# Patient Record
Sex: MALE | Race: WHITE | NOT HISPANIC OR LATINO | Employment: OTHER | URBAN - METROPOLITAN AREA
[De-identification: names, ages, dates, MRNs, and addresses within clinical notes are randomized per-mention and may not be internally consistent; named-entity substitution may affect disease eponyms.]

---

## 2019-03-04 ENCOUNTER — HOSPITAL ENCOUNTER (OUTPATIENT)
Facility: HOSPITAL | Age: 74
Setting detail: HOSPITAL OUTPATIENT SURGERY
End: 2019-03-04
Attending: UROLOGY | Admitting: UROLOGY

## 2019-03-05 RX ORDER — GENTAMICIN SULFATE 80 MG/100ML
80 INJECTION, SOLUTION INTRAVENOUS ONCE
Status: CANCELLED | OUTPATIENT
Start: 2019-03-06

## 2019-03-06 ENCOUNTER — ANESTHESIA EVENT (OUTPATIENT)
Dept: PERIOP | Facility: HOSPITAL | Age: 74
End: 2019-03-06

## 2019-03-06 ENCOUNTER — ANESTHESIA (OUTPATIENT)
Dept: PERIOP | Facility: HOSPITAL | Age: 74
End: 2019-03-06

## 2019-03-06 RX ORDER — LIDOCAINE HYDROCHLORIDE 10 MG/ML
0.5 INJECTION, SOLUTION EPIDURAL; INFILTRATION; INTRACAUDAL; PERINEURAL ONCE AS NEEDED
Status: CANCELLED | OUTPATIENT
Start: 2019-03-06

## 2019-03-06 RX ORDER — SODIUM CHLORIDE 0.9 % (FLUSH) 0.9 %
3-10 SYRINGE (ML) INJECTION AS NEEDED
Status: CANCELLED | OUTPATIENT
Start: 2019-03-06

## 2019-03-06 RX ORDER — FAMOTIDINE 20 MG/1
20 TABLET, FILM COATED ORAL ONCE
Status: CANCELLED | OUTPATIENT
Start: 2019-03-06 | End: 2019-03-06

## 2019-03-06 RX ORDER — SODIUM CHLORIDE, SODIUM LACTATE, POTASSIUM CHLORIDE, CALCIUM CHLORIDE 600; 310; 30; 20 MG/100ML; MG/100ML; MG/100ML; MG/100ML
9 INJECTION, SOLUTION INTRAVENOUS CONTINUOUS
Status: CANCELLED | OUTPATIENT
Start: 2019-03-06

## 2019-03-06 RX ORDER — SODIUM CHLORIDE 0.9 % (FLUSH) 0.9 %
3 SYRINGE (ML) INJECTION EVERY 12 HOURS SCHEDULED
Status: CANCELLED | OUTPATIENT
Start: 2019-03-06

## 2019-03-06 RX ORDER — FAMOTIDINE 10 MG/ML
20 INJECTION, SOLUTION INTRAVENOUS ONCE
Status: CANCELLED | OUTPATIENT
Start: 2019-03-06 | End: 2019-03-06

## 2020-05-12 ENCOUNTER — OFFICE VISIT (OUTPATIENT)
Dept: UROLOGY | Facility: CLINIC | Age: 75
End: 2020-05-12

## 2020-05-12 VITALS — TEMPERATURE: 98.5 F | HEIGHT: 68 IN | BODY MASS INDEX: 33.37 KG/M2 | WEIGHT: 220.2 LBS

## 2020-05-12 DIAGNOSIS — N31.9 NEUROGENIC BLADDER: ICD-10-CM

## 2020-05-12 DIAGNOSIS — C61 PROSTATE CANCER (HCC): Primary | ICD-10-CM

## 2020-05-12 DIAGNOSIS — N35.914 ANTERIOR URETHRAL STRICTURE: ICD-10-CM

## 2020-05-12 LAB
BILIRUB BLD-MCNC: NEGATIVE MG/DL
CLARITY, POC: ABNORMAL
COLOR UR: YELLOW
GLUCOSE UR STRIP-MCNC: NEGATIVE MG/DL
KETONES UR QL: NEGATIVE
LEUKOCYTE EST, POC: ABNORMAL
NITRITE UR-MCNC: POSITIVE MG/ML
PH UR: 6 [PH] (ref 5–8)
PROT UR STRIP-MCNC: NEGATIVE MG/DL
RBC # UR STRIP: ABNORMAL /UL
SP GR UR: 1.01 (ref 1–1.03)
UROBILINOGEN UR QL: NORMAL

## 2020-05-12 PROCEDURE — 81003 URINALYSIS AUTO W/O SCOPE: CPT | Performed by: UROLOGY

## 2020-05-12 PROCEDURE — 99204 OFFICE O/P NEW MOD 45 MIN: CPT | Performed by: UROLOGY

## 2020-05-12 RX ORDER — TROSPIUM CHLORIDE ER 60 MG/1
CAPSULE ORAL
COMMUNITY
Start: 2020-02-03 | End: 2020-07-07 | Stop reason: SDUPTHER

## 2020-05-12 NOTE — PROGRESS NOTES
Mr. Tam is 74 y.o. male    CHIEF COMPLAINT: PSA recurrence after EBRT.  HPI  A medical record summary is included below under the data section.    Prostate cancer   He was initially diagnosed with prostate cancer about  20 year(s) ago. This was identified in the context of elevated psa.  Severity of the disease is best described as biochemical recurrence after definitive therapy. Previous or current management includes external beam radiotherapy and androgen ablation therapy. Associated symptoms include urinary incontinence, urgency, frequency, poor urinary stream and incomplete bladder emptying. Currently his PSA is 22.25 ng/ml.     PSA  DATE  22.26  10/05/2016  <0.13  07/17/2017  7.91  10/17/2018  19.29  03/19/2020  22.25  04/07/2020    Other issues to be evaluated/managed today:  -Neurogenic bladder/anterior urethral stricture: Patient currently manages this with intermittent catheterization up to 7 times daily.  He still has moderate leakage.  He has been offered Botox therapy and continues to follow-up with Dr. Campa.  He is able to pass the catheters without difficulty.      The following portions of the patient's history were reviewed and updated as appropriate: allergies, current medications, past family history, past medical history, past social history, past surgical history and problem list.      Review of Systems   Constitutional: Negative for appetite change and fever.   HENT: Negative for hearing loss and sore throat.    Eyes: Negative for pain and redness.   Respiratory: Negative for cough and shortness of breath.    Cardiovascular: Negative for chest pain and leg swelling.   Gastrointestinal: Negative for anal bleeding, nausea and vomiting.   Endocrine: Negative for cold intolerance and heat intolerance.   Genitourinary: Negative for dysuria, flank pain, frequency, hematuria and urgency.   Musculoskeletal: Negative for joint swelling and myalgias.   Skin: Negative for color change and rash.    Allergic/Immunologic: Negative for immunocompromised state.   Neurological: Negative for dizziness and speech difficulty.   Hematological: Negative for adenopathy. Does not bruise/bleed easily.   Psychiatric/Behavioral: Negative for dysphoric mood and suicidal ideas.         Current Outpatient Medications:   •  dutasteride (AVODART) 0.5 MG capsule, Take 0.5 mg by mouth Daily., Disp: , Rfl:   •  JANUVIA 100 MG tablet, Take 100 mg by mouth Daily., Disp: , Rfl:   •  lisinopril-hydrochlorothiazide (PRINZIDE,ZESTORETIC) 20-12.5 MG per tablet, Take 20 tablets by mouth Daily., Disp: , Rfl:   •  rosuvastatin (CRESTOR) 20 MG tablet, Take 20 mg by mouth Daily., Disp: , Rfl:   •  trospium 60 MG capsule sustained-release 24 hr capsule, trospium ER 60 mg capsule,extended release 24 hr, Disp: , Rfl:   •  bicalutamide (CASODEX) 50 MG chemo tablet, Take 50 mg by mouth Daily., Disp: , Rfl:   •  doxycycline (VIBRAMYCIN) 100 MG capsule, Take 100 mg by mouth 2 (Two) Times a Day., Disp: , Rfl:   •  MYRBETRIQ 25 MG tablet sustained-release 24 hour, Take 25 mg by mouth Daily., Disp: , Rfl:   •  phenazopyridine (PYRIDIUM) 200 MG tablet, Take 200 mg by mouth As Needed., Disp: , Rfl:   •  sulfamethoxazole-trimethoprim (BACTRIM DS,SEPTRA DS) 800-160 MG per tablet, Take 800 tablets by mouth 2 (Two) Times a Day., Disp: , Rfl:   •  tamsulosin (FLOMAX) 0.4 MG capsule 24 hr capsule, Take 0.4 mg by mouth Daily., Disp: , Rfl:     Past Medical History:   Diagnosis Date   • Arrhythmia    • Cancer (CMS/HCC)    • Diabetes mellitus (CMS/HCC)    • Hyperlipidemia    • Hypertension        Past Surgical History:   Procedure Laterality Date   • CATARACT EXTRACTION     • KIDNEY STONE SURGERY     • TONSILLECTOMY         Social History     Socioeconomic History   • Marital status:      Spouse name: Not on file   • Number of children: Not on file   • Years of education: Not on file   • Highest education level: Not on file   Tobacco Use   • Smoking  "status: Never Smoker   • Smokeless tobacco: Never Used   Substance and Sexual Activity   • Alcohol use: No   • Drug use: No   • Sexual activity: Defer       Family History   Problem Relation Age of Onset   • Dementia Mother          Temp 98.5 °F (36.9 °C)   Ht 172.7 cm (68\")   Wt 99.9 kg (220 lb 3.2 oz)   BMI 33.48 kg/m²       Physical Exam  Constitutional: Well nourished, Well developed; No apparent distress; Vital reviewed as above  Psychiatric: Appropriate affect; Alert and oriented  Eyes: Unremarkable  Musculoskeletal: Normal gait and station  GI: Abdomen is soft, non-tender  Respiratory: No distress; Unlabored movement; No accessory musculature needed with symmetric movements  Skin: No pallor or diaphoresis  Lymphatic: No adenopathy neck or groin        International Prostate Symptom Score  The following is posted based on patient questionnaire answers:  0 - not at all    1-7 mild symptoms  1- Less than one time in five  8-19 moderate symptoms  2 -Less than half the time  20-35 severe symptoms  3 - About half the time  4 - More than half the time  5 - Almost always     For following sections:  Incomplete Emptying: - How often have you had the sensation  of not emptying your bladder completely after you finished urinating?  5  Frequency: -How often have you had to urinate again less than   two hours after you finished urinating?      5  Intermittency: -How often have you found you stopped and started again  Several times when you urinate?       2  Urgency: -How often do you find it difficult to postpone urination?             4  Weak stream: - How often have you had a weak urinary stream?             5  Straining: - How often have you had to push or strain to begin  Urination?          5  Sleeping: -How many times did you most typically get up to urinate   From the time you went to bed at night until the time you got up in the   3  Morning          Total `  29    Quality of Life  How would you feel if you had " "to live with your urinary condition the way   6  It is now, no better, no worse for the rest of your life?    Where: 0=delighted; 1= pleased, 2= mostly satisfied, 3= mixed, 4 = mostly  Dissatisfied, 5= Unhappy, 6 = terrible    Medical Records Summary:  Available to me today are  medical records from  [facility], [facility], & [facility] that can be summarized as follows:     -Approximately 2000: There are no records with the dates of when this gentleman underwent EBRT with Casodex monotherapy.  However it is consistently repeated that he had it in the history of several physicians' notes.  -10/2016: Patient PSA jameson to 22.26.  Bone scan and CT scan of the abdomen and pelvis were negative for prostate cancer.  Dr. Campa's note indicates that the patient was started on Lupron and Casodex at the time.  The patient denies getting Lupron.  The chart gives a good history by her that she reviewed Dr. Weller notes which confirmed that he had a total of 7 months of ADT therapy using Lupron.  -03/2017: The patient presented to [facility] with an obstructed, impacted right ureteral stone that was managed by Dr. Armando Colon.   -07/2017: Initial visit with Dr. Campa at [facility].  She saw him for neurogenic bladder. PSA >0.13 after Casodex and possibly leuprolide.   Patient did undergo urodynamic study which revealed a neurogenic bladder \"possibly secondary to radiation and diabetes mellitus 0.2 with overflow incontinence.  While he is done well with the bladder emptying using Klingerman catheterization it appears as though he has failed both Vesicare and Myrbetriq.  -09/2018: Patient decided against having Botox.  He was placed on trospium at 20 mg twice daily.  - 11/2018: Patient presented to emergency room with acute urinary retention.  He was managed by Dr. Frausto at the time.  A severe urethral stricture was noted.  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  He dilated this and the " patient has been on clean remittent catheterization since.  -03/2019: Seen by Dr. Wilson at [facility].  Cystoscopy is recommended.  I have no record of those results.    Assessment and Plan  Diagnoses and all orders for this visit:    Prostate cancer (CMS/[facility])  -     POC Urinalysis Dipstick, Multipro  -     Ambulatory Referral to Oncology  -     NM Bone Scan Whole Body; Future  -     CT Abdomen Pelvis With Contrast; Future    Neurogenic bladder    Anterior urethral stricture       -From prostate cancer standpoint I believe that he needs to be treated with leuprolide.  With regard to antiandrogen therapy versus second line hormonal therapy I would like to get oncology input.  Casodex withdrawal and monotherapy has not been reported on as far as I can tell.  He will be a reasonable candidate for Erleada (apalutamide) if his CT scan of abdomen pelvis and bone scan are negative which I have ordered.  -His neurogenic bladder is stable at this time on intermittent catheterization up to 7 times daily.  He will continue his tamsulosin and trospium.  -At present time he is having no difficulty passing a catheter.      (Please note that portions of this note were completed with a voice recognition program.)  Matthew Oneil MD  05/12/20  15:52

## 2020-05-12 NOTE — PATIENT INSTRUCTIONS

## 2020-05-22 ENCOUNTER — APPOINTMENT (OUTPATIENT)
Dept: CT IMAGING | Facility: HOSPITAL | Age: 75
End: 2020-05-22

## 2020-05-22 ENCOUNTER — APPOINTMENT (OUTPATIENT)
Dept: NUCLEAR MEDICINE | Facility: HOSPITAL | Age: 75
End: 2020-05-22

## 2020-06-11 ENCOUNTER — HOSPITAL ENCOUNTER (OUTPATIENT)
Dept: CT IMAGING | Facility: HOSPITAL | Age: 75
Discharge: HOME | End: 2020-06-11
Admitting: UROLOGY

## 2020-06-11 ENCOUNTER — HOSPITAL ENCOUNTER (OUTPATIENT)
Dept: NUCLEAR MEDICINE | Facility: HOSPITAL | Age: 75
Discharge: HOME | End: 2020-06-11

## 2020-06-11 DIAGNOSIS — C61 PROSTATE CANCER (HCC): ICD-10-CM

## 2020-06-11 LAB — CREAT BLDA-MCNC: 0.8 MG/DL (ref 0.6–1.3)

## 2020-06-11 PROCEDURE — 0 TECHNETIUM OXIDRONATE KIT: Performed by: UROLOGY

## 2020-06-11 PROCEDURE — 78306 BONE IMAGING WHOLE BODY: CPT

## 2020-06-11 PROCEDURE — 25010000002 IOPAMIDOL 61 % SOLUTION: Performed by: UROLOGY

## 2020-06-11 PROCEDURE — 82565 ASSAY OF CREATININE: CPT

## 2020-06-11 PROCEDURE — 74177 CT ABD & PELVIS W/CONTRAST: CPT

## 2020-06-11 PROCEDURE — A9561 TC99M OXIDRONATE: HCPCS | Performed by: UROLOGY

## 2020-06-11 RX ADMIN — IOPAMIDOL 50 ML: 612 INJECTION, SOLUTION INTRAVENOUS at 10:29

## 2020-06-11 RX ADMIN — TECHNETIUM TC 99M OXIDRONATE 1 DOSE: 3.15 INJECTION, POWDER, LYOPHILIZED, FOR SOLUTION INTRAVENOUS at 10:45

## 2020-06-11 RX ADMIN — IOPAMIDOL 100 ML: 612 INJECTION, SOLUTION INTRAVENOUS at 10:29

## 2020-06-22 NOTE — PROGRESS NOTES
[facility]  [address]  [place] [address] [phone]    Patient Name: Simeon Tam  Encounter Date: 06/29/2020  YOB: 1945  Patient Number: [id number]    Initial Note    REASON FOR CONSULTATION: Simeon Tam is a pleasant 75 y.o.  male referred by Dr. Matthew Oneil for management recommendations for prostate cancer.  He is seen with spouse, Essence.  History is obtained from patient. History is considered to be accurate.    HISTORY OF PRESENT ILLNESS: He presented with rising PSA of 13 and was diagnosed with prostate cancer about 25 years ago. He was treated with adjuvant radiation at [place] and androgen ablation with Casodex.    Previous PSA was 22.26 on 10/05/2016, <0.13 on 07/17/2017, 7.91 on 10/17/2018, 19.29 on 03/19/2020, and 22.25 on 04/07/2020.     He was seen by Dr. Oneil 05/12/2020. Latest PSA was 22.25 ng/ml on 04/07/2020.  Previous PSA 22.26 on 10/05/2016.  Bone scan and CT scan of the abdomen and pelvis were negative for prostate cancer on 10/2016.  Started on Lupron and Casodex. He had 7 months of ADT therapy with Lupron. PSA  Was down to <0.13 on 07/17/207.  Plan for apalutamide after staging scans.     CT abdomen and pelvis 06/11/2020. No evidence of metastatic disease in the abdomen or pelvis. Right renal pelvis and ureter urothelial thickening and hyperemia. Urinary bladder wall is thickened and there is adjacent inflammation. Recommend correlation with urinalysis and exam to exclude cystitis with right-sided pyelitis.  Cirrhosis.  Tiny 8 mm hypodense RIGHT inferior liver lesion on image 33 is too small to characterize.    Bone scan 06/11/2020. No evidence of bone metastases.    With the above background, he is seen.     LABS    No results for input(s): HGB, HCT, MCV, WBC, RDW, MPV, PLT, AUTOIGPER, NEUTROABS, LYMPHSABS, MONOSABS, EOSABS, BASOSABS, AUTOIGNUM, NRBC,  NEUTRELPCTM, MONOPCT, BASOPCT, ATYLMPCT, ANISOCYTOSIS, GIANTPLTS in the last 54164 hours.    Invalid input(s): LYMPHOCPCT, ABCMORPH    Lab Results - Last 18 Months   Lab Units 06/11/20  1018   CREATININE mg/dL 0.80       No results for input(s): MSPIKE, KAPPALAMB, IGLFLC, URICACID, FREEKAPPAL, CEA, LDH, REFLABREPO in the last 32892 hours.    No results for input(s): IRON, TIBC, LABIRON, FERRITIN, N6QYADX, TSH, FOLATE in the last 15166 hours.    Invalid input(s): VITB12      PAST MEDICAL HISTORY:  ALLERGIES:  No Known Allergies  CURRENT MEDICATIONS:  Outpatient Encounter Medications as of 6/29/2020   Medication Sig Dispense Refill   • dutasteride (AVODART) 0.5 MG capsule Take 0.5 mg by mouth Daily.     • JANUVIA 100 MG tablet Take 100 mg by mouth Daily.     • lisinopril-hydrochlorothiazide (PRINZIDE,ZESTORETIC) 20-12.5 MG per tablet Take 20 tablets by mouth Daily.     • rosuvastatin (CRESTOR) 20 MG tablet Take 20 mg by mouth Daily.     • trospium 60 MG capsule sustained-release 24 hr capsule trospium ER 60 mg capsule,extended release 24 hr     • bicalutamide (CASODEX) 50 MG chemo tablet Take 50 mg by mouth Daily.     • doxycycline (VIBRAMYCIN) 100 MG capsule Take 100 mg by mouth 2 (Two) Times a Day.     • phenazopyridine (PYRIDIUM) 200 MG tablet Take 200 mg by mouth As Needed.     • sulfamethoxazole-trimethoprim (BACTRIM DS,SEPTRA DS) 800-160 MG per tablet Take 800 tablets by mouth 2 (Two) Times a Day.     • tamsulosin (FLOMAX) 0.4 MG capsule 24 hr capsule Take 0.4 mg by mouth Daily.       No facility-administered encounter medications on file as of 6/29/2020.      ADULT ILLNESSES:  Patient Active Problem List   Diagnosis Code   • Hypertension I10   • Hyperlipidemia E78.5   • Diabetes mellitus (CMS/HCC) E11.9   • Cancer (CMS/HCC) C80.1   • Arrhythmia I49.9   • Non morbid obesity due to excess calories E66.09     SURGERIES:  Past Surgical History:   Procedure Laterality Date   • CATARACT EXTRACTION     • KIDNEY STONE  "SURGERY     • TONSILLECTOMY       HEALTH MAINTENANCE ITEMS:  Health Maintenance Due   Topic Date Due   • URINE MICROALBUMIN  1945   • TDAP/TD VACCINES (1 - Tdap) 06/10/1954   • ZOSTER VACCINE (1 of 2) 06/10/1993   • Pneumococcal Vaccine Once at 65 Years Old  06/10/2008   • HEPATITIS C SCREENING  05/11/2020   • MEDICARE ANNUAL WELLNESS  05/11/2020   • LIPID PANEL  05/11/2020   • HEMOGLOBIN A1C  05/11/2020   • DIABETIC EYE EXAM  05/11/2020       <no information>  Last Completed Colonoscopy     Patient has no health maintenance due at this time          There is no immunization history on file for this patient.  Last Completed Mammogram     Patient has no health maintenance due at this time            FAMILY HISTORY:  Family History   Problem Relation Age of Onset   • Dementia Mother      SOCIAL HISTORY:  Social History     Socioeconomic History   • Marital status:      Spouse name: Not on file   • Number of children: Not on file   • Years of education: Not on file   • Highest education level: Not on file   Tobacco Use   • Smoking status: Never Smoker   • Smokeless tobacco: Never Used   Substance and Sexual Activity   • Alcohol use: No   • Drug use: No   • Sexual activity: Defer       REVIEW OF SYSTEMS:  Review of Systems   Constitutional: Negative for chills, diaphoresis, fatigue, fever and unexpected weight loss.        \"I feel fine.\"   HENT: Negative for congestion, hearing loss and trouble swallowing.    Eyes: Negative for redness and visual disturbance.   Respiratory: Negative for cough, shortness of breath and wheezing.    Cardiovascular: Positive for leg swelling. Negative for chest pain.   Gastrointestinal: Negative for abdominal pain, blood in stool, constipation, diarrhea, nausea and vomiting.   Endocrine: Negative for cold intolerance and heat intolerance.   Genitourinary: Positive for difficulty urinating. Negative for flank pain and hematuria.        Self catheterize.    Musculoskeletal: " "Negative for joint swelling and neck stiffness.   Skin: Negative for pallor.   Allergic/Immunologic: Negative for food allergies.   Neurological: Negative for tremors, speech difficulty, weakness and confusion.   Hematological: Negative for adenopathy. Does not bruise/bleed easily.   Psychiatric/Behavioral: Negative for agitation, hallucinations and sleep disturbance. The patient is not nervous/anxious.        /72   Pulse 87   Temp 99.5 °F (37.5 °C)   Resp 18   Ht 172.7 cm (68\")   Wt 98.2 kg (216 lb 8 oz)   SpO2 94%   BMI 32.92 kg/m²  Body surface area is 2.11 meters squared.  Pain Score    06/29/20 1323   PainSc: 0-No pain       Physical Exam:  Physical Exam   Constitutional: He is oriented to person, place, and time. He appears well-developed and well-nourished. No distress.   HENT:   Head: Normocephalic and atraumatic.   Eyes: EOM are normal. No scleral icterus.   Neck: Neck supple.   Cardiovascular: Normal rate and regular rhythm.   Pulmonary/Chest: Effort normal and breath sounds normal. He has no wheezes. He has no rales.   Abdominal: Bowel sounds are normal. There is no tenderness.   Musculoskeletal: He exhibits edema.   2 + bilateral leg edema. No erythema or calf tenderness.    Lymphadenopathy:     He has no cervical adenopathy.   Neurological: He is alert and oriented to person, place, and time.   Skin: Skin is warm and dry. He is not diaphoretic. No pallor.   Psychiatric: He has a normal mood and affect. His behavior is normal. Judgment and thought content normal.   Vitals reviewed.      Simeon Tam reports a pain score of 0.        Patient's Body mass index is 32.92 kg/m². BMI is above normal parameters. Recommendations include: referral to primary care.        ASSESSMENT:  1.  Prostate cancer.  PSA recurrence.   AJCC stage (TX, NX, M0)  Treatment status:Casodex and Lupron with PSA recurrence.  Start apalutamide and leuprolde (by Dr. Oneil).  2.  Performance status of 0.   3.  " "Cirrhosis.  4.  Neurogenic bladder.   5.  Anterior urethral stricture.  6.  Thrombocytopenia at 102 on 03/19/2020 likley from cirrhosis.  7.  Obesity BMI 32.9.      PLAN:  1.    Re:  Reason for the referral.    2.    Re:  Role of apalutamide for hormone refractory prostate cancer, non metastatic.  Aware of potential adverse effects especially arrhythmia, rash, seizures, thyroid dysfunction, hypothyroidism, fall and fractures.    3.     Re:  CT abdomen and pelvis report. No metastasis.  4.     Re:  Bone scan report. No metastasis.   5.    eRx for apalutamide 60 mg, take four tablets total dose 240 mg po daily, number, 120.  Take either with or without food.  Swallow tablets whole.  TSH before apalutamide.  No grapefruit juice or grapefruit.   6.    eRx for Compazine 10 mg p.o. every 4 hours as needed for nausea/vomiting, 60, 2 refills.    7.    Continue currently identified medications.  8.    Continue ongoing management per primary care physician and other specialists.  9.    Plan of care discussed with patient.  Understanding expressed.  Patient agreeable to proceed.  10.  Blood for TSH, CMP, PSA and CBC with differential today.  11.  Refer to PCP for obesity.  12.  Questions were answered to their satisfaction. \"I got you.\"   13.  Advance Care Planning   ACP discussion was declined by the patient. Patient does not have an advance directive, information provided.  14.  Return to office in 4 weeks with pre-office TSH, CMP, PSA and CBC with differential.      Thank you for the referral.       I spent 64 total minutes, face-to-face, caring for Simeon today.  Greater than 50% of this time involved counseling and/or coordination of care as documented within this note regarding the patient's illness(es), pros and cons of various treatment options, instructions and/or risk reduction.      Matthew Oneil MD  (Oniel Doshi MD)  Lucio Garcia MD  "

## 2020-06-29 ENCOUNTER — LAB (OUTPATIENT)
Dept: LAB | Facility: HOSPITAL | Age: 75
End: 2020-06-29

## 2020-06-29 ENCOUNTER — CONSULT (OUTPATIENT)
Dept: ONCOLOGY | Facility: CLINIC | Age: 75
End: 2020-06-29

## 2020-06-29 VITALS
RESPIRATION RATE: 18 BRPM | SYSTOLIC BLOOD PRESSURE: 136 MMHG | OXYGEN SATURATION: 94 % | BODY MASS INDEX: 32.81 KG/M2 | HEART RATE: 87 BPM | DIASTOLIC BLOOD PRESSURE: 72 MMHG | WEIGHT: 216.5 LBS | TEMPERATURE: 99.5 F | HEIGHT: 68 IN

## 2020-06-29 DIAGNOSIS — C61 PROSTATE CANCER (HCC): Primary | ICD-10-CM

## 2020-06-29 DIAGNOSIS — R53.83 FATIGUE, UNSPECIFIED TYPE: ICD-10-CM

## 2020-06-29 LAB
ALBUMIN SERPL-MCNC: 4.3 G/DL (ref 3.5–5.2)
ALBUMIN/GLOB SERPL: 1.4 G/DL
ALP SERPL-CCNC: 48 U/L (ref 39–117)
ALT SERPL W P-5'-P-CCNC: 26 U/L (ref 1–41)
ANION GAP SERPL CALCULATED.3IONS-SCNC: 14 MMOL/L (ref 5–15)
AST SERPL-CCNC: 28 U/L (ref 1–40)
BASOPHILS # BLD AUTO: 0.04 10*3/MM3 (ref 0–0.2)
BASOPHILS NFR BLD AUTO: 0.5 % (ref 0–1.5)
BILIRUB SERPL-MCNC: 0.6 MG/DL (ref 0.2–1.2)
BUN SERPL-MCNC: 11 MG/DL (ref 8–23)
BUN/CREAT SERPL: 15.7 (ref 7–25)
CALCIUM SPEC-SCNC: 9.6 MG/DL (ref 8.6–10.5)
CHLORIDE SERPL-SCNC: 98 MMOL/L (ref 98–107)
CO2 SERPL-SCNC: 26 MMOL/L (ref 22–29)
CREAT SERPL-MCNC: 0.7 MG/DL (ref 0.76–1.27)
DEPRECATED RDW RBC AUTO: 45.1 FL (ref 37–54)
EOSINOPHIL # BLD AUTO: 0.21 10*3/MM3 (ref 0–0.4)
EOSINOPHIL NFR BLD AUTO: 2.7 % (ref 0.3–6.2)
ERYTHROCYTE [DISTWIDTH] IN BLOOD BY AUTOMATED COUNT: 14 % (ref 12.3–15.4)
GFR SERPL CREATININE-BSD FRML MDRD: 109 ML/MIN/1.73
GLOBULIN UR ELPH-MCNC: 3.1 GM/DL
GLUCOSE SERPL-MCNC: 145 MG/DL (ref 65–99)
HCT VFR BLD AUTO: 46.3 % (ref 37.5–51)
HGB BLD-MCNC: 15.8 G/DL (ref 13–17.7)
LYMPHOCYTES # BLD AUTO: 1.43 10*3/MM3 (ref 0.7–3.1)
LYMPHOCYTES NFR BLD AUTO: 18.4 % (ref 19.6–45.3)
MCH RBC QN AUTO: 30.4 PG (ref 26.6–33)
MCHC RBC AUTO-ENTMCNC: 34.1 G/DL (ref 31.5–35.7)
MCV RBC AUTO: 89 FL (ref 79–97)
MONOCYTES # BLD AUTO: 0.79 10*3/MM3 (ref 0.1–0.9)
MONOCYTES NFR BLD AUTO: 10.2 % (ref 5–12)
NEUTROPHILS NFR BLD AUTO: 5.26 10*3/MM3 (ref 1.7–7)
NEUTROPHILS NFR BLD AUTO: 67.8 % (ref 42.7–76)
PLATELET # BLD AUTO: 121 10*3/MM3 (ref 140–450)
PMV BLD AUTO: 10.1 FL (ref 6–12)
POTASSIUM SERPL-SCNC: 3.8 MMOL/L (ref 3.5–5.2)
PROT SERPL-MCNC: 7.4 G/DL (ref 6–8.5)
RBC # BLD AUTO: 5.2 10*6/MM3 (ref 4.14–5.8)
SODIUM SERPL-SCNC: 138 MMOL/L (ref 136–145)
TSH SERPL DL<=0.05 MIU/L-ACNC: 2.08 UIU/ML (ref 0.27–4.2)
WBC # BLD AUTO: 7.76 10*3/MM3 (ref 3.4–10.8)

## 2020-06-29 PROCEDURE — 84153 ASSAY OF PSA TOTAL: CPT | Performed by: INTERNAL MEDICINE

## 2020-06-29 PROCEDURE — 80053 COMPREHEN METABOLIC PANEL: CPT | Performed by: INTERNAL MEDICINE

## 2020-06-29 PROCEDURE — 85025 COMPLETE CBC W/AUTO DIFF WBC: CPT | Performed by: INTERNAL MEDICINE

## 2020-06-29 PROCEDURE — 84443 ASSAY THYROID STIM HORMONE: CPT | Performed by: INTERNAL MEDICINE

## 2020-06-29 PROCEDURE — 36415 COLL VENOUS BLD VENIPUNCTURE: CPT | Performed by: INTERNAL MEDICINE

## 2020-06-29 PROCEDURE — 99205 OFFICE O/P NEW HI 60 MIN: CPT | Performed by: INTERNAL MEDICINE

## 2020-06-30 ENCOUNTER — SPECIALTY PHARMACY (OUTPATIENT)
Dept: ONCOLOGY | Facility: HOSPITAL | Age: 75
End: 2020-06-30

## 2020-06-30 DIAGNOSIS — C61 PROSTATE CANCER (HCC): Primary | ICD-10-CM

## 2020-06-30 LAB — PSA SERPL-MCNC: 27.3 NG/ML (ref 0–4)

## 2020-06-30 RX ORDER — PROCHLORPERAZINE MALEATE 10 MG
10 TABLET ORAL EVERY 4 HOURS PRN
Qty: 30 TABLET | Refills: 5 | Status: SHIPPED | OUTPATIENT
Start: 2020-06-30 | End: 2021-04-20

## 2020-07-01 ENCOUNTER — TELEPHONE (OUTPATIENT)
Dept: ONCOLOGY | Facility: CLINIC | Age: 75
End: 2020-07-01

## 2020-07-01 NOTE — TELEPHONE ENCOUNTER
----- Message from Deshawn Black MD sent at 6/29/2020  2:20 PM CDT -----  Platelet 121, notify patient, stable for observation.

## 2020-07-06 NOTE — PROGRESS NOTES
[facility]    Oral Oncology Initial Consult      Consult Details  Consulting Provider:   Deshawn Black MD ([facility])   Medication and Regimen:  Apalutamide (Erleada) 60 mg tab - 240 mg daily (#120) x 6 cycles     Script Review and Evaluation:  Dosing & Interactions:   Reviewed, appropriate and no significant interaction noted at this time..   Oral Treatment Plan Signed?: Yes   Oral Treatment Plan Released?: Yes, released on 6/30/20.   Specialty Pharmacy Selected:  [facility]   Prior Authorization Status: Denied - Appeal Pending   Scheduled Counseling Date: 7/6/20   Predicted Prescription Fill Date: This Week     Intervention(s):                  Treatment plan entered for physician review and signature.  Medications reviewed for interactions.  Prior authorization process started with initial rejection, resubmission with transfer to [facility] pharmacy resulted in approval with $6 copay.      Summary:    Contacted patient regarding plan and schedule counseling for tomorrow at 1300.     Viviana Muro, PharmD  06/30/2020 13:59

## 2020-07-07 ENCOUNTER — APPOINTMENT (OUTPATIENT)
Dept: ONCOLOGY | Facility: HOSPITAL | Age: 75
End: 2020-07-07

## 2020-07-07 ENCOUNTER — TELEPHONE (OUTPATIENT)
Dept: UROLOGY | Facility: CLINIC | Age: 75
End: 2020-07-07

## 2020-07-07 DIAGNOSIS — N31.9 NEUROGENIC BLADDER: Primary | ICD-10-CM

## 2020-07-07 RX ORDER — TROSPIUM CHLORIDE ER 60 MG/1
60 CAPSULE ORAL EVERY MORNING
Qty: 30 CAPSULE | Refills: 3 | Status: SHIPPED | OUTPATIENT
Start: 2020-07-07 | End: 2021-01-04 | Stop reason: SDUPTHER

## 2020-07-07 NOTE — TELEPHONE ENCOUNTER
----- Message from Matthew Johns MD sent at 7/7/2020  6:57 AM CDT -----  Regarding: RE: Trospium Refill  Yes they should be refilled.  If he does not think it is helping his symptoms though he could stop it.  ----- Message -----  From: Jonny Delvalle MA  Sent: 7/6/2020   3:08 PM CDT  To: Matthew Johns MD  Subject: Trospium Refill                                  You last saw in him May 2020 for Prostate Ca. He is also followed in [place]. Someone else has been prescribing his Trospium. Do you want to refill this medication please?      ----- Message -----  From: Lissett Weller  Sent: 7/2/2020   1:39 PM CDT  To: Jonny Delvalle MA  Subject: nat pt                                        Mr juarez is needing a refill on his trospium please. He said if he is suppose to keep taking them. He wasn't sure if dr johns still wanted him to take them. His call back number is [phone] thanks.

## 2020-07-07 NOTE — TELEPHONE ENCOUNTER
I sent in 3 refills to [facility] in [place]. I tried to call pt but vm box is full and unable to leave message.

## 2020-07-08 ENCOUNTER — SPECIALTY PHARMACY (OUTPATIENT)
Dept: ONCOLOGY | Facility: HOSPITAL | Age: 75
End: 2020-07-08

## 2020-07-08 NOTE — PROGRESS NOTES
[facility]     Oral Oncology Teaching and Consent      Patient Name (): Simeon Tam  (1945)   Oral Chemotherapy Regimen: Apalutamide 240 mg (four 60 mg tablets) PO once daily    Provider: Deshawn Black MD   Projected Start Date: TBD      Education and information provided to Patient and Family (Name: Essence, Relationship: wife).  Patient's wife also recorded conversation to play for daughter.     Counseling Points  Administration Instructions:  • Dosing regimen.  • Medication can be taken with or without food.  • Proper storage and handling.    Side Effects & Management  • Side effects are often individualized and are usually able to be managed with medication or non-pharmacologic strategies  • Importance of managing side effects preemptively and alerting provider if side effects are unable to be managed at home.    • Highlighted common side effects and management strategies  Fatigue* Stay active   Increased BP Monitor   Rash Monitor   Diarrhea Loperamide (Immodium) - take 2 tablets after each loose stool   Hot flashes Light clothing, stay in a cool environment, place a cool clot on your head   Swelling Monitor and notify provider   Lab abnormalities*  Low blood counts   Increased cholesterol, triglycerides, blood sugar, potassium.  Thyroid dysfunction. Monitor for fever and signs of infection, keep lab appointments   Joint Pain OTC APAP, Ibuprofen unless instructed to avoid by another provider.   Weight Loss Monitor, maintain good eating habits.   Fractures Monitor   Dizziness/Falls Monitor, if experiencing dizziness be cautious to avoid falls     *most common  • Current medications acceptable from an interaction standpoint, please alert of any changes as they can sometimes affect the medication. Generally herbal products are not recommended.  • Seizure history and potential for increased risk of seizure.  • Barrier method contraception is recommended.    Contact  Provider If:  • Any of the above side effects are not able to be managed at home, increase dramatically, or become intolerable.  • Signs and symptoms of infection (fever, chills, etc.), unusual bleeding or bruising, or confusion.  • Any unusual symptoms since starting this medication.    Materials Provided  • Handout from [email or web address] (Erleada Patient Handout) provided with annotations concerning side effect management recommendations.    Questions/Comments:  Did not see active order for Lupron at this time. Instructed patient to not initiate Erleada until Lupron therapy had been confirmed and scheduled.    Re-stated medication will be filled through [facility] as preferred by patient's insurance company.     Consent:  Consent reviewed, signed, and will be uploaded.     Follow-up  Will follow-up with patient prior to next scheduled fill to monitor for side effects.     Simeon Tam  7/8/2020 10:53

## 2020-07-13 ENCOUNTER — TELEPHONE (OUTPATIENT)
Dept: UROLOGY | Facility: CLINIC | Age: 75
End: 2020-07-13

## 2020-07-13 NOTE — TELEPHONE ENCOUNTER
Pt's wife called and left message that they are needing his trospium and dutasteride refilled at the [facility] in [place] call back number is [phone] thanks.

## 2020-07-14 ENCOUNTER — TELEPHONE (OUTPATIENT)
Dept: ONCOLOGY | Facility: CLINIC | Age: 75
End: 2020-07-14

## 2020-07-14 NOTE — TELEPHONE ENCOUNTER
"Essence Tam(spouse) calling to discuss with some general questions about patient's cancer. Said for example \"Is it fast growing or slow growing?\" Her callback is [phone]. Then would like to know if we could send a 'hard copy' of the questions and answers.  "

## 2020-07-15 DIAGNOSIS — N31.9 NEUROGENIC BLADDER: ICD-10-CM

## 2020-07-15 RX ORDER — DUTASTERIDE 0.5 MG/1
0.5 CAPSULE, LIQUID FILLED ORAL DAILY
Qty: 30 CAPSULE | Refills: 3 | Status: SHIPPED | OUTPATIENT
Start: 2020-07-15 | End: 2021-01-04 | Stop reason: SDUPTHER

## 2020-07-17 NOTE — TELEPHONE ENCOUNTER
Called wife back and instructed that Dr Black states that it is slow growing.  Wife also states that they have not received any Erleada yet.  Instructed that I will check into this and will call her back.  Wife v/u.

## 2020-07-22 NOTE — PROGRESS NOTES
[facility] HEMATOLOGY AND [facility]COLOGY  [address]  [place] [address] [phone]    Patient Name: Simeon Tam  Encounter Date: 07/29/2020  YOB: 1945  Patient Number: [id number]      REASON FOR FOLLOW-UP: Simeon Tam is a pleasant 75 y.o.  male  who is seen in follow-up for  hormone refractory prostate cancer.  He is seen C1D8 of apalutamide.  He is seen with spouse, Essence.  History is obtained from patient. History is considered to be accurate.        Oncology/Hematology History    DIAGNOSTIC ABNORMALITIES:  He presented with rising PSA of 13 and was diagnosed with prostate cancer about 25 years ago.   Previous PSA was 22.26 on 10/05/2016, <0.13 on 07/17/2017, 7.91 on 10/17/2018, 19.29 on 03/19/2020, and 22.25 on 04/07/2020.   He was seen by Dr. Oneil 05/12/2020. Latest PSA was 22.25 ng/ml on 04/07/2020.  Previous PSA 22.26 on 10/05/2016.  Bone scan and CT scan of the abdomen and pelvis were negative for prostate cancer on 10/2016.  Started on  PSA  Was down to <0.13 on 07/17/207.  Plan for apalutamide after staging scans.   CT abdomen and pelvis 06/11/2020. No evidence of metastatic disease in the abdomen or pelvis. Right renal pelvis and ureter urothelial thickening and hyperemia. Urinary bladder wall is thickened and there is adjacent inflammation. Recommend correlation with urinalysis and exam to exclude cystitis with right-sided pyelitis.  Cirrhosis.  Tiny 8 mm hypodense RIGHT inferior liver lesion on image 33 is too small to characterize.   Bone scan 06/11/2020. No evidence of bone metastases.        PREVIOUS INTERVENTIONS:  He was treated with adjuvant radiation at [place] and androgen ablation with Casodex.  Lupron and Casodex 10/2016. He had 7 months of ADT therapy with Lupron.  Apalutamide 07/22/2020 through present.        Prostate cancer (CMS/HCC)    6/29/2020 -  Chemotherapy     OP PROSTATE Apalutamide       6/30/2020 Initial Diagnosis     Prostate cancer (CMS/[facility])      7/24/2020 -  Chemotherapy     OP LEUPROLIDE Q6M         PAST MEDICAL HISTORY:  ALLERGIES:  No Known Allergies  CURRENT MEDICATIONS:  Outpatient Encounter Medications as of 7/29/2020   Medication Sig Dispense Refill   • Apalutamide 60 MG tablet Take 4 tablets by mouth Daily. Take 4 tablets once daily with or without food. Do not crush or chew tablets. 120 tablet 5   • doxycycline (VIBRAMYCIN) 100 MG capsule Take 100 mg by mouth 2 (Two) Times a Day.     • dutasteride (AVODART) 0.5 MG capsule Take 1 capsule by mouth Daily. 30 capsule 3   • JANUVIA 100 MG tablet Take 100 mg by mouth Daily.     • lisinopril-hydrochlorothiazide (PRINZIDE,ZESTORETIC) 20-12.5 MG per tablet Take 20 tablets by mouth Daily.     • phenazopyridine (PYRIDIUM) 200 MG tablet Take 200 mg by mouth As Needed.     • prochlorperazine (COMPAZINE) 10 MG tablet Take 1 tablet by mouth Every 4 (Four) Hours As Needed for Nausea or Vomiting. 30 tablet 5   • rosuvastatin (CRESTOR) 20 MG tablet Take 20 mg by mouth Daily.     • sulfamethoxazole-trimethoprim (BACTRIM DS,SEPTRA DS) 800-160 MG per tablet Take 800 tablets by mouth 2 (Two) Times a Day.     • tamsulosin (FLOMAX) 0.4 MG capsule 24 hr capsule Take 0.4 mg by mouth Daily.     • trospium 60 MG capsule sustained-release 24 hr capsule Take 1 capsule by mouth Every Morning. 30 capsule 3     No facility-administered encounter medications on file as of 7/29/2020.      ADULT ILLNESSES:  Patient Active Problem List   Diagnosis Code   • Hypertension I10   • Hyperlipidemia E78.5   • Diabetes mellitus (CMS/[facility]) E11.9   • Cancer (CMS/[facility]) C80.1   • Arrhythmia I49.9   • Non morbid obesity due to excess calories E66.09   • Prostate cancer (CMS/[facility]) C61     SURGERIES:  Past Surgical History:   Procedure Laterality Date   • CATARACT EXTRACTION     • KIDNEY STONE SURGERY     • TONSILLECTOMY       HEALTH MAINTENANCE ITEMS:  Health Maintenance Due   Topic Date  "Due   • URINE MICROALBUMIN  1945   • TDAP/TD VACCINES (1 - Tdap) 06/10/1954   • ZOSTER VACCINE (1 of 2) 06/10/1993   • Pneumococcal Vaccine Once at 65 Years Old  06/10/2008   • HEPATITIS C SCREENING  05/11/2020   • MEDICARE ANNUAL WELLNESS  05/11/2020   • LIPID PANEL  05/11/2020   • HEMOGLOBIN A1C  05/11/2020   • DIABETIC EYE EXAM  05/11/2020       <no information>  Last Completed Colonoscopy     Patient has no health maintenance due at this time          There is no immunization history on file for this patient.  Last Completed Mammogram     Patient has no health maintenance due at this time            FAMILY HISTORY:  Family History   Problem Relation Age of Onset   • Dementia Mother      SOCIAL HISTORY:  Social History     Socioeconomic History   • Marital status:      Spouse name: Not on file   • Number of children: Not on file   • Years of education: Not on file   • Highest education level: Not on file   Tobacco Use   • Smoking status: Never Smoker   • Smokeless tobacco: Never Used   Substance and Sexual Activity   • Alcohol use: No   • Drug use: No   • Sexual activity: Defer       REVIEW OF SYSTEMS:    Review of Systems   Constitutional: Negative for chills, diaphoresis, fatigue and fever.        \"I feel good.\" Tolerating apulutamide.   HENT: Negative for congestion, hearing loss and nosebleeds.    Eyes: Negative for redness and visual disturbance.   Respiratory: Negative for cough, shortness of breath and wheezing.    Cardiovascular: Negative for chest pain and palpitations.   Gastrointestinal: Negative for abdominal distention, abdominal pain, blood in stool, constipation, diarrhea and nausea.   Endocrine: Negative for cold intolerance and heat intolerance.   Genitourinary: Negative for difficulty urinating and hematuria.   Musculoskeletal: Negative for gait problem and neck stiffness.   Skin: Negative for pallor.   Allergic/Immunologic: Negative for food allergies.   Neurological: Negative for " "dizziness, speech difficulty and weakness.   Hematological: Negative for adenopathy. Does not bruise/bleed easily.   Psychiatric/Behavioral: Negative for agitation, confusion and hallucinations.       VITAL SIGNS: /70   Pulse 93   Temp 98.6 °F (37 °C)   Resp 18   Ht 172.7 cm (68\")   Wt 98.2 kg (216 lb 8 oz)   SpO2 96%   BMI 32.92 kg/m²   Pain Score    07/29/20 1419   PainSc: 0-No pain       PHYSICAL EXAMINATION:     Physical Exam   Constitutional: He is oriented to person, place, and time. He appears well-developed and well-nourished. No distress.   HENT:   Head: Normocephalic and atraumatic.   Eyes: No scleral icterus.   Neck: Neck supple.   Cardiovascular: Normal rate and regular rhythm.   Pulmonary/Chest: Effort normal and breath sounds normal. He has no wheezes. He has no rales.   Abdominal: Soft. Bowel sounds are normal. There is no tenderness.   Musculoskeletal: He exhibits no edema.   Lymphadenopathy:     He has no cervical adenopathy.   Neurological: He is alert and oriented to person, place, and time.   Skin: Skin is warm and dry. He is not diaphoretic.   Psychiatric: He has a normal mood and affect. His behavior is normal. Judgment and thought content normal.   Vitals reviewed.      LABS    Lab Results - Last 18 Months   Lab Units 06/29/20  1408   HEMOGLOBIN g/dL 15.8   HEMATOCRIT % 46.3   MCV fL 89.0   WBC 10*3/mm3 7.76   RDW % 14.0   MPV fL 10.1   PLATELETS 10*3/mm3 121*   NEUTROS ABS 10*3/mm3 5.26   LYMPHS ABS 10*3/mm3 1.43   MONOS ABS 10*3/mm3 0.79   EOS ABS 10*3/mm3 0.21   BASOS ABS 10*3/mm3 0.04       Lab Results - Last 18 Months   Lab Units 06/29/20  1408 06/11/20  1018   GLUCOSE mg/dL 145*  --    SODIUM mmol/L 138  --    POTASSIUM mmol/L 3.8  --    CO2 mmol/L 26.0  --    CHLORIDE mmol/L 98  --    ANION GAP mmol/L 14.0  --    CREATININE mg/dL 0.70* 0.80   BUN mg/dL 11  --    BUN / CREAT RATIO  15.7  --    CALCIUM mg/dL 9.6  --    EGFR IF NONAFRICN AM mL/min/1.73 109  --    ALK PHOS U/L " 48  --    TOTAL PROTEIN g/dL 7.4  --    ALT (SGPT) U/L 26  --    AST (SGOT) U/L 28  --    BILIRUBIN mg/dL 0.6  --    ALBUMIN g/dL 4.30  --    GLOBULIN gm/dL 3.1  --        No results for input(s): MSPIKE, KAPPALAMB, IGLFLC, URICACID, FREEKAPPAL, CEA, LDH, REFLABREPO in the last 15379 hours.    Lab Results - Last 18 Months   Lab Units 06/29/20  1408   TSH uIU/mL 2.080       Simeon Tam reports a pain score of 0.        Patient's Body mass index is 32.92 kg/m². BMI is above normal parameters. Recommendations include: referral to primary care.        ASSESSMENT:  1.  Prostate cancer.  PSA recurrence.   AJCC stage (TX, NX, M0)  Treatment status:Casodex and Lupron with PSA recurrence.  On apalutamide and leuprolde (by Dr. Oneil).  2.  Performance status of 0.   3.  Cirrhosis.  4.  Neurogenic bladder.   5.  Anterior urethral stricture.  6.  Thrombocytopenia at 102 on 03/19/2020 likley from cirrhosis.  7.  Obesity BMI 32.92.        PLAN:  1.    Re:  Tolerance to apalutamide.    2.    Re:   Heme status.  None.  3.    Re:   Pre-office CMP and TSH.  None.   4.    Re:   Pre-office PSA.  None.   5.   eRx for C2D1 on 08/19/2020 apalutamide 60 mg, take four tablets total dose 240 mg po daily, number, 120.  Take either with or without food.  Swallow tablets whole.  TSH before apalutamide.  No grapefruit juice or grapefruit.   Monitor for adverse effects especially arrhythmia, rash, seizures, thyroid dysfunction, hypothyroidism, fall and fractures.  6.    eRx for Compazine 10 mg p.o. every 4 hours as needed for nausea/vomiting, 60, 2 refills if needed.    7.    Continue currently identified medications.  8.    Continue ongoing management per primary care physician and other specialists.  9.    Plan of care discussed with patient.  Understanding expressed.  Patient agreeable to proceed.  10.  Blood for TSH, CMP, PSA and CBC with differential in 2 weeks.  11.  Refer to PCP for obesity.  12.  Questions  "were answered to their satisfaction. \"We got you.\"   13.  Advance Care Planning  ACP discussion was declined by the patient. Patient does not have an advance directive, information provided.  14.  Return to office in 6 weeks with pre-office TSH, CMP, PSA and CBC with differential.  15.  Lupron per Dr. Oneil.          I spent 28 total minutes, face-to-face, caring for Simeon today.  Greater than 50% of this time involved counseling and/or coordination of care as documented within this note regarding the patient's illness(es), pros and cons of various treatment options, instructions and/or risk reduction.        Matthew Oneil MD  (Oniel Doshi MD)  Lucio Garcia MD  "

## 2020-07-29 ENCOUNTER — OFFICE VISIT (OUTPATIENT)
Dept: ONCOLOGY | Facility: CLINIC | Age: 75
End: 2020-07-29

## 2020-07-29 ENCOUNTER — INFUSION (OUTPATIENT)
Dept: ONCOLOGY | Facility: HOSPITAL | Age: 75
End: 2020-07-29

## 2020-07-29 VITALS
RESPIRATION RATE: 18 BRPM | SYSTOLIC BLOOD PRESSURE: 133 MMHG | OXYGEN SATURATION: 92 % | HEART RATE: 92 BPM | DIASTOLIC BLOOD PRESSURE: 62 MMHG | TEMPERATURE: 98.1 F

## 2020-07-29 VITALS
HEART RATE: 93 BPM | DIASTOLIC BLOOD PRESSURE: 70 MMHG | HEIGHT: 68 IN | TEMPERATURE: 98.6 F | OXYGEN SATURATION: 96 % | RESPIRATION RATE: 18 BRPM | WEIGHT: 216.5 LBS | SYSTOLIC BLOOD PRESSURE: 134 MMHG | BODY MASS INDEX: 32.81 KG/M2

## 2020-07-29 DIAGNOSIS — R53.83 FATIGUE, UNSPECIFIED TYPE: ICD-10-CM

## 2020-07-29 DIAGNOSIS — C61 PROSTATE CANCER (HCC): Primary | ICD-10-CM

## 2020-07-29 PROCEDURE — 25010000002 LEUPROLIDE ACETATE PER 7.5 MG: Performed by: UROLOGY

## 2020-07-29 PROCEDURE — 99214 OFFICE O/P EST MOD 30 MIN: CPT | Performed by: INTERNAL MEDICINE

## 2020-07-29 PROCEDURE — 96402 CHEMO HORMON ANTINEOPL SQ/IM: CPT

## 2020-07-29 RX ADMIN — LEUPROLIDE ACETATE 7.5 MG: KIT at 15:29

## 2020-07-29 NOTE — PATIENT INSTRUCTIONS
Leuprolide depot injection  What is this medicine?  LEUPROLIDE (loo PROE lide) is a man-made protein that acts like a natural hormone in the body. It decreases testosterone in men and decreases estrogen in women. In men, this medicine is used to treat advanced prostate cancer. In women, some forms of this medicine may be used to treat endometriosis, uterine fibroids, or other female hormone-related problems.  This medicine may be used for other purposes; ask your health care provider or pharmacist if you have questions.  COMMON BRAND NAME(S): Eligard, Fensolv, Lupron Depot, Lupron Depot-Ped, Viadur  What should I tell my health care provider before I take this medicine?  They need to know if you have any of these conditions:  · diabetes  · heart disease or previous heart attack  · high blood pressure  · high cholesterol  · mental illness  · osteoporosis  · pain or difficulty passing urine  · seizures  · spinal cord metastasis  · stroke  · suicidal thoughts, plans, or attempt; a previous suicide attempt by you or a family member  · tobacco smoker  · unusual vaginal bleeding (women)  · an unusual or allergic reaction to leuprolide, benzyl alcohol, other medicines, foods, dyes, or preservatives  · pregnant or trying to get pregnant  · breast-feeding  How should I use this medicine?  This medicine is for injection into a muscle or for injection under the skin. It is given by a health care professional in a hospital or clinic setting. The specific product will determine how it will be given to you. Make sure you understand which product you receive and how often you will receive it.  Talk to your pediatrician regarding the use of this medicine in children. Special care may be needed.  Overdosage: If you think you have taken too much of this medicine contact a poison control center or emergency room at once.  NOTE: This medicine is only for you. Do not share this medicine with others.  What if I miss a dose?  It is  important not to miss a dose. Call your doctor or health care professional if you are unable to keep an appointment.  Depot injections: Depot injections are given either once-monthly, every 12 weeks, every 16 weeks, or every 24 weeks depending on the product you are prescribed. The product you are prescribed will be based on if you are male or female, and your condition. Make sure you understand your product and dosing.  What may interact with this medicine?  Do not take this medicine with any of the following medications:  · chasteberry  This medicine may also interact with the following medications:  · herbal or dietary supplements, like black cohosh or DHEA  · female hormones, like estrogens or progestins and birth control pills, patches, rings, or injections  · male hormones, like testosterone  This list may not describe all possible interactions. Give your health care provider a list of all the medicines, herbs, non-prescription drugs, or dietary supplements you use. Also tell them if you smoke, drink alcohol, or use illegal drugs. Some items may interact with your medicine.  What should I watch for while using this medicine?  Visit your doctor or health care professional for regular checks on your progress. During the first weeks of treatment, your symptoms may get worse, but then will improve as you continue your treatment. You may get hot flashes, increased bone pain, increased difficulty passing urine, or an aggravation of nerve symptoms. Discuss these effects with your doctor or health care professional, some of them may improve with continued use of this medicine.  Female patients may experience a menstrual cycle or spotting during the first months of therapy with this medicine. If this continues, contact your doctor or health care professional.  This medicine may increase blood sugar. Ask your healthcare provider if changes in diet or medicines are needed if you have diabetes.  What side effects may I  notice from receiving this medicine?  Side effects that you should report to your doctor or health care professional as soon as possible:  · allergic reactions like skin rash, itching or hives, swelling of the face, lips, or tongue  · breathing problems  · chest pain  · depression or memory disorders  · pain in your legs or groin  · pain at site where injected or implanted  · seizures  · severe headache  · signs and symptoms of high blood sugar such as being more thirsty or hungry or having to urinate more than normal. You may also feel very tired or have blurry vision  · swelling of the feet and legs  · suicidal thoughts or other mood changes  · visual changes  · vomiting  Side effects that usually do not require medical attention (report to your doctor or health care professional if they continue or are bothersome):  · breast swelling or tenderness  · decrease in sex drive or performance  · diarrhea  · hot flashes  · loss of appetite  · muscle, joint, or bone pains  · nausea  · redness or irritation at site where injected or implanted  · skin problems or acne  This list may not describe all possible side effects. Call your doctor for medical advice about side effects. You may report side effects to FDA at 3-836-FDA-8248.  Where should I keep my medicine?  This drug is given in a hospital or clinic and will not be stored at home.  NOTE: This sheet is a summary. It may not cover all possible information. If you have questions about this medicine, talk to your doctor, pharmacist, or health care provider.  © 2020 [employer] (2019-10-17 09:27:03)

## 2020-08-12 ENCOUNTER — LAB (OUTPATIENT)
Dept: LAB | Facility: HOSPITAL | Age: 75
End: 2020-08-12

## 2020-08-12 DIAGNOSIS — R53.83 FATIGUE, UNSPECIFIED TYPE: ICD-10-CM

## 2020-08-12 DIAGNOSIS — C61 PROSTATE CANCER (HCC): ICD-10-CM

## 2020-08-12 LAB
ALBUMIN SERPL-MCNC: 4.4 G/DL (ref 3.5–5.2)
ALBUMIN/GLOB SERPL: 1.4 G/DL
ALP SERPL-CCNC: 55 U/L (ref 39–117)
ALT SERPL W P-5'-P-CCNC: 34 U/L (ref 1–41)
ANION GAP SERPL CALCULATED.3IONS-SCNC: 13 MMOL/L (ref 5–15)
AST SERPL-CCNC: 39 U/L (ref 1–40)
BASOPHILS # BLD AUTO: 0.04 10*3/MM3 (ref 0–0.2)
BASOPHILS NFR BLD AUTO: 0.6 % (ref 0–1.5)
BILIRUB SERPL-MCNC: 0.4 MG/DL (ref 0–1.2)
BUN SERPL-MCNC: 14 MG/DL (ref 8–23)
BUN/CREAT SERPL: 18.2 (ref 7–25)
CALCIUM SPEC-SCNC: 10 MG/DL (ref 8.6–10.5)
CHLORIDE SERPL-SCNC: 94 MMOL/L (ref 98–107)
CO2 SERPL-SCNC: 28 MMOL/L (ref 22–29)
CREAT SERPL-MCNC: 0.77 MG/DL (ref 0.76–1.27)
DEPRECATED RDW RBC AUTO: 42.2 FL (ref 37–54)
EOSINOPHIL # BLD AUTO: 0.13 10*3/MM3 (ref 0–0.4)
EOSINOPHIL NFR BLD AUTO: 2 % (ref 0.3–6.2)
ERYTHROCYTE [DISTWIDTH] IN BLOOD BY AUTOMATED COUNT: 13.2 % (ref 12.3–15.4)
GFR SERPL CREATININE-BSD FRML MDRD: 98 ML/MIN/1.73
GLOBULIN UR ELPH-MCNC: 3.1 GM/DL
GLUCOSE SERPL-MCNC: 221 MG/DL (ref 65–99)
HCT VFR BLD AUTO: 44.8 % (ref 37.5–51)
HGB BLD-MCNC: 15.5 G/DL (ref 13–17.7)
LYMPHOCYTES # BLD AUTO: 1.13 10*3/MM3 (ref 0.7–3.1)
LYMPHOCYTES NFR BLD AUTO: 17.2 % (ref 19.6–45.3)
MCH RBC QN AUTO: 30.2 PG (ref 26.6–33)
MCHC RBC AUTO-ENTMCNC: 34.6 G/DL (ref 31.5–35.7)
MCV RBC AUTO: 87.2 FL (ref 79–97)
MONOCYTES # BLD AUTO: 0.61 10*3/MM3 (ref 0.1–0.9)
MONOCYTES NFR BLD AUTO: 9.3 % (ref 5–12)
NEUTROPHILS NFR BLD AUTO: 4.64 10*3/MM3 (ref 1.7–7)
NEUTROPHILS NFR BLD AUTO: 70.6 % (ref 42.7–76)
PLATELET # BLD AUTO: 127 10*3/MM3 (ref 140–450)
PMV BLD AUTO: 10.4 FL (ref 6–12)
POTASSIUM SERPL-SCNC: 4 MMOL/L (ref 3.5–5.2)
PROT SERPL-MCNC: 7.5 G/DL (ref 6–8.5)
RBC # BLD AUTO: 5.14 10*6/MM3 (ref 4.14–5.8)
SODIUM SERPL-SCNC: 135 MMOL/L (ref 136–145)
TSH SERPL DL<=0.05 MIU/L-ACNC: 2.07 UIU/ML (ref 0.27–4.2)
WBC # BLD AUTO: 6.57 10*3/MM3 (ref 3.4–10.8)

## 2020-08-12 PROCEDURE — 84153 ASSAY OF PSA TOTAL: CPT

## 2020-08-12 PROCEDURE — 85025 COMPLETE CBC W/AUTO DIFF WBC: CPT

## 2020-08-12 PROCEDURE — 36415 COLL VENOUS BLD VENIPUNCTURE: CPT

## 2020-08-12 PROCEDURE — 84443 ASSAY THYROID STIM HORMONE: CPT

## 2020-08-12 PROCEDURE — 80053 COMPREHEN METABOLIC PANEL: CPT

## 2020-08-13 LAB — PSA SERPL-MCNC: 1.03 NG/ML (ref 0–4)

## 2020-08-17 ENCOUNTER — TELEPHONE (OUTPATIENT)
Dept: ONCOLOGY | Facility: CLINIC | Age: 75
End: 2020-08-17

## 2020-08-17 NOTE — TELEPHONE ENCOUNTER
Essence, patient's wife, calling.    Patient had labs last week.  They would like lab results.    Also, patient is going to dentist tomorrow.  Essence is not sure this is a good idea due to him being on chemotherapy.    [phone]

## 2020-08-17 NOTE — TELEPHONE ENCOUNTER
Labs reviewed with Essence.  Discussed Simeon getting dental work tomorrow with Dr. Black and he is ok for dental work to be done. Notified Essence that Simeon if ok to get dental work done.

## 2020-08-26 ENCOUNTER — INFUSION (OUTPATIENT)
Dept: ONCOLOGY | Facility: HOSPITAL | Age: 75
End: 2020-08-26

## 2020-08-26 VITALS
SYSTOLIC BLOOD PRESSURE: 113 MMHG | HEART RATE: 81 BPM | WEIGHT: 213 LBS | HEIGHT: 68 IN | OXYGEN SATURATION: 92 % | RESPIRATION RATE: 20 BRPM | BODY MASS INDEX: 32.28 KG/M2 | DIASTOLIC BLOOD PRESSURE: 62 MMHG | TEMPERATURE: 97.8 F

## 2020-08-26 DIAGNOSIS — C61 PROSTATE CANCER (HCC): Primary | ICD-10-CM

## 2020-08-26 PROCEDURE — 25010000002 LEUPROLIDE 45 MG KIT: Performed by: UROLOGY

## 2020-08-26 PROCEDURE — 96402 CHEMO HORMON ANTINEOPL SQ/IM: CPT

## 2020-08-26 RX ADMIN — LEUPROLIDE ACETATE 45 MG: KIT at 14:49

## 2020-09-01 ENCOUNTER — LAB (OUTPATIENT)
Dept: LAB | Facility: HOSPITAL | Age: 75
End: 2020-09-01

## 2020-09-01 DIAGNOSIS — R53.83 FATIGUE, UNSPECIFIED TYPE: ICD-10-CM

## 2020-09-01 DIAGNOSIS — C61 PROSTATE CANCER (HCC): ICD-10-CM

## 2020-09-01 LAB
ALBUMIN SERPL-MCNC: 4.3 G/DL (ref 3.5–5.2)
ALBUMIN/GLOB SERPL: 1.4 G/DL
ALP SERPL-CCNC: 61 U/L (ref 39–117)
ALT SERPL W P-5'-P-CCNC: 34 U/L (ref 1–41)
ANION GAP SERPL CALCULATED.3IONS-SCNC: 17 MMOL/L (ref 5–15)
AST SERPL-CCNC: 38 U/L (ref 1–40)
BASOPHILS # BLD AUTO: 0.03 10*3/MM3 (ref 0–0.2)
BASOPHILS NFR BLD AUTO: 0.5 % (ref 0–1.5)
BILIRUB SERPL-MCNC: 0.6 MG/DL (ref 0–1.2)
BUN SERPL-MCNC: 15 MG/DL (ref 8–23)
BUN/CREAT SERPL: 22.1 (ref 7–25)
CALCIUM SPEC-SCNC: 9.7 MG/DL (ref 8.6–10.5)
CHLORIDE SERPL-SCNC: 94 MMOL/L (ref 98–107)
CO2 SERPL-SCNC: 24 MMOL/L (ref 22–29)
CREAT SERPL-MCNC: 0.68 MG/DL (ref 0.76–1.27)
DEPRECATED RDW RBC AUTO: 42.8 FL (ref 37–54)
EOSINOPHIL # BLD AUTO: 0.15 10*3/MM3 (ref 0–0.4)
EOSINOPHIL NFR BLD AUTO: 2.5 % (ref 0.3–6.2)
ERYTHROCYTE [DISTWIDTH] IN BLOOD BY AUTOMATED COUNT: 13.2 % (ref 12.3–15.4)
GFR SERPL CREATININE-BSD FRML MDRD: 113 ML/MIN/1.73
GLOBULIN UR ELPH-MCNC: 3.1 GM/DL
GLUCOSE SERPL-MCNC: 280 MG/DL (ref 65–99)
HCT VFR BLD AUTO: 43.2 % (ref 37.5–51)
HGB BLD-MCNC: 14.7 G/DL (ref 13–17.7)
IMM GRANULOCYTES # BLD AUTO: 0.02 10*3/MM3 (ref 0–0.05)
IMM GRANULOCYTES NFR BLD AUTO: 0.3 % (ref 0–0.5)
LYMPHOCYTES # BLD AUTO: 1 10*3/MM3 (ref 0.7–3.1)
LYMPHOCYTES NFR BLD AUTO: 16.6 % (ref 19.6–45.3)
MCH RBC QN AUTO: 30.1 PG (ref 26.6–33)
MCHC RBC AUTO-ENTMCNC: 34 G/DL (ref 31.5–35.7)
MCV RBC AUTO: 88.5 FL (ref 79–97)
MONOCYTES # BLD AUTO: 0.41 10*3/MM3 (ref 0.1–0.9)
MONOCYTES NFR BLD AUTO: 6.8 % (ref 5–12)
NEUTROPHILS NFR BLD AUTO: 4.4 10*3/MM3 (ref 1.7–7)
NEUTROPHILS NFR BLD AUTO: 73.3 % (ref 42.7–76)
NRBC BLD AUTO-RTO: 0 /100 WBC (ref 0–0.2)
PLATELET # BLD AUTO: 143 10*3/MM3 (ref 140–450)
PMV BLD AUTO: 10.9 FL (ref 6–12)
POTASSIUM SERPL-SCNC: 3.9 MMOL/L (ref 3.5–5.2)
PROT SERPL-MCNC: 7.4 G/DL (ref 6–8.5)
RBC # BLD AUTO: 4.88 10*6/MM3 (ref 4.14–5.8)
SODIUM SERPL-SCNC: 135 MMOL/L (ref 136–145)
TSH SERPL DL<=0.05 MIU/L-ACNC: 2.14 UIU/ML (ref 0.27–4.2)
WBC # BLD AUTO: 6.01 10*3/MM3 (ref 3.4–10.8)

## 2020-09-01 PROCEDURE — 80053 COMPREHEN METABOLIC PANEL: CPT

## 2020-09-01 PROCEDURE — 36415 COLL VENOUS BLD VENIPUNCTURE: CPT

## 2020-09-01 PROCEDURE — 84443 ASSAY THYROID STIM HORMONE: CPT

## 2020-09-01 PROCEDURE — 85025 COMPLETE CBC W/AUTO DIFF WBC: CPT

## 2020-09-01 PROCEDURE — 84153 ASSAY OF PSA TOTAL: CPT

## 2020-09-01 NOTE — PROGRESS NOTES
[facility] HEMATOLOGY AND ONCOLOGY  [address]  [facility] [address] [phone]    Patient Name: Simeon Tam  Encounter Date: 09/08/2020  YOB: 1945  Patient Number: [id number]      REASON FOR FOLLOW-UP: Simeon Tam is a pleasant 75 y.o.  male who is seen in follow-up for hormone refractory prostate cancer.  He is seen C2D21 of apalutamide.  He is seen with spouse, Essence.  History is obtained from patient.  History is considered to be accurate.      Oncology/Hematology History    DIAGNOSTIC ABNORMALITIES:  He presented with rising PSA of 13 and was diagnosed with prostate cancer about 25 years ago.   Previous PSA was 22.26 on 10/05/2016, <0.13 on 07/17/2017, 7.91 on 10/17/2018, 19.29 on 03/19/2020, and 22.25 on 04/07/2020.   He was seen by Dr. Oneil 05/12/2020. Latest PSA was 22.25 ng/ml on 04/07/2020.  Previous PSA 22.26 on 10/05/2016.  Bone scan and CT scan of the abdomen and pelvis were negative for prostate cancer on 10/2016.  Started on Casodex and possibly leuprolide.  PSA  was down to <0.13 on 07/17/207.  Plan for apalutamide after staging scans.   CT abdomen and pelvis 06/11/2020. No evidence of metastatic disease in the abdomen or pelvis. Right renal pelvis and ureter urothelial thickening and hyperemia. Urinary bladder wall is thickened and there is adjacent inflammation. Recommend correlation with urinalysis and exam to exclude cystitis with right-sided pyelitis.  Cirrhosis.  Tiny 8 mm hypodense RIGHT inferior liver lesion on image 33 is too small to characterize.   Bone scan 06/11/2020. No evidence of bone metastases.        PREVIOUS INTERVENTIONS:  He was treated with adjuvant radiation at [place] and androgen ablation with Casodex.  Lupron and Casodex 10/2016. He had 7 months of ADT therapy with Lupron.  Apalutamide 07/22/2020 through present.        Prostate cancer (CMS/HCC)    6/29/2020 -   Chemotherapy     OP PROSTATE Apalutamide      6/30/2020 Initial Diagnosis     Prostate cancer (CMS/[facility])      7/29/2020 -  Chemotherapy     OP LEUPROLIDE Q6M         PAST MEDICAL HISTORY:  ALLERGIES:  No Known Allergies  CURRENT MEDICATIONS:  Outpatient Encounter Medications as of 9/8/2020   Medication Sig Dispense Refill   • Apalutamide 60 MG tablet Take 4 tablets by mouth Daily. Take 4 tablets once daily with or without food. Do not crush or chew tablets. 120 tablet 5   • doxycycline (VIBRAMYCIN) 100 MG capsule Take 100 mg by mouth 2 (Two) Times a Day.     • dutasteride (AVODART) 0.5 MG capsule Take 1 capsule by mouth Daily. 30 capsule 3   • JANUVIA 100 MG tablet Take 100 mg by mouth Daily.     • lisinopril-hydrochlorothiazide (PRINZIDE,ZESTORETIC) 20-12.5 MG per tablet Take 20 tablets by mouth Daily.     • phenazopyridine (PYRIDIUM) 200 MG tablet Take 200 mg by mouth As Needed.     • prochlorperazine (COMPAZINE) 10 MG tablet Take 1 tablet by mouth Every 4 (Four) Hours As Needed for Nausea or Vomiting. 30 tablet 5   • rosuvastatin (CRESTOR) 20 MG tablet Take 20 mg by mouth Daily.     • sulfamethoxazole-trimethoprim (BACTRIM DS,SEPTRA DS) 800-160 MG per tablet Take 800 tablets by mouth 2 (Two) Times a Day.     • tamsulosin (FLOMAX) 0.4 MG capsule 24 hr capsule Take 0.4 mg by mouth Daily.     • trospium 60 MG capsule sustained-release 24 hr capsule Take 1 capsule by mouth Every Morning. 30 capsule 3     No facility-administered encounter medications on file as of 9/8/2020.      ADULT ILLNESSES:  Patient Active Problem List   Diagnosis Code   • Hypertension I10   • Hyperlipidemia E78.5   • Diabetes mellitus (CMS/[facility]) E11.9   • Cancer (CMS/[facility]) C80.1   • Arrhythmia I49.9   • Non morbid obesity due to excess calories E66.09   • Prostate cancer (CMS/[facility]) C61     SURGERIES:  Past Surgical History:   Procedure Laterality Date   • CATARACT EXTRACTION     • KIDNEY STONE SURGERY     • TONSILLECTOMY       HEALTH MAINTENANCE  "ITEMS:  Health Maintenance Due   Topic Date Due   • URINE MICROALBUMIN  1945   • TDAP/TD VACCINES (1 - Tdap) 06/10/1954   • ZOSTER VACCINE (1 of 2) 06/10/1993   • Pneumococcal Vaccine Once at 65 Years Old  06/10/2008   • HEPATITIS C SCREENING  05/11/2020   • MEDICARE ANNUAL WELLNESS  05/11/2020   • LIPID PANEL  05/11/2020   • HEMOGLOBIN A1C  05/11/2020   • DIABETIC EYE EXAM  05/11/2020   • INFLUENZA VACCINE  08/01/2020       <no information>  Last Completed Colonoscopy     Patient has no health maintenance due at this time          There is no immunization history on file for this patient.  Last Completed Mammogram     Patient has no health maintenance due at this time            FAMILY HISTORY:  Family History   Problem Relation Age of Onset   • Dementia Mother      SOCIAL HISTORY:  Social History     Socioeconomic History   • Marital status:      Spouse name: Not on file   • Number of children: Not on file   • Years of education: Not on file   • Highest education level: Not on file   Tobacco Use   • Smoking status: Never Smoker   • Smokeless tobacco: Never Used   Substance and Sexual Activity   • Alcohol use: No   • Drug use: No   • Sexual activity: Defer       REVIEW OF SYSTEMS:    Review of Systems   Constitutional: Negative for chills, diaphoresis, fatigue and fever.        \"I feel good.\" Tolerating apalutamide.    HENT: Negative for congestion, facial swelling and trouble swallowing.    Eyes: Negative for redness and visual disturbance.   Respiratory: Negative for cough, shortness of breath and wheezing.    Cardiovascular: Negative for chest pain and leg swelling.   Gastrointestinal: Negative for abdominal pain, blood in stool, constipation, diarrhea, nausea and vomiting.   Endocrine: Negative for cold intolerance and heat intolerance.   Genitourinary: Negative for difficulty urinating, flank pain and hematuria.   Musculoskeletal: Negative for gait problem and joint swelling.   Skin: Negative for " "pallor.   Allergic/Immunologic: Negative for food allergies.   Neurological: Negative for dizziness, speech difficulty and weakness.   Hematological: Negative for adenopathy. Does not bruise/bleed easily.   Psychiatric/Behavioral: Negative for agitation, confusion and hallucinations. The patient is not nervous/anxious.        VITAL SIGNS: /68   Pulse 87   Temp 97.5 °F (36.4 °C)   Resp 18   Ht 172.7 cm (68\")   Wt 98.2 kg (216 lb 6.4 oz)   SpO2 92%   BMI 32.90 kg/m²   Pain Score    09/08/20 1437   PainSc: 0-No pain       PHYSICAL EXAMINATION:     Physical Exam   Constitutional: He is oriented to person, place, and time. He appears well-developed and well-nourished. No distress.   HENT:   Head: Normocephalic and atraumatic.   Eyes: No scleral icterus.   Neck: Neck supple.   Cardiovascular: Normal rate and regular rhythm.   Pulmonary/Chest: Effort normal and breath sounds normal. He has no wheezes. He has no rales.   Abdominal: Soft. Bowel sounds are normal. There is no tenderness.   Musculoskeletal: He exhibits edema.   Bilateral ankle edema.   Lymphadenopathy:     He has no cervical adenopathy.   Neurological: He is alert and oriented to person, place, and time.   Skin: Skin is warm and dry. He is not diaphoretic. No pallor.   Psychiatric: He has a normal mood and affect. His behavior is normal. Judgment and thought content normal.   Vitals reviewed.      LABS    Lab Results - Last 18 Months   Lab Units 09/01/20  1427 08/12/20  1424 06/29/20  1408   HEMOGLOBIN g/dL 14.7 15.5 15.8   HEMATOCRIT % 43.2 44.8 46.3   MCV fL 88.5 87.2 89.0   WBC 10*3/mm3 6.01 6.57 7.76   RDW % 13.2 13.2 14.0   MPV fL 10.9 10.4 10.1   PLATELETS 10*3/mm3 143 127* 121*   IMM GRAN % % 0.3  --   --    NEUTROS ABS 10*3/mm3 4.40 4.64 5.26   LYMPHS ABS 10*3/mm3 1.00 1.13 1.43   MONOS ABS 10*3/mm3 0.41 0.61 0.79   EOS ABS 10*3/mm3 0.15 0.13 0.21   BASOS ABS 10*3/mm3 0.03 0.04 0.04   IMMATURE GRANS (ABS) 10*3/mm3 0.02  --   --    NRBC " /100 WBC 0.0  --   --        Lab Results - Last 18 Months   Lab Units 09/01/20  1427 08/12/20  1424 06/29/20  1408 06/11/20  1018   GLUCOSE mg/dL 280* 221* 145*  --    SODIUM mmol/L 135* 135* 138  --    POTASSIUM mmol/L 3.9 4.0 3.8  --    CO2 mmol/L 24.0 28.0 26.0  --    CHLORIDE mmol/L 94* 94* 98  --    ANION GAP mmol/L 17.0* 13.0 14.0  --    CREATININE mg/dL 0.68* 0.77 0.70* 0.80   BUN mg/dL 15 14 11  --    BUN / CREAT RATIO  22.1 18.2 15.7  --    CALCIUM mg/dL 9.7 10.0 9.6  --    EGFR IF NONAFRICN AM mL/min/1.73 113 98 109  --    ALK PHOS U/L 61 55 48  --    TOTAL PROTEIN g/dL 7.4 7.5 7.4  --    ALT (SGPT) U/L 34 34 26  --    AST (SGOT) U/L 38 39 28  --    BILIRUBIN mg/dL 0.6 0.4 0.6  --    ALBUMIN g/dL 4.30 4.40 4.30  --    GLOBULIN gm/dL 3.1 3.1 3.1  --        No results for input(s): MSPIKE, KAPPALAMB, IGLFLC, URICACID, FREEKAPPAL, CEA, LDH, REFLABREPO in the last 95223 hours.    Lab Results - Last 18 Months   Lab Units 09/01/20  1427 08/12/20  1424 06/29/20  1408   TSH uIU/mL 2.140 2.070 2.080       Simeon Tam reports a pain score of 0.        Patient's Body mass index is 32.9 kg/m². BMI is above normal parameters. Recommendations include: referral to primary care.      ASSESSMENT:  1.  Prostate cancer.  PSA recurrence.   AJCC stage (TX, NX, M0)  Treatment status:Casodex and Lupron with PSA recurrence.  On apalutamide and leuprolde (by Dr. Oneil).  2.  Performance status of 0.   3.  Cirrhosis.  4.  Neurogenic bladder.   5.  Anterior urethral stricture.  6.  Thrombocytopenia at 102 on 03/19/2020 isreal from cirrhosis.  7.  Obesity BMI 32.9.        PLAN:  1.    Re:   Apalutamide tolerance.    2.    Re:   Heme status.  Hemoglobin 14.7.  3.    Re:   Pre-office CMP remarkable for glucose of 280.  4.    Re:   Pre-office TSH normal at 2.1.  5.    Re:   Pre-office PSA 0.125 from 1.03 from 27.3.   6.   eRx for Compazine 10 mg p.o. every 4 hours as needed for nausea/vomiting, 60, 2  refills if needed.    7.   eRx for C3D1 on 09/16/2020 apalutamide 60 mg, take four tablets total dose 240 mg po daily, number, 120.  Take either with or without food.  Swallow tablets whole.  TSH before apalutamide.  No grapefruit juice or grapefruit.   Monitor for adverse effects especially arrhythmia, rash, seizures, thyroid dysfunction, hypothyroidism, fall and fractures.  8.    Continue ongoing management per primary care physician and other specialists.  9.    Plan of care discussed with patient and spouse.  Understanding expressed.  Patient agreeable to proceed.  10.  Continue currently identified medications.  11.  Refer to PCP for obesity.  12.  Questions were answered to their satisfaction.   13.  Advance Care Planning  ACP discussion was declined by the patient. Patient does not have an advance directive, information provided.  14.  Return to office in 6 weeks with pre-office CMP, PSA and CBC with differential.  15.  Lupron per Dr. Oneil.          I spent 27 total minutes, face-to-face, caring for Simeon today.  Greater than 50% of this time involved counseling and/or coordination of care as documented within this note regarding the patient's illness(es), pros and cons of various treatment options, instructions and/or risk reduction.          Matthew Oneil MD  (Oniel Doshi MD)  Lucio Garcia MD

## 2020-09-02 LAB — PSA SERPL-MCNC: 0.12 NG/ML (ref 0–4)

## 2020-09-08 ENCOUNTER — OFFICE VISIT (OUTPATIENT)
Dept: ONCOLOGY | Facility: CLINIC | Age: 75
End: 2020-09-08

## 2020-09-08 VITALS
RESPIRATION RATE: 18 BRPM | TEMPERATURE: 97.5 F | SYSTOLIC BLOOD PRESSURE: 120 MMHG | OXYGEN SATURATION: 92 % | DIASTOLIC BLOOD PRESSURE: 68 MMHG | BODY MASS INDEX: 32.8 KG/M2 | HEIGHT: 68 IN | WEIGHT: 216.4 LBS | HEART RATE: 87 BPM

## 2020-09-08 DIAGNOSIS — C61 PROSTATE CANCER (HCC): Primary | ICD-10-CM

## 2020-09-08 PROCEDURE — 99214 OFFICE O/P EST MOD 30 MIN: CPT | Performed by: INTERNAL MEDICINE

## 2020-10-06 ENCOUNTER — LAB (OUTPATIENT)
Dept: LAB | Facility: HOSPITAL | Age: 75
End: 2020-10-06

## 2020-10-06 DIAGNOSIS — C61 PROSTATE CANCER (HCC): ICD-10-CM

## 2020-10-06 LAB
ALBUMIN SERPL-MCNC: 4.4 G/DL (ref 3.5–5.2)
ALBUMIN/GLOB SERPL: 1.5 G/DL
ALP SERPL-CCNC: 66 U/L (ref 39–117)
ALT SERPL W P-5'-P-CCNC: 30 U/L (ref 1–41)
ANION GAP SERPL CALCULATED.3IONS-SCNC: 9 MMOL/L (ref 5–15)
AST SERPL-CCNC: 32 U/L (ref 1–40)
BASOPHILS # BLD AUTO: 0.03 10*3/MM3 (ref 0–0.2)
BASOPHILS NFR BLD AUTO: 0.5 % (ref 0–1.5)
BILIRUB SERPL-MCNC: 0.3 MG/DL (ref 0–1.2)
BUN SERPL-MCNC: 15 MG/DL (ref 8–23)
BUN/CREAT SERPL: 21.1 (ref 7–25)
CALCIUM SPEC-SCNC: 9.8 MG/DL (ref 8.6–10.5)
CHLORIDE SERPL-SCNC: 98 MMOL/L (ref 98–107)
CO2 SERPL-SCNC: 31 MMOL/L (ref 22–29)
CREAT SERPL-MCNC: 0.71 MG/DL (ref 0.76–1.27)
DEPRECATED RDW RBC AUTO: 43.7 FL (ref 37–54)
EOSINOPHIL # BLD AUTO: 0.19 10*3/MM3 (ref 0–0.4)
EOSINOPHIL NFR BLD AUTO: 3.2 % (ref 0.3–6.2)
ERYTHROCYTE [DISTWIDTH] IN BLOOD BY AUTOMATED COUNT: 13.2 % (ref 12.3–15.4)
GFR SERPL CREATININE-BSD FRML MDRD: 108 ML/MIN/1.73
GLOBULIN UR ELPH-MCNC: 2.9 GM/DL
GLUCOSE SERPL-MCNC: 158 MG/DL (ref 65–99)
HCT VFR BLD AUTO: 40.9 % (ref 37.5–51)
HGB BLD-MCNC: 14.1 G/DL (ref 13–17.7)
LYMPHOCYTES # BLD AUTO: 1.12 10*3/MM3 (ref 0.7–3.1)
LYMPHOCYTES NFR BLD AUTO: 19 % (ref 19.6–45.3)
MCH RBC QN AUTO: 31.1 PG (ref 26.6–33)
MCHC RBC AUTO-ENTMCNC: 34.5 G/DL (ref 31.5–35.7)
MCV RBC AUTO: 90.3 FL (ref 79–97)
MONOCYTES # BLD AUTO: 0.74 10*3/MM3 (ref 0.1–0.9)
MONOCYTES NFR BLD AUTO: 12.6 % (ref 5–12)
NEUTROPHILS NFR BLD AUTO: 3.79 10*3/MM3 (ref 1.7–7)
NEUTROPHILS NFR BLD AUTO: 64.5 % (ref 42.7–76)
PLATELET # BLD AUTO: 116 10*3/MM3 (ref 140–450)
PMV BLD AUTO: 10.7 FL (ref 6–12)
POTASSIUM SERPL-SCNC: 4 MMOL/L (ref 3.5–5.2)
PROT SERPL-MCNC: 7.3 G/DL (ref 6–8.5)
RBC # BLD AUTO: 4.53 10*6/MM3 (ref 4.14–5.8)
SODIUM SERPL-SCNC: 138 MMOL/L (ref 136–145)
WBC # BLD AUTO: 5.88 10*3/MM3 (ref 3.4–10.8)

## 2020-10-06 PROCEDURE — 85025 COMPLETE CBC W/AUTO DIFF WBC: CPT

## 2020-10-06 PROCEDURE — 36415 COLL VENOUS BLD VENIPUNCTURE: CPT

## 2020-10-06 PROCEDURE — 80053 COMPREHEN METABOLIC PANEL: CPT

## 2020-10-06 PROCEDURE — 84153 ASSAY OF PSA TOTAL: CPT

## 2020-10-07 LAB — PSA SERPL-MCNC: 0.03 NG/ML (ref 0–4)

## 2020-10-12 ENCOUNTER — SPECIALTY PHARMACY (OUTPATIENT)
Dept: ONCOLOGY | Facility: HOSPITAL | Age: 75
End: 2020-10-12

## 2020-10-12 NOTE — PROGRESS NOTES
[facility]    Oral Oncology Patient Follow-Up      Consult Details  Consulting Provider:   Deshawn Black MD ([facility])   Medication and Regimen:  apalutamide 240 mg daily     Prescription Review  Dosing & Interactions:   Reviewed, appropriate and no significant interaction noted at this time..     Intervention(s):                  Called patient to check in and spoke with wife. She states the patient has been doing well with his medication with no notable side effects.  She does state patient has had a cold with productive cough that has been going on for 9 days and has not gotten better. She states on the first day he had a low grade fever.  She states he has been taking Swati-Selzer Plus (Cold & Flu) PowerMax Gels [APAP, Dextromethorphan, and phenylephrine].      Recommended against cough suppressants due to productive cough.  Recommended guaifenesin (plain) with plenty of fluids.  Also, recommended contacting patient's PCP since we are on day 9 and patient is not getting better. Mr. Tam actually stayed home from work today due to feeling poorly.    I will also send a message to the Dr. Black's office as well.     Summary:    Will continue to follow patient.     Viviana Muro, PharmD  10/12/2020 15:54 CDT

## 2020-10-16 NOTE — PROGRESS NOTES
[facility]ATOLOGY AND ONCOLOGY  [address]  [facility] [address] [phone]    Patient Name: Simeon Tam  Encounter Date: 10/23/2020  YOB: 1945  Patient Number: [id number]      REASON FOR FOLLOW-UP: Simeon Tam is a pleasant 75 y.o.  male who is seen in follow-up for hormone refractory prostate cancer.  He is seen C4D10 of apalutamide.  He is seen with spouse, Essence.  History is obtained from patient.  History is considered to be accurate.      Oncology/Hematology History Overview Note   DIAGNOSTIC ABNORMALITIES:  He presented with rising PSA of 13 and was diagnosed with prostate cancer about 25 years ago.   Previous PSA was 22.26 on 10/05/2016, <0.13 on 07/17/2017, 7.91 on 10/17/2018, 19.29 on 03/19/2020, and 22.25 on 04/07/2020.   He was seen by Dr. Oneil 05/12/2020. Latest PSA was 22.25 ng/ml on 04/07/2020.  Previous PSA 22.26 on 10/05/2016.  Bone scan and CT scan of the abdomen and pelvis were negative for prostate cancer on 10/2016.  Started on Casodex and possibly leuprolide.  PSA  was down to <0.13 on 07/17/207.  Plan for apalutamide after staging scans.   CT abdomen and pelvis 06/11/2020. No evidence of metastatic disease in the abdomen or pelvis. Right renal pelvis and ureter urothelial thickening and hyperemia. Urinary bladder wall is thickened and there is adjacent inflammation. Recommend correlation with urinalysis and exam to exclude cystitis with right-sided pyelitis.  Cirrhosis.  Tiny 8 mm hypodense RIGHT inferior liver lesion on image 33 is too small to characterize.   Bone scan 06/11/2020. No evidence of bone metastases.        PREVIOUS INTERVENTIONS:  He was treated with adjuvant radiation at [employer] and androgen ablation with Casodex.  Lupron and Casodex 10/2016. He had 7 months of ADT therapy with Lupron.  Apalutamide 07/22/2020 through present.     Prostate cancer (CMS/HCC)   6/29/2020 -   Chemotherapy    OP PROSTATE Apalutamide     6/30/2020 Initial Diagnosis    Prostate cancer (CMS/[facility])     7/29/2020 -  Chemotherapy    OP LEUPROLIDE Q6M         PAST MEDICAL HISTORY:  ALLERGIES:  No Known Allergies  CURRENT MEDICATIONS:  Outpatient Encounter Medications as of 10/23/2020   Medication Sig Dispense Refill   • Apalutamide 60 MG tablet Take 4 tablets by mouth Daily. Take 4 tablets once daily with or without food. Do not crush or chew tablets. 120 tablet 5   • doxycycline (VIBRAMYCIN) 100 MG capsule Take 100 mg by mouth 2 (Two) Times a Day.     • dutasteride (AVODART) 0.5 MG capsule Take 1 capsule by mouth Daily. 30 capsule 3   • JANUVIA 100 MG tablet Take 100 mg by mouth Daily.     • lisinopril-hydrochlorothiazide (PRINZIDE,ZESTORETIC) 20-12.5 MG per tablet Take 20 tablets by mouth Daily.     • phenazopyridine (PYRIDIUM) 200 MG tablet Take 200 mg by mouth As Needed.     • prochlorperazine (COMPAZINE) 10 MG tablet Take 1 tablet by mouth Every 4 (Four) Hours As Needed for Nausea or Vomiting. 30 tablet 5   • rosuvastatin (CRESTOR) 20 MG tablet Take 20 mg by mouth Daily.     • sulfamethoxazole-trimethoprim (BACTRIM DS,SEPTRA DS) 800-160 MG per tablet Take 800 tablets by mouth 2 (Two) Times a Day.     • tamsulosin (FLOMAX) 0.4 MG capsule 24 hr capsule Take 0.4 mg by mouth Daily.     • trospium 60 MG capsule sustained-release 24 hr capsule Take 1 capsule by mouth Every Morning. 30 capsule 3     No facility-administered encounter medications on file as of 10/23/2020.      ADULT ILLNESSES:  Patient Active Problem List   Diagnosis Code   • Hypertension I10   • Hyperlipidemia E78.5   • Diabetes mellitus (CMS/[facility]) E11.9   • Cancer (CMS/[facility]) C80.1   • Arrhythmia I49.9   • Non morbid obesity due to excess calories E66.09   • Prostate cancer (CMS/[facility]) C61     SURGERIES:  Past Surgical History:   Procedure Laterality Date   • CATARACT EXTRACTION     • KIDNEY STONE SURGERY     • TONSILLECTOMY       HEALTH MAINTENANCE  "ITEMS:  Health Maintenance Due   Topic Date Due   • URINE MICROALBUMIN  1945   • Hepatitis B (1 of 3 - Risk 3-dose series) 06/10/1962   • TDAP/TD VACCINES (1 - Tdap) 06/10/1962   • ZOSTER VACCINE (1 of 2) 06/10/1993   • Pneumococcal Vaccine 65+ (1 of 1 - PPSV23) 06/10/2008   • HEPATITIS C SCREENING  05/11/2020   • ANNUAL WELLNESS VISIT  05/11/2020   • LIPID PANEL  05/11/2020   • HEMOGLOBIN A1C  05/11/2020   • DIABETIC EYE EXAM  05/11/2020   • INFLUENZA VACCINE  08/01/2020       <no information>  Last Completed Colonoscopy     Patient has no health maintenance due at this time          There is no immunization history on file for this patient.  Last Completed Mammogram     Patient has no health maintenance due at this time            FAMILY HISTORY:  Family History   Problem Relation Age of Onset   • Dementia Mother      SOCIAL HISTORY:  Social History     Socioeconomic History   • Marital status:      Spouse name: Not on file   • Number of children: Not on file   • Years of education: Not on file   • Highest education level: Not on file   Tobacco Use   • Smoking status: Never Smoker   • Smokeless tobacco: Never Used   Substance and Sexual Activity   • Alcohol use: No   • Drug use: No   • Sexual activity: Defer       REVIEW OF SYSTEMS:    Review of Systems   Constitutional: Negative for chills, diaphoresis, fatigue and fever.        \"I feel great.\"   HENT: Negative for congestion, mouth sores and trouble swallowing.    Eyes: Negative for redness and visual disturbance.   Respiratory: Positive for cough. Negative for shortness of breath and wheezing.         \"I am better. I had a cold.\"   Cardiovascular: Negative for chest pain and palpitations.   Gastrointestinal: Negative for abdominal pain, constipation, diarrhea, nausea and vomiting.   Endocrine: Negative for cold intolerance and heat intolerance.   Genitourinary: Positive for difficulty urinating. Negative for flank pain.        \"Self catheterize 5 " "times daily.\"   Musculoskeletal: Negative for gait problem and joint swelling.   Skin: Negative for pallor.   Allergic/Immunologic: Negative for food allergies.   Neurological: Negative for dizziness, speech difficulty and weakness.   Hematological: Negative for adenopathy. Does not bruise/bleed easily.   Psychiatric/Behavioral: Negative for agitation and confusion. The patient is not hyperactive.        VITAL SIGNS: /76   Pulse 86   Temp 97.7 °F (36.5 °C) (Temporal)   Resp 16   Ht 172.7 cm (68\")   Wt 94.9 kg (209 lb 4.8 oz)   SpO2 96%   BMI 31.82 kg/m²   Pain Score    10/23/20 1322   PainSc: 0-No pain       PHYSICAL EXAMINATION:     Physical Exam  Vitals signs reviewed.   Constitutional:       Appearance: He is not ill-appearing, toxic-appearing or diaphoretic.   HENT:      Head: Normocephalic and atraumatic.   Eyes:      General: No scleral icterus.  Neck:      Musculoskeletal: Neck supple.   Cardiovascular:      Rate and Rhythm: Normal rate and regular rhythm.   Pulmonary:      Effort: No respiratory distress.      Breath sounds: No wheezing.   Abdominal:      General: Abdomen is flat. Bowel sounds are normal.      Palpations: Abdomen is soft.   Musculoskeletal:         General: No swelling.   Skin:     General: Skin is warm and dry.      Coloration: Skin is not pale.   Neurological:      Mental Status: He is oriented to person, place, and time.   Psychiatric:         Mood and Affect: Mood normal.         Behavior: Behavior normal.         Thought Content: Thought content normal.         Judgment: Judgment normal.         LABS    Lab Results - Last 18 Months   Lab Units 10/06/20  1343 09/01/20  1427 08/12/20  1424 06/29/20  1408   HEMOGLOBIN g/dL 14.1 14.7 15.5 15.8   HEMATOCRIT % 40.9 43.2 44.8 46.3   MCV fL 90.3 88.5 87.2 89.0   WBC 10*3/mm3 5.88 6.01 6.57 7.76   RDW % 13.2 13.2 13.2 14.0   MPV fL 10.7 10.9 10.4 10.1   PLATELETS 10*3/mm3 116* 143 127* 121*   IMM GRAN % %  --  0.3  --   --  "   NEUTROS ABS 10*3/mm3 3.79 4.40 4.64 5.26   LYMPHS ABS 10*3/mm3 1.12 1.00 1.13 1.43   MONOS ABS 10*3/mm3 0.74 0.41 0.61 0.79   EOS ABS 10*3/mm3 0.19 0.15 0.13 0.21   BASOS ABS 10*3/mm3 0.03 0.03 0.04 0.04   IMMATURE GRANS (ABS) 10*3/mm3  --  0.02  --   --    NRBC /100 WBC  --  0.0  --   --        Lab Results - Last 18 Months   Lab Units 10/06/20  1343 09/01/20  1427 08/12/20  1424 06/29/20  1408 06/11/20  1018   GLUCOSE mg/dL 158* 280* 221* 145*  --    SODIUM mmol/L 138 135* 135* 138  --    POTASSIUM mmol/L 4.0 3.9 4.0 3.8  --    CO2 mmol/L 31.0* 24.0 28.0 26.0  --    CHLORIDE mmol/L 98 94* 94* 98  --    ANION GAP mmol/L 9.0 17.0* 13.0 14.0  --    CREATININE mg/dL 0.71* 0.68* 0.77 0.70* 0.80   BUN mg/dL 15 15 14 11  --    BUN / CREAT RATIO  21.1 22.1 18.2 15.7  --    CALCIUM mg/dL 9.8 9.7 10.0 9.6  --    EGFR IF NONAFRICN AM mL/min/1.73 108 113 98 109  --    ALK PHOS U/L 66 61 55 48  --    TOTAL PROTEIN g/dL 7.3 7.4 7.5 7.4  --    ALT (SGPT) U/L 30 34 34 26  --    AST (SGOT) U/L 32 38 39 28  --    BILIRUBIN mg/dL 0.3 0.6 0.4 0.6  --    ALBUMIN g/dL 4.40 4.30 4.40 4.30  --    GLOBULIN gm/dL 2.9 3.1 3.1 3.1  --        No results for input(s): MSPIKE, KAPPALAMB, IGLFLC, URICACID, FREEKAPPAL, CEA, LDH, REFLABREPO in the last 18068 hours.    Lab Results - Last 18 Months   Lab Units 09/01/20  1427 08/12/20  1424 06/29/20  1408   TSH uIU/mL 2.140 2.070 2.080       Simeon Tam reports a pain score of 0.      Patient's Body mass index is 31.82 kg/m². BMI is above normal parameters. Recommendations include: referral to primary care.      ASSESSMENT:  1.  Prostate cancer.  PSA recurrence.   AJCC stage (TX, NX, M0)  Treatment status:Casodex and Lupron with PSA recurrence.  On apalutamide and leuprolde (by Dr. Oneil).  2.  Performance status of 0.   3.  Cirrhosis.  4.  Neurogenic bladder.   5.  Anterior urethral stricture.  6.  Thrombocytopenia at 102 on 03/19/2020 likely from cirrhosis.  7.  Obesity BMI  31.8.        PLAN:  1.    Re:   Tolerating and responding to apalutamide.    2.    Re:   Heme status.  Hemoglobin 14.1 and platelet 116.  3.    Re:   Pre-office CMP remarkable for glucose of 158.  4.    Re:   Pre-office PSA 0.03 from 0.125 from 1.03 from 27.3.   5.   eRx for Compazine 10 mg p.o. every 4 hours as needed for nausea/vomiting, 60, 2 refills if needed.    6.   eRx for C4D1 started on 10/14/2020 apalutamide 60 mg, take four tablets total dose 240 mg po daily, number, 120.  Take either with or without food.  Swallow tablets whole.  TSH before apalutamide.  No grapefruit juice or grapefruit.   Monitor for adverse effects especially arrhythmia, rash, seizures, thyroid dysfunction, hypothyroidism, fall and fractures.  7.    Continue ongoing management per primary care physician and other specialists.  8.    Plan of care discussed with patient and spouse.  Understanding expressed.  Patient agreeable to proceed.  9.    Continue currently identified medications.  10.  Refer to PCP for obesity.  11.  Questions were answered to their satisfaction.   12.  Advance Care Planning  ACP discussion was declined by the patient. Patient does not have an advance directive, information provided.  13.  Return to office in 3 weeks with pre-office CMP, PSA and CBC with differential.  14.  Lupron per Dr. Oneil.          I spent 26 total minutes, face-to-face, caring for Simeon today.  Greater than 50% of this time involved counseling and/or coordination of care as documented within this note regarding the patient's illness(es), pros and cons of various treatment options, instructions and/or risk reduction.           (Matthew Oneil MD)  (Oniel Doshi MD)  Lucio Garcia MD

## 2020-10-23 ENCOUNTER — OFFICE VISIT (OUTPATIENT)
Dept: ONCOLOGY | Facility: CLINIC | Age: 75
End: 2020-10-23

## 2020-10-23 VITALS
SYSTOLIC BLOOD PRESSURE: 118 MMHG | TEMPERATURE: 97.7 F | BODY MASS INDEX: 31.72 KG/M2 | DIASTOLIC BLOOD PRESSURE: 76 MMHG | HEART RATE: 86 BPM | HEIGHT: 68 IN | OXYGEN SATURATION: 96 % | RESPIRATION RATE: 16 BRPM | WEIGHT: 209.3 LBS

## 2020-10-23 DIAGNOSIS — C61 PROSTATE CANCER (HCC): ICD-10-CM

## 2020-10-23 PROCEDURE — 99214 OFFICE O/P EST MOD 30 MIN: CPT | Performed by: INTERNAL MEDICINE

## 2020-10-23 RX ORDER — PROCHLORPERAZINE MALEATE 10 MG
10 TABLET ORAL EVERY 4 HOURS PRN
Qty: 30 TABLET | Refills: 5 | Status: CANCELLED | OUTPATIENT
Start: 2020-10-23

## 2020-10-26 ENCOUNTER — SPECIALTY PHARMACY (OUTPATIENT)
Dept: ONCOLOGY | Facility: HOSPITAL | Age: 75
End: 2020-10-26

## 2020-10-26 ENCOUNTER — TELEPHONE (OUTPATIENT)
Dept: UROLOGY | Facility: CLINIC | Age: 75
End: 2020-10-26

## 2020-10-26 NOTE — PROGRESS NOTES
[facility]    Oral Oncology Patient Follow-Up      Consult Details  Consulting Provider:   Deshawn Black MD ([facility])   Medication and Regimen:  apalutamide 240 mg daily     Patient's wife Essence called concerned about that patient had not had lupron for some time.  She said she discussed this with Dr. Black and he assured her the that Dr. Oneil would have that taken care of.    Intervention(s):                  Told patient that in fact Dr. Oneil is the ordering provider on his Lupron plan.  I told her that because the patient was now on 45 mg dose, he only had to have that injection once every 6 months.  Patient last received Lupron in August so would not be due for some time now.    I also inquired how patient was doing after our last discussion where I encouraged her to talk to go to the doctors office regarding a prolonged cough.  She states the patient was seen and was wheezing at that time and given an inhaler and has since improved some.  Dr. Black saw patient recently and said patient is no longer wheezing.     Summary:    All questions answered.     Viviana Muro, PharmD  10/26/2020 11:57 CDT

## 2020-10-26 NOTE — TELEPHONE ENCOUNTER
Pt wife notified that next Lup[facility]

## 2020-11-30 ENCOUNTER — LAB (OUTPATIENT)
Dept: LAB | Facility: HOSPITAL | Age: 75
End: 2020-11-30

## 2020-11-30 DIAGNOSIS — C61 PROSTATE CANCER (HCC): ICD-10-CM

## 2020-11-30 LAB
ALBUMIN SERPL-MCNC: 4.4 G/DL (ref 3.5–5.2)
ALBUMIN/GLOB SERPL: 1.5 G/DL
ALP SERPL-CCNC: 78 U/L (ref 39–117)
ALT SERPL W P-5'-P-CCNC: 36 U/L (ref 1–41)
ANION GAP SERPL CALCULATED.3IONS-SCNC: 11 MMOL/L (ref 5–15)
AST SERPL-CCNC: 44 U/L (ref 1–40)
BASOPHILS # BLD AUTO: 0.03 10*3/MM3 (ref 0–0.2)
BASOPHILS NFR BLD AUTO: 0.5 % (ref 0–1.5)
BILIRUB SERPL-MCNC: 0.3 MG/DL (ref 0–1.2)
BUN SERPL-MCNC: 16 MG/DL (ref 8–23)
BUN/CREAT SERPL: 24.6 (ref 7–25)
CALCIUM SPEC-SCNC: 10.1 MG/DL (ref 8.6–10.5)
CHLORIDE SERPL-SCNC: 96 MMOL/L (ref 98–107)
CO2 SERPL-SCNC: 30 MMOL/L (ref 22–29)
CREAT SERPL-MCNC: 0.65 MG/DL (ref 0.76–1.27)
DEPRECATED RDW RBC AUTO: 41.5 FL (ref 37–54)
EOSINOPHIL # BLD AUTO: 0.2 10*3/MM3 (ref 0–0.4)
EOSINOPHIL NFR BLD AUTO: 3.3 % (ref 0.3–6.2)
ERYTHROCYTE [DISTWIDTH] IN BLOOD BY AUTOMATED COUNT: 12.7 % (ref 12.3–15.4)
GFR SERPL CREATININE-BSD FRML MDRD: 119 ML/MIN/1.73
GLOBULIN UR ELPH-MCNC: 3 GM/DL
GLUCOSE SERPL-MCNC: 170 MG/DL (ref 65–99)
HCT VFR BLD AUTO: 38.8 % (ref 37.5–51)
HGB BLD-MCNC: 13.5 G/DL (ref 13–17.7)
LYMPHOCYTES # BLD AUTO: 1.08 10*3/MM3 (ref 0.7–3.1)
LYMPHOCYTES NFR BLD AUTO: 17.7 % (ref 19.6–45.3)
MCH RBC QN AUTO: 31.5 PG (ref 26.6–33)
MCHC RBC AUTO-ENTMCNC: 34.8 G/DL (ref 31.5–35.7)
MCV RBC AUTO: 90.4 FL (ref 79–97)
MONOCYTES # BLD AUTO: 0.59 10*3/MM3 (ref 0.1–0.9)
MONOCYTES NFR BLD AUTO: 9.7 % (ref 5–12)
NEUTROPHILS NFR BLD AUTO: 4.17 10*3/MM3 (ref 1.7–7)
NEUTROPHILS NFR BLD AUTO: 68.5 % (ref 42.7–76)
PLATELET # BLD AUTO: 126 10*3/MM3 (ref 140–450)
PMV BLD AUTO: 10.7 FL (ref 6–12)
POTASSIUM SERPL-SCNC: 4 MMOL/L (ref 3.5–5.2)
PROT SERPL-MCNC: 7.4 G/DL (ref 6–8.5)
RBC # BLD AUTO: 4.29 10*6/MM3 (ref 4.14–5.8)
SODIUM SERPL-SCNC: 137 MMOL/L (ref 136–145)
WBC # BLD AUTO: 6.09 10*3/MM3 (ref 3.4–10.8)

## 2020-11-30 PROCEDURE — 85025 COMPLETE CBC W/AUTO DIFF WBC: CPT

## 2020-11-30 PROCEDURE — 36415 COLL VENOUS BLD VENIPUNCTURE: CPT

## 2020-11-30 PROCEDURE — 80053 COMPREHEN METABOLIC PANEL: CPT

## 2020-11-30 PROCEDURE — 84153 ASSAY OF PSA TOTAL: CPT

## 2020-12-01 LAB — PSA SERPL-MCNC: 0.01 NG/ML (ref 0–4)

## 2020-12-07 ENCOUNTER — OFFICE VISIT (OUTPATIENT)
Dept: ONCOLOGY | Facility: CLINIC | Age: 75
End: 2020-12-07

## 2020-12-07 VITALS
WEIGHT: 215.9 LBS | DIASTOLIC BLOOD PRESSURE: 66 MMHG | HEIGHT: 68 IN | TEMPERATURE: 97.9 F | SYSTOLIC BLOOD PRESSURE: 128 MMHG | BODY MASS INDEX: 32.72 KG/M2 | RESPIRATION RATE: 18 BRPM | OXYGEN SATURATION: 96 % | HEART RATE: 80 BPM

## 2020-12-07 DIAGNOSIS — C61 PROSTATE CANCER (HCC): Primary | ICD-10-CM

## 2020-12-07 PROCEDURE — 99214 OFFICE O/P EST MOD 30 MIN: CPT | Performed by: INTERNAL MEDICINE

## 2020-12-28 ENCOUNTER — LAB (OUTPATIENT)
Dept: LAB | Facility: HOSPITAL | Age: 75
End: 2020-12-28

## 2020-12-28 ENCOUNTER — SPECIALTY PHARMACY (OUTPATIENT)
Dept: ONCOLOGY | Facility: HOSPITAL | Age: 75
End: 2020-12-28

## 2020-12-28 DIAGNOSIS — C61 PROSTATE CANCER (HCC): ICD-10-CM

## 2020-12-28 DIAGNOSIS — C61 PROSTATE CANCER (HCC): Primary | ICD-10-CM

## 2020-12-28 LAB
ALBUMIN SERPL-MCNC: 4.3 G/DL (ref 3.5–5.2)
ALBUMIN/GLOB SERPL: 1.3 G/DL
ALP SERPL-CCNC: 74 U/L (ref 39–117)
ALT SERPL W P-5'-P-CCNC: 29 U/L (ref 1–41)
ANION GAP SERPL CALCULATED.3IONS-SCNC: 11 MMOL/L (ref 5–15)
AST SERPL-CCNC: 35 U/L (ref 1–40)
BASOPHILS # BLD AUTO: 0.02 10*3/MM3 (ref 0–0.2)
BASOPHILS NFR BLD AUTO: 0.3 % (ref 0–1.5)
BILIRUB SERPL-MCNC: 0.3 MG/DL (ref 0–1.2)
BUN SERPL-MCNC: 15 MG/DL (ref 8–23)
BUN/CREAT SERPL: 20.8 (ref 7–25)
CALCIUM SPEC-SCNC: 10 MG/DL (ref 8.6–10.5)
CHLORIDE SERPL-SCNC: 95 MMOL/L (ref 98–107)
CO2 SERPL-SCNC: 30 MMOL/L (ref 22–29)
CREAT SERPL-MCNC: 0.72 MG/DL (ref 0.76–1.27)
DEPRECATED RDW RBC AUTO: 41 FL (ref 37–54)
EOSINOPHIL # BLD AUTO: 0.2 10*3/MM3 (ref 0–0.4)
EOSINOPHIL NFR BLD AUTO: 3.2 % (ref 0.3–6.2)
ERYTHROCYTE [DISTWIDTH] IN BLOOD BY AUTOMATED COUNT: 12.4 % (ref 12.3–15.4)
GFR SERPL CREATININE-BSD FRML MDRD: 106 ML/MIN/1.73
GLOBULIN UR ELPH-MCNC: 3.2 GM/DL
GLUCOSE SERPL-MCNC: 191 MG/DL (ref 65–99)
HCT VFR BLD AUTO: 40.1 % (ref 37.5–51)
HGB BLD-MCNC: 14.3 G/DL (ref 13–17.7)
LYMPHOCYTES # BLD AUTO: 1.06 10*3/MM3 (ref 0.7–3.1)
LYMPHOCYTES NFR BLD AUTO: 16.7 % (ref 19.6–45.3)
MCH RBC QN AUTO: 32.3 PG (ref 26.6–33)
MCHC RBC AUTO-ENTMCNC: 35.7 G/DL (ref 31.5–35.7)
MCV RBC AUTO: 90.5 FL (ref 79–97)
MONOCYTES # BLD AUTO: 0.59 10*3/MM3 (ref 0.1–0.9)
MONOCYTES NFR BLD AUTO: 9.3 % (ref 5–12)
NEUTROPHILS NFR BLD AUTO: 4.44 10*3/MM3 (ref 1.7–7)
NEUTROPHILS NFR BLD AUTO: 70.2 % (ref 42.7–76)
PLATELET # BLD AUTO: 124 10*3/MM3 (ref 140–450)
PMV BLD AUTO: 10.7 FL (ref 6–12)
POTASSIUM SERPL-SCNC: 3.9 MMOL/L (ref 3.5–5.2)
PROT SERPL-MCNC: 7.5 G/DL (ref 6–8.5)
RBC # BLD AUTO: 4.43 10*6/MM3 (ref 4.14–5.8)
SODIUM SERPL-SCNC: 136 MMOL/L (ref 136–145)
WBC # BLD AUTO: 6.33 10*3/MM3 (ref 3.4–10.8)

## 2020-12-28 PROCEDURE — 36415 COLL VENOUS BLD VENIPUNCTURE: CPT

## 2020-12-28 PROCEDURE — 85025 COMPLETE CBC W/AUTO DIFF WBC: CPT

## 2020-12-28 PROCEDURE — 80053 COMPREHEN METABOLIC PANEL: CPT

## 2020-12-28 PROCEDURE — 84153 ASSAY OF PSA TOTAL: CPT

## 2020-12-29 LAB — PSA SERPL-MCNC: <0.014 NG/ML (ref 0–4)

## 2020-12-29 NOTE — PROGRESS NOTES
[facility]    Oral Oncology Patient Follow-Up - Refill      Consult Details  Consulting Provider:   Deshawn Black MD ([facility])   Medication and Regimen:  apalutamide 240 mg daily     Prescription Revie  Intervention(s):                  Patient due for refills, entered refill cycle on his treatment plan and sent to Dr. Black for final review and signature.    Called to check on the patient.  Patient's wife states he is doing well, had a good [place].  No side effects of note and no real concerns.  She did mention again she wasn't sure when he would get Lupron again.  I reiterated that he was on the every 6 month regimen and would be due until February (which his plan is entered). I encouraged her to keep an open line of communication with the Urology office.     She also asked how long would he be on this or if he would ever be able to get off. I told her she could discuss this in depth more with either Dr. Oneil or Dr. Black.  However, I did tell her this regimen is typically kept on indefinitely as long as the patient is responding to and tolerating treatment.  She voiced understanding.     Summary:    We will continue to follow patient.     Viviana Muro, PharmD  12/28/2020 15:04 CST

## 2021-01-04 ENCOUNTER — OFFICE VISIT (OUTPATIENT)
Dept: ONCOLOGY | Facility: CLINIC | Age: 76
End: 2021-01-04

## 2021-01-04 VITALS
BODY MASS INDEX: 32.4 KG/M2 | WEIGHT: 213.8 LBS | OXYGEN SATURATION: 95 % | SYSTOLIC BLOOD PRESSURE: 116 MMHG | HEIGHT: 68 IN | HEART RATE: 80 BPM | TEMPERATURE: 97.7 F | DIASTOLIC BLOOD PRESSURE: 68 MMHG | RESPIRATION RATE: 18 BRPM

## 2021-01-04 DIAGNOSIS — R53.83 FATIGUE, UNSPECIFIED TYPE: ICD-10-CM

## 2021-01-04 DIAGNOSIS — C61 PROSTATE CANCER (HCC): Primary | ICD-10-CM

## 2021-01-04 DIAGNOSIS — N31.9 NEUROGENIC BLADDER: ICD-10-CM

## 2021-01-04 PROCEDURE — 99214 OFFICE O/P EST MOD 30 MIN: CPT | Performed by: INTERNAL MEDICINE

## 2021-01-04 RX ORDER — DUTASTERIDE 0.5 MG/1
0.5 CAPSULE, LIQUID FILLED ORAL DAILY
Qty: 60 CAPSULE | Refills: 11 | Status: SHIPPED | OUTPATIENT
Start: 2021-01-04 | End: 2022-01-25 | Stop reason: SDUPTHER

## 2021-01-04 RX ORDER — PHENOL 1.4 %
600 AEROSOL, SPRAY (ML) MUCOUS MEMBRANE DAILY
COMMUNITY

## 2021-01-04 RX ORDER — SPIRONOLACT/HYDROCHLOROTHIAZID 25 MG-25MG
1 TABLET ORAL DAILY
COMMUNITY
End: 2023-01-04

## 2021-01-04 RX ORDER — TROSPIUM CHLORIDE ER 60 MG/1
60 CAPSULE ORAL EVERY MORNING
Qty: 60 CAPSULE | Refills: 11 | Status: SHIPPED | OUTPATIENT
Start: 2021-01-04 | End: 2022-01-03

## 2021-02-18 ENCOUNTER — SPECIALTY PHARMACY (OUTPATIENT)
Dept: ONCOLOGY | Facility: HOSPITAL | Age: 76
End: 2021-02-18

## 2021-02-18 NOTE — PROGRESS NOTES
[facility]    Oral Oncology Patient Follow-Up      Consult Details  Consulting Provider:   Deshawn Black MD ([facility])   Medication and Regimen:  apalutamide [Erleada] 240 mg po daily     Prescription Review  Dosing & Interactions:   Reviewed, appropriate and no significant interaction noted at this time..   Current Specialty Pharmacy [facility]     Intervention(s):                  Spoke with patient's wife (per pt preference) who stated they are receiving refills through [facility] without issue.   Warrant tolerating apalutamide well; however, reports having a significant increase in dental caries and tooth breakage recently.  They have been to see dentist and are not sure what is causing these problems.  Brief review of package insert and PubMed search does not reveal any correlation between Erleada and tooth decay.  Advised patient maintain good oral hygiene and continue to follow up with dentist as issues arise.  No further questions or concerns.     Summary:    Continue to follow.     Alejandra Diaz, PharmD  02/18/2021 14:50 CST

## 2021-02-26 ENCOUNTER — INFUSION (OUTPATIENT)
Dept: ONCOLOGY | Facility: HOSPITAL | Age: 76
End: 2021-02-26

## 2021-02-26 ENCOUNTER — LAB (OUTPATIENT)
Dept: LAB | Facility: HOSPITAL | Age: 76
End: 2021-02-26

## 2021-02-26 VITALS
BODY MASS INDEX: 32.13 KG/M2 | WEIGHT: 212 LBS | HEIGHT: 68 IN | OXYGEN SATURATION: 97 % | TEMPERATURE: 97.5 F | RESPIRATION RATE: 18 BRPM | HEART RATE: 86 BPM | DIASTOLIC BLOOD PRESSURE: 69 MMHG | SYSTOLIC BLOOD PRESSURE: 147 MMHG

## 2021-02-26 DIAGNOSIS — R53.83 FATIGUE, UNSPECIFIED TYPE: ICD-10-CM

## 2021-02-26 DIAGNOSIS — C61 PROSTATE CANCER (HCC): ICD-10-CM

## 2021-02-26 DIAGNOSIS — C61 PROSTATE CANCER (HCC): Primary | ICD-10-CM

## 2021-02-26 LAB
ALBUMIN SERPL-MCNC: 4.2 G/DL (ref 3.5–5.2)
ALBUMIN/GLOB SERPL: 1.3 G/DL
ALP SERPL-CCNC: 75 U/L (ref 39–117)
ALT SERPL W P-5'-P-CCNC: 32 U/L (ref 1–41)
ANION GAP SERPL CALCULATED.3IONS-SCNC: 10 MMOL/L (ref 5–15)
AST SERPL-CCNC: 43 U/L (ref 1–40)
BASOPHILS # BLD AUTO: 0.03 10*3/MM3 (ref 0–0.2)
BASOPHILS NFR BLD AUTO: 0.5 % (ref 0–1.5)
BILIRUB SERPL-MCNC: 0.4 MG/DL (ref 0–1.2)
BUN SERPL-MCNC: 15 MG/DL (ref 8–23)
BUN/CREAT SERPL: 25.4 (ref 7–25)
CALCIUM SPEC-SCNC: 9.7 MG/DL (ref 8.6–10.5)
CHLORIDE SERPL-SCNC: 98 MMOL/L (ref 98–107)
CO2 SERPL-SCNC: 29 MMOL/L (ref 22–29)
CREAT SERPL-MCNC: 0.59 MG/DL (ref 0.76–1.27)
DEPRECATED RDW RBC AUTO: 40.1 FL (ref 37–54)
EOSINOPHIL # BLD AUTO: 0.14 10*3/MM3 (ref 0–0.4)
EOSINOPHIL NFR BLD AUTO: 2.3 % (ref 0.3–6.2)
ERYTHROCYTE [DISTWIDTH] IN BLOOD BY AUTOMATED COUNT: 12.7 % (ref 12.3–15.4)
GFR SERPL CREATININE-BSD FRML MDRD: 133 ML/MIN/1.73
GLOBULIN UR ELPH-MCNC: 3.2 GM/DL
GLUCOSE SERPL-MCNC: 149 MG/DL (ref 65–99)
HCT VFR BLD AUTO: 38.6 % (ref 37.5–51)
HGB BLD-MCNC: 13.7 G/DL (ref 13–17.7)
IMM GRANULOCYTES # BLD AUTO: 0.02 10*3/MM3 (ref 0–0.05)
IMM GRANULOCYTES NFR BLD AUTO: 0.3 % (ref 0–0.5)
LYMPHOCYTES # BLD AUTO: 0.94 10*3/MM3 (ref 0.7–3.1)
LYMPHOCYTES NFR BLD AUTO: 15.4 % (ref 19.6–45.3)
MCH RBC QN AUTO: 30.9 PG (ref 26.6–33)
MCHC RBC AUTO-ENTMCNC: 35.5 G/DL (ref 31.5–35.7)
MCV RBC AUTO: 86.9 FL (ref 79–97)
MONOCYTES # BLD AUTO: 0.58 10*3/MM3 (ref 0.1–0.9)
MONOCYTES NFR BLD AUTO: 9.5 % (ref 5–12)
NEUTROPHILS NFR BLD AUTO: 4.4 10*3/MM3 (ref 1.7–7)
NEUTROPHILS NFR BLD AUTO: 72 % (ref 42.7–76)
NRBC BLD AUTO-RTO: 0 /100 WBC (ref 0–0.2)
PLATELET # BLD AUTO: 138 10*3/MM3 (ref 140–450)
PMV BLD AUTO: 10.2 FL (ref 6–12)
POTASSIUM SERPL-SCNC: 4 MMOL/L (ref 3.5–5.2)
PROT SERPL-MCNC: 7.4 G/DL (ref 6–8.5)
PSA SERPL-MCNC: <0.014 NG/ML (ref 0–4)
RBC # BLD AUTO: 4.44 10*6/MM3 (ref 4.14–5.8)
SODIUM SERPL-SCNC: 137 MMOL/L (ref 136–145)
TSH SERPL DL<=0.05 MIU/L-ACNC: 2.67 UIU/ML (ref 0.27–4.2)
WBC # BLD AUTO: 6.11 10*3/MM3 (ref 3.4–10.8)

## 2021-02-26 PROCEDURE — 36415 COLL VENOUS BLD VENIPUNCTURE: CPT

## 2021-02-26 PROCEDURE — 85025 COMPLETE CBC W/AUTO DIFF WBC: CPT

## 2021-02-26 PROCEDURE — 96402 CHEMO HORMON ANTINEOPL SQ/IM: CPT

## 2021-02-26 PROCEDURE — 80053 COMPREHEN METABOLIC PANEL: CPT

## 2021-02-26 PROCEDURE — 25010000002 LEUPROLIDE 45 MG KIT: Performed by: UROLOGY

## 2021-02-26 PROCEDURE — 84153 ASSAY OF PSA TOTAL: CPT

## 2021-02-26 PROCEDURE — 84443 ASSAY THYROID STIM HORMONE: CPT

## 2021-02-26 RX ADMIN — LEUPROLIDE ACETATE 45 MG: KIT at 12:53

## 2021-03-01 ENCOUNTER — OFFICE VISIT (OUTPATIENT)
Dept: ONCOLOGY | Facility: CLINIC | Age: 76
End: 2021-03-01

## 2021-03-01 VITALS
HEART RATE: 77 BPM | HEIGHT: 68 IN | WEIGHT: 213.7 LBS | SYSTOLIC BLOOD PRESSURE: 120 MMHG | TEMPERATURE: 97.6 F | OXYGEN SATURATION: 96 % | RESPIRATION RATE: 18 BRPM | DIASTOLIC BLOOD PRESSURE: 60 MMHG | BODY MASS INDEX: 32.39 KG/M2

## 2021-03-01 DIAGNOSIS — C61 PROSTATE CANCER (HCC): Primary | ICD-10-CM

## 2021-03-01 DIAGNOSIS — R53.83 FATIGUE, UNSPECIFIED TYPE: ICD-10-CM

## 2021-03-01 PROCEDURE — 99214 OFFICE O/P EST MOD 30 MIN: CPT | Performed by: INTERNAL MEDICINE

## 2021-03-23 ENCOUNTER — LAB (OUTPATIENT)
Dept: LAB | Facility: HOSPITAL | Age: 76
End: 2021-03-23

## 2021-03-23 DIAGNOSIS — C61 PROSTATE CANCER (HCC): ICD-10-CM

## 2021-03-23 DIAGNOSIS — R53.83 FATIGUE, UNSPECIFIED TYPE: ICD-10-CM

## 2021-03-23 LAB
ALBUMIN SERPL-MCNC: 4.3 G/DL (ref 3.5–5.2)
ALBUMIN/GLOB SERPL: 1.4 G/DL
ALP SERPL-CCNC: 74 U/L (ref 39–117)
ALT SERPL W P-5'-P-CCNC: 28 U/L (ref 1–41)
ANION GAP SERPL CALCULATED.3IONS-SCNC: 11 MMOL/L (ref 5–15)
AST SERPL-CCNC: 39 U/L (ref 1–40)
BASOPHILS # BLD AUTO: 0.04 10*3/MM3 (ref 0–0.2)
BASOPHILS NFR BLD AUTO: 0.6 % (ref 0–1.5)
BILIRUB SERPL-MCNC: 0.3 MG/DL (ref 0–1.2)
BUN SERPL-MCNC: 14 MG/DL (ref 8–23)
BUN/CREAT SERPL: 18.9 (ref 7–25)
CALCIUM SPEC-SCNC: 9.9 MG/DL (ref 8.6–10.5)
CHLORIDE SERPL-SCNC: 96 MMOL/L (ref 98–107)
CO2 SERPL-SCNC: 29 MMOL/L (ref 22–29)
CREAT SERPL-MCNC: 0.74 MG/DL (ref 0.76–1.27)
DEPRECATED RDW RBC AUTO: 42.3 FL (ref 37–54)
EOSINOPHIL # BLD AUTO: 0.17 10*3/MM3 (ref 0–0.4)
EOSINOPHIL NFR BLD AUTO: 2.7 % (ref 0.3–6.2)
ERYTHROCYTE [DISTWIDTH] IN BLOOD BY AUTOMATED COUNT: 12.8 % (ref 12.3–15.4)
GFR SERPL CREATININE-BSD FRML MDRD: 103 ML/MIN/1.73
GLOBULIN UR ELPH-MCNC: 3.1 GM/DL
GLUCOSE SERPL-MCNC: 149 MG/DL (ref 65–99)
HCT VFR BLD AUTO: 40 % (ref 37.5–51)
HGB BLD-MCNC: 13.8 G/DL (ref 13–17.7)
LYMPHOCYTES # BLD AUTO: 1.1 10*3/MM3 (ref 0.7–3.1)
LYMPHOCYTES NFR BLD AUTO: 17.4 % (ref 19.6–45.3)
MCH RBC QN AUTO: 30.9 PG (ref 26.6–33)
MCHC RBC AUTO-ENTMCNC: 34.5 G/DL (ref 31.5–35.7)
MCV RBC AUTO: 89.7 FL (ref 79–97)
MONOCYTES # BLD AUTO: 0.66 10*3/MM3 (ref 0.1–0.9)
MONOCYTES NFR BLD AUTO: 10.4 % (ref 5–12)
NEUTROPHILS NFR BLD AUTO: 4.35 10*3/MM3 (ref 1.7–7)
NEUTROPHILS NFR BLD AUTO: 68.6 % (ref 42.7–76)
PLATELET # BLD AUTO: 144 10*3/MM3 (ref 140–450)
PMV BLD AUTO: 10.8 FL (ref 6–12)
POTASSIUM SERPL-SCNC: 4 MMOL/L (ref 3.5–5.2)
PROT SERPL-MCNC: 7.4 G/DL (ref 6–8.5)
RBC # BLD AUTO: 4.46 10*6/MM3 (ref 4.14–5.8)
SODIUM SERPL-SCNC: 136 MMOL/L (ref 136–145)
TSH SERPL DL<=0.05 MIU/L-ACNC: 3.67 UIU/ML (ref 0.27–4.2)
WBC # BLD AUTO: 6.34 10*3/MM3 (ref 3.4–10.8)

## 2021-03-23 PROCEDURE — 84443 ASSAY THYROID STIM HORMONE: CPT

## 2021-03-23 PROCEDURE — 80053 COMPREHEN METABOLIC PANEL: CPT

## 2021-03-23 PROCEDURE — 85025 COMPLETE CBC W/AUTO DIFF WBC: CPT

## 2021-03-23 PROCEDURE — 84153 ASSAY OF PSA TOTAL: CPT

## 2021-03-23 PROCEDURE — 36415 COLL VENOUS BLD VENIPUNCTURE: CPT

## 2021-03-23 NOTE — PROGRESS NOTES
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  Noticing increasing blood with catheterizations.  This been going on for about a month.  Denies flank pain.  With regard to his prostate cancer he is currently on apalutamide and leuprolide.  Dr. Black is also following him with me.      Current Outpatient Medications:   •  Apalutamide 60 MG tablet, Take 4 tablets by mouth Daily. Take 4 tablets once daily with or without food. Do not crush or chew tablets., Disp: 120 tablet, Rfl: 11  •  Apple [place] Vn-Grn Tea-Bit Or-Cr (Apple Cider Vinegar Plus) tablet, Take  by mouth., Disp: , Rfl:   •  calcium carbonate (OS-JONNY) 600 MG tablet, Take 600 mg by mouth Daily., Disp: , Rfl:   •  dutasteride (AVODART) 0.5 MG capsule, Take 1 capsule by mouth Daily., Disp: 60 capsule, Rfl: 11  •  JANUVIA 100 MG tablet, Take 100 mg by mouth Daily., Disp: , Rfl:   •  lisinopril-hydrochlorothiazide (PRINZIDE,ZESTORETIC) 20-12.5 MG per tablet, Take 20 tablets by mouth Daily., Disp: , Rfl:   •  rosuvastatin (CRESTOR) 20 MG tablet, Take 20 mg by mouth Daily., Disp: , Rfl:   •  trospium 60 MG capsule sustained-release 24 hr capsule, Take 1 capsule by mouth Every Morning., Disp: 60 capsule, Rfl: 11  •  doxycycline (VIBRAMYCIN) 100 MG capsule, Take 100 mg by mouth 2 (Two) Times a Day., Disp: , Rfl:   •  phenazopyridine (PYRIDIUM) 200 MG tablet, Take 200 mg by mouth As Needed., Disp: , Rfl:   •  prochlorperazine (COMPAZINE) 10 MG tablet, Take 1 tablet by mouth Every 4 (Four) Hours As Needed for Nausea or Vomiting., Disp: 30 tablet, Rfl: 5  •  sulfamethoxazole-trimethoprim (BACTRIM DS,SEPTRA DS) 800-160 MG per tablet, Take 800 tablets by mouth 2 (Two) Times a Day., Disp: , Rfl:   •  tamsulosin  "(FLOMAX) 0.4 MG capsule 24 hr capsule, Take 0.4 mg by mouth Daily., Disp: , Rfl:   •  Zinc Sulfate (ZINC 15 PO), Take  by mouth., Disp: , Rfl:   Past Medical History:   Diagnosis Date   • Arrhythmia    • Cancer (CMS/HCC)     prostate   • Diabetes mellitus (CMS/HCC)    • Hyperlipidemia    • Hypertension      Past Surgical History:   Procedure Laterality Date   • CATARACT EXTRACTION     • KIDNEY STONE SURGERY     • TONSILLECTOMY       Social History     Socioeconomic History   • Marital status:      Spouse name: Not on file   • Number of children: Not on file   • Years of education: Not on file   • Highest education level: Not on file   Tobacco Use   • Smoking status: Never Smoker   • Smokeless tobacco: Never Used   Vaping Use   • Vaping Use: Never used   Substance and Sexual Activity   • Alcohol use: No   • Drug use: No   • Sexual activity: Defer     Family History   Problem Relation Age of Onset   • Dementia Mother        Review  of systems  Constitutional: Negative for chills and fever.   Gastrointestinal: Negative for abdominal pain, anal bleeding and blood in stool.   Genitourinary: Negative for flank pain and hematuria.      Objective   PHYSICAL EXAM  Vital Signs:   Temp 96.1 °F (35.6 °C)   Ht 172.7 cm (68\")   Wt 94.6 kg (208 lb 9.6 oz)   BMI 31.72 kg/m²     Constitutional:  The patient appear well-developed and well-nourished. There are no obvious deformities. No distress. The vital signs are reviewed  Pulmonary/Chest: Effort normal.   GI: Soft. The patient exhibits no distension and no mass. There is no tenderness. There is no rebound and no guarding. No hernia.   Neurological: Patient is alert and oriented to person, place, and time.   Skin: Skin is warm and dry. Not diaphoretic.   Psychiatric:  normal mood and affect. Not agitated.         DATA  Result Review :            PSA                 DATE  22.26               10/05/2016  <0.13               07/17/2017  7.91                 10/17/2018  19.29 " "              03/19/2020  22.25               04/07/2020    Lab Results   Component Value Date    PSA <0.014 03/23/2021    PSA <0.014 02/26/2021    PSA <0.014 12/28/2020        NM Bone Scan Whole Body (06/11/2020 12:56)  Results reviewed/DLS    CT Abdomen Pelvis With Contrast (06/11/2020 10:34)   The images for the above \"link\" were made available to me to review independently.  I also reviewed the radiologist's report described above with regard to the urologic findings. My interpretation is as follows:  There is no evidence of metastatic disease or hydronephrosis.  /Matthew Oneil MD      Brief Urine Lab Results  (Last result in the past 365 days)      Color   Clarity   Blood   Leuk Est   Nitrite   Protein   CREAT   Urine HCG        03/30/21 1011 Viviana Cloudy Large Moderate (2+) Negative 2+           Microscopic Urinalysis  I inspected the urine myself based on the clinical situation including the dipstick urine. The urine is spun in a centrifuge for three minutes. The spun urine shows 3-6 rbc/hpf, 3-6 wbc/hpf, 0-2 epi/hpf, 2+ bacteria, trace crystals, and negative casts.        ASSESSMENT AND PLAN      Problem List Items Addressed This Visit        Hematology and Neoplasia    Prostate cancer (CMS/HCC)    Relevant Orders    POC Urinalysis Dipstick, Multipro (Completed)      Other Visit Diagnoses     Gross hematuria    -  Primary    Relevant Orders    CT Abdomen Pelvis With & Without Contrast    Urine Culture - Urine, Urine, Catheter In/Out    Neurogenic bladder          -Hematuria needs further work-up.  I would recommend cystoscopy.Cystoscopy as the definitive lower urinary tract study is discussed . The risks of pain and discomfort, infection, and urethral stricture are discussed with the patient including the technique used in the office setting.  All patient questions were answered.  Will also obtain a CT urogram.  Urine culture is sent today.  Will contact patient couple days of results because I like to " given this before we do cystoscopy.  -Continue leuprolide and apalutamide.  He has upcoming follow-up with Dr. Black.  I did review his previous CT and bone scan he had done.  -Continues to manage neurogenic bladder with intermittent catheterization.  No difficulties until he started to develop the blood in urine over the last few weeks.  We will assess this obvious at the time of cystoscopy to rule out any injury to the urethra.        FOLLOW UP     Return in about 1 week (around 4/6/2021).  Patient was given instructions and counseling regarding his condition or for health maintenance advice. Please see specific information pulled into the AVS if appropriate.       (Please note that portions of this note were completed with a voice recognition program.)  Matthew Oneil MD  03/31/21  09:18 CDT

## 2021-03-23 NOTE — PROGRESS NOTES
[facility]W ONC [facility] GROUP HEMATOLOGY AND ONCOLOGY  [address]  [facility] [address] [phone]    Patient Name: Simeon Tam  Encounter Date: 03/30/2021  YOB: 1945  Patient Number: [id number]      REASON FOR FOLLOW-UP: Simeon Tam is a pleasant 75 y.o.  male who is seen in follow-up for hormone refractory prostate cancer.  He is seen C9D28 of apalutamide.  He is seen with spouse, Essence. History is obtained from patient.  History is considered to be accurate.        Oncology/Hematology History Overview Note   DIAGNOSTIC ABNORMALITIES:  He presented with rising PSA of 13 and was diagnosed with prostate cancer about 25 years ago.   Previous PSA was 22.26 on 10/05/2016, <0.13 on 07/17/2017, 7.91 on 10/17/2018, 19.29 on 03/19/2020, and 22.25 on 04/07/2020.   He was seen by Dr. Oneil 05/12/2020. Latest PSA was 22.25 ng/ml on 04/07/2020.  Previous PSA 22.26 on 10/05/2016.  Bone scan and CT scan of the abdomen and pelvis were negative for prostate cancer on 10/2016.  Started on Casodex and possibly leuprolide.  PSA  was down to <0.13 on 07/17/207.  Plan for apalutamide after staging scans.   CT abdomen and pelvis 06/11/2020. No evidence of metastatic disease in the abdomen or pelvis. Right renal pelvis and ureter urothelial thickening and hyperemia. Urinary bladder wall is thickened and there is adjacent inflammation. Recommend correlation with urinalysis and exam to exclude cystitis with right-sided pyelitis.  Cirrhosis.  Tiny 8 mm hypodense RIGHT inferior liver lesion on image 33 is too small to characterize.   Bone scan 06/11/2020. No evidence of bone metastases.        PREVIOUS INTERVENTIONS:  He was treated with adjuvant radiation at [facility] and androgen ablation with Casodex.  Lupron and Casodex 10/2016. He had 7 months of ADT therapy with Lupron.  Apalutamide 07/22/2020 through present.     Prostate cancer (CMS/HCC)   6/29/2020 -   Chemotherapy    OP PROSTATE Apalutamide     6/30/2020 Initial Diagnosis    Prostate cancer (CMS/HCC)     7/29/2020 -  Chemotherapy    OP LEUPROLIDE Q6M         PAST MEDICAL HISTORY:  ALLERGIES:  No Known Allergies  CURRENT MEDICATIONS:  Outpatient Encounter Medications as of 3/30/2021   Medication Sig Dispense Refill   • Apalutamide 60 MG tablet Take 4 tablets by mouth Daily. Take 4 tablets once daily with or without food. Do not crush or chew tablets. 120 tablet 11   • Apple Irving Vn-Grn Tea-Bit Or-Cr (Apple Cider Vinegar Plus) tablet Take  by mouth.     • calcium carbonate (OS-JONNY) 600 MG tablet Take 600 mg by mouth Daily.     • doxycycline (VIBRAMYCIN) 100 MG capsule Take 100 mg by mouth 2 (Two) Times a Day.     • dutasteride (AVODART) 0.5 MG capsule Take 1 capsule by mouth Daily. 60 capsule 11   • JANUVIA 100 MG tablet Take 100 mg by mouth Daily.     • lisinopril-hydrochlorothiazide (PRINZIDE,ZESTORETIC) 20-12.5 MG per tablet Take 20 tablets by mouth Daily.     • phenazopyridine (PYRIDIUM) 200 MG tablet Take 200 mg by mouth As Needed.     • prochlorperazine (COMPAZINE) 10 MG tablet Take 1 tablet by mouth Every 4 (Four) Hours As Needed for Nausea or Vomiting. 30 tablet 5   • rosuvastatin (CRESTOR) 20 MG tablet Take 20 mg by mouth Daily.     • sulfamethoxazole-trimethoprim (BACTRIM DS,SEPTRA DS) 800-160 MG per tablet Take 800 tablets by mouth 2 (Two) Times a Day.     • tamsulosin (FLOMAX) 0.4 MG capsule 24 hr capsule Take 0.4 mg by mouth Daily.     • trospium 60 MG capsule sustained-release 24 hr capsule Take 1 capsule by mouth Every Morning. 60 capsule 11   • Zinc Sulfate (ZINC 15 PO) Take  by mouth.       No facility-administered encounter medications on file as of 3/30/2021.     ADULT ILLNESSES:  Patient Active Problem List   Diagnosis Code   • Hypertension I10   • Hyperlipidemia E78.5   • Diabetes mellitus (CMS/HCC) E11.9   • Cancer (CMS/HCC) C80.1   • Arrhythmia I49.9   • Non morbid obesity due to excess  "calories E66.09   • Prostate cancer (CMS/HCC) C61     SURGERIES:  Past Surgical History:   Procedure Laterality Date   • CATARACT EXTRACTION     • KIDNEY STONE SURGERY     • TONSILLECTOMY       HEALTH MAINTENANCE ITEMS:  Health Maintenance Due   Topic Date Due   • URINE MICROALBUMIN  Never done   • COVID-19 Vaccine (1) Never done   • Hepatitis B (1 of 3 - Risk 3-dose series) Never done   • TDAP/TD VACCINES (1 - Tdap) Never done   • ZOSTER VACCINE (1 of 2) Never done   • HEPATITIS C SCREENING  Never done   • ANNUAL WELLNESS VISIT  Never done   • LIPID PANEL  Never done   • HEMOGLOBIN A1C  Never done   • DIABETIC EYE EXAM  Never done       <no information>  Last Completed Colonoscopy    Patient has no health maintenance due at this time         There is no immunization history on file for this patient.  Last Completed Mammogram    Patient has no health maintenance due at this time           FAMILY HISTORY:  Family History   Problem Relation Age of Onset   • Dementia Mother      SOCIAL HISTORY:  Social History     Socioeconomic History   • Marital status:      Spouse name: Not on file   • Number of children: Not on file   • Years of education: Not on file   • Highest education level: Not on file   Tobacco Use   • Smoking status: Never Smoker   • Smokeless tobacco: Never Used   Vaping Use   • Vaping Use: Never used   Substance and Sexual Activity   • Alcohol use: No   • Drug use: No   • Sexual activity: Defer       REVIEW OF SYSTEMS:    Review of Systems   Constitutional: Positive for fatigue. Negative for chills and fever.        \"I am bleeding for the past 10 days.  Usually in the morning.  I will have a scan.  I will see Dr. Oneil April 8th.\"    HENT: Negative for congestion, nosebleeds and trouble swallowing.    Eyes: Negative for redness and visual disturbance.   Respiratory: Negative for cough, shortness of breath and wheezing.    Cardiovascular: Negative for chest pain and palpitations. " "  Gastrointestinal: Negative for abdominal pain, blood in stool, constipation, diarrhea, nausea and vomiting.   Endocrine: Negative for heat intolerance.   Genitourinary: Positive for hematuria.   Musculoskeletal: Negative for gait problem and neck stiffness.   Skin: Negative for pallor.   Allergic/Immunologic: Negative for food allergies.   Neurological: Negative for dizziness, speech difficulty and weakness.   Hematological: Negative for adenopathy.   Psychiatric/Behavioral: Negative for agitation and confusion. The patient is not nervous/anxious.        VITAL SIGNS: /58   Pulse 88   Temp 98.1 °F (36.7 °C)   Resp 18   Ht 172.7 cm (68\")   Wt 95.2 kg (209 lb 14.4 oz)   SpO2 95%   BMI 31.92 kg/m²   Pain Score    03/30/21 1443   PainSc: 0-No pain       PHYSICAL EXAMINATION:     Physical Exam  Constitutional:       General: He is not in acute distress.     Appearance: He is obese.   HENT:      Head: Normocephalic and atraumatic.   Eyes:      General: No scleral icterus.  Cardiovascular:      Rate and Rhythm: Normal rate and regular rhythm.   Pulmonary:      Effort: No respiratory distress.      Breath sounds: No wheezing or rales.   Abdominal:      General: Bowel sounds are normal.      Palpations: Abdomen is soft.      Tenderness: There is no abdominal tenderness.   Musculoskeletal:         General: No swelling.      Cervical back: Neck supple.   Skin:     General: Skin is warm and dry.      Coloration: Skin is not pale.   Neurological:      Mental Status: He is alert and oriented to person, place, and time.   Psychiatric:         Mood and Affect: Mood normal.         Behavior: Behavior normal.         Thought Content: Thought content normal.         Judgment: Judgment normal.         LABS    Lab Results - Last 18 Months   Lab Units 03/23/21  1340 02/26/21  1237 12/28/20  1342 11/30/20  1357 10/06/20  1343 09/01/20  1427   HEMOGLOBIN g/dL 13.8 13.7 14.3 13.5 14.1 14.7   HEMATOCRIT % 40.0 38.6 40.1 38.8 " 40.9 43.2   MCV fL 89.7 86.9 90.5 90.4 90.3 88.5   WBC 10*3/mm3 6.34 6.11 6.33 6.09 5.88 6.01   RDW % 12.8 12.7 12.4 12.7 13.2 13.2   MPV fL 10.8 10.2 10.7 10.7 10.7 10.9   PLATELETS 10*3/mm3 144 138* 124* 126* 116* 143   IMM GRAN % %  --  0.3  --   --   --  0.3   NEUTROS ABS 10*3/mm3 4.35 4.40 4.44 4.17 3.79 4.40   LYMPHS ABS 10*3/mm3 1.10 0.94 1.06 1.08 1.12 1.00   MONOS ABS 10*3/mm3 0.66 0.58 0.59 0.59 0.74 0.41   EOS ABS 10*3/mm3 0.17 0.14 0.20 0.20 0.19 0.15   BASOS ABS 10*3/mm3 0.04 0.03 0.02 0.03 0.03 0.03   IMMATURE GRANS (ABS) 10*3/mm3  --  0.02  --   --   --  0.02   NRBC /100 WBC  --  0.0  --   --   --  0.0       Lab Results - Last 18 Months   Lab Units 03/23/21  1340 02/26/21  1237 12/28/20  1342 11/30/20  1357 10/06/20  1343 09/01/20  1427   GLUCOSE mg/dL 149* 149* 191* 170* 158* 280*   SODIUM mmol/L 136 137 136 137 138 135*   POTASSIUM mmol/L 4.0 4.0 3.9 4.0 4.0 3.9   CO2 mmol/L 29.0 29.0 30.0* 30.0* 31.0* 24.0   CHLORIDE mmol/L 96* 98 95* 96* 98 94*   ANION GAP mmol/L 11.0 10.0 11.0 11.0 9.0 17.0*   CREATININE mg/dL 0.74* 0.59* 0.72* 0.65* 0.71* 0.68*   BUN mg/dL 14 15 15 16 15 15   BUN / CREAT RATIO  18.9 25.4* 20.8 24.6 21.1 22.1   CALCIUM mg/dL 9.9 9.7 10.0 10.1 9.8 9.7   EGFR IF NONAFRICN AM mL/min/1.73 103 133 106 119 108 113   ALK PHOS U/L 74 75 74 78 66 61   TOTAL PROTEIN g/dL 7.4 7.4 7.5 7.4 7.3 7.4   ALT (SGPT) U/L 28 32 29 36 30 34   AST (SGOT) U/L 39 43* 35 44* 32 38   BILIRUBIN mg/dL 0.3 0.4 0.3 0.3 0.3 0.6   ALBUMIN g/dL 4.30 4.20 4.30 4.40 4.40 4.30   GLOBULIN gm/dL 3.1 3.2 3.2 3.0 2.9 3.1       No results for input(s): MSPIKE, KAPPALAMB, IGLFLC, URICACID, FREEKAPPAL, CEA, LDH, REFLABREPO in the last 47319 hours.    Lab Results - Last 18 Months   Lab Units 03/23/21  1340 02/26/21  1237 09/01/20  1427 08/12/20  1424 06/29/20  1408   TSH uIU/mL 3.670 2.670 2.140 2.070 2.080       Simeon Tam reports a pain score of 0.     Patient's Body mass index is 31.92 kg/m². BMI is above normal  "parameters. Recommendations include: referral to primary care.      ASSESSMENT:  1.  Prostate cancer.  PSA recurrence.   AJCC stage (TX, NX, M0)  Treatment status:Casodex and Lupron with PSA recurrence.  On apalutamide and leuprolde (by Dr. Oneil).  2.  Performance status of 1.   3.  Cirrhosis.  4.  Neurogenic bladder.   5.  Anterior urethral stricture.  6.  Thrombocytopenia at 102 on 03/19/2020 from cirrhosis.  7.  Obesity BMI 31.92.  8.  Grade 1 fatigue from malignancy.         PLAN:  1.    Re:  Good apalutamide tolerance.    2.    Re:  Heme status.  Hemoglobin 13.8 and platelet 144, normal.  3.    Re:  Pre-office CMP.   ml/minute.   4.    Re:  Pre-office PSA < 0.014 from < 0.014 from < 0.014 from 0.015 from 0.03 from 0.125 from 1.03 from 27.3.   5.   eRx for Compazine 10 mg p.o. every 4 hours as needed for nausea/vomiting, 60, 2 refills if needed.    6.   eRx for C10D1 start on 03/31/2021 apalutamide 60 mg, take four tablets total dose 240 mg po daily, number, 120.  Take either with or without food.  Swallow tablets whole. TSH before apalutamide.  No grapefruit juice or grapefruit.  Monitor for adverse effects especially arrhythmia, rash, seizures, thyroid dysfunction, hypothyroidism, fall and fractures.  7.    Continue ongoing management per primary care physician and other specialists.  8.    Plan of care discussed with patient and spouse.  Understanding expressed.  Patient agreeable to proceed.  9.    Refer to PCP for obesity.  10.  Questions were answered to his satisfaction.  \"Right.\"  11.  Advance Care Planning   ACP discussion was declined by the patient. Patient does not have an advance directive, information provided.  12.  Return to office in 4 weeks with pre-office TSH, CMP, PSA and CBC with differential.  13.  Lupron per Dr. Oneil.         I have reviewed the assessment and plan and verified the accuracy of it. No changes to assessment and plan since the " information was documented. Deshawn Black MD 03/30/21        I spent 30 total minutes, face-to-face, caring for Simeon today.  Greater than 50% of this time involved counseling and/or coordination of care as documented within this note regarding the patient's illness(es), pros and cons of various treatment options, instructions and/or risk reduction.            (Matthew Oneil MD)  (Oniel Doshi MD)  Lucio Garcia MD

## 2021-03-24 LAB — PSA SERPL-MCNC: <0.014 NG/ML (ref 0–4)

## 2021-03-26 ENCOUNTER — SPECIALTY PHARMACY (OUTPATIENT)
Dept: ONCOLOGY | Facility: HOSPITAL | Age: 76
End: 2021-03-26

## 2021-03-26 NOTE — PROGRESS NOTES
[facility]    Oral Oncology Patient Follow-Up      Consult Details  Consulting Provider:   Deshawn Black MD ([facility])   Medication and Regimen:  apalutamide [Erleada] 240 mg po daily     Prescription Review  Dosing & Interactions:   Reviewed, appropriate and no significant interaction noted at this time..   Current Specialty Pharmacy [facility] (insurance preference)     Intervention(s):                  Spoke with Essence (patient's wife) for monthly touch-base and reviewed dosing regimen.  She states Mr. Tam continues to tolerate Erleada very well as far as they can tell, but he is having blood in his urine and she questions if it could be related to the medication.  Reviewed package insert and online references; no thrombocytopenia or other bleeding risks are listed as potential adverse effects.  Advised Ms. Tam that this is not likely to be caused by Erleada and they should keep appointment with urologist on 3/30/21 (next Tues).  She voiced understanding, no other questions or concerns at this time.    Summary:    Continue to follow.     Alejandra Diaz, PharmD  03/26/2021 12:12 CDT

## 2021-03-30 ENCOUNTER — OFFICE VISIT (OUTPATIENT)
Dept: ONCOLOGY | Facility: CLINIC | Age: 76
End: 2021-03-30

## 2021-03-30 ENCOUNTER — OFFICE VISIT (OUTPATIENT)
Dept: UROLOGY | Facility: CLINIC | Age: 76
End: 2021-03-30

## 2021-03-30 VITALS — BODY MASS INDEX: 31.61 KG/M2 | HEIGHT: 68 IN | TEMPERATURE: 96.1 F | WEIGHT: 208.6 LBS

## 2021-03-30 VITALS
SYSTOLIC BLOOD PRESSURE: 118 MMHG | HEIGHT: 68 IN | OXYGEN SATURATION: 95 % | RESPIRATION RATE: 18 BRPM | WEIGHT: 209.9 LBS | HEART RATE: 88 BPM | BODY MASS INDEX: 31.81 KG/M2 | DIASTOLIC BLOOD PRESSURE: 58 MMHG | TEMPERATURE: 98.1 F

## 2021-03-30 DIAGNOSIS — R31.0 GROSS HEMATURIA: Primary | ICD-10-CM

## 2021-03-30 DIAGNOSIS — C61 PROSTATE CANCER (HCC): ICD-10-CM

## 2021-03-30 DIAGNOSIS — N31.9 NEUROGENIC BLADDER: ICD-10-CM

## 2021-03-30 DIAGNOSIS — C61 PROSTATE CANCER (HCC): Primary | ICD-10-CM

## 2021-03-30 DIAGNOSIS — R53.0 NEOPLASTIC MALIGNANT RELATED FATIGUE: ICD-10-CM

## 2021-03-30 LAB
BILIRUB BLD-MCNC: NEGATIVE MG/DL
CLARITY, POC: ABNORMAL
COLOR UR: ABNORMAL
GLUCOSE UR STRIP-MCNC: NEGATIVE MG/DL
KETONES UR QL: NEGATIVE
LEUKOCYTE EST, POC: ABNORMAL
NITRITE UR-MCNC: NEGATIVE MG/ML
PH UR: 5.5 [PH] (ref 5–8)
PROT UR STRIP-MCNC: ABNORMAL MG/DL
RBC # UR STRIP: ABNORMAL /UL
SP GR UR: 1.01 (ref 1–1.03)
UROBILINOGEN UR QL: NORMAL

## 2021-03-30 PROCEDURE — 87086 URINE CULTURE/COLONY COUNT: CPT | Performed by: UROLOGY

## 2021-03-30 PROCEDURE — 87181 SC STD AGAR DILUTION PER AGT: CPT | Performed by: UROLOGY

## 2021-03-30 PROCEDURE — 81003 URINALYSIS AUTO W/O SCOPE: CPT | Performed by: UROLOGY

## 2021-03-30 PROCEDURE — 87088 URINE BACTERIA CULTURE: CPT | Performed by: UROLOGY

## 2021-03-30 PROCEDURE — 99214 OFFICE O/P EST MOD 30 MIN: CPT | Performed by: UROLOGY

## 2021-03-30 PROCEDURE — 99214 OFFICE O/P EST MOD 30 MIN: CPT | Performed by: INTERNAL MEDICINE

## 2021-03-30 PROCEDURE — 87186 SC STD MICRODIL/AGAR DIL: CPT | Performed by: UROLOGY

## 2021-04-02 ENCOUNTER — TELEPHONE (OUTPATIENT)
Dept: UROLOGY | Facility: CLINIC | Age: 76
End: 2021-04-02

## 2021-04-02 DIAGNOSIS — N39.0 URINARY TRACT INFECTION WITHOUT HEMATURIA, SITE UNSPECIFIED: Primary | ICD-10-CM

## 2021-04-02 LAB — BACTERIA SPEC AEROBE CULT: ABNORMAL

## 2021-04-02 RX ORDER — DOXYCYCLINE HYCLATE 100 MG/1
100 CAPSULE ORAL 2 TIMES DAILY
Qty: 14 CAPSULE | Refills: 0 | Status: SHIPPED | OUTPATIENT
Start: 2021-04-02 | End: 2021-04-09

## 2021-04-02 NOTE — TELEPHONE ENCOUNTER
----- Message from Matthew Oneil MD sent at 4/2/2021  7:04 AM CDT -----  Prescription sent in for doxycycline

## 2021-04-06 NOTE — PROGRESS NOTES
Chief Complaint  1 week follow up for Prostate Cancer and Gross Hematuria. Imaging was done at [facility].  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  The urethra was anesthetized with 2% lidocaine jelly.  A flexible cystoscope was introduced per urethra.      Urethra:  Pan urethral stricture disease    Bladder:  No bladder tumor, moderate trabeculation and No bladder neck contracture    Ureteral orifices:  Bladder debris and bladder cannot visualize orifice ease    Prostate:  Small appearing prostate with significant inflammation.  Neovascularity noted.  Some telangiectasia    Patient tolerated the procedure well    Complications: none    Blood loss: minimal  Given these findings, I had to evaluate the patient to assess fitness for surgery, formulate and discussa plan, that included alternatives, risks and benefits of management.. This went well beyond the typical description of procedural findings and therefore an additional evaluation and management was required.        History of Present Illness  Cystoscopy finding  The patient's cystoscopy today revealed pan urethral stricture disease. This is a new diagnois finding warranting further evaluation. Onset of this is unknown. It was found in the context of cystoscopy being done for an evaluation of hematuria.  Symptoms include irritative symptoms including frequency, urgency.          Current Outpatient Medications:   •  Apalutamide 60 MG tablet, Take 4 tablets by mouth Daily. Take 4 tablets once daily with or without food. Do not crush or chew tablets., Disp: 120 tablet, Rfl: 11  •  Apple Irving Vn-Grn Tea-Bit Or-Cr (Apple Cider Vinegar Plus) tablet, Take  by mouth., Disp: , Rfl:   •  calcium carbonate (OS-JONNY) 600 MG tablet, Take 600 mg by mouth Daily.,  "Disp: , Rfl:   •  dutasteride (AVODART) 0.5 MG capsule, Take 1 capsule by mouth Daily., Disp: 60 capsule, Rfl: 11  •  JANUVIA 100 MG tablet, Take 100 mg by mouth Daily., Disp: , Rfl:   •  lisinopril-hydrochlorothiazide (PRINZIDE,ZESTORETIC) 20-12.5 MG per tablet, Take 20 tablets by mouth Daily., Disp: , Rfl:   •  phenazopyridine (PYRIDIUM) 200 MG tablet, Take 200 mg by mouth As Needed., Disp: , Rfl:   •  prochlorperazine (COMPAZINE) 10 MG tablet, Take 1 tablet by mouth Every 4 (Four) Hours As Needed for Nausea or Vomiting., Disp: 30 tablet, Rfl: 5  •  rosuvastatin (CRESTOR) 20 MG tablet, Take 20 mg by mouth Daily., Disp: , Rfl:   •  tamsulosin (FLOMAX) 0.4 MG capsule 24 hr capsule, Take 0.4 mg by mouth Daily., Disp: , Rfl:   •  trospium 60 MG capsule sustained-release 24 hr capsule, Take 1 capsule by mouth Every Morning., Disp: 60 capsule, Rfl: 11  •  Zinc Sulfate (ZINC 15 PO), Take  by mouth., Disp: , Rfl:   Past Medical History:   Diagnosis Date   • Arrhythmia    • Cancer (CMS/HCC)     prostate   • Diabetes mellitus (CMS/HCC)    • Hyperlipidemia    • Hypertension      Past Surgical History:   Procedure Laterality Date   • CATARACT EXTRACTION     • KIDNEY STONE SURGERY     • TONSILLECTOMY           Review  of systems  Constitutional: Negative for chills and fever.   Gastrointestinal: Negative for abdominal pain, anal bleeding and blood in stool.   Genitourinary: Negative for flank pain but he does have hematuria    Objective   PHYSICAL EXAM  Vital Signs:   Pulse 97   Ht 172.7 cm (68\")   Wt 94.8 kg (209 lb)   BMI 31.78 kg/m²     Constitutional: Patient is without distress or deformity.  Vital signs are reviewed as above.    Neuro: No confusion; No disorientation; Alert and oriented  Pulmonary: No respiratory distress.   Skin: No pallor or diaphoresis  : Meatal narrowing including pallor of glans      DATA  Result Review :            CT Abdomen Pelvis With & Without Contrast (04/08/2021 09:07)   The images for " "the above \"link\" were made available to me to review independently.  I also reviewed the radiologist's report described above with regard to the urologic findings. My interpretation is as follows:  Right hydronephrosis is evident.  This is extending down into the pelvis near the ureterovesical junction.  No obvious bladder mass although there is some thickening.  No left hydro-.  /Matthew Oneil MD      PSA                 DATE  22.26               10/05/2016  <0.13               07/17/2017  7.91                 10/17/2018  19.29               03/19/2020  22.25               04/07/2020    Lab Results   Component Value Date    PSA <0.014 03/23/2021    PSA <0.014 02/26/2021    PSA <0.014 12/28/2020                ASSESSMENT AND PLAN      Problem List Items Addressed This Visit        Genitourinary and Reproductive     Anterior urethral stricture    Overview     Added automatically from request for surgery [id number]           Other Visit Diagnoses     Gross hematuria    -  Primary    Relevant Orders    POC Urinalysis Dipstick, Multipro (Completed)    Hydronephrosis of right kidney            Complex issues.  He is having difficulty catheterizing and that is giving him some hematuria.  But he is also has significant hydronephrosis on the right.  I explained that he do a retrograde pyelogram possible ureteroscopy.  This may be impossible given the presence of prior radiation.  Ultimately will probably require prolonged stenting with frequent changes.  Bladder biopsy resection is indicated if tumor is found.  Very difficult exam cystoscopically because of debris in bladder.    FOLLOW UP     No follow-ups on file.  Right-sided radiation stricture  Membranous urethral stricture-cath 7-8 times per day  Urothelium sloughing in bladder, nondiagnostic      (Please note that portions of this note were completed with a voice recognition program.)  Matthew Oneil MD  04/10/21  17:48 CDT  "

## 2021-04-06 NOTE — H&P (VIEW-ONLY)
Chief Complaint  1 week follow up for Prostate Cancer and Gross Hematuria. Imaging was done at [facility].  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  The urethra was anesthetized with 2% lidocaine jelly.  A flexible cystoscope was introduced per urethra.      Urethra:  Pan urethral stricture disease    Bladder:  No bladder tumor, moderate trabeculation and No bladder neck contracture    Ureteral orifices:  Bladder debris and bladder cannot visualize orifice ease    Prostate:  Small appearing prostate with significant inflammation.  Neovascularity noted.  Some telangiectasia    Patient tolerated the procedure well    Complications: none    Blood loss: minimal  Given these findings, I had to evaluate the patient to assess fitness for surgery, formulate and discussa plan, that included alternatives, risks and benefits of management.. This went well beyond the typical description of procedural findings and therefore an additional evaluation and management was required.        History of Present Illness  Cystoscopy finding  The patient's cystoscopy today revealed pan urethral stricture disease. This is a new diagnois finding warranting further evaluation. Onset of this is unknown. It was found in the context of cystoscopy being done for an evaluation of hematuria.  Symptoms include irritative symptoms including frequency, urgency.          Current Outpatient Medications:   •  Apalutamide 60 MG tablet, Take 4 tablets by mouth Daily. Take 4 tablets once daily with or without food. Do not crush or chew tablets., Disp: 120 tablet, Rfl: 11  •  Apple Irving Vn-Grn Tea-Bit Or-Cr (Apple Cider Vinegar Plus) tablet, Take  by mouth., Disp: , Rfl:   •  calcium carbonate (OS-JONNY) 600 MG tablet, Take 600 mg by mouth Daily.,  "Disp: , Rfl:   •  dutasteride (AVODART) 0.5 MG capsule, Take 1 capsule by mouth Daily., Disp: 60 capsule, Rfl: 11  •  JANUVIA 100 MG tablet, Take 100 mg by mouth Daily., Disp: , Rfl:   •  lisinopril-hydrochlorothiazide (PRINZIDE,ZESTORETIC) 20-12.5 MG per tablet, Take 20 tablets by mouth Daily., Disp: , Rfl:   •  phenazopyridine (PYRIDIUM) 200 MG tablet, Take 200 mg by mouth As Needed., Disp: , Rfl:   •  prochlorperazine (COMPAZINE) 10 MG tablet, Take 1 tablet by mouth Every 4 (Four) Hours As Needed for Nausea or Vomiting., Disp: 30 tablet, Rfl: 5  •  rosuvastatin (CRESTOR) 20 MG tablet, Take 20 mg by mouth Daily., Disp: , Rfl:   •  tamsulosin (FLOMAX) 0.4 MG capsule 24 hr capsule, Take 0.4 mg by mouth Daily., Disp: , Rfl:   •  trospium 60 MG capsule sustained-release 24 hr capsule, Take 1 capsule by mouth Every Morning., Disp: 60 capsule, Rfl: 11  •  Zinc Sulfate (ZINC 15 PO), Take  by mouth., Disp: , Rfl:   Past Medical History:   Diagnosis Date   • Arrhythmia    • Cancer (CMS/HCC)     prostate   • Diabetes mellitus (CMS/HCC)    • Hyperlipidemia    • Hypertension      Past Surgical History:   Procedure Laterality Date   • CATARACT EXTRACTION     • KIDNEY STONE SURGERY     • TONSILLECTOMY           Review  of systems  Constitutional: Negative for chills and fever.   Gastrointestinal: Negative for abdominal pain, anal bleeding and blood in stool.   Genitourinary: Negative for flank pain but he does have hematuria    Objective   PHYSICAL EXAM  Vital Signs:   Pulse 97   Ht 172.7 cm (68\")   Wt 94.8 kg (209 lb)   BMI 31.78 kg/m²     Constitutional: Patient is without distress or deformity.  Vital signs are reviewed as above.    Neuro: No confusion; No disorientation; Alert and oriented  Pulmonary: No respiratory distress.   Skin: No pallor or diaphoresis  : Meatal narrowing including pallor of glans      DATA  Result Review :            CT Abdomen Pelvis With & Without Contrast (04/08/2021 09:07)   The images for " "the above \"link\" were made available to me to review independently.  I also reviewed the radiologist's report described above with regard to the urologic findings. My interpretation is as follows:  Right hydronephrosis is evident.  This is extending down into the pelvis near the ureterovesical junction.  No obvious bladder mass although there is some thickening.  No left hydro-.  /Matthew Oneil MD      PSA                 DATE  22.26               10/05/2016  <0.13               07/17/2017  7.91                 10/17/2018  19.29               03/19/2020  22.25               04/07/2020    Lab Results   Component Value Date    PSA <0.014 03/23/2021    PSA <0.014 02/26/2021    PSA <0.014 12/28/2020                ASSESSMENT AND PLAN      Problem List Items Addressed This Visit        Genitourinary and Reproductive     Anterior urethral stricture    Overview     Added automatically from request for surgery [id number]           Other Visit Diagnoses     Gross hematuria    -  Primary    Relevant Orders    POC Urinalysis Dipstick, Multipro (Completed)    Hydronephrosis of right kidney            Complex issues.  He is having difficulty catheterizing and that is giving him some hematuria.  But he is also has significant hydronephrosis on the right.  I explained that he do a retrograde pyelogram possible ureteroscopy.  This may be impossible given the presence of prior radiation.  Ultimately will probably require prolonged stenting with frequent changes.  Bladder biopsy resection is indicated if tumor is found.  Very difficult exam cystoscopically because of debris in bladder.    FOLLOW UP     No follow-ups on file.  Right-sided radiation stricture  Membranous urethral stricture-cath 7-8 times per day  Urothelium sloughing in bladder, nondiagnostic      (Please note that portions of this note were completed with a voice recognition program.)  Matthew Oneil MD  04/10/21  17:48 CDT  "

## 2021-04-08 ENCOUNTER — HOSPITAL ENCOUNTER (OUTPATIENT)
Dept: CT IMAGING | Facility: HOSPITAL | Age: 76
Discharge: HOME | End: 2021-04-08
Admitting: UROLOGY

## 2021-04-08 ENCOUNTER — OFFICE VISIT (OUTPATIENT)
Dept: UROLOGY | Facility: CLINIC | Age: 76
End: 2021-04-08

## 2021-04-08 VITALS — WEIGHT: 209 LBS | HEART RATE: 97 BPM | BODY MASS INDEX: 31.67 KG/M2 | HEIGHT: 68 IN

## 2021-04-08 DIAGNOSIS — R31.0 GROSS HEMATURIA: ICD-10-CM

## 2021-04-08 DIAGNOSIS — N13.30 HYDRONEPHROSIS OF RIGHT KIDNEY: ICD-10-CM

## 2021-04-08 DIAGNOSIS — R31.0 GROSS HEMATURIA: Primary | ICD-10-CM

## 2021-04-08 DIAGNOSIS — N35.914 ANTERIOR URETHRAL STRICTURE: ICD-10-CM

## 2021-04-08 LAB
BILIRUB BLD-MCNC: ABNORMAL MG/DL
CLARITY, POC: CLEAR
COLOR UR: ABNORMAL
CREAT BLDA-MCNC: 0.7 MG/DL (ref 0.6–1.3)
GLUCOSE UR STRIP-MCNC: NEGATIVE MG/DL
KETONES UR QL: ABNORMAL
LEUKOCYTE EST, POC: ABNORMAL
NITRITE UR-MCNC: NEGATIVE MG/ML
PH UR: 7 [PH] (ref 5–8)
PROT UR STRIP-MCNC: ABNORMAL MG/DL
RBC # UR STRIP: ABNORMAL /UL
SP GR UR: 1.01 (ref 1–1.03)
UROBILINOGEN UR QL: NORMAL

## 2021-04-08 PROCEDURE — 52000 CYSTOURETHROSCOPY: CPT | Performed by: UROLOGY

## 2021-04-08 PROCEDURE — 82565 ASSAY OF CREATININE: CPT

## 2021-04-08 PROCEDURE — 74178 CT ABD&PLV WO CNTR FLWD CNTR: CPT

## 2021-04-08 PROCEDURE — 99214 OFFICE O/P EST MOD 30 MIN: CPT | Performed by: UROLOGY

## 2021-04-08 PROCEDURE — 25010000002 IOPAMIDOL 61 % SOLUTION: Performed by: UROLOGY

## 2021-04-08 PROCEDURE — 81003 URINALYSIS AUTO W/O SCOPE: CPT | Performed by: UROLOGY

## 2021-04-08 RX ADMIN — IOPAMIDOL 100 ML: 612 INJECTION, SOLUTION INTRAVENOUS at 09:08

## 2021-04-09 ENCOUNTER — TELEPHONE (OUTPATIENT)
Dept: UROLOGY | Facility: CLINIC | Age: 76
End: 2021-04-09

## 2021-04-09 ENCOUNTER — PREP FOR SURGERY (OUTPATIENT)
Dept: OTHER | Facility: HOSPITAL | Age: 76
End: 2021-04-09

## 2021-04-09 DIAGNOSIS — N35.914 ANTERIOR URETHRAL STRICTURE: Primary | ICD-10-CM

## 2021-04-09 RX ORDER — DEXTROSE AND SODIUM CHLORIDE 5; .9 G/100ML; G/100ML
100 INJECTION, SOLUTION INTRAVENOUS CONTINUOUS
Status: CANCELLED | OUTPATIENT
Start: 2021-04-09

## 2021-04-09 RX ORDER — BUPIVACAINE HCL/0.9 % NACL/PF 0.1 %
2 PLASTIC BAG, INJECTION (ML) EPIDURAL ONCE
Status: CANCELLED | OUTPATIENT
Start: 2021-04-09 | End: 2021-04-09

## 2021-04-15 ENCOUNTER — TRANSCRIBE ORDERS (OUTPATIENT)
Dept: ADMINISTRATIVE | Facility: HOSPITAL | Age: 76
End: 2021-04-15

## 2021-04-15 DIAGNOSIS — Z11.59 SCREENING FOR VIRAL DISEASE: Primary | ICD-10-CM

## 2021-04-19 NOTE — PROGRESS NOTES
[facility]  [address]  [facility] [address] [phone]    Patient Name: Simeon Tam  Encounter Date: 04/27/2021  YOB: 1945  Patient Number: [id number]      REASON FOR FOLLOW-UP: Simeon Tam is a pleasant 75 y.o.  male who is seen in follow-up for hormone refractory prostate cancer.  He is seen C10D28 of apalutamide.  He is seen with spouse, Essence. History is obtained from patient.  History is considered to be accurate.        Oncology/Hematology History Overview Note   DIAGNOSTIC ABNORMALITIES:  He presented with rising PSA of 13 and was diagnosed with prostate cancer about 25 years ago.   Previous PSA was 22.26 on 10/05/2016, <0.13 on 07/17/2017, 7.91 on 10/17/2018, 19.29 on 03/19/2020, and 22.25 on 04/07/2020.   He was seen by Dr. Oneil 05/12/2020. Latest PSA was 22.25 ng/ml on 04/07/2020.  Previous PSA 22.26 on 10/05/2016.  Bone scan and CT scan of the abdomen and pelvis were negative for prostate cancer on 10/2016.  Started on Casodex and possibly leuprolide.  PSA  was down to <0.13 on 07/17/207.  Plan for apalutamide after staging scans.   CT abdomen and pelvis 06/11/2020. No evidence of metastatic disease in the abdomen or pelvis. Right renal pelvis and ureter urothelial thickening and hyperemia. Urinary bladder wall is thickened and there is adjacent inflammation. Recommend correlation with urinalysis and exam to exclude cystitis with right-sided pyelitis.  Cirrhosis.  Tiny 8 mm hypodense RIGHT inferior liver lesion on image 33 is too small to characterize.   Bone scan 06/11/2020. No evidence of bone metastases.        PREVIOUS INTERVENTIONS:  He was treated with adjuvant radiation at [facility] and androgen ablation with Casodex.  Lupron and Casodex 10/2016. He had 7 months of ADT therapy with Lupron.  Apalutamide 07/22/2020 through present.     Prostate cancer (CMS/HCC)   6/29/2020 -   Chemotherapy    OP PROSTATE Apalutamide     6/30/2020 Initial Diagnosis    Prostate cancer (CMS/HCC)     7/29/2020 -  Chemotherapy    OP LEUPROLIDE Q6M         PAST MEDICAL HISTORY:  ALLERGIES:  Allergies   Allergen Reactions   • Sulfa Antibiotics Rash     CURRENT MEDICATIONS:  Outpatient Encounter Medications as of 4/27/2021   Medication Sig Dispense Refill   • Apalutamide 60 MG tablet Take 4 tablets by mouth Daily. Take 4 tablets once daily with or without food. Do not crush or chew tablets. 120 tablet 11   • Apple Irving Vn-Grn Tea-Bit Or-Cr (Apple Cider Vinegar Plus) tablet Take 1 tablet by mouth Daily.     • calcium carbonate (OS-JONNY) 600 MG tablet Take 600 mg by mouth Daily.     • cefdinir (OMNICEF) 300 MG capsule Take 1 capsule by mouth 2 (Two) Times a Day. 6 capsule 0   • dutasteride (AVODART) 0.5 MG capsule Take 1 capsule by mouth Daily. 60 capsule 11   • JANUVIA 100 MG tablet Take 100 mg by mouth Daily.     • lisinopril-hydrochlorothiazide (PRINZIDE,ZESTORETIC) 20-12.5 MG per tablet Take 20 tablets by mouth Daily.     • Loperamide HCl (IMODIUM PO) Take 1 dose by mouth As Needed.     • rosuvastatin (CRESTOR) 20 MG tablet Take 10 mg by mouth Daily.     • trospium 60 MG capsule sustained-release 24 hr capsule Take 1 capsule by mouth Every Morning. 60 capsule 11   • [DISCONTINUED] phenazopyridine (PYRIDIUM) 200 MG tablet Take 200 mg by mouth As Needed.     • [DISCONTINUED] prochlorperazine (COMPAZINE) 10 MG tablet Take 1 tablet by mouth Every 4 (Four) Hours As Needed for Nausea or Vomiting. 30 tablet 5   • [DISCONTINUED] tamsulosin (FLOMAX) 0.4 MG capsule 24 hr capsule Take 0.4 mg by mouth Daily.     • [DISCONTINUED] Zinc Sulfate (ZINC 15 PO) Take  by mouth.       No facility-administered encounter medications on file as of 4/27/2021.     ADULT ILLNESSES:  Patient Active Problem List   Diagnosis Code   • Hypertension I10   • Hyperlipidemia E78.5   • Diabetes mellitus (CMS/HCC) E11.9   • Cancer (CMS/[facility]) C80.1   •  Arrhythmia I49.9   • Non morbid obesity due to excess calories E66.09   • Prostate cancer (CMS/[facility]) C61   • Anterior urethral stricture N35.914     SURGERIES:  Past Surgical History:   Procedure Laterality Date   • CATARACT EXTRACTION     • CYSTOSCOPY RETROGRADE PYELOGRAM Bilateral 4/21/2021    Procedure: CYSTOSCOPY RETROGRADE PYELOGRAM, possible DIRECT VISION INTERNAL URETHROTOMY;  Surgeon: Matthew Oneil MD;  Location: [facility] OR;  Service: Urology;  Laterality: Bilateral;   • CYSTOSCOPY URETHROTOMY VISUAL INTERNAL Bilateral 4/21/2021    Procedure: possible DIRECT VISION INTERNAL URETHROTOMY;  Surgeon: Matthew Oneil MD;  Location:  [facility] OR;  Service: Urology;  Laterality: Bilateral;   • EXCISION LESION      neck   • KIDNEY STONE SURGERY     • REPLACEMENT TOTAL KNEE Left    • TONSILLECTOMY       HEALTH MAINTENANCE ITEMS:  Health Maintenance Due   Topic Date Due   • URINE MICROALBUMIN  Never done   • COVID-19 Vaccine (1) Never done   • Hepatitis B (1 of 3 - Risk 3-dose series) Never done   • TDAP/TD VACCINES (1 - Tdap) Never done   • ZOSTER VACCINE (1 of 2) Never done   • HEPATITIS C SCREENING  Never done   • ANNUAL WELLNESS VISIT  Never done   • LIPID PANEL  Never done   • HEMOGLOBIN A1C  Never done   • DIABETIC EYE EXAM  Never done       <no information>  Last Completed Colonoscopy    Patient has no health maintenance due at this time         There is no immunization history on file for this patient.  Last Completed Mammogram    Patient has no health maintenance due at this time           FAMILY HISTORY:  Family History   Problem Relation Age of Onset   • Dementia Mother      SOCIAL HISTORY:  Social History     Socioeconomic History   • Marital status:      Spouse name: Not on file   • Number of children: Not on file   • Years of education: Not on file   • Highest education level: Not on file   Tobacco Use   • Smoking status: Never Smoker   • Smokeless tobacco: Never Used   Vaping Use   • Vaping Use:  "Never used   Substance and Sexual Activity   • Alcohol use: No   • Drug use: No   • Sexual activity: Defer       REVIEW OF SYSTEMS:    Review of Systems   Constitutional: Positive for fatigue. Negative for chills and fever.        Tolerating apalutamide.     HENT: Negative for congestion, hearing loss and trouble swallowing.    Eyes: Negative for redness and visual disturbance.   Respiratory: Negative for cough, shortness of breath and wheezing.    Cardiovascular: Negative for chest pain and palpitations.   Gastrointestinal: Negative for abdominal pain, constipation, diarrhea, nausea and vomiting.   Endocrine: Negative for cold intolerance and heat intolerance.   Genitourinary: Negative for difficulty urinating, flank pain and hematuria.   Musculoskeletal: Negative for gait problem and joint swelling.   Skin: Positive for pallor.   Allergic/Immunologic: Negative for food allergies.   Neurological: Negative for dizziness, speech difficulty and weakness.   Hematological: Negative for adenopathy. Does not bruise/bleed easily.   Psychiatric/Behavioral: Negative for agitation, confusion and hallucinations.       VITAL SIGNS: /62   Pulse 85   Temp 97.5 °F (36.4 °C)   Resp 18   Ht 170.2 cm (67\")   Wt 95.4 kg (210 lb 4.8 oz)   SpO2 96%   BMI 32.94 kg/m²   Pain Score    04/27/21 1442   PainSc: 0-No pain       PHYSICAL EXAMINATION:     Physical Exam  Vitals reviewed.   Constitutional:       General: He is not in acute distress.     Appearance: He is not toxic-appearing.   HENT:      Head: Normocephalic and atraumatic.   Eyes:      General: No scleral icterus.  Cardiovascular:      Rate and Rhythm: Normal rate and regular rhythm.   Pulmonary:      Effort: No respiratory distress.      Breath sounds: No wheezing or rales.   Abdominal:      General: Bowel sounds are normal.      Palpations: Abdomen is soft.      Tenderness: There is no abdominal tenderness.   Musculoskeletal:         General: No swelling.      " Cervical back: Neck supple.   Skin:     General: Skin is warm and dry.      Coloration: Skin is pale.   Neurological:      Mental Status: He is alert and oriented to person, place, and time.   Psychiatric:         Mood and Affect: Mood normal.         Behavior: Behavior normal.         Thought Content: Thought content normal.         Judgment: Judgment normal.         LABS    Lab Results - Last 18 Months   Lab Units 04/20/21  1414 03/23/21  1340 02/26/21  1237 12/28/20  1342 11/30/20  1357 10/06/20  1343 09/01/20  1427   HEMOGLOBIN g/dL 12.4* 13.8 13.7 14.3 13.5 14.1 14.7   HEMATOCRIT % 37.0* 40.0 38.6 40.1 38.8 40.9 43.2   MCV fL 91.1 89.7 86.9 90.5 90.4 90.3 88.5   WBC 10*3/mm3 6.56 6.34 6.11 6.33 6.09 5.88 6.01   RDW % 13.2 12.8 12.7 12.4 12.7 13.2 13.2   MPV fL 10.8 10.8 10.2 10.7 10.7 10.7 10.9   PLATELETS 10*3/mm3 138* 144 138* 124* 126* 116* 143   IMM GRAN % %  --   --  0.3  --   --   --  0.3   NEUTROS ABS 10*3/mm3 4.90 4.35 4.40 4.44 4.17 3.79 4.40   LYMPHS ABS 10*3/mm3 0.84 1.10 0.94 1.06 1.08 1.12 1.00   MONOS ABS 10*3/mm3 0.60 0.66 0.58 0.59 0.59 0.74 0.41   EOS ABS 10*3/mm3 0.16 0.17 0.14 0.20 0.20 0.19 0.15   BASOS ABS 10*3/mm3 0.03 0.04 0.03 0.02 0.03 0.03 0.03   IMMATURE GRANS (ABS) 10*3/mm3  --   --  0.02  --   --   --  0.02   NRBC /100 WBC  --   --  0.0  --   --   --  0.0       Lab Results - Last 18 Months   Lab Units 04/20/21  1414 04/08/21  0831 03/23/21  1340 02/26/21  1237 12/28/20  1342 11/30/20  1357 10/06/20  1343   GLUCOSE mg/dL 176*  --  149* 149* 191* 170* 158*   SODIUM mmol/L 133*  --  136 137 136 137 138   POTASSIUM mmol/L 4.2  --  4.0 4.0 3.9 4.0 4.0   CO2 mmol/L 30.0*  --  29.0 29.0 30.0* 30.0* 31.0*   CHLORIDE mmol/L 94*  --  96* 98 95* 96* 98   ANION GAP mmol/L 9.0  --  11.0 10.0 11.0 11.0 9.0   CREATININE mg/dL 0.78 0.70 0.74* 0.59* 0.72* 0.65* 0.71*   BUN mg/dL 14  --  14 15 15 16 15   BUN / CREAT RATIO  17.9  --  18.9 25.4* 20.8 24.6 21.1   CALCIUM mg/dL 9.7  --  9.9 9.7 10.0 10.1  9.8   EGFR IF NONAFRICN AM mL/min/1.73 97  --  103 133 106 119 108   ALK PHOS U/L 72  --  74 75 74 78 66   TOTAL PROTEIN g/dL 7.4  --  7.4 7.4 7.5 7.4 7.3   ALT (SGPT) U/L 27  --  28 32 29 36 30   AST (SGOT) U/L 36  --  39 43* 35 44* 32   BILIRUBIN mg/dL 0.3  --  0.3 0.4 0.3 0.3 0.3   ALBUMIN g/dL 4.20  --  4.30 4.20 4.30 4.40 4.40   GLOBULIN gm/dL 3.2  --  3.1 3.2 3.2 3.0 2.9       No results for input(s): MSPIKE, KAPPALAMB, IGLFLC, URICACID, FREEKAPPAL, CEA, LDH, REFLABREPO in the last 70864 hours.    Lab Results - Last 18 Months   Lab Units 04/20/21  1414 03/23/21  1340 02/26/21  1237 09/01/20  1427 08/12/20  1424 06/29/20  1408   TSH uIU/mL 4.120 3.670 2.670 2.140 2.070 2.080       Simeon Tam reports a pain score of 0.        Patient's Body mass index is 32.94 kg/m². BMI is above normal parameters. Recommendations include: referral to primary care.      ASSESSMENT:  1.  Prostate cancer.  PSA recurrence.   AJCC stage (TX, NX, M0)  Treatment status:Casodex and Lupron with PSA recurrence.  On apalutamide and leuprolde (by Dr. Oneil).  2.  Performance status of 1.   3.  Cirrhosis.  4.  Neurogenic bladder.   5.  Anterior urethral stricture.  6.  Thrombocytopenia at 102 on 03/19/2020 from cirrhosis.  7.  Obesity BMI 32.94.  8.  Grade 1 fatigue from malignancy.          PLAN:  1.    Re: Tolerating apalutamide.    2.    Re:  Heme status.  Hemoglobin 12.4 and platelet 138.  3.    Re:  Pre-office CMP.  GFR 97 ml/minute.   4.    Re:  Pre-office PSA < 0.014 from < 0.014 from < 0.014 from < 0.014 from 0.015 from 0.03 from 0.125 from 1.03 from 27.3.   5.   eRx for Compazine 10 mg p.o. every 4 hours as needed for nausea/vomiting, 60, 2 refills if needed.    6.   eRx for C11D1 start on 04/28/2021 apalutamide 60 mg, take four tablets total dose 240 mg po daily, number, 120.  Take either with or without food.  Swallow tablets whole. TSH before apalutamide.  No grapefruit juice or  "grapefruit.  Monitor for adverse effects especially arrhythmia, rash, seizures, thyroid dysfunction, hypothyroidism, fall and fractures.  7.    Continue ongoing management per primary care physician and other specialists.  8.    Plan of care discussed with patient and spouse.  Understanding expressed.  Patient agreeable to proceed.  9.    Refer to PCP for obesity.  10.  Questions were answered to his satisfaction.  \"Yes.\"  11.  Advance Care Planning   ACP discussion was declined by the patient. Patient does not have an advance directive, information provided.  12.  Return to office in 4 weeks with pre-office TSH, CMP, PSA and CBC with differential.  13.  Lupron per Dr. Oneil.         I have reviewed the assessment and plan and verified the accuracy of it. No changes to assessment and plan since the information was documented. Deshawn Black MD 04/27/21        I spent 32 total minutes, face-to-face, caring for Simeon today.  Greater than 50% of this time involved counseling and/or coordination of care as documented within this note regarding the patient's illness(es), pros and cons of various treatment options, instructions and/or risk reduction.            (Matthew Oneil MD)  (Oniel Doshi MD)  Lucio Garcia MD      "

## 2021-04-20 ENCOUNTER — PRE-ADMISSION TESTING (OUTPATIENT)
Dept: PREADMISSION TESTING | Facility: HOSPITAL | Age: 76
End: 2021-04-20

## 2021-04-20 ENCOUNTER — LAB (OUTPATIENT)
Dept: LAB | Facility: HOSPITAL | Age: 76
End: 2021-04-20

## 2021-04-20 VITALS
HEIGHT: 65 IN | DIASTOLIC BLOOD PRESSURE: 63 MMHG | SYSTOLIC BLOOD PRESSURE: 120 MMHG | RESPIRATION RATE: 18 BRPM | OXYGEN SATURATION: 96 % | HEART RATE: 96 BPM | BODY MASS INDEX: 35.3 KG/M2 | WEIGHT: 211.86 LBS

## 2021-04-20 DIAGNOSIS — R53.0 NEOPLASTIC MALIGNANT RELATED FATIGUE: ICD-10-CM

## 2021-04-20 DIAGNOSIS — C61 PROSTATE CANCER (HCC): ICD-10-CM

## 2021-04-20 LAB
ALBUMIN SERPL-MCNC: 4.2 G/DL (ref 3.5–5.2)
ALBUMIN/GLOB SERPL: 1.3 G/DL
ALP SERPL-CCNC: 72 U/L (ref 39–117)
ALT SERPL W P-5'-P-CCNC: 27 U/L (ref 1–41)
ANION GAP SERPL CALCULATED.3IONS-SCNC: 9 MMOL/L (ref 5–15)
AST SERPL-CCNC: 36 U/L (ref 1–40)
BASOPHILS # BLD AUTO: 0.03 10*3/MM3 (ref 0–0.2)
BASOPHILS NFR BLD AUTO: 0.5 % (ref 0–1.5)
BILIRUB SERPL-MCNC: 0.3 MG/DL (ref 0–1.2)
BUN SERPL-MCNC: 14 MG/DL (ref 8–23)
BUN/CREAT SERPL: 17.9 (ref 7–25)
CALCIUM SPEC-SCNC: 9.7 MG/DL (ref 8.6–10.5)
CHLORIDE SERPL-SCNC: 94 MMOL/L (ref 98–107)
CO2 SERPL-SCNC: 30 MMOL/L (ref 22–29)
CREAT SERPL-MCNC: 0.78 MG/DL (ref 0.76–1.27)
DEPRECATED RDW RBC AUTO: 43.6 FL (ref 37–54)
EOSINOPHIL # BLD AUTO: 0.16 10*3/MM3 (ref 0–0.4)
EOSINOPHIL NFR BLD AUTO: 2.4 % (ref 0.3–6.2)
ERYTHROCYTE [DISTWIDTH] IN BLOOD BY AUTOMATED COUNT: 13.2 % (ref 12.3–15.4)
GFR SERPL CREATININE-BSD FRML MDRD: 97 ML/MIN/1.73
GLOBULIN UR ELPH-MCNC: 3.2 GM/DL
GLUCOSE SERPL-MCNC: 176 MG/DL (ref 65–99)
HCT VFR BLD AUTO: 37 % (ref 37.5–51)
HGB BLD-MCNC: 12.4 G/DL (ref 13–17.7)
LYMPHOCYTES # BLD AUTO: 0.84 10*3/MM3 (ref 0.7–3.1)
LYMPHOCYTES NFR BLD AUTO: 12.8 % (ref 19.6–45.3)
MCH RBC QN AUTO: 30.5 PG (ref 26.6–33)
MCHC RBC AUTO-ENTMCNC: 33.5 G/DL (ref 31.5–35.7)
MCV RBC AUTO: 91.1 FL (ref 79–97)
MONOCYTES # BLD AUTO: 0.6 10*3/MM3 (ref 0.1–0.9)
MONOCYTES NFR BLD AUTO: 9.1 % (ref 5–12)
NEUTROPHILS NFR BLD AUTO: 4.9 10*3/MM3 (ref 1.7–7)
NEUTROPHILS NFR BLD AUTO: 74.7 % (ref 42.7–76)
PLATELET # BLD AUTO: 138 10*3/MM3 (ref 140–450)
PMV BLD AUTO: 10.8 FL (ref 6–12)
POTASSIUM SERPL-SCNC: 4.2 MMOL/L (ref 3.5–5.2)
PROT SERPL-MCNC: 7.4 G/DL (ref 6–8.5)
RBC # BLD AUTO: 4.06 10*6/MM3 (ref 4.14–5.8)
SARS-COV-2 RNA PNL SPEC NAA+PROBE: NOT DETECTED
SODIUM SERPL-SCNC: 133 MMOL/L (ref 136–145)
TSH SERPL DL<=0.05 MIU/L-ACNC: 4.12 UIU/ML (ref 0.27–4.2)
WBC # BLD AUTO: 6.56 10*3/MM3 (ref 3.4–10.8)

## 2021-04-20 PROCEDURE — 36415 COLL VENOUS BLD VENIPUNCTURE: CPT

## 2021-04-20 PROCEDURE — 84443 ASSAY THYROID STIM HORMONE: CPT

## 2021-04-20 PROCEDURE — 84153 ASSAY OF PSA TOTAL: CPT

## 2021-04-20 PROCEDURE — C9803 HOPD COVID-19 SPEC COLLECT: HCPCS | Performed by: UROLOGY

## 2021-04-20 PROCEDURE — 87635 SARS-COV-2 COVID-19 AMP PRB: CPT | Performed by: UROLOGY

## 2021-04-20 PROCEDURE — 80053 COMPREHEN METABOLIC PANEL: CPT

## 2021-04-20 PROCEDURE — 85025 COMPLETE CBC W/AUTO DIFF WBC: CPT

## 2021-04-20 PROCEDURE — 93005 ELECTROCARDIOGRAM TRACING: CPT

## 2021-04-20 PROCEDURE — 93010 ELECTROCARDIOGRAM REPORT: CPT | Performed by: EMERGENCY MEDICINE

## 2021-04-20 NOTE — DISCHARGE INSTRUCTIONS
PATIENT/FAMILY/RESPONSIBLE PARTY VERBALIZES UNDERSTANDING OF ABOVE EDUCATION.  COPY OF PAIN SCALE GIVEN AND REVIEWED WITH VERBALIZED UNDERSTANDING.DAY OF SURGERY INSTRUCTIONS        YOUR SURGEON: Matthew Oenil     PROCEDURE: Cystoscopy Retrograde Pyelogram, Possible Direct Vision Internal Urethrotomy     DATE OF SURGERY: 04/21/20    ARRIVAL TIME: AS DIRECTED BY OFFICE    YOU MAY TAKE THE FOLLOWING MEDICATION(S) THE MORNING OF SURGERY WITH A SIP OF WATER: NONE    *** HOLD LISINOPRIL FOR 24 HOURS PRIOR TO SURGERY ***    ALL OTHER HOME MEDICATIONS CHECK WITH YOUR DOCTOR    DO NOT TAKE ANY ERECTILE DYSFUNCTION MEDICATIONS (EX:  CIALIS, VIAGRA) 24 HOURS PRIOR TO SURGERY              MANAGING PAIN AFTER SURGERY    We know you are probably wondering what your pain will be like after surgery.  Following surgery it is unrealistic to expect you will not have pain.   Pain is how our bodies let us know that something is wrong or cautions us to be careful.  That said, our goal is to make your pain tolerable.    Methods we may use to treat your pain include (oral or IV medications, PCAs, epidurals, nerve blocks, etc.)   While some procedures require IV pain medications for a short time after surgery, transitioning to pain medications by mouth allows for better management of pain.   Your nurse will encourage you to take oral pain medications whenever possible.  IV medications work almost immediately, but only last a short while.  Taking medications by mouth allows for a more constant level of medication in your blood stream for a longer period of time.      Once your pain is out of control it is harder to get back under control.  It is important you are aware when your next dose of pain medication is due.  If you are admitted, your nurse may write the time of your next dose on the white board in your room to help you remember.      We are interested in your pain and encourage you to inform us about aggravating factors during your  visit.   Many times a simple repositioning every few hours can make a big difference.    If your physician says it is okay, do not let your pain prevent you from getting out of bed. Be sure to call your nurse for assistance prior to getting up so you do not fall.      Before surgery, please decide your tolerable pain goal.  These faces help describe the pain ratings we use on a 0-10 scale.   Be prepared to tell us your goal and whether or not you take pain or anxiety medications at home.      BEFORE YOU COME TO THE HOSPITAL  (Pre-op instructions)  • Do not eat, drink, smoke or chew gum after midnight the night before surgery.  This also includes no mints.  • Morning of surgery take only the medicines you have been instructed with a sip of water unless otherwise instructed  by your physician.  • Do not shave, wear makeup or dark nail polish.  • Remove all jewelry including rings.  • Leave anything you consider valuable at home.  • Leave your suitcase in the car until after your surgery.  • Bring the following with you if applicable:  o Picture ID and insurance, Medicare or Medicaid cards  o Co-pay/deductible required by insurance (cash, check, credit card)  o Copy of advance directive, living will or power-of- documents if not brought to PAT  o CPAP or BIPAP mask and tubing  o Relaxation aids ( book, magazine), etc.  o Hearing aids                                 ON THE DAY OF SURGERY  · On the day of surgery check in at registration located at the main entrance of the hospital.   ? You will be registered and given a beeper with instructions where to wait in the main lobby.  ? When your beeper lights up and vibrates a member of the Outpatient Surgery staff will meet you at the double doors under the stair steps and escort you to your preoperative room.   · You may have cloth compression devices placed on your legs. These help to prevent blood clots and reduce swelling in your legs.  · An IV may be inserted  "into one of your veins.  · In the operating room, you may be given one or more of the following:  ? A medicine to help you relax (sedative).  ? A medicine to numb the area (local anesthetic).  ? A medicine to make you fall asleep (general anesthetic).  ? A medicine that is injected into an area of your body to numb everything below the injection site (regional anesthetic).  · Your surgical site will be marked or identified.  · You may be given an antibiotic through your IV to help prevent infection.  Contact a health care provider if you:  · Develop a fever of more than 100.4°F (38°C) or other feelings of illness during the 48 hours before your surgery.  · Have symptoms that get worse.  Have questions or concerns about your surgery    General Anesthesia/Surgery, Adult  General anesthesia is the use of medicines to make a person \"go to sleep\" (unconscious) for a medical procedure. General anesthesia must be used for certain procedures, and is often recommended for procedures that:  · Last a long time.  · Require you to be still or in an unusual position.  · Are major and can cause blood loss.  The medicines used for general anesthesia are called general anesthetics. As well as making you unconscious for a certain amount of time, these medicines:  · Prevent pain.  · Control your blood pressure.  · Relax your muscles.  Tell a health care provider about:  · Any allergies you have.  · All medicines you are taking, including vitamins, herbs, eye drops, creams, and over-the-counter medicines.  · Any problems you or family members have had with anesthetic medicines.  · Types of anesthetics you have had in the past.  · Any blood disorders you have.  · Any surgeries you have had.  · Any medical conditions you have.  · Any recent upper respiratory, chest, or ear infections.  · Any history of:  ? Heart or lung conditions, such as heart failure, sleep apnea, asthma, or chronic obstructive pulmonary disease (COPD).  ?  " service.  ? Depression or anxiety.  · Any tobacco or drug use, including marijuana or alcohol use.  · Whether you are pregnant or may be pregnant.  What are the risks?  Generally, this is a safe procedure. However, problems may occur, including:  · Allergic reaction.  · Lung and heart problems.  · Inhaling food or liquid from the stomach into the lungs (aspiration).  · Nerve injury.  · Air in the bloodstream, which can lead to stroke.  · Extreme agitation or confusion (delirium) when you wake up from the anesthetic.  · Waking up during your procedure and being unable to move. This is rare.  These problems are more likely to develop if you are having a major surgery or if you have an advanced or serious medical condition. You can prevent some of these complications by answering all of your health care provider's questions thoroughly and by following all instructions before your procedure.  General anesthesia can cause side effects, including:  · Nausea or vomiting.  · A sore throat from the breathing tube.  · Hoarseness.  · Wheezing or coughing.  · Shaking chills.  · Tiredness.  · Body aches.  · Anxiety.  · Sleepiness or drowsiness.  · Confusion or agitation.  RISKS AND COMPLICATIONS OF SURGERY  Your health care provider will discuss possible risks and complications with you before surgery. Common risks and complications include:    · Problems due to the use of anesthetics.  · Blood loss and replacement (does not apply to minor surgical procedures).  · Temporary increase in pain due to surgery.  · Uncorrected pain or problems that the surgery was meant to correct.  · Infection.  · New damage.    What happens before the procedure?    Medicines  Ask your health care provider about:  · Changing or stopping your regular medicines. This is especially important if you are taking diabetes medicines or blood thinners.  · Taking medicines such as aspirin and ibuprofen. These medicines can thin your blood. Do not take these  medicines unless your health care provider tells you to take them.  · Taking over-the-counter medicines, vitamins, herbs, and supplements. Do not take these during the week before your procedure unless your health care provider approves them.  General instructions  · Starting 3-6 weeks before the procedure, do not use any products that contain nicotine or tobacco, such as cigarettes and e-cigarettes. If you need help quitting, ask your health care provider.  · If you brush your teeth on the morning of the procedure, make sure to spit out all of the toothpaste.  · Tell your health care provider if you become ill or develop a cold, cough, or fever.  · If instructed by your health care provider, bring your sleep apnea device with you on the day of your surgery (if applicable).  · Ask your health care provider if you will be going home the same day, the following day, or after a longer hospital stay.  ? Plan to have someone take you home from the hospital or clinic.  ? Plan to have a responsible adult care for you for at least 24 hours after you leave the hospital or clinic. This is important.  What happens during the procedure?  · You will be given anesthetics through both of the following:  ? A mask placed over your nose and mouth.  ? An IV in one of your veins.  · You may receive a medicine to help you relax (sedative).  · After you are unconscious, a breathing tube may be inserted down your throat to help you breathe. This will be removed before you wake up.  · An anesthesia specialist will stay with you throughout your procedure. He or she will:  ? Keep you comfortable and safe by continuing to give you medicines and adjusting the amount of medicine that you get.  ? Monitor your blood pressure, pulse, and oxygen levels to make sure that the anesthetics do not cause any problems.  The procedure may vary among health care providers and hospitals.  What happens after the procedure?  · Your blood pressure, temperature,  heart rate, breathing rate, and blood oxygen level will be monitored until the medicines you were given have worn off.  · You will wake up in a recovery area. You may wake up slowly.  · If you feel anxious or agitated, you may be given medicine to help you calm down.  · If you will be going home the same day, your health care provider may check to make sure you can walk, drink, and urinate.  · Your health care provider will treat any pain or side effects you have before you go home.  · Do not drive for 24 hours if you were given a sedative.  Summary  · General anesthesia is used to keep you still and prevent pain during a procedure.  · It is important to tell your healthcare provider about your medical history and any surgeries you have had, and previous experience with anesthesia.  · Follow your healthcare provider’s instructions about when to stop eating, drinking, or taking certain medicines before your procedure.  · Plan to have someone take you home from the hospital or clinic.  This information is not intended to replace advice given to you by your health care provider. Make sure you discuss any questions you have with your health care provider.  Document Released: 03/26/2009 Document Revised: 08/03/2018 Document Reviewed: 08/03/2018  [employer] Patient Education © 2019 [employer]      Fall Prevention in Hospitals, Adult  As a hospital patient, your condition and the treatments you receive can increase your risk for falls. Some additional risk factors for falls in a hospital include:  · Being in an unfamiliar environment.  · Being on bed rest.  · Your surgery.  · Taking certain medicines.  · Your tubing requirements, such as intravenous (IV) therapy or catheters.  It is important that you learn how to decrease fall risks while at the hospital. Below are important tips that can help prevent falls.  SAFETY TIPS FOR PREVENTING FALLS  Talk about your risk of falling.  · Ask your health care provider  why you are at risk for falling. Is it your medicine, illness, tubing placement, or something else?  · Make a plan with your health care provider to keep you safe from falls.  · Ask your health care provider or pharmacist about side effects of your medicines. Some medicines can make you dizzy or affect your coordination.  Ask for help.  · Ask for help before getting out of bed. You may need to press your call button.  · Ask for assistance in getting safely to the toilet.  · Ask for a walker or cane to be put at your bedside. Ask that most of the side rails on your bed be placed up before your health care provider leaves the room.  · Ask family or friends to sit with you.  · Ask for things that are out of your reach, such as your glasses, hearing aids, telephone, bedside table, or call button.  Follow these tips to avoid falling:  · Stay lying or seated, rather than standing, while waiting for help.  · Wear rubber-soled slippers or shoes whenever you walk in the hospital.  · Avoid quick, sudden movements.  ¨ Change positions slowly.  ¨ Sit on the side of your bed before standing.  ¨ Stand up slowly and wait before you start to walk.  · Let your health care provider know if there is a spill on the floor.  · Pay careful attention to the medical equipment, electrical cords, and tubes around you.  · When you need help, use your call button by your bed or in the bathroom. Wait for one of your health care providers to help you.  · If you feel dizzy or unsure of your footing, return to bed and wait for assistance.  · Avoid being distracted by the TV, telephone, or another person in your room.  · Do not lean or support yourself on rolling objects, such as IV poles or bedside tables.     This information is not intended to replace advice given to you by your health care provider. Make sure you discuss any questions you have with your health care provider.     Document Released: 12/15/2001 Document Revised: 01/08/2016 Document  Reviewed: 08/25/2013  [employer] Interactive Patient Education ©2016 [employer]

## 2021-04-21 ENCOUNTER — HOSPITAL ENCOUNTER (OUTPATIENT)
Facility: HOSPITAL | Age: 76
Setting detail: HOSPITAL OUTPATIENT SURGERY
Discharge: HOME | End: 2021-04-21
Attending: UROLOGY | Admitting: UROLOGY

## 2021-04-21 ENCOUNTER — APPOINTMENT (OUTPATIENT)
Dept: GENERAL RADIOLOGY | Facility: HOSPITAL | Age: 76
End: 2021-04-21

## 2021-04-21 ENCOUNTER — ANESTHESIA EVENT (OUTPATIENT)
Dept: PERIOP | Facility: HOSPITAL | Age: 76
End: 2021-04-21

## 2021-04-21 ENCOUNTER — ANESTHESIA (OUTPATIENT)
Dept: PERIOP | Facility: HOSPITAL | Age: 76
End: 2021-04-21

## 2021-04-21 VITALS
DIASTOLIC BLOOD PRESSURE: 68 MMHG | RESPIRATION RATE: 16 BRPM | TEMPERATURE: 98 F | SYSTOLIC BLOOD PRESSURE: 98 MMHG | HEART RATE: 63 BPM | OXYGEN SATURATION: 97 %

## 2021-04-21 DIAGNOSIS — C61 PROSTATE CANCER (HCC): Primary | ICD-10-CM

## 2021-04-21 DIAGNOSIS — N35.914 ANTERIOR URETHRAL STRICTURE: ICD-10-CM

## 2021-04-21 LAB
GLUCOSE BLDC GLUCOMTR-MCNC: 160 MG/DL (ref 70–130)
GLUCOSE BLDC GLUCOMTR-MCNC: 166 MG/DL (ref 70–130)
PSA SERPL-MCNC: <0.014 NG/ML (ref 0–4)
QT INTERVAL: 356 MS
QTC INTERVAL: 437 MS

## 2021-04-21 PROCEDURE — 25010000002 PROPOFOL 10 MG/ML EMULSION: Performed by: NURSE ANESTHETIST, CERTIFIED REGISTERED

## 2021-04-21 PROCEDURE — 25010000002 DEXAMETHASONE PER 1 MG: Performed by: NURSE ANESTHETIST, CERTIFIED REGISTERED

## 2021-04-21 PROCEDURE — C1758 CATHETER, URETERAL: HCPCS | Performed by: UROLOGY

## 2021-04-21 PROCEDURE — 74420 UROGRAPHY RTRGR +-KUB: CPT

## 2021-04-21 PROCEDURE — 25010000002 FENTANYL CITRATE (PF) 100 MCG/2ML SOLUTION: Performed by: ANESTHESIOLOGY

## 2021-04-21 PROCEDURE — 25010000002 CEFAZOLIN PER 500 MG: Performed by: UROLOGY

## 2021-04-21 PROCEDURE — 82962 GLUCOSE BLOOD TEST: CPT

## 2021-04-21 PROCEDURE — 25010000002 ONDANSETRON PER 1 MG: Performed by: ANESTHESIOLOGY

## 2021-04-21 PROCEDURE — C1769 GUIDE WIRE: HCPCS | Performed by: UROLOGY

## 2021-04-21 PROCEDURE — 52351 CYSTOURETERO & OR PYELOSCOPE: CPT | Performed by: UROLOGY

## 2021-04-21 PROCEDURE — 25010000002 ONDANSETRON PER 1 MG: Performed by: NURSE ANESTHETIST, CERTIFIED REGISTERED

## 2021-04-21 PROCEDURE — 25010000002 IOPAMIDOL 61 % SOLUTION: Performed by: UROLOGY

## 2021-04-21 RX ORDER — SODIUM CHLORIDE 0.9 % (FLUSH) 0.9 %
10 SYRINGE (ML) INJECTION AS NEEDED
Status: DISCONTINUED | OUTPATIENT
Start: 2021-04-21 | End: 2021-04-21 | Stop reason: HOSPADM

## 2021-04-21 RX ORDER — OXYCODONE AND ACETAMINOPHEN 10; 325 MG/1; MG/1
1 TABLET ORAL ONCE AS NEEDED
Status: COMPLETED | OUTPATIENT
Start: 2021-04-21 | End: 2021-04-21

## 2021-04-21 RX ORDER — ONDANSETRON 2 MG/ML
INJECTION INTRAMUSCULAR; INTRAVENOUS AS NEEDED
Status: DISCONTINUED | OUTPATIENT
Start: 2021-04-21 | End: 2021-04-21 | Stop reason: SURG

## 2021-04-21 RX ORDER — FENTANYL CITRATE 50 UG/ML
25 INJECTION, SOLUTION INTRAMUSCULAR; INTRAVENOUS
Status: DISCONTINUED | OUTPATIENT
Start: 2021-04-21 | End: 2021-04-21 | Stop reason: HOSPADM

## 2021-04-21 RX ORDER — LIDOCAINE HYDROCHLORIDE 40 MG/ML
SOLUTION TOPICAL AS NEEDED
Status: DISCONTINUED | OUTPATIENT
Start: 2021-04-21 | End: 2021-04-21 | Stop reason: SURG

## 2021-04-21 RX ORDER — DEXTROSE MONOHYDRATE 25 G/50ML
12.5 INJECTION, SOLUTION INTRAVENOUS AS NEEDED
Status: DISCONTINUED | OUTPATIENT
Start: 2021-04-21 | End: 2021-04-21 | Stop reason: HOSPADM

## 2021-04-21 RX ORDER — IBUPROFEN 600 MG/1
600 TABLET ORAL ONCE AS NEEDED
Status: DISCONTINUED | OUTPATIENT
Start: 2021-04-21 | End: 2021-04-21 | Stop reason: HOSPADM

## 2021-04-21 RX ORDER — SODIUM CHLORIDE, SODIUM LACTATE, POTASSIUM CHLORIDE, CALCIUM CHLORIDE 600; 310; 30; 20 MG/100ML; MG/100ML; MG/100ML; MG/100ML
1000 INJECTION, SOLUTION INTRAVENOUS CONTINUOUS
Status: DISCONTINUED | OUTPATIENT
Start: 2021-04-21 | End: 2021-04-21 | Stop reason: HOSPADM

## 2021-04-21 RX ORDER — PROPOFOL 10 MG/ML
VIAL (ML) INTRAVENOUS AS NEEDED
Status: DISCONTINUED | OUTPATIENT
Start: 2021-04-21 | End: 2021-04-21 | Stop reason: SURG

## 2021-04-21 RX ORDER — BUPIVACAINE HCL/0.9 % NACL/PF 0.1 %
2 PLASTIC BAG, INJECTION (ML) EPIDURAL ONCE
Status: COMPLETED | OUTPATIENT
Start: 2021-04-21 | End: 2021-04-21

## 2021-04-21 RX ORDER — CEFDINIR 300 MG/1
300 CAPSULE ORAL 2 TIMES DAILY
Qty: 6 CAPSULE | Refills: 0 | Status: SHIPPED | OUTPATIENT
Start: 2021-04-21 | End: 2022-01-04

## 2021-04-21 RX ORDER — SORBITOL 30 G/1000ML
IRRIGANT IRRIGATION AS NEEDED
Status: DISCONTINUED | OUTPATIENT
Start: 2021-04-21 | End: 2021-04-21 | Stop reason: HOSPADM

## 2021-04-21 RX ORDER — NALOXONE HCL 0.4 MG/ML
0.4 VIAL (ML) INJECTION AS NEEDED
Status: DISCONTINUED | OUTPATIENT
Start: 2021-04-21 | End: 2021-04-21 | Stop reason: HOSPADM

## 2021-04-21 RX ORDER — ONDANSETRON 2 MG/ML
4 INJECTION INTRAMUSCULAR; INTRAVENOUS ONCE AS NEEDED
Status: COMPLETED | OUTPATIENT
Start: 2021-04-21 | End: 2021-04-21

## 2021-04-21 RX ORDER — NEOSTIGMINE METHYLSULFATE 5 MG/5 ML
SYRINGE (ML) INTRAVENOUS AS NEEDED
Status: DISCONTINUED | OUTPATIENT
Start: 2021-04-21 | End: 2021-04-21 | Stop reason: SURG

## 2021-04-21 RX ORDER — LABETALOL HYDROCHLORIDE 5 MG/ML
5 INJECTION, SOLUTION INTRAVENOUS
Status: DISCONTINUED | OUTPATIENT
Start: 2021-04-21 | End: 2021-04-21 | Stop reason: HOSPADM

## 2021-04-21 RX ORDER — LIDOCAINE HYDROCHLORIDE 10 MG/ML
0.5 INJECTION, SOLUTION EPIDURAL; INFILTRATION; INTRACAUDAL; PERINEURAL ONCE AS NEEDED
Status: DISCONTINUED | OUTPATIENT
Start: 2021-04-21 | End: 2021-04-21 | Stop reason: HOSPADM

## 2021-04-21 RX ORDER — SODIUM CHLORIDE, SODIUM LACTATE, POTASSIUM CHLORIDE, CALCIUM CHLORIDE 600; 310; 30; 20 MG/100ML; MG/100ML; MG/100ML; MG/100ML
9 INJECTION, SOLUTION INTRAVENOUS CONTINUOUS
Status: DISCONTINUED | OUTPATIENT
Start: 2021-04-21 | End: 2021-04-21 | Stop reason: HOSPADM

## 2021-04-21 RX ORDER — OXYCODONE AND ACETAMINOPHEN 7.5; 325 MG/1; MG/1
2 TABLET ORAL EVERY 4 HOURS PRN
Status: DISCONTINUED | OUTPATIENT
Start: 2021-04-21 | End: 2021-04-21 | Stop reason: HOSPADM

## 2021-04-21 RX ORDER — ROCURONIUM BROMIDE 10 MG/ML
INJECTION, SOLUTION INTRAVENOUS AS NEEDED
Status: DISCONTINUED | OUTPATIENT
Start: 2021-04-21 | End: 2021-04-21 | Stop reason: SURG

## 2021-04-21 RX ORDER — FLUMAZENIL 0.1 MG/ML
0.2 INJECTION INTRAVENOUS AS NEEDED
Status: DISCONTINUED | OUTPATIENT
Start: 2021-04-21 | End: 2021-04-21 | Stop reason: HOSPADM

## 2021-04-21 RX ORDER — LIDOCAINE HYDROCHLORIDE 20 MG/ML
INJECTION, SOLUTION EPIDURAL; INFILTRATION; INTRACAUDAL; PERINEURAL AS NEEDED
Status: DISCONTINUED | OUTPATIENT
Start: 2021-04-21 | End: 2021-04-21 | Stop reason: SURG

## 2021-04-21 RX ORDER — SODIUM CHLORIDE 0.9 % (FLUSH) 0.9 %
10 SYRINGE (ML) INJECTION EVERY 12 HOURS SCHEDULED
Status: DISCONTINUED | OUTPATIENT
Start: 2021-04-21 | End: 2021-04-21 | Stop reason: HOSPADM

## 2021-04-21 RX ORDER — MAGNESIUM HYDROXIDE 1200 MG/15ML
LIQUID ORAL AS NEEDED
Status: DISCONTINUED | OUTPATIENT
Start: 2021-04-21 | End: 2021-04-21 | Stop reason: HOSPADM

## 2021-04-21 RX ORDER — DEXAMETHASONE SODIUM PHOSPHATE 4 MG/ML
INJECTION, SOLUTION INTRA-ARTICULAR; INTRALESIONAL; INTRAMUSCULAR; INTRAVENOUS; SOFT TISSUE AS NEEDED
Status: DISCONTINUED | OUTPATIENT
Start: 2021-04-21 | End: 2021-04-21 | Stop reason: SURG

## 2021-04-21 RX ORDER — SODIUM CHLORIDE 0.9 % (FLUSH) 0.9 %
3 SYRINGE (ML) INJECTION AS NEEDED
Status: DISCONTINUED | OUTPATIENT
Start: 2021-04-21 | End: 2021-04-21 | Stop reason: HOSPADM

## 2021-04-21 RX ORDER — DEXTROSE AND SODIUM CHLORIDE 5; .9 G/100ML; G/100ML
100 INJECTION, SOLUTION INTRAVENOUS CONTINUOUS
Status: DISCONTINUED | OUTPATIENT
Start: 2021-04-21 | End: 2021-04-21 | Stop reason: HOSPADM

## 2021-04-21 RX ADMIN — OXYCODONE HYDROCHLORIDE AND ACETAMINOPHEN 1 TABLET: 10; 325 TABLET ORAL at 11:17

## 2021-04-21 RX ADMIN — GLYCOPYRROLATE 0.4 MG: 0.2 INJECTION, SOLUTION INTRAMUSCULAR; INTRAVENOUS at 10:30

## 2021-04-21 RX ADMIN — ONDANSETRON HYDROCHLORIDE 4 MG: 2 SOLUTION INTRAMUSCULAR; INTRAVENOUS at 10:30

## 2021-04-21 RX ADMIN — ONDANSETRON HYDROCHLORIDE 4 MG: 2 SOLUTION INTRAMUSCULAR; INTRAVENOUS at 11:17

## 2021-04-21 RX ADMIN — Medication 2 G: at 10:00

## 2021-04-21 RX ADMIN — LIDOCAINE HYDROCHLORIDE 1 EACH: 40 SOLUTION TOPICAL at 09:55

## 2021-04-21 RX ADMIN — LIDOCAINE HYDROCHLORIDE 100 MG: 20 INJECTION, SOLUTION EPIDURAL; INFILTRATION; INTRACAUDAL; PERINEURAL at 09:55

## 2021-04-21 RX ADMIN — PROPOFOL 150 MG: 10 INJECTION, EMULSION INTRAVENOUS at 09:55

## 2021-04-21 RX ADMIN — SODIUM CHLORIDE, POTASSIUM CHLORIDE, SODIUM LACTATE AND CALCIUM CHLORIDE 1000 ML: 600; 310; 30; 20 INJECTION, SOLUTION INTRAVENOUS at 07:51

## 2021-04-21 RX ADMIN — FENTANYL CITRATE 100 MCG: 50 INJECTION, SOLUTION INTRAMUSCULAR; INTRAVENOUS at 09:55

## 2021-04-21 RX ADMIN — DEXAMETHASONE SODIUM PHOSPHATE 4 MG: 4 INJECTION, SOLUTION INTRAMUSCULAR; INTRAVENOUS at 10:30

## 2021-04-21 RX ADMIN — PROPOFOL 50 MG: 10 INJECTION, EMULSION INTRAVENOUS at 10:09

## 2021-04-21 RX ADMIN — ROCURONIUM BROMIDE 20 MG: 10 INJECTION INTRAVENOUS at 09:55

## 2021-04-21 RX ADMIN — Medication 4 MG: at 10:30

## 2021-04-21 NOTE — ANESTHESIA PROCEDURE NOTES
Airway  Urgency: elective    Date/Time: 4/21/2021 9:56 AM  Airway not difficult    General Information and Staff    Patient location during procedure: OR  CRNA: Hernan Bae CRNA    Indications and Patient Condition  Indications for airway management: airway protection    Preoxygenated: yes  MILS maintained throughout  Mask difficulty assessment: 1 - vent by mask    Final Airway Details  Final airway type: endotracheal airway      Successful airway: ETT  Cuffed: yes   Successful intubation technique: direct laryngoscopy  Endotracheal tube insertion site: oral  Blade: Rodriguez  Blade size: 2  ETT size (mm): 8.0  Cormack-Lehane Classification: grade IIa - partial view of glottis  Placement verified by: chest auscultation   Cuff volume (mL): 10  Measured from: lips  ETT/EBT  to lips (cm): 23  Number of attempts at approach: 1  Assessment: lips, teeth, and gum same as pre-op and atraumatic intubation

## 2021-04-21 NOTE — ANESTHESIA POSTPROCEDURE EVALUATION
Patient: Simeon Tam    Procedure Summary     Date: 04/21/21 Room / Location:  [facility] OR 01 / [facility]    Anesthesia Start: 0952 Anesthesia Stop: 1039    Procedures:       CYSTOSCOPY RETROGRADE PYELOGRAM, possible DIRECT VISION INTERNAL URETHROTOMY (Bilateral Bladder)      possible DIRECT VISION INTERNAL URETHROTOMY (Bilateral Urethra) Diagnosis:       Anterior urethral stricture      (Anterior urethral stricture [N35.914])    Surgeons: Matthew Oneil MD Provider: Hernan Bae CRNA    Anesthesia Type: general ASA Status: 2          Anesthesia Type: general    Vitals  Vitals Value Taken Time   BP 99/54 04/21/21 1110   Temp 98 °F (36.7 °C) 04/21/21 1115   Pulse 61 04/21/21 1119   Resp 15 04/21/21 1115   SpO2 97 % 04/21/21 1119   Vitals shown include unvalidated device data.        Post Anesthesia Care and Evaluation    Patient location during evaluation: PACU  Patient participation: complete - patient participated  Level of consciousness: awake and alert  Pain management: adequate  Airway patency: patent  Anesthetic complications: No anesthetic complications    Cardiovascular status: acceptable  Respiratory status: acceptable  Hydration status: acceptable    Comments: Blood pressure 115/63, pulse 61, temperature 98 °F (36.7 °C), temperature source Temporal, resp. rate 16, SpO2 96 %.    Pt discharged from PACU based on zuleyka score >8

## 2021-04-21 NOTE — ANESTHESIA PREPROCEDURE EVALUATION
Anesthesia Evaluation     Patient summary reviewed   no history of anesthetic complications:  NPO Solid Status: > 8 hours             Airway   Mallampati: II  TM distance: >3 FB  Neck ROM: full  Dental      Pulmonary    (-) COPD, asthma, sleep apnea, not a smoker  Cardiovascular   Exercise tolerance: excellent (>7 METS)    (+) hypertension, hyperlipidemia,   (-) pacemaker, past MI, angina, cardiac stents      Neuro/Psych  (-) seizures, TIA, CVA  GI/Hepatic/Renal/Endo    (+) obesity,   diabetes mellitus,   (-) GERD, liver disease, no renal disease    Musculoskeletal     Abdominal    Substance History      OB/GYN          Other                        Anesthesia Plan    ASA 2     general     intravenous induction     Anesthetic plan, all risks, benefits, and alternatives have been provided, discussed and informed consent has been obtained with: patient.

## 2021-04-21 NOTE — DISCHARGE INSTRUCTIONS

## 2021-04-21 NOTE — OP NOTE
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]    Procedure/CPT® Codes:      Procedure(s):  CYSTOSCOPY   URETHRAL DILATION WITH AMPLATZ OVER WIRE KIT  BILATERAL RETROGRADE PYELOGRAM    Surgeon(s):  Matthew Oneil MD    Anesthesia: General    Staff:   Circulator: Quique Keys RN; Sasha Pak RN  Scrub Person: Kishor Shah; Noreen Johnson  Assistant: Traci Perez    Indications for procedure:  Prolonged Gross Hematuria  Anterior and Membranous Urethral stricture requiring self catheterization    Findings:   Anterior membranous urethral stricture  Radiation posterior urethritis and cystitis  Large amount of clot in bladder    Procedure details:  After appropriate anesthesia, positioning, prep and drape, timeout protocol was observed.     I was unable to pass the 23 Mongolian Segundo cystoscope.    Guidewire was passed into the bladder. Over the guidewire I used the Amplatz dilating kit starting with a 14 Mongolian dilator and went up to 24 Mongolian.    This allowed me to negotiate the membranous urethra with a 23 Mongolian cystoscope. The bladder was full of clot. Using the cystoscope sheath and a Rohit syringe I was able to irrigate the clot from the bladder. I would estimate that I got 150 mL from the bladder.    I then inspected the bladder. I could not identify any significant urothelial lesions although there is a lot of edema in the bladder. 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    Bilateral retrograde pyelograms were performed with a 5 Mongolian cone-tip catheter and half-strength contrast. There is a significant right distal ureteral stricture that is over 4 cm in length. This occurs just below the level of the vessels. While the left distal  ureter is also narrowed about the same distance, there is no hydronephrosis.    I then placed a wire back in the bladder. Over this I passed a 20 German three-way catheter using the blitz technique over the wire.    Estimated Blood Loss: Less than 30 mL    Specimens:                None      Drains: none    Complications: none    Plan: Patient will follow up with me in a couple weeks. We will leave the catheter in until tomorrow. This is a prolonged problem. Most likely this gentleman is ultimately going need a suprapubic tube because I do not think frequent catheterizations are going to keep him open. 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      (Please note that portions of this note were completed with a voice recognition program.)  Matthew Oneil MD     Date: 4/21/2021  Time: 10:50 CDT

## 2021-04-21 NOTE — PROGRESS NOTES
Subjective    Mr. Tam is 75 y.o. male    Chief Complaint: Catheter Removal     History of Present Illness  Patient is 75-year-old gentleman history of prostate cancer gross hematuria.  Patient had an office cystoscopy 04/08/2021.  He then underwent cystoscopy urethral dilation with Amplatz over wire CAD bilateral retrograde pyelogram.  Postoperatively patient is here to have catheter removed he does do self cath which she will resume after catheter removal today.  However Dr. Oneil did not think frequent catheterizations are going to keep him open and feels his hematuria also is going to be recurrent due to radiation changes in the prostate urethra and bladder.     The following portions of the patient's history were reviewed and updated as appropriate: allergies, current medications, past family history, past medical history, past social history, past surgical history and problem list.    Review of Systems      Current Outpatient Medications:   •  Apalutamide 60 MG tablet, Take 4 tablets by mouth Daily. Take 4 tablets once daily with or without food. Do not crush or chew tablets., Disp: 120 tablet, Rfl: 11  •  Apple Irving Vn-Grn Tea-Bit Or-Cr (Apple Cider Vinegar Plus) tablet, Take 1 tablet by mouth Daily., Disp: , Rfl:   •  calcium carbonate (OS-JONNY) 600 MG tablet, Take 600 mg by mouth Daily., Disp: , Rfl:   •  cefdinir (OMNICEF) 300 MG capsule, Take 1 capsule by mouth 2 (Two) Times a Day., Disp: 6 capsule, Rfl: 0  •  dutasteride (AVODART) 0.5 MG capsule, Take 1 capsule by mouth Daily., Disp: 60 capsule, Rfl: 11  •  JANUVIA 100 MG tablet, Take 100 mg by mouth Daily., Disp: , Rfl:   •  lisinopril-hydrochlorothiazide (PRINZIDE,ZESTORETIC) 20-12.5 MG per tablet, Take 20 tablets by mouth Daily., Disp: , Rfl:   •  Loperamide HCl (IMODIUM PO), Take 1 dose by mouth As Needed., Disp: , Rfl:   •  rosuvastatin (CRESTOR) 20 MG tablet, Take 10 mg by mouth Daily., Disp: , Rfl:   •  trospium 60 MG capsule sustained-release 24  "hr capsule, Take 1 capsule by mouth Every Morning., Disp: 60 capsule, Rfl: 11    Past Medical History:   Diagnosis Date   • Arrhythmia    • Cancer (CMS/HCC)     prostate   • Diabetes mellitus (CMS/HCC)    • History of sepsis    • Hyperlipidemia    • Hypertension    • UTI (urinary tract infection)        Past Surgical History:   Procedure Laterality Date   • CATARACT EXTRACTION     • CYSTOSCOPY RETROGRADE PYELOGRAM Bilateral 4/21/2021    Procedure: CYSTOSCOPY RETROGRADE PYELOGRAM, possible DIRECT VISION INTERNAL URETHROTOMY;  Surgeon: Matthew Oneil MD;  Location: [facility] OR;  Service: Urology;  Laterality: Bilateral;   • CYSTOSCOPY URETHROTOMY VISUAL INTERNAL Bilateral 4/21/2021    Procedure: possible DIRECT VISION INTERNAL URETHROTOMY;  Surgeon: Matthew Oneil MD;  Location: [facility] OR;  Service: Urology;  Laterality: Bilateral;   • EXCISION LESION      neck   • KIDNEY STONE SURGERY     • REPLACEMENT TOTAL KNEE Left    • TONSILLECTOMY         Social History     Socioeconomic History   • Marital status:      Spouse name: Not on file   • Number of children: Not on file   • Years of education: Not on file   • Highest education level: Not on file   Tobacco Use   • Smoking status: Never Smoker   • Smokeless tobacco: Never Used   Vaping Use   • Vaping Use: Never used   Substance and Sexual Activity   • Alcohol use: No   • Drug use: No   • Sexual activity: Defer       Family History   Problem Relation Age of Onset   • Dementia Mother        Objective    Temp 98.5 °F (36.9 °C) (Temporal)   Ht 170.2 cm (67\")   Wt 97 kg (213 lb 12.8 oz)   BMI 33.49 kg/m²     Physical Exam  Vitals reviewed.   Constitutional:       Appearance: Normal appearance.   HENT:      Head: Normocephalic and atraumatic.      Right Ear: External ear normal.      Left Ear: External ear normal.      Nose: No congestion.   Eyes:      Conjunctiva/sclera: Conjunctivae normal.   Neck:      Comments: Observed no obvious neck masses  Pulmonary:     "  Effort: Pulmonary effort is normal.   Neurological:      General: No focal deficit present.      Mental Status: He is alert and oriented to person, place, and time.   Psychiatric:         Mood and Affect: Mood normal.         Behavior: Behavior normal.             Assessment and Plan    Diagnoses and all orders for this visit:    1. Gross hematuria (Primary)    2. Anterior urethral stricture    Patient status post cystoscopy urethral dilation and retrograde.  Patient found to have urethral stricture which Dr. Oneil does not think doing intermittent catheterization is good under keep this open also gross hematuria was likely to recur with history of radiation changes.  He will follow-up in 2 weeks with Dr. Oneil.

## 2021-04-23 ENCOUNTER — OFFICE VISIT (OUTPATIENT)
Dept: UROLOGY | Facility: CLINIC | Age: 76
End: 2021-04-23

## 2021-04-23 VITALS — BODY MASS INDEX: 33.56 KG/M2 | TEMPERATURE: 98.5 F | HEIGHT: 67 IN | WEIGHT: 213.8 LBS

## 2021-04-23 DIAGNOSIS — R31.0 GROSS HEMATURIA: Primary | ICD-10-CM

## 2021-04-23 DIAGNOSIS — N35.914 ANTERIOR URETHRAL STRICTURE: ICD-10-CM

## 2021-04-23 PROCEDURE — 99213 OFFICE O/P EST LOW 20 MIN: CPT | Performed by: PHYSICIAN ASSISTANT

## 2021-04-23 NOTE — PROGRESS NOTES
Patient is here today to have their catheter removed. The patient was seen by Dr Oneil on 04/21/2021. The balloon to the catheter was deflated using a 10cc syringe. The Catheter was then removed without difficulty. The patient was advised to report back to the emergency room and or urgent care if they can not void after 6 hours. I urged the patient to drink plenty of fluids. Patient denied any fever, N&V, or chills. Patient acknowledged and verbalized understanding. Mike Rivera PA-C was in the office at the time of the procedure.

## 2021-04-27 ENCOUNTER — OFFICE VISIT (OUTPATIENT)
Dept: ONCOLOGY | Facility: CLINIC | Age: 76
End: 2021-04-27

## 2021-04-27 VITALS
HEART RATE: 85 BPM | DIASTOLIC BLOOD PRESSURE: 62 MMHG | OXYGEN SATURATION: 96 % | SYSTOLIC BLOOD PRESSURE: 116 MMHG | TEMPERATURE: 97.5 F | WEIGHT: 210.3 LBS | RESPIRATION RATE: 18 BRPM | BODY MASS INDEX: 33.01 KG/M2 | HEIGHT: 67 IN

## 2021-04-27 DIAGNOSIS — R53.82 CHRONIC FATIGUE: ICD-10-CM

## 2021-04-27 DIAGNOSIS — C61 PROSTATE CANCER (HCC): Primary | ICD-10-CM

## 2021-04-27 PROCEDURE — 99214 OFFICE O/P EST MOD 30 MIN: CPT | Performed by: INTERNAL MEDICINE

## 2021-05-11 ENCOUNTER — TELEPHONE (OUTPATIENT)
Dept: PHARMACY | Facility: HOSPITAL | Age: 76
End: 2021-05-11

## 2021-05-11 NOTE — TELEPHONE ENCOUNTER
Attempted call to follow up with Simeon regarding Erleada. Left generic message requesting call back at [phone] Pharmacist line.     Andrzej Hancock, Jill  05/11/21  15:46 CDT

## 2021-05-17 ENCOUNTER — LAB (OUTPATIENT)
Dept: LAB | Facility: HOSPITAL | Age: 76
End: 2021-05-17

## 2021-05-17 ENCOUNTER — OFFICE VISIT (OUTPATIENT)
Dept: UROLOGY | Facility: CLINIC | Age: 76
End: 2021-05-17

## 2021-05-17 VITALS — HEIGHT: 68 IN | TEMPERATURE: 95.9 F | WEIGHT: 211.8 LBS | BODY MASS INDEX: 32.1 KG/M2

## 2021-05-17 DIAGNOSIS — C61 PROSTATE CANCER (HCC): Primary | ICD-10-CM

## 2021-05-17 DIAGNOSIS — C61 PROSTATE CANCER (HCC): ICD-10-CM

## 2021-05-17 DIAGNOSIS — R53.82 CHRONIC FATIGUE: ICD-10-CM

## 2021-05-17 DIAGNOSIS — N35.914 ANTERIOR URETHRAL STRICTURE: ICD-10-CM

## 2021-05-17 LAB
ALBUMIN SERPL-MCNC: 4.2 G/DL (ref 3.5–5.2)
ALBUMIN/GLOB SERPL: 1.5 G/DL
ALP SERPL-CCNC: 62 U/L (ref 39–117)
ALT SERPL W P-5'-P-CCNC: 27 U/L (ref 1–41)
ANION GAP SERPL CALCULATED.3IONS-SCNC: 11 MMOL/L (ref 5–15)
AST SERPL-CCNC: 35 U/L (ref 1–40)
BASOPHILS # BLD AUTO: 0.03 10*3/MM3 (ref 0–0.2)
BASOPHILS NFR BLD AUTO: 0.5 % (ref 0–1.5)
BILIRUB BLD-MCNC: NEGATIVE MG/DL
BILIRUB SERPL-MCNC: 0.4 MG/DL (ref 0–1.2)
BUN SERPL-MCNC: 15 MG/DL (ref 8–23)
BUN/CREAT SERPL: 21.4 (ref 7–25)
CALCIUM SPEC-SCNC: 10 MG/DL (ref 8.6–10.5)
CHLORIDE SERPL-SCNC: 96 MMOL/L (ref 98–107)
CLARITY, POC: CLEAR
CO2 SERPL-SCNC: 27 MMOL/L (ref 22–29)
COLOR UR: YELLOW
CREAT SERPL-MCNC: 0.7 MG/DL (ref 0.76–1.27)
DEPRECATED RDW RBC AUTO: 42.6 FL (ref 37–54)
EOSINOPHIL # BLD AUTO: 0.16 10*3/MM3 (ref 0–0.4)
EOSINOPHIL NFR BLD AUTO: 2.5 % (ref 0.3–6.2)
ERYTHROCYTE [DISTWIDTH] IN BLOOD BY AUTOMATED COUNT: 13.1 % (ref 12.3–15.4)
GFR SERPL CREATININE-BSD FRML MDRD: 109 ML/MIN/1.73
GLOBULIN UR ELPH-MCNC: 2.8 GM/DL
GLUCOSE SERPL-MCNC: 156 MG/DL (ref 65–99)
GLUCOSE UR STRIP-MCNC: NEGATIVE MG/DL
HCT VFR BLD AUTO: 35.3 % (ref 37.5–51)
HGB BLD-MCNC: 12.1 G/DL (ref 13–17.7)
KETONES UR QL: NEGATIVE
LEUKOCYTE EST, POC: ABNORMAL
LYMPHOCYTES # BLD AUTO: 0.97 10*3/MM3 (ref 0.7–3.1)
LYMPHOCYTES NFR BLD AUTO: 15.2 % (ref 19.6–45.3)
MCH RBC QN AUTO: 30.7 PG (ref 26.6–33)
MCHC RBC AUTO-ENTMCNC: 34.3 G/DL (ref 31.5–35.7)
MCV RBC AUTO: 89.6 FL (ref 79–97)
MONOCYTES # BLD AUTO: 0.65 10*3/MM3 (ref 0.1–0.9)
MONOCYTES NFR BLD AUTO: 10.2 % (ref 5–12)
NEUTROPHILS NFR BLD AUTO: 4.56 10*3/MM3 (ref 1.7–7)
NEUTROPHILS NFR BLD AUTO: 71.1 % (ref 42.7–76)
NITRITE UR-MCNC: NEGATIVE MG/ML
PH UR: 7 [PH] (ref 5–8)
PLATELET # BLD AUTO: 117 10*3/MM3 (ref 140–450)
PMV BLD AUTO: 10.6 FL (ref 6–12)
POTASSIUM SERPL-SCNC: 4.1 MMOL/L (ref 3.5–5.2)
PROT SERPL-MCNC: 7 G/DL (ref 6–8.5)
PROT UR STRIP-MCNC: NEGATIVE MG/DL
RBC # BLD AUTO: 3.94 10*6/MM3 (ref 4.14–5.8)
RBC # UR STRIP: ABNORMAL /UL
SODIUM SERPL-SCNC: 134 MMOL/L (ref 136–145)
SP GR UR: 1.01 (ref 1–1.03)
TSH SERPL DL<=0.05 MIU/L-ACNC: 3.06 UIU/ML (ref 0.27–4.2)
UROBILINOGEN UR QL: NORMAL
WBC # BLD AUTO: 6.4 10*3/MM3 (ref 3.4–10.8)

## 2021-05-17 PROCEDURE — 80053 COMPREHEN METABOLIC PANEL: CPT

## 2021-05-17 PROCEDURE — 84443 ASSAY THYROID STIM HORMONE: CPT

## 2021-05-17 PROCEDURE — 36415 COLL VENOUS BLD VENIPUNCTURE: CPT

## 2021-05-17 PROCEDURE — 85025 COMPLETE CBC W/AUTO DIFF WBC: CPT

## 2021-05-17 PROCEDURE — 81003 URINALYSIS AUTO W/O SCOPE: CPT | Performed by: UROLOGY

## 2021-05-17 PROCEDURE — 84153 ASSAY OF PSA TOTAL: CPT

## 2021-05-17 PROCEDURE — 99213 OFFICE O/P EST LOW 20 MIN: CPT | Performed by: UROLOGY

## 2021-05-18 LAB — PSA SERPL-MCNC: <0.014 NG/ML (ref 0–4)

## 2021-05-26 ENCOUNTER — OFFICE VISIT (OUTPATIENT)
Dept: ONCOLOGY | Facility: CLINIC | Age: 76
End: 2021-05-26

## 2021-05-26 VITALS
BODY MASS INDEX: 31.99 KG/M2 | TEMPERATURE: 97.8 F | OXYGEN SATURATION: 97 % | SYSTOLIC BLOOD PRESSURE: 122 MMHG | HEIGHT: 68 IN | HEART RATE: 84 BPM | WEIGHT: 211.1 LBS | RESPIRATION RATE: 18 BRPM | DIASTOLIC BLOOD PRESSURE: 68 MMHG

## 2021-05-26 DIAGNOSIS — C61 PROSTATE CANCER (HCC): Primary | ICD-10-CM

## 2021-05-26 DIAGNOSIS — R53.0 NEOPLASTIC MALIGNANT RELATED FATIGUE: ICD-10-CM

## 2021-05-26 PROCEDURE — 99214 OFFICE O/P EST MOD 30 MIN: CPT | Performed by: INTERNAL MEDICINE

## 2021-06-11 ENCOUNTER — SPECIALTY PHARMACY (OUTPATIENT)
Dept: ONCOLOGY | Facility: HOSPITAL | Age: 76
End: 2021-06-11

## 2021-06-11 NOTE — PROGRESS NOTES
[facility]    Oral Oncology Patient Follow-Up      Consult Details  Consulting Provider:   Deshawn Black MD ([facility])   Medication and Regimen:  apalutamide [Erleada] 240 mg po daily     Prescription Review  Dosing & Interactions:   Reviewed, appropriate and no significant interaction noted at this time..   Current Specialty Pharmacy [facility] (insurance preferred)     Intervention(s):                  Spoke with patient's wife (Essence) as patient unavailable, confirmed she has permission to speak on his behalf.  Mrs. Tam reports Simeon is doing well and continues to tolerate Erleada well with no obvious side effects that she is aware of.  She states he continues to receive refills through [facility] when appropriate and has no questions or concerns at this time.  Encouraged her to have Mr. Tam reach out to us with any needs or questions.     Summary:    Continue to follow.     Alejandra Diaz, PharmD  06/11/2021 09:51 CDT

## 2021-06-14 NOTE — PROGRESS NOTES
[facility] HEMATOLOGY AND [facility]COLOGY  [address]  [place] [address] [phone]    Patient Name: Simeon Tam  Encounter Date: 06/23/2021  YOB: 1945  Patient Number: [id number]      REASON FOR FOLLOW-UP: Simeon Tam is a pleasant 76 y.o.  male who is seen in follow-up for hormone refractory prostate cancer.  He is seen C13D1 of apalutamide.  He is seen with spouse, Essence.  History is obtained from patient. History is considered to be accurate.        Oncology/Hematology History Overview Note   DIAGNOSTIC ABNORMALITIES:  He presented with rising PSA of 13 and was diagnosed with prostate cancer about 25 years ago.   Previous PSA was 22.26 on 10/05/2016, <0.13 on 07/17/2017, 7.91 on 10/17/2018, 19.29 on 03/19/2020, and 22.25 on 04/07/2020.   He was seen by Dr. Oneil 05/12/2020. Latest PSA was 22.25 ng/ml on 04/07/2020.  Previous PSA 22.26 on 10/05/2016.  Bone scan and CT scan of the abdomen and pelvis were negative for prostate cancer on 10/2016.  Started on Casodex and possibly leuprolide.  PSA  was down to <0.13 on 07/17/207.  Plan for apalutamide after staging scans.   CT abdomen and pelvis 06/11/2020. No evidence of metastatic disease in the abdomen or pelvis. Right renal pelvis and ureter urothelial thickening and hyperemia. Urinary bladder wall is thickened and there is adjacent inflammation. Recommend correlation with urinalysis and exam to exclude cystitis with right-sided pyelitis.  Cirrhosis.  Tiny 8 mm hypodense RIGHT inferior liver lesion on image 33 is too small to characterize.   Bone scan 06/11/2020. No evidence of bone metastases.        PREVIOUS INTERVENTIONS:  He was treated with adjuvant radiation at [place] and androgen ablation with Casodex.  Lupron and Casodex 10/2016. He had 7 months of ADT therapy with Lupron.  Apalutamide 07/22/2020 through present.     Prostate cancer (CMS/HCC)   6/29/2020 -   Chemotherapy    OP PROSTATE Apalutamide     6/30/2020 Initial Diagnosis    Prostate cancer (CMS/[facility])     7/29/2020 -  Chemotherapy    OP LEUPROLIDE Q6M         PAST MEDICAL HISTORY:  ALLERGIES:  Allergies   Allergen Reactions   • Sulfa Antibiotics Rash     CURRENT MEDICATIONS:  Outpatient Encounter Medications as of 6/23/2021   Medication Sig Dispense Refill   • Apalutamide 60 MG tablet Take 4 tablets by mouth Daily. Take 4 tablets once daily with or without food. Do not crush or chew tablets. 120 tablet 11   • Apple Irving Vn-Grn Tea-Bit Or-Cr (Apple Cider Vinegar Plus) tablet Take 1 tablet by mouth Daily.     • calcium carbonate (OS-JONNY) 600 MG tablet Take 600 mg by mouth Daily.     • cefdinir (OMNICEF) 300 MG capsule Take 1 capsule by mouth 2 (Two) Times a Day. 6 capsule 0   • dutasteride (AVODART) 0.5 MG capsule Take 1 capsule by mouth Daily. 60 capsule 11   • JANUVIA 100 MG tablet Take 100 mg by mouth Daily.     • lisinopril-hydrochlorothiazide (PRINZIDE,ZESTORETIC) 20-12.5 MG per tablet Take 20 tablets by mouth Daily.     • Loperamide HCl (IMODIUM PO) Take 1 dose by mouth As Needed.     • rosuvastatin (CRESTOR) 20 MG tablet Take 10 mg by mouth Daily.     • trospium 60 MG capsule sustained-release 24 hr capsule Take 1 capsule by mouth Every Morning. 60 capsule 11     No facility-administered encounter medications on file as of 6/23/2021.     ADULT ILLNESSES:  Patient Active Problem List   Diagnosis Code   • Hypertension I10   • Hyperlipidemia E78.5   • Diabetes mellitus (CMS/[facility]) E11.9   • Cancer (CMS/[facility]) C80.1   • Arrhythmia I49.9   • Non morbid obesity due to excess calories E66.09   • Prostate cancer (CMS/[facility]) C61   • Anterior urethral stricture N35.914     SURGERIES:  Past Surgical History:   Procedure Laterality Date   • CATARACT EXTRACTION     • CYSTOSCOPY RETROGRADE PYELOGRAM Bilateral 4/21/2021    Procedure: CYSTOSCOPY RETROGRADE PYELOGRAM, possible DIRECT VISION INTERNAL URETHROTOMY;  Surgeon: Clarice  "Matthew RUTHERFORD MD;  Location: [facility];  Service: Urology;  Laterality: Bilateral;   • CYSTOSCOPY URETHROTOMY VISUAL INTERNAL Bilateral 4/21/2021    Procedure: possible DIRECT VISION INTERNAL URETHROTOMY;  Surgeon: Matthew Oneil MD;  Location: [facility];  Service: Urology;  Laterality: Bilateral;   • EXCISION LESION      neck   • KIDNEY STONE SURGERY     • REPLACEMENT TOTAL KNEE Left    • TONSILLECTOMY       HEALTH MAINTENANCE ITEMS:  Health Maintenance Due   Topic Date Due   • URINE MICROALBUMIN  Never done   • COVID-19 Vaccine (1) Never done   • Hepatitis B (1 of 3 - Risk 3-dose series) Never done   • TDAP/TD VACCINES (1 - Tdap) Never done   • ZOSTER VACCINE (1 of 2) Never done   • HEPATITIS C SCREENING  Never done   • ANNUAL WELLNESS VISIT  Never done   • LIPID PANEL  Never done   • HEMOGLOBIN A1C  Never done   • DIABETIC EYE EXAM  Never done       <no information>  Last Completed Colonoscopy     This patient has no relevant Health Maintenance data.          There is no immunization history on file for this patient.  Last Completed Mammogram     This patient has no relevant Health Maintenance data.            FAMILY HISTORY:  Family History   Problem Relation Age of Onset   • Dementia Mother      SOCIAL HISTORY:  Social History     Socioeconomic History   • Marital status:      Spouse name: Not on file   • Number of children: Not on file   • Years of education: Not on file   • Highest education level: Not on file   Tobacco Use   • Smoking status: Never Smoker   • Smokeless tobacco: Never Used   Vaping Use   • Vaping Use: Never used   Substance and Sexual Activity   • Alcohol use: No   • Drug use: No   • Sexual activity: Defer       REVIEW OF SYSTEMS:    Review of Systems   Constitutional: Negative for chills, diaphoresis and fever.        \"I feel real good.\" Tolerating apalutamide.   HENT: Negative for congestion, mouth sores and trouble swallowing.    Eyes: Negative for redness and visual disturbance. " "  Respiratory: Negative for cough, shortness of breath and wheezing.    Cardiovascular: Negative for chest pain and palpitations.   Gastrointestinal: Negative for abdominal pain, constipation, diarrhea, nausea and vomiting.   Endocrine: Negative for cold intolerance and heat intolerance.   Genitourinary: Positive for difficulty urinating. Negative for flank pain and hematuria.        \"Self catheterize.\"   Musculoskeletal: Negative for arthralgias and joint swelling.   Skin: Negative for pallor.   Allergic/Immunologic: Negative for food allergies.   Neurological: Negative for dizziness, speech difficulty and weakness.   Hematological: Negative for adenopathy. Does not bruise/bleed easily.   Psychiatric/Behavioral: Negative for agitation, confusion and hallucinations.       VITAL SIGNS: /70   Pulse 104   Temp 98 °F (36.7 °C)   Resp 18   Ht 172.7 cm (68\")   Wt 94.8 kg (209 lb 1.6 oz)   SpO2 98%   BMI 31.79 kg/m²   Pain Score    06/23/21 1340   PainSc: 0-No pain       PHYSICAL EXAMINATION:     Physical Exam  Vitals reviewed.   Constitutional:       General: He is not in acute distress.  HENT:      Head: Normocephalic and atraumatic.   Eyes:      General: No scleral icterus.  Cardiovascular:      Rate and Rhythm: Normal rate and regular rhythm.   Pulmonary:      Effort: No respiratory distress.      Breath sounds: No wheezing or rales.   Abdominal:      General: Bowel sounds are normal.      Palpations: Abdomen is soft.   Musculoskeletal:         General: No swelling.      Cervical back: Neck supple.   Skin:     General: Skin is warm and dry.      Coloration: Skin is not pale.   Neurological:      Mental Status: He is alert and oriented to person, place, and time.   Psychiatric:         Mood and Affect: Mood normal.         Behavior: Behavior normal.         Thought Content: Thought content normal.         Judgment: Judgment normal.         LABS    Lab Results - Last 18 Months   Lab Units 06/18/21  1437 " 05/17/21  1345 04/20/21  1414 03/23/21  1340 02/26/21  1237 12/28/20  1342 09/01/20  1427   HEMOGLOBIN g/dL 13.7 12.1* 12.4* 13.8 13.7 14.3 14.7   HEMATOCRIT % 40.6 35.3* 37.0* 40.0 38.6 40.1 43.2   MCV fL 90.2 89.6 91.1 89.7 86.9 90.5 88.5   WBC 10*3/mm3 6.61 6.40 6.56 6.34 6.11 6.33 6.01   RDW % 12.9 13.1 13.2 12.8 12.7 12.4 13.2   MPV fL 10.7 10.6 10.8 10.8 10.2 10.7 10.9   PLATELETS 10*3/mm3 137* 117* 138* 144 138* 124* 143   IMM GRAN % % 0.3  --   --   --  0.3  --  0.3   NEUTROS ABS 10*3/mm3 4.44 4.56 4.90 4.35 4.40 4.44 4.40   LYMPHS ABS 10*3/mm3 1.15 0.97 0.84 1.10 0.94 1.06 1.00   MONOS ABS 10*3/mm3 0.79 0.65 0.60 0.66 0.58 0.59 0.41   EOS ABS 10*3/mm3 0.17 0.16 0.16 0.17 0.14 0.20 0.15   BASOS ABS 10*3/mm3 0.04 0.03 0.03 0.04 0.03 0.02 0.03   IMMATURE GRANS (ABS) 10*3/mm3 0.02  --   --   --  0.02  --  0.02   NRBC /100 WBC 0.0  --   --   --  0.0  --  0.0       Lab Results - Last 18 Months   Lab Units 06/18/21  1437 05/17/21  1345 04/20/21  1414 04/08/21  0831 03/23/21  1340 02/26/21  1237 12/28/20  1342   GLUCOSE mg/dL 135* 156* 176*  --  149* 149* 191*   SODIUM mmol/L 136 134* 133*  --  136 137 136   POTASSIUM mmol/L 4.3 4.1 4.2  --  4.0 4.0 3.9   CO2 mmol/L 29.0 27.0 30.0*  --  29.0 29.0 30.0*   CHLORIDE mmol/L 96* 96* 94*  --  96* 98 95*   ANION GAP mmol/L 11.0 11.0 9.0  --  11.0 10.0 11.0   CREATININE mg/dL 0.95 0.70* 0.78 0.70 0.74* 0.59* 0.72*   BUN mg/dL 15 15 14  --  14 15 15   BUN / CREAT RATIO  15.8 21.4 17.9  --  18.9 25.4* 20.8   CALCIUM mg/dL 10.2 10.0 9.7  --  9.9 9.7 10.0   EGFR IF NONAFRICN AM mL/min/1.73 77 109 97  --  103 133 106   ALK PHOS U/L 67 62 72  --  74 75 74   TOTAL PROTEIN g/dL 7.3 7.0 7.4  --  7.4 7.4 7.5   ALT (SGPT) U/L 29 27 27  --  28 32 29   AST (SGOT) U/L 37 35 36  --  39 43* 35   BILIRUBIN mg/dL 0.3 0.4 0.3  --  0.3 0.4 0.3   ALBUMIN g/dL 4.50 4.20 4.20  --  4.30 4.20 4.30   GLOBULIN gm/dL 2.8 2.8 3.2  --  3.1 3.2 3.2       No results for input(s): LEONA, KAPPALAMB,  "IGLFLC, URICACID, FREEKAPPAL, CEA, LDH, REFLABREPO in the last 36231 hours.    Lab Results - Last 18 Months   Lab Units 06/18/21  1437 05/17/21  1345 04/20/21  1414 03/23/21  1340 02/26/21  1237 09/01/20  1427   TSH uIU/mL 3.010 3.060 4.120 3.670 2.670 2.140       Simeon Tam reports a pain score of 0.       ASSESSMENT:  1.  Prostate cancer.  PSA recurrence.   AJCC stage (TX, NX, M0)  Treatment status:Casodex and Lupron with PSA recurrence.  On apalutamide and leuprolde (by Dr. Oneil).  2.  Performance status of 1.   3.  Cirrhosis.  4.  Neurogenic bladder.  Self catheterize.   5.  Anterior urethral stricture.  6.  Thrombocytopenia at 102 on 03/19/2020 from cirrhosis.  7.  Grade 1 fatigue from malignancy.          PLAN:  1.    Re:  Good apalutamide tolerance.    2.    Re:  Heme status.  Hemoglobin 13.7, normal and platelet 137 from cirrhosis.  3.    Re:  Pre-office CMP.  GFR 77 ml/minute. TSH 3.01.  4.    Re:  Pre-office PSA < 0.014 from < 0.014 from < 0.014 from < 0.014 from < 0.014 from < 0.014 from 0.015 from 0.03 from 0.125 from 1.03 from 27.3.   5.   eRx for Compazine 10 mg p.o. every 4 hours as needed for nausea/vomiting, 60, 2 refills if needed.    6.   eRx for C13D1 start on 06/23/2021 apalutamide 60 mg, take four tablets total dose 240 mg po daily, number, 120.  Take either with or without food.  Swallow tablets whole. TSH before apalutamide.  No grapefruit juice or grapefruit.  Observe for adverse effects especially arrhythmia, rash, seizures, thyroid dysfunction, hypothyroidism, fall and fractures.  7.    Continue ongoing management per primary care physician and other specialists.  8.    Plan of care discussed with patient and spouse.  Understanding expressed.  Patient agreeable to proceed.  9.    Lupron per Dr. Oneil.   10.  Questions were answered to his satisfaction.  \"Alright.\"  11.  Advance Care Planning   ACP discussion was declined by the patient. Patient does not " have an advance directive, information provided.  12.  Return to office in 4 weeks with pre-office TSH, CMP, PSA and CBC with differential.        I have reviewed the assessment and plan and verified the accuracy of it. No changes to assessment and plan since the information was documented. Deshawn Black MD 06/23/21         I spent 31 total minutes, face-to-face, caring for Simeon mcdonald.  Greater than 50% of this time involved counseling and/or coordination of care as documented within this note regarding the patient's illness(es), pros and cons of various treatment options, instructions and/or risk reduction.          (Matthew Oneil MD)  (Oniel Doshi MD)  Lucio Garcia MD

## 2021-06-16 ENCOUNTER — APPOINTMENT (OUTPATIENT)
Dept: LAB | Facility: HOSPITAL | Age: 76
End: 2021-06-16

## 2021-06-18 ENCOUNTER — LAB (OUTPATIENT)
Dept: LAB | Facility: HOSPITAL | Age: 76
End: 2021-06-18

## 2021-06-18 DIAGNOSIS — C61 PROSTATE CANCER (HCC): ICD-10-CM

## 2021-06-18 DIAGNOSIS — R53.0 NEOPLASTIC MALIGNANT RELATED FATIGUE: ICD-10-CM

## 2021-06-18 LAB
ALBUMIN SERPL-MCNC: 4.5 G/DL (ref 3.5–5.2)
ALBUMIN/GLOB SERPL: 1.6 G/DL
ALP SERPL-CCNC: 67 U/L (ref 39–117)
ALT SERPL W P-5'-P-CCNC: 29 U/L (ref 1–41)
ANION GAP SERPL CALCULATED.3IONS-SCNC: 11 MMOL/L (ref 5–15)
AST SERPL-CCNC: 37 U/L (ref 1–40)
BILIRUB SERPL-MCNC: 0.3 MG/DL (ref 0–1.2)
BUN SERPL-MCNC: 15 MG/DL (ref 8–23)
BUN/CREAT SERPL: 15.8 (ref 7–25)
CALCIUM SPEC-SCNC: 10.2 MG/DL (ref 8.6–10.5)
CHLORIDE SERPL-SCNC: 96 MMOL/L (ref 98–107)
CO2 SERPL-SCNC: 29 MMOL/L (ref 22–29)
CREAT SERPL-MCNC: 0.95 MG/DL (ref 0.76–1.27)
GFR SERPL CREATININE-BSD FRML MDRD: 77 ML/MIN/1.73
GLOBULIN UR ELPH-MCNC: 2.8 GM/DL
GLUCOSE SERPL-MCNC: 135 MG/DL (ref 65–99)
POTASSIUM SERPL-SCNC: 4.3 MMOL/L (ref 3.5–5.2)
PROT SERPL-MCNC: 7.3 G/DL (ref 6–8.5)
SODIUM SERPL-SCNC: 136 MMOL/L (ref 136–145)
TSH SERPL DL<=0.05 MIU/L-ACNC: 3.01 UIU/ML (ref 0.27–4.2)

## 2021-06-18 PROCEDURE — 85025 COMPLETE CBC W/AUTO DIFF WBC: CPT

## 2021-06-18 PROCEDURE — 84153 ASSAY OF PSA TOTAL: CPT

## 2021-06-18 PROCEDURE — 36415 COLL VENOUS BLD VENIPUNCTURE: CPT

## 2021-06-18 PROCEDURE — 84443 ASSAY THYROID STIM HORMONE: CPT

## 2021-06-18 PROCEDURE — 80053 COMPREHEN METABOLIC PANEL: CPT

## 2021-06-19 LAB
BASOPHILS # BLD AUTO: 0.04 10*3/MM3 (ref 0–0.2)
BASOPHILS NFR BLD AUTO: 0.6 % (ref 0–1.5)
DEPRECATED RDW RBC AUTO: 42.5 FL (ref 37–54)
EOSINOPHIL # BLD AUTO: 0.17 10*3/MM3 (ref 0–0.4)
EOSINOPHIL NFR BLD AUTO: 2.6 % (ref 0.3–6.2)
ERYTHROCYTE [DISTWIDTH] IN BLOOD BY AUTOMATED COUNT: 12.9 % (ref 12.3–15.4)
HCT VFR BLD AUTO: 40.6 % (ref 37.5–51)
HGB BLD-MCNC: 13.7 G/DL (ref 13–17.7)
IMM GRANULOCYTES # BLD AUTO: 0.02 10*3/MM3 (ref 0–0.05)
IMM GRANULOCYTES NFR BLD AUTO: 0.3 % (ref 0–0.5)
LYMPHOCYTES # BLD AUTO: 1.15 10*3/MM3 (ref 0.7–3.1)
LYMPHOCYTES NFR BLD AUTO: 17.4 % (ref 19.6–45.3)
MCH RBC QN AUTO: 30.4 PG (ref 26.6–33)
MCHC RBC AUTO-ENTMCNC: 33.7 G/DL (ref 31.5–35.7)
MCV RBC AUTO: 90.2 FL (ref 79–97)
MONOCYTES # BLD AUTO: 0.79 10*3/MM3 (ref 0.1–0.9)
MONOCYTES NFR BLD AUTO: 12 % (ref 5–12)
NEUTROPHILS NFR BLD AUTO: 4.44 10*3/MM3 (ref 1.7–7)
NEUTROPHILS NFR BLD AUTO: 67.1 % (ref 42.7–76)
NRBC BLD AUTO-RTO: 0 /100 WBC (ref 0–0.2)
PLATELET # BLD AUTO: 137 10*3/MM3 (ref 140–450)
PMV BLD AUTO: 10.7 FL (ref 6–12)
PSA SERPL-MCNC: <0.014 NG/ML (ref 0–4)
RBC # BLD AUTO: 4.5 10*6/MM3 (ref 4.14–5.8)
WBC # BLD AUTO: 6.61 10*3/MM3 (ref 3.4–10.8)

## 2021-06-23 ENCOUNTER — OFFICE VISIT (OUTPATIENT)
Dept: ONCOLOGY | Facility: CLINIC | Age: 76
End: 2021-06-23

## 2021-06-23 VITALS
OXYGEN SATURATION: 98 % | HEART RATE: 104 BPM | WEIGHT: 209.1 LBS | SYSTOLIC BLOOD PRESSURE: 124 MMHG | RESPIRATION RATE: 18 BRPM | TEMPERATURE: 98 F | BODY MASS INDEX: 31.69 KG/M2 | HEIGHT: 68 IN | DIASTOLIC BLOOD PRESSURE: 70 MMHG

## 2021-06-23 DIAGNOSIS — R53.0 NEOPLASTIC MALIGNANT RELATED FATIGUE: ICD-10-CM

## 2021-06-23 DIAGNOSIS — C61 PROSTATE CANCER (HCC): Primary | ICD-10-CM

## 2021-06-23 PROCEDURE — 99214 OFFICE O/P EST MOD 30 MIN: CPT | Performed by: INTERNAL MEDICINE

## 2021-07-13 ENCOUNTER — PATIENT OUTREACH (OUTPATIENT)
Dept: ONCOLOGY | Facility: HOSPITAL | Age: 76
End: 2021-07-13

## 2021-07-13 NOTE — OUTREACH NOTE
[facility]    Oral Oncology Patient Outreach      Prescription Details  Consulting Provider:   Deshawn Black MD ([facility])   Medication and Regimen:  apalutamide [Erleada] 240 mg PO daily       An attempt was made by the Oral Oncology Clinic to contact this patient for a follow-up on their chemotherapy medication. The Oral Oncology Clinic can be reached at [phone].        Carolee Diaz PharmD  07/13/21  15:48 CDT

## 2021-07-14 ENCOUNTER — SPECIALTY PHARMACY (OUTPATIENT)
Dept: ONCOLOGY | Facility: HOSPITAL | Age: 76
End: 2021-07-14

## 2021-07-14 NOTE — PROGRESS NOTES
[facility]    Oral Oncology Patient Follow-Up      Consult Details  Consulting Provider:   Deshawn Black MD ([facility])   Medication and Regimen:  apalutamide [Erleada] 240 mg po daily     Prescription Review  Dosing & Interactions:   Reviewed, appropriate and no significant interaction noted at this time..   Current Specialty Pharmacy [facility] (insurance preferred)     Intervention(s):                  Spoke with patient's wife (Essence) who reports Mr. Tam is doing very well.  We reviewed his dosing regimen and potential adverse effects.  She does admit that occasionally he has some very mild swelling around his ankles, but it does not bother him.  Discussed methods to help alleviate this, such as drinking plenty of water, limiting salt intake, keeping legs/feet elevated.  No other concerns or issues with the medication.  She states they receive refills through the mail-order pharmacy as appropriate.  No questions at this time.     Summary:    Continue to follow.     Alejandra Diaz, PharmD  07/14/2021 16:08 CDT

## 2021-07-14 NOTE — PROGRESS NOTES
[facility] HEMATOLOGY AND ONCOLOGY  [address]  [place] [address] [phone]    Patient Name: Simeon Tam  Encounter Date: 07/22/2021  YOB: 1945  Patient Number: [id number]      REASON FOR FOLLOW-UP:Simeon Tam is a pleasant 76 y.o.  male who is seen in follow-up for hormone refractory prostate cancer.  He is seen C14D2 of apalutamide.  He is seen with spouse, Essence.  History is obtained from patient.  History is considered to be accurate.      Oncology/Hematology History Overview Note   DIAGNOSTIC ABNORMALITIES:  He presented with rising PSA of 13 and was diagnosed with prostate cancer about 25 years ago.   Previous PSA was 22.26 on 10/05/2016, <0.13 on 07/17/2017, 7.91 on 10/17/2018, 19.29 on 03/19/2020, and 22.25 on 04/07/2020.   He was seen by Dr. Oneil 05/12/2020. Latest PSA was 22.25 ng/ml on 04/07/2020.  Previous PSA 22.26 on 10/05/2016.  Bone scan and CT scan of the abdomen and pelvis were negative for prostate cancer on 10/2016.  Started on Casodex and possibly leuprolide.  PSA  was down to <0.13 on 07/17/207.  Plan for apalutamide after staging scans.   CT abdomen and pelvis 06/11/2020. No evidence of metastatic disease in the abdomen or pelvis. Right renal pelvis and ureter urothelial thickening and hyperemia. Urinary bladder wall is thickened and there is adjacent inflammation. Recommend correlation with urinalysis and exam to exclude cystitis with right-sided pyelitis.  Cirrhosis.  Tiny 8 mm hypodense RIGHT inferior liver lesion on image 33 is too small to characterize.   Bone scan 06/11/2020. No evidence of bone metastases.        PREVIOUS INTERVENTIONS:  He was treated with adjuvant radiation at [facility] and androgen ablation with Casodex.  Lupron and Casodex 10/2016. He had 7 months of ADT therapy with Lupron.  Apalutamide 07/22/2020 through present.     Prostate cancer (CMS/HCC)   6/29/2020 -   Chemotherapy    OP PROSTATE Apalutamide     6/30/2020 Initial Diagnosis    Prostate cancer (CMS/[facility])     7/29/2020 -  Chemotherapy    OP LEUPROLIDE Q6M         PAST MEDICAL HISTORY:  ALLERGIES:  Allergies   Allergen Reactions   • Sulfa Antibiotics Rash     CURRENT MEDICATIONS:  Outpatient Encounter Medications as of 7/22/2021   Medication Sig Dispense Refill   • Apalutamide 60 MG tablet Take 4 tablets by mouth Daily. Take 4 tablets once daily with or without food. Do not crush or chew tablets. 120 tablet 11   • Apple Irving Vn-Grn Tea-Bit Or-Cr (Apple Cider Vinegar Plus) tablet Take 1 tablet by mouth Daily.     • calcium carbonate (OS-JONNY) 600 MG tablet Take 600 mg by mouth Daily.     • cefdinir (OMNICEF) 300 MG capsule Take 1 capsule by mouth 2 (Two) Times a Day. 6 capsule 0   • dutasteride (AVODART) 0.5 MG capsule Take 1 capsule by mouth Daily. 60 capsule 11   • JANUVIA 100 MG tablet Take 100 mg by mouth Daily.     • lisinopril-hydrochlorothiazide (PRINZIDE,ZESTORETIC) 20-12.5 MG per tablet Take 20 tablets by mouth Daily.     • Loperamide HCl (IMODIUM PO) Take 1 dose by mouth As Needed.     • rosuvastatin (CRESTOR) 20 MG tablet Take 10 mg by mouth Daily.     • trospium 60 MG capsule sustained-release 24 hr capsule Take 1 capsule by mouth Every Morning. 60 capsule 11     No facility-administered encounter medications on file as of 7/22/2021.     ADULT ILLNESSES:  Patient Active Problem List   Diagnosis Code   • Hypertension I10   • Hyperlipidemia E78.5   • Diabetes mellitus (CMS/[facility]) E11.9   • Cancer (CMS/[facility]) C80.1   • Arrhythmia I49.9   • Non morbid obesity due to excess calories E66.09   • Prostate cancer (CMS/[facility]) C61   • Anterior urethral stricture N35.914     SURGERIES:  Past Surgical History:   Procedure Laterality Date   • CATARACT EXTRACTION     • CYSTOSCOPY RETROGRADE PYELOGRAM Bilateral 4/21/2021    Procedure: CYSTOSCOPY RETROGRADE PYELOGRAM, possible DIRECT VISION INTERNAL URETHROTOMY;  Surgeon: Clarice  "Matthew RUTHERFORD MD;  Location: [facility];  Service: Urology;  Laterality: Bilateral;   • CYSTOSCOPY URETHROTOMY VISUAL INTERNAL Bilateral 4/21/2021    Procedure: possible DIRECT VISION INTERNAL URETHROTOMY;  Surgeon: Matthew Oneil MD;  Location: [facility];  Service: Urology;  Laterality: Bilateral;   • EXCISION LESION      neck   • KIDNEY STONE SURGERY     • REPLACEMENT TOTAL KNEE Left    • TONSILLECTOMY       HEALTH MAINTENANCE ITEMS:  Health Maintenance Due   Topic Date Due   • URINE MICROALBUMIN  Never done   • Hepatitis B (1 of 3 - Risk 3-dose series) Never done   • TDAP/TD VACCINES (1 - Tdap) Never done   • ZOSTER VACCINE (1 of 2) Never done   • HEPATITIS C SCREENING  Never done   • ANNUAL WELLNESS VISIT  Never done   • LIPID PANEL  Never done   • HEMOGLOBIN A1C  Never done   • DIABETIC EYE EXAM  Never done       <no information>  Last Completed Colonoscopy     This patient has no relevant Health Maintenance data.          There is no immunization history on file for this patient.  Last Completed Mammogram     This patient has no relevant Health Maintenance data.            FAMILY HISTORY:  Family History   Problem Relation Age of Onset   • Dementia Mother      SOCIAL HISTORY:  Social History     Socioeconomic History   • Marital status:      Spouse name: Not on file   • Number of children: Not on file   • Years of education: Not on file   • Highest education level: Not on file   Tobacco Use   • Smoking status: Never Smoker   • Smokeless tobacco: Never Used   Vaping Use   • Vaping Use: Never used   Substance and Sexual Activity   • Alcohol use: No   • Drug use: No   • Sexual activity: Defer       REVIEW OF SYSTEMS:    Review of Systems   Constitutional: Negative for chills, fatigue and fever.        \"I feel good.\" Tolerating apalutamide.    HENT: Negative for congestion, mouth sores and trouble swallowing.    Eyes: Negative for discharge and redness.   Respiratory: Negative for cough, shortness of breath " "and wheezing.    Cardiovascular: Negative for chest pain and palpitations.   Gastrointestinal: Negative for abdominal pain, blood in stool, constipation, nausea and vomiting.   Endocrine: Negative for cold intolerance and heat intolerance.   Genitourinary: Positive for difficulty urinating. Negative for hematuria.        \"Self catheterize about 7 times.\"   Musculoskeletal: Negative for joint swelling and neck stiffness.   Skin: Negative for pallor.   Allergic/Immunologic: Negative for food allergies.   Neurological: Negative for dizziness, speech difficulty and weakness.   Hematological: Negative for adenopathy. Does not bruise/bleed easily.   Psychiatric/Behavioral: Negative for agitation, confusion and hallucinations.       VITAL SIGNS: /68   Pulse 90   Temp 98.2 °F (36.8 °C)   Resp 18   Ht 172.7 cm (68\")   Wt 95.8 kg (211 lb 4.8 oz)   SpO2 98%   BMI 32.13 kg/m²   Pain Score    07/22/21 1316   PainSc: 0-No pain       PHYSICAL EXAMINATION:     Physical Exam  Vitals reviewed.   Constitutional:       General: He is not in acute distress.  HENT:      Head: Normocephalic and atraumatic.   Eyes:      General: No scleral icterus.  Cardiovascular:      Rate and Rhythm: Normal rate and regular rhythm.   Pulmonary:      Effort: No respiratory distress.      Breath sounds: No wheezing or rales.   Abdominal:      General: Bowel sounds are normal.      Palpations: Abdomen is soft.      Tenderness: There is no abdominal tenderness.   Musculoskeletal:         General: No swelling.      Cervical back: Neck supple.   Skin:     General: Skin is warm and dry.      Coloration: Skin is not pale.   Neurological:      Mental Status: He is alert and oriented to person, place, and time.   Psychiatric:         Mood and Affect: Mood normal.         Behavior: Behavior normal.         Thought Content: Thought content normal.         Judgment: Judgment normal.         LABS    Lab Results - Last 18 Months   Lab Units 07/15/21  1330 " 06/18/21  1437 05/17/21  1345 04/20/21  1414 03/23/21  1340 02/26/21  1237 10/06/20  1343 09/01/20  1427   HEMOGLOBIN g/dL 13.3 13.7 12.1* 12.4* 13.8 13.7   < > 14.7   HEMATOCRIT % 38.7 40.6 35.3* 37.0* 40.0 38.6   < > 43.2   MCV fL 89.8 90.2 89.6 91.1 89.7 86.9   < > 88.5   WBC 10*3/mm3 5.64 6.61 6.40 6.56 6.34 6.11   < > 6.01   RDW % 13.1 12.9 13.1 13.2 12.8 12.7   < > 13.2   MPV fL 11.3 10.7 10.6 10.8 10.8 10.2   < > 10.9   PLATELETS 10*3/mm3 121* 137* 117* 138* 144 138*   < > 143   IMM GRAN % %  --  0.3  --   --   --  0.3  --  0.3   NEUTROS ABS 10*3/mm3 3.83 4.44 4.56 4.90 4.35 4.40   < > 4.40   LYMPHS ABS 10*3/mm3 0.94 1.15 0.97 0.84 1.10 0.94   < > 1.00   MONOS ABS 10*3/mm3 0.66 0.79 0.65 0.60 0.66 0.58   < > 0.41   EOS ABS 10*3/mm3 0.17 0.17 0.16 0.16 0.17 0.14   < > 0.15   BASOS ABS 10*3/mm3 0.03 0.04 0.03 0.03 0.04 0.03   < > 0.03   IMMATURE GRANS (ABS) 10*3/mm3  --  0.02  --   --   --  0.02  --  0.02   NRBC /100 WBC  --  0.0  --   --   --  0.0  --  0.0    < > = values in this interval not displayed.       Lab Results - Last 18 Months   Lab Units 07/15/21  1330 06/18/21  1437 05/17/21  1345 04/20/21  1414 04/08/21  0831 03/23/21  1340 02/26/21  1237 02/26/21  1237   GLUCOSE mg/dL 178* 135* 156* 176*  --  149*  --  149*   SODIUM mmol/L 136 136 134* 133*  --  136  --  137   POTASSIUM mmol/L 4.1 4.3 4.1 4.2  --  4.0  --  4.0   CO2 mmol/L 27.0 29.0 27.0 30.0*  --  29.0  --  29.0   CHLORIDE mmol/L 98 96* 96* 94*  --  96*  --  98   ANION GAP mmol/L 11.0 11.0 11.0 9.0  --  11.0  --  10.0   CREATININE mg/dL 0.73* 0.95 0.70* 0.78 0.70 0.74*   < > 0.59*   BUN mg/dL 15 15 15 14  --  14  --  15   BUN / CREAT RATIO  20.5 15.8 21.4 17.9  --  18.9  --  25.4*   CALCIUM mg/dL 9.8 10.2 10.0 9.7  --  9.9  --  9.7   EGFR IF NONAFRICN AM mL/min/1.73 104 77 109 97  --  103  --  133   ALK PHOS U/L 67 67 62 72  --  74  --  75   TOTAL PROTEIN g/dL 7.1 7.3 7.0 7.4  --  7.4  --  7.4   ALT (SGPT) U/L 25 29 27 27  --  28  --  32   AST  (SGOT) U/L 32 37 35 36  --  39  --  43*   BILIRUBIN mg/dL 0.3 0.3 0.4 0.3  --  0.3  --  0.4   ALBUMIN g/dL 4.40 4.50 4.20 4.20  --  4.30  --  4.20   GLOBULIN gm/dL 2.7 2.8 2.8 3.2  --  3.1  --  3.2    < > = values in this interval not displayed.       No results for input(s): MSPIKE, KAPPALAMB, IGLFLC, URICACID, FREEKAPPAL, CEA, LDH, REFLABREPO in the last 69044 hours.    Lab Results - Last 18 Months   Lab Units 07/15/21  1330 06/18/21  1437 05/17/21  1345 04/20/21  1414 03/23/21  1340 02/26/21  1237   TSH uIU/mL 3.740 3.010 3.060 4.120 3.670 2.670       Simeon Tam reports a pain score of 0.      ASSESSMENT:  1.  Prostate cancer.  PSA recurrence.   AJCC stage (TX, NX, M0)  Treatment status:Casodex and Lupron with PSA recurrence.  On apalutamide and leuprolde (by Dr. Oneil).  2.  Performance status of 0.   3.  Cirrhosis.  4.  Neurogenic bladder.  Self catheterize.   5.  Anterior urethral stricture.  6.  Thrombocytopenia at 102 on 03/19/2020 from cirrhosis.  7.  Grade 1 fatigue from malignancy.          PLAN:  1.    Re:  Good apalutamide response and tolerance.    2.    Re:  Heme status.  Hemoglobin 13.3, normal and platelet 121 from cirrhosis.  3.    Re:  Pre-office CMP.  GFR 104 ml/minute. TSH 3.74.  4.    Re:  Pre-office PSA < 0.014 from < 0.014 from < 0.014 from < 0.014 from < 0.014 from < 0.014 from < 0.014 from 0.015 from 0.03 from 0.125 from 1.03 from 27.3.   5.   eRx for Compazine 10 mg p.o. every 4 hours as needed for nausea/vomiting, 60, 2 refills if needed.    6.   eRx for C14D1 started on 07/21/2021 apalutamide 60 mg, take four tablets total dose 240 mg po daily, number, 120.  Take either with or without food.  Swallow tablets whole. TSH before apalutamide.  No grapefruit juice or grapefruit.  Observe for adverse effects especially arrhythmia, rash, seizures, thyroid dysfunction, hypothyroidism, fall and fractures.  7.    Continue ongoing management per primary care physician  "and other specialists.  8.    Plan of care discussed with patient and spouse.  Understanding expressed.  Patient agreeable to proceed.  9.    Lupron per Dr. Oneil.   10.  Questions were answered to his satisfaction.  \"Okay.\"  11.  Advance Care Planning   ACP discussion was declined by the patient. Patient does not have an advance directive, information provided.  12.  Return to office in 4 weeks with pre-office CMP, PSA and CBC with differential.        I have reviewed the assessment and plan and verified the accuracy of it. No changes to assessment and plan since the information was documented. Deshawn Black MD 07/22/21        I spent 32 total minutes, face-to-face, caring for Simeon today.  Greater than 50% of this time involved counseling and/or coordination of care as documented within this note regarding the patient's illness(es), pros and cons of various treatment options, instructions and/or risk reduction.            (Matthew Oneil MD)  (Oniel Doshi MD)  Lucio Garcia MD    "

## 2021-07-15 ENCOUNTER — LAB (OUTPATIENT)
Dept: LAB | Facility: HOSPITAL | Age: 76
End: 2021-07-15

## 2021-07-15 DIAGNOSIS — C61 PROSTATE CANCER (HCC): ICD-10-CM

## 2021-07-15 DIAGNOSIS — R53.0 NEOPLASTIC MALIGNANT RELATED FATIGUE: ICD-10-CM

## 2021-07-15 LAB
ALBUMIN SERPL-MCNC: 4.4 G/DL (ref 3.5–5.2)
ALBUMIN/GLOB SERPL: 1.6 G/DL
ALP SERPL-CCNC: 67 U/L (ref 39–117)
ALT SERPL W P-5'-P-CCNC: 25 U/L (ref 1–41)
ANION GAP SERPL CALCULATED.3IONS-SCNC: 11 MMOL/L (ref 5–15)
AST SERPL-CCNC: 32 U/L (ref 1–40)
BASOPHILS # BLD AUTO: 0.03 10*3/MM3 (ref 0–0.2)
BASOPHILS NFR BLD AUTO: 0.5 % (ref 0–1.5)
BILIRUB SERPL-MCNC: 0.3 MG/DL (ref 0–1.2)
BUN SERPL-MCNC: 15 MG/DL (ref 8–23)
BUN/CREAT SERPL: 20.5 (ref 7–25)
CALCIUM SPEC-SCNC: 9.8 MG/DL (ref 8.6–10.5)
CHLORIDE SERPL-SCNC: 98 MMOL/L (ref 98–107)
CO2 SERPL-SCNC: 27 MMOL/L (ref 22–29)
CREAT SERPL-MCNC: 0.73 MG/DL (ref 0.76–1.27)
DEPRECATED RDW RBC AUTO: 43.2 FL (ref 37–54)
EOSINOPHIL # BLD AUTO: 0.17 10*3/MM3 (ref 0–0.4)
EOSINOPHIL NFR BLD AUTO: 3 % (ref 0.3–6.2)
ERYTHROCYTE [DISTWIDTH] IN BLOOD BY AUTOMATED COUNT: 13.1 % (ref 12.3–15.4)
GFR SERPL CREATININE-BSD FRML MDRD: 104 ML/MIN/1.73
GLOBULIN UR ELPH-MCNC: 2.7 GM/DL
GLUCOSE SERPL-MCNC: 178 MG/DL (ref 65–99)
HCT VFR BLD AUTO: 38.7 % (ref 37.5–51)
HGB BLD-MCNC: 13.3 G/DL (ref 13–17.7)
LYMPHOCYTES # BLD AUTO: 0.94 10*3/MM3 (ref 0.7–3.1)
LYMPHOCYTES NFR BLD AUTO: 16.7 % (ref 19.6–45.3)
MCH RBC QN AUTO: 30.9 PG (ref 26.6–33)
MCHC RBC AUTO-ENTMCNC: 34.4 G/DL (ref 31.5–35.7)
MCV RBC AUTO: 89.8 FL (ref 79–97)
MONOCYTES # BLD AUTO: 0.66 10*3/MM3 (ref 0.1–0.9)
MONOCYTES NFR BLD AUTO: 11.7 % (ref 5–12)
NEUTROPHILS NFR BLD AUTO: 3.83 10*3/MM3 (ref 1.7–7)
NEUTROPHILS NFR BLD AUTO: 67.9 % (ref 42.7–76)
PLATELET # BLD AUTO: 121 10*3/MM3 (ref 140–450)
PMV BLD AUTO: 11.3 FL (ref 6–12)
POTASSIUM SERPL-SCNC: 4.1 MMOL/L (ref 3.5–5.2)
PROT SERPL-MCNC: 7.1 G/DL (ref 6–8.5)
RBC # BLD AUTO: 4.31 10*6/MM3 (ref 4.14–5.8)
SODIUM SERPL-SCNC: 136 MMOL/L (ref 136–145)
TSH SERPL DL<=0.05 MIU/L-ACNC: 3.74 UIU/ML (ref 0.27–4.2)
WBC # BLD AUTO: 5.64 10*3/MM3 (ref 3.4–10.8)

## 2021-07-15 PROCEDURE — 84443 ASSAY THYROID STIM HORMONE: CPT

## 2021-07-15 PROCEDURE — 85025 COMPLETE CBC W/AUTO DIFF WBC: CPT

## 2021-07-15 PROCEDURE — 80053 COMPREHEN METABOLIC PANEL: CPT

## 2021-07-15 PROCEDURE — 36415 COLL VENOUS BLD VENIPUNCTURE: CPT

## 2021-07-15 PROCEDURE — 84153 ASSAY OF PSA TOTAL: CPT

## 2021-07-16 LAB — PSA SERPL-MCNC: <0.014 NG/ML (ref 0–4)

## 2021-07-22 ENCOUNTER — OFFICE VISIT (OUTPATIENT)
Dept: ONCOLOGY | Facility: CLINIC | Age: 76
End: 2021-07-22

## 2021-07-22 VITALS
DIASTOLIC BLOOD PRESSURE: 68 MMHG | HEART RATE: 90 BPM | OXYGEN SATURATION: 98 % | BODY MASS INDEX: 32.02 KG/M2 | HEIGHT: 68 IN | WEIGHT: 211.3 LBS | SYSTOLIC BLOOD PRESSURE: 122 MMHG | RESPIRATION RATE: 18 BRPM | TEMPERATURE: 98.2 F

## 2021-07-22 DIAGNOSIS — C61 PROSTATE CANCER (HCC): Primary | ICD-10-CM

## 2021-07-22 PROCEDURE — 99214 OFFICE O/P EST MOD 30 MIN: CPT | Performed by: INTERNAL MEDICINE

## 2021-08-02 ENCOUNTER — TELEPHONE (OUTPATIENT)
Dept: ONCOLOGY | Facility: CLINIC | Age: 76
End: 2021-08-02

## 2021-08-02 NOTE — TELEPHONE ENCOUNTER
Caller: Essence Tam    Relationship to patient: Emergency Contact    Best call back number:[phone]    Type of visit: INJECTION    Requested date: 08/25    If rescheduling, when is the original appointment: 08/27    Additional notes: THEY WANT THE INJECTION FOR WHEN HE COMES IN FOR HIS FOLLOW UP. PLEASE CALL ONCE R/S.

## 2021-08-06 ENCOUNTER — SPECIALTY PHARMACY (OUTPATIENT)
Dept: ONCOLOGY | Facility: HOSPITAL | Age: 76
End: 2021-08-06

## 2021-08-06 NOTE — PROGRESS NOTES
[facility]    Oral Oncology Patient Follow-Up      Consult Details  Consulting Provider:   Deshawn Black MD ([facility])   Medication and Regimen:  apalutamide [Erleada] 240 mg po daily     Prescription Review  Dosing & Interactions:   Reviewed, appropriate and no significant interaction noted at this time..   Current Specialty Pharmacy [facility] (insurance preferred)     Intervention(s):                  Spoke with patient's wife for routine follow up of his Erleada.  She reports Mr. Tam is tolerating Erleada well, with no side effects to note.  Reviewed dosing information and potential adverse effects.  They continue to receive refills timely through [facility].  No questions or concerns at this time.     Summary:    Continue to follow.     Alejandra Diaz, PharmD  08/06/2021 16:36 CDT

## 2021-08-11 NOTE — PROGRESS NOTES
Chief Complaint  3 month follow up for Prostate Cancer    Subjective              Simeon Tam presents to [facility] UROLOG[facility] for:  History of Present Illness  See previous history from May 17, 2021 note    Patient is currently on leuprolide and apalutamide.  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 He is actually done well with this. Patient denies any possible systemic symptoms of prostate cancer such as weight loss, lower extremity edema, or skeletal pain that could be worrisome for metastases. He does have a severe anterior urethral stricture that he keeps open by self cath. He doesn't void on his own anymore.     Current Outpatient Medications:   •  Apalutamide 60 MG tablet, Take 4 tablets by mouth Daily. Take 4 tablets once daily with or without food. Do not crush or chew tablets., Disp: 120 tablet, Rfl: 11  •  Apple Irving Vn-Grn Tea-Bit Or-Cr (Apple Cider Vinegar Plus) tablet, Take 1 tablet by mouth Daily., Disp: , Rfl:   •  calcium carbonate (OS-JONNY) 600 MG tablet, Take 600 mg by mouth Daily., Disp: , Rfl:   •  dutasteride (AVODART) 0.5 MG capsule, Take 1 capsule by mouth Daily., Disp: 60 capsule, Rfl: 11  •  JANUVIA 100 MG tablet, Take 100 mg by mouth Daily., Disp: , Rfl:   •  lisinopril-hydrochlorothiazide (PRINZIDE,ZESTORETIC) 20-12.5 MG per tablet, Take 20 tablets by mouth Daily., Disp: , Rfl:   •  Loperamide HCl (IMODIUM PO), Take 1 dose by mouth As Needed., Disp: , Rfl:   •  rosuvastatin (CRESTOR) 20 MG tablet, Take 10 mg by mouth Daily., Disp: , Rfl:   •  trospium 60 MG capsule sustained-release 24 hr capsule, Take 1 capsule by mouth Every Morning., Disp: 60 capsule, Rfl: 11  •  cefdinir (OMNICEF) 300 MG capsule, Take 1 capsule by mouth 2 (Two) Times a Day., Disp: 6 capsule, Rfl: 0  Past Medical History:   Diagnosis Date   • Arrhythmia    • Cancer (CMS/HCC)     prostate   • Diabetes mellitus (CMS/HCC)    • History of sepsis    • Hyperlipidemia    •  "Hypertension    • UTI (urinary tract infection)      Past Surgical History:   Procedure Laterality Date   • CATARACT EXTRACTION     • CYSTOSCOPY RETROGRADE PYELOGRAM Bilateral 4/21/2021    Procedure: CYSTOSCOPY RETROGRADE PYELOGRAM, possible DIRECT VISION INTERNAL URETHROTOMY;  Surgeon: Matthew Oneil MD;  Location: [facility] OR;  Service: Urology;  Laterality: Bilateral;   • CYSTOSCOPY URETHROTOMY VISUAL INTERNAL Bilateral 4/21/2021    Procedure: possible DIRECT VISION INTERNAL URETHROTOMY;  Surgeon: Matthew Oneil MD;  Location: [facility] OR;  Service: Urology;  Laterality: Bilateral;   • EXCISION LESION      neck   • KIDNEY STONE SURGERY     • REPLACEMENT TOTAL KNEE Left    • TONSILLECTOMY           Review  of systems  Constitutional: Negative for chills and fever.   Gastrointestinal: Negative for abdominal pain, anal bleeding and blood in stool.   Genitourinary: Negative for flank pain and hematuria.     Objective   PHYSICAL EXAM  Vital Signs:   Temp 97.9 °F (36.6 °C)   Ht 172.7 cm (68\")   Wt 95.3 kg (210 lb)   BMI 31.93 kg/m²     Constitutional: Patient is without distress or deformity.  Vital signs are reviewed as above.    Neuro: No confusion; No disorientation; Alert and oriented  Pulmonary: No respiratory distress.   Skin: No pallor or diaphoresis      DATA  Result Review :       Lab Results   Component Value Date    PSA <0.014 07/15/2021    PSA <0.014 06/18/2021    PSA <0.014 05/17/2021                  ASSESSMENT AND PLAN      Problem List Items Addressed This Visit        Genitourinary and Reproductive     Anterior urethral stricture    Overview     Added automatically from request for surgery [id number]            Hematology and Neoplasia    Prostate cancer (CMS/HCC) - Primary      -continue self catheterization  -His PSA is <0.2 suggesting no clinical evidence of prostate cancer at this time.   His hematuria has resolved.   Both of these chronic Urologic conditions were evaluated today and felt to be " stable.        FOLLOW UP     No follow-ups on file.        (Please note that portions of this note were completed with a voice recognition program.)  Matthew Oneil MD  08/16/21  13:01 CDT

## 2021-08-16 ENCOUNTER — OFFICE VISIT (OUTPATIENT)
Dept: UROLOGY | Facility: CLINIC | Age: 76
End: 2021-08-16

## 2021-08-16 VITALS — HEIGHT: 68 IN | WEIGHT: 210 LBS | BODY MASS INDEX: 31.83 KG/M2 | TEMPERATURE: 97.9 F

## 2021-08-16 DIAGNOSIS — C61 PROSTATE CANCER (HCC): Primary | ICD-10-CM

## 2021-08-16 DIAGNOSIS — N35.914 ANTERIOR URETHRAL STRICTURE: ICD-10-CM

## 2021-08-16 PROCEDURE — 99213 OFFICE O/P EST LOW 20 MIN: CPT | Performed by: UROLOGY

## 2021-08-16 NOTE — PROGRESS NOTES
[facility] HEMATOLOGY AND ONCOLOGY  [address]  [place] [address] [phone]    Patient Name: Simeon Tam  Encounter Date: 08/25/2021  YOB: 1945  Patient Number: [id number]      REASON FOR FOLLOW-UP: Simeon Tam is a pleasant 76 y.o.  male who is seen in follow-up for hormone refractory prostate cancer.  He is seen C15D8 of apalutamide.  He is seen with spouse, Essence.  History is obtained from patient.  History is considered to be accurate.         Oncology/Hematology History Overview Note   DIAGNOSTIC ABNORMALITIES:  He presented with rising PSA of 13 and was diagnosed with prostate cancer about 25 years ago.   Previous PSA was 22.26 on 10/05/2016, <0.13 on 07/17/2017, 7.91 on 10/17/2018, 19.29 on 03/19/2020, and 22.25 on 04/07/2020.   He was seen by Dr. Oneil 05/12/2020. Latest PSA was 22.25 ng/ml on 04/07/2020.  Previous PSA 22.26 on 10/05/2016.  Bone scan and CT scan of the abdomen and pelvis were negative for prostate cancer on 10/2016.  Started on Casodex and possibly leuprolide.  PSA  was down to <0.13 on 07/17/207.  Plan for apalutamide after staging scans.   CT abdomen and pelvis 06/11/2020. No evidence of metastatic disease in the abdomen or pelvis. Right renal pelvis and ureter urothelial thickening and hyperemia. Urinary bladder wall is thickened and there is adjacent inflammation. Recommend correlation with urinalysis and exam to exclude cystitis with right-sided pyelitis.  Cirrhosis.  Tiny 8 mm hypodense RIGHT inferior liver lesion on image 33 is too small to characterize.   Bone scan 06/11/2020. No evidence of bone metastases.        PREVIOUS INTERVENTIONS:  He was treated with adjuvant radiation at [place] and androgen ablation with Casodex.  Lupron and Casodex 10/2016. He had 7 months of ADT therapy with Lupron.  Apalutamide 07/22/2020 through present.     Prostate cancer (CMS/HCC)   6/29/2020 -   Chemotherapy    OP PROSTATE Apalutamide     6/30/2020 Initial Diagnosis    Prostate cancer (CMS/[facility])     7/29/2020 -  Chemotherapy    OP LEUPROLIDE Q6M         PAST MEDICAL HISTORY:  ALLERGIES:  Allergies   Allergen Reactions   • Sulfa Antibiotics Rash     CURRENT MEDICATIONS:  Outpatient Encounter Medications as of 8/25/2021   Medication Sig Dispense Refill   • Apalutamide 60 MG tablet Take 4 tablets by mouth Daily. Take 4 tablets once daily with or without food. Do not crush or chew tablets. 120 tablet 11   • Apple Irving Vn-Grn Tea-Bit Or-Cr (Apple Cider Vinegar Plus) tablet Take 1 tablet by mouth Daily.     • calcium carbonate (OS-JONNY) 600 MG tablet Take 600 mg by mouth Daily.     • cefdinir (OMNICEF) 300 MG capsule Take 1 capsule by mouth 2 (Two) Times a Day. 6 capsule 0   • dutasteride (AVODART) 0.5 MG capsule Take 1 capsule by mouth Daily. 60 capsule 11   • JANUVIA 100 MG tablet Take 100 mg by mouth Daily.     • lisinopril-hydrochlorothiazide (PRINZIDE,ZESTORETIC) 20-12.5 MG per tablet Take 20 tablets by mouth Daily.     • Loperamide HCl (IMODIUM PO) Take 1 dose by mouth As Needed.     • rosuvastatin (CRESTOR) 20 MG tablet Take 10 mg by mouth Daily.     • trospium 60 MG capsule sustained-release 24 hr capsule Take 1 capsule by mouth Every Morning. 60 capsule 11     No facility-administered encounter medications on file as of 8/25/2021.     ADULT ILLNESSES:  Patient Active Problem List   Diagnosis Code   • Hypertension I10   • Hyperlipidemia E78.5   • Diabetes mellitus (CMS/[facility]) E11.9   • Cancer (CMS/[facility]) C80.1   • Arrhythmia I49.9   • Non morbid obesity due to excess calories E66.09   • Prostate cancer (CMS/[facility]) C61   • Anterior urethral stricture N35.914     SURGERIES:  Past Surgical History:   Procedure Laterality Date   • CATARACT EXTRACTION     • CYSTOSCOPY RETROGRADE PYELOGRAM Bilateral 4/21/2021    Procedure: CYSTOSCOPY RETROGRADE PYELOGRAM, possible DIRECT VISION INTERNAL URETHROTOMY;  Surgeon: Clarice  Matthew RUTHERFORD MD;  Location: [facility];  Service: Urology;  Laterality: Bilateral;   • CYSTOSCOPY URETHROTOMY VISUAL INTERNAL Bilateral 4/21/2021    Procedure: possible DIRECT VISION INTERNAL URETHROTOMY;  Surgeon: Matthew Oneil MD;  Location: [facility];  Service: Urology;  Laterality: Bilateral;   • EXCISION LESION      neck   • KIDNEY STONE SURGERY     • REPLACEMENT TOTAL KNEE Left    • TONSILLECTOMY       HEALTH MAINTENANCE ITEMS:  Health Maintenance Due   Topic Date Due   • URINE MICROALBUMIN  Never done   • Hepatitis B (1 of 3 - Risk 3-dose series) Never done   • TDAP/TD VACCINES (1 - Tdap) Never done   • ZOSTER VACCINE (1 of 2) Never done   • HEPATITIS C SCREENING  Never done   • ANNUAL WELLNESS VISIT  Never done   • LIPID PANEL  Never done   • HEMOGLOBIN A1C  Never done   • DIABETIC EYE EXAM  Never done       <no information>  Last Completed Colonoscopy     This patient has no relevant Health Maintenance data.          There is no immunization history on file for this patient.  Last Completed Mammogram     This patient has no relevant Health Maintenance data.            FAMILY HISTORY:  Family History   Problem Relation Age of Onset   • Dementia Mother      SOCIAL HISTORY:  Social History     Socioeconomic History   • Marital status:      Spouse name: Not on file   • Number of children: Not on file   • Years of education: Not on file   • Highest education level: Not on file   Tobacco Use   • Smoking status: Never Smoker   • Smokeless tobacco: Never Used   Vaping Use   • Vaping Use: Never used   Substance and Sexual Activity   • Alcohol use: No   • Drug use: No   • Sexual activity: Defer       REVIEW OF SYSTEMS:    Review of Systems   Constitutional: Positive for fatigue. Negative for chills and fever.        Tolerating apalutamide.    HENT: Negative for congestion, mouth sores and trouble swallowing.    Eyes: Negative for discharge and redness.   Respiratory: Negative for cough, shortness of breath and  "wheezing.    Cardiovascular: Negative for chest pain and palpitations.   Gastrointestinal: Negative for abdominal pain, nausea and vomiting.   Endocrine: Negative for cold intolerance and heat intolerance.   Genitourinary: Negative for difficulty urinating, dysuria and flank pain.   Musculoskeletal: Negative for gait problem and joint swelling.   Skin: Positive for pallor.   Allergic/Immunologic: Negative for food allergies.   Neurological: Negative for dizziness, speech difficulty and weakness.   Hematological: Negative for adenopathy. Does not bruise/bleed easily.   Psychiatric/Behavioral: Negative for agitation, confusion and hallucinations.       VITAL SIGNS: /68   Pulse 96   Temp 97.4 °F (36.3 °C)   Resp 18   Ht 172.7 cm (68\")   Wt 95.2 kg (209 lb 14.4 oz)   SpO2 97%   BMI 31.92 kg/m²   Pain Score    08/25/21 1317   PainSc: 0-No pain       PHYSICAL EXAMINATION:     Physical Exam  Vitals reviewed.   Constitutional:       General: He is not in acute distress.  HENT:      Head: Normocephalic and atraumatic.   Eyes:      General: No scleral icterus.  Cardiovascular:      Rate and Rhythm: Normal rate and regular rhythm.   Pulmonary:      Effort: No respiratory distress.      Breath sounds: No wheezing or rales.   Abdominal:      General: Bowel sounds are normal.      Palpations: Abdomen is soft.      Tenderness: There is no abdominal tenderness.   Musculoskeletal:         General: No swelling.      Cervical back: Neck supple.   Skin:     General: Skin is warm and dry.      Coloration: Skin is pale.   Neurological:      Mental Status: He is alert and oriented to person, place, and time.   Psychiatric:         Mood and Affect: Mood normal.         Behavior: Behavior normal.         Thought Content: Thought content normal.         Judgment: Judgment normal.         LABS    Lab Results - Last 18 Months   Lab Units 08/18/21  1329 07/15/21  1330 06/18/21  1437 05/17/21  1345 04/20/21  1414 03/23/21  1340 " 02/26/21  1237 02/26/21  1237 10/06/20  1343 09/01/20  1427   HEMOGLOBIN g/dL 12.8* 13.3 13.7 12.1* 12.4* 13.8   < > 13.7   < > 14.7   HEMATOCRIT % 36.8* 38.7 40.6 35.3* 37.0* 40.0   < > 38.6   < > 43.2   MCV fL 88.7 89.8 90.2 89.6 91.1 89.7   < > 86.9   < > 88.5   WBC 10*3/mm3 6.05 5.64 6.61 6.40 6.56 6.34   < > 6.11   < > 6.01   RDW % 13.3 13.1 12.9 13.1 13.2 12.8   < > 12.7   < > 13.2   MPV fL 11.5 11.3 10.7 10.6 10.8 10.8   < > 10.2   < > 10.9   PLATELETS 10*3/mm3 122* 121* 137* 117* 138* 144   < > 138*   < > 143   IMM GRAN % %  --   --  0.3  --   --   --   --  0.3  --  0.3   NEUTROS ABS 10*3/mm3 4.48 3.83 4.44 4.56 4.90 4.35   < > 4.40   < > 4.40   LYMPHS ABS 10*3/mm3 0.87 0.94 1.15 0.97 0.84 1.10   < > 0.94   < > 1.00   MONOS ABS 10*3/mm3 0.52 0.66 0.79 0.65 0.60 0.66   < > 0.58   < > 0.41   EOS ABS 10*3/mm3 0.12 0.17 0.17 0.16 0.16 0.17   < > 0.14   < > 0.15   BASOS ABS 10*3/mm3 0.04 0.03 0.04 0.03 0.03 0.04   < > 0.03   < > 0.03   IMMATURE GRANS (ABS) 10*3/mm3  --   --  0.02  --   --   --   --  0.02  --  0.02   NRBC /100 WBC  --   --  0.0  --   --   --   --  0.0  --  0.0    < > = values in this interval not displayed.       Lab Results - Last 18 Months   Lab Units 08/18/21  1329 07/15/21  1330 06/18/21  1437 05/17/21  1345 04/20/21  1414 04/08/21  0831 03/23/21  1340 06/11/20  1018   GLUCOSE mg/dL 217* 178* 135* 156* 176*  --  149*  --    SODIUM mmol/L 133* 136 136 134* 133*  --  136  --    POTASSIUM mmol/L 4.3 4.1 4.3 4.1 4.2  --  4.0  --    CO2 mmol/L 25.0 27.0 29.0 27.0 30.0*  --  29.0  --    CHLORIDE mmol/L 95* 98 96* 96* 94*  --  96*  --    ANION GAP mmol/L 13.0 11.0 11.0 11.0 9.0  --  11.0  --    CREATININE mg/dL 0.69* 0.73* 0.95 0.70* 0.78 0.70 0.74*   < >   BUN mg/dL 15 15 15 15 14  --  14  --    BUN / CREAT RATIO  21.7 20.5 15.8 21.4 17.9  --  18.9  --    CALCIUM mg/dL 9.6 9.8 10.2 10.0 9.7  --  9.9  --    EGFR IF NONAFRICN AM mL/min/1.73 111 104 77 109 97  --  103  --    ALK PHOS U/L 72 67 67 62 72   --  74  --    TOTAL PROTEIN g/dL 7.0 7.1 7.3 7.0 7.4  --  7.4  --    ALT (SGPT) U/L 27 25 29 27 27  --  28  --    AST (SGOT) U/L 37 32 37 35 36  --  39  --    BILIRUBIN mg/dL 0.4 0.3 0.3 0.4 0.3  --  0.3  --    ALBUMIN g/dL 4.50 4.40 4.50 4.20 4.20  --  4.30  --    GLOBULIN gm/dL 2.5 2.7 2.8 2.8 3.2  --  3.1  --     < > = values in this interval not displayed.       No results for input(s): MSPIKE, KAPPALAMB, IGLFLC, URICACID, FREEKAPPAL, CEA, LDH, REFLABREPO in the last 42690 hours.    Lab Results - Last 18 Months   Lab Units 08/18/21  1329 07/15/21  1330 06/18/21  1437 05/17/21  1345 04/20/21  1414 03/23/21  1340 02/26/21  1237   IRON mcg/dL 70  --   --   --   --   --   --    TIBC mcg/dL 328  --   --   --   --   --   --    IRON SATURATION % 21  --   --   --   --   --   --    FERRITIN ng/mL 383.40  --   --   --   --   --   --    TSH uIU/mL  --  3.740 3.010 3.060 4.120 3.670 2.670       Simeon Tam reports a pain score of 0.        ASSESSMENT:  1.  Prostate cancer.  PSA recurrence.   AJCC stage (TX, NX, M0)  Treatment status:Casodex and Lupron with PSA recurrence.  On apalutamide and leuprolde (by Dr. Oneil).  2.  Performance status of 0.   3.  Cirrhosis.  4.  Neurogenic bladder.  Self catheterize.   5.  Anterior urethral stricture.  6.  Thrombocytopenia at 102 on 03/19/2020 from cirrhosis.  7.  Grade 1 fatigue from malignancy.          PLAN:  1.    Re:  Good apalutamide response and tolerance.    2.    Re:  Heme status.  Hemoglobin 12.8 and platelet 122 from cirrhosis. Retic 1.45, saturation 21 and ferritin 383.4.  3.    Re:  Pre-office CMP.  GFR 111 ml/minute and glucose 217.   4.    Re:  Pre-office PSA < 0.014 from < 0.014 from < 0.014 from < 0.014 from < 0.014 from < 0.014 from < 0.014 from < 0.014 from 0.015 from 0.03 from 0.125 from 1.03 from 27.3.   5.   eRx for Compazine 10 mg p.o. every 4 hours as needed for nausea/vomiting, 60, 2 refills if needed.    6.   eRx  "for C15D1 started on 08/18/2021 apalutamide 60 mg, take four tablets total dose 240 mg po daily, number, 120.  Take either with or without food.  Swallow tablets whole. TSH before apalutamide.  No grapefruit juice or grapefruit.  Observe for adverse effects especially arrhythmia, rash, seizures, thyroid dysfunction, hypothyroidism, fall and fractures.  7.    Continue ongoing management per primary care physician and other specialists.  8.    Plan of care discussed with patient and spouse.  Understanding expressed.  Patient agreeable to proceed.  9.    Lupron per Dr. Oneil.   10.  Questions were answered to his satisfaction.  \"I do.\"  11.  Advance Care Planning   ACP discussion was declined by the patient. Patient does not have an advance directive, information provided.  12.  Return to office in 4 weeks with pre-office CMP, PSA and CBC with differential.        I have reviewed the assessment and plan and verified the accuracy of it. No changes to assessment and plan since the information was documented. Deshawn Black MD 08/25/21        I spent 30 total minutes, face-to-face, caring for Simeon today.  Greater than 50% of this time involved counseling and/or coordination of care as documented within this note regarding the patient's illness(es), pros and cons of various treatment options, instructions and/or risk reduction.               (Matthew Oneil MD)  (Oniel Doshi MD)  Lucio Garcia MD    "

## 2021-08-18 ENCOUNTER — LAB (OUTPATIENT)
Dept: LAB | Facility: HOSPITAL | Age: 76
End: 2021-08-18

## 2021-08-18 DIAGNOSIS — T45.1X5A ANEMIA DUE TO ANTINEOPLASTIC CHEMOTHERAPY: ICD-10-CM

## 2021-08-18 DIAGNOSIS — T45.1X5A ANEMIA DUE TO ANTINEOPLASTIC CHEMOTHERAPY: Primary | ICD-10-CM

## 2021-08-18 DIAGNOSIS — C61 PROSTATE CANCER (HCC): ICD-10-CM

## 2021-08-18 DIAGNOSIS — D64.81 ANEMIA DUE TO ANTINEOPLASTIC CHEMOTHERAPY: ICD-10-CM

## 2021-08-18 DIAGNOSIS — D64.81 ANEMIA DUE TO ANTINEOPLASTIC CHEMOTHERAPY: Primary | ICD-10-CM

## 2021-08-18 LAB
ALBUMIN SERPL-MCNC: 4.5 G/DL (ref 3.5–5.2)
ALBUMIN/GLOB SERPL: 1.8 G/DL
ALP SERPL-CCNC: 72 U/L (ref 39–117)
ALT SERPL W P-5'-P-CCNC: 27 U/L (ref 1–41)
ANION GAP SERPL CALCULATED.3IONS-SCNC: 13 MMOL/L (ref 5–15)
AST SERPL-CCNC: 37 U/L (ref 1–40)
BASOPHILS # BLD AUTO: 0.04 10*3/MM3 (ref 0–0.2)
BASOPHILS NFR BLD AUTO: 0.7 % (ref 0–1.5)
BILIRUB SERPL-MCNC: 0.4 MG/DL (ref 0–1.2)
BUN SERPL-MCNC: 15 MG/DL (ref 8–23)
BUN/CREAT SERPL: 21.7 (ref 7–25)
CALCIUM SPEC-SCNC: 9.6 MG/DL (ref 8.6–10.5)
CHLORIDE SERPL-SCNC: 95 MMOL/L (ref 98–107)
CO2 SERPL-SCNC: 25 MMOL/L (ref 22–29)
CREAT SERPL-MCNC: 0.69 MG/DL (ref 0.76–1.27)
DEPRECATED RDW RBC AUTO: 43.5 FL (ref 37–54)
EOSINOPHIL # BLD AUTO: 0.12 10*3/MM3 (ref 0–0.4)
EOSINOPHIL NFR BLD AUTO: 2 % (ref 0.3–6.2)
ERYTHROCYTE [DISTWIDTH] IN BLOOD BY AUTOMATED COUNT: 13.3 % (ref 12.3–15.4)
FERRITIN SERPL-MCNC: 383.4 NG/ML (ref 30–400)
GFR SERPL CREATININE-BSD FRML MDRD: 111 ML/MIN/1.73
GLOBULIN UR ELPH-MCNC: 2.5 GM/DL
GLUCOSE SERPL-MCNC: 217 MG/DL (ref 65–99)
HCT VFR BLD AUTO: 36.8 % (ref 37.5–51)
HGB BLD-MCNC: 12.8 G/DL (ref 13–17.7)
IRON 24H UR-MRATE: 70 MCG/DL (ref 59–158)
IRON SATN MFR SERPL: 21 % (ref 20–50)
LYMPHOCYTES # BLD AUTO: 0.87 10*3/MM3 (ref 0.7–3.1)
LYMPHOCYTES NFR BLD AUTO: 14.4 % (ref 19.6–45.3)
MCH RBC QN AUTO: 30.8 PG (ref 26.6–33)
MCHC RBC AUTO-ENTMCNC: 34.8 G/DL (ref 31.5–35.7)
MCV RBC AUTO: 88.7 FL (ref 79–97)
MONOCYTES # BLD AUTO: 0.52 10*3/MM3 (ref 0.1–0.9)
MONOCYTES NFR BLD AUTO: 8.6 % (ref 5–12)
NEUTROPHILS NFR BLD AUTO: 4.48 10*3/MM3 (ref 1.7–7)
NEUTROPHILS NFR BLD AUTO: 74 % (ref 42.7–76)
PLATELET # BLD AUTO: 122 10*3/MM3 (ref 140–450)
PMV BLD AUTO: 11.5 FL (ref 6–12)
POTASSIUM SERPL-SCNC: 4.3 MMOL/L (ref 3.5–5.2)
PROT SERPL-MCNC: 7 G/DL (ref 6–8.5)
RBC # BLD AUTO: 4.15 10*6/MM3 (ref 4.14–5.8)
RETICS # AUTO: 0.06 10*6/MM3 (ref 0.02–0.13)
RETICS/RBC NFR AUTO: 1.45 % (ref 0.7–1.9)
SODIUM SERPL-SCNC: 133 MMOL/L (ref 136–145)
TIBC SERPL-MCNC: 328 MCG/DL (ref 298–536)
TRANSFERRIN SERPL-MCNC: 220 MG/DL (ref 200–360)
WBC # BLD AUTO: 6.05 10*3/MM3 (ref 3.4–10.8)

## 2021-08-18 PROCEDURE — 36415 COLL VENOUS BLD VENIPUNCTURE: CPT

## 2021-08-18 PROCEDURE — 83540 ASSAY OF IRON: CPT

## 2021-08-18 PROCEDURE — 80053 COMPREHEN METABOLIC PANEL: CPT

## 2021-08-18 PROCEDURE — 85045 AUTOMATED RETICULOCYTE COUNT: CPT

## 2021-08-18 PROCEDURE — 84466 ASSAY OF TRANSFERRIN: CPT

## 2021-08-18 PROCEDURE — 82728 ASSAY OF FERRITIN: CPT

## 2021-08-18 PROCEDURE — 84153 ASSAY OF PSA TOTAL: CPT

## 2021-08-18 PROCEDURE — 85025 COMPLETE CBC W/AUTO DIFF WBC: CPT

## 2021-08-19 LAB — PSA SERPL-MCNC: <0.014 NG/ML (ref 0–4)

## 2021-08-25 ENCOUNTER — OFFICE VISIT (OUTPATIENT)
Dept: ONCOLOGY | Facility: CLINIC | Age: 76
End: 2021-08-25

## 2021-08-25 ENCOUNTER — INFUSION (OUTPATIENT)
Dept: ONCOLOGY | Facility: HOSPITAL | Age: 76
End: 2021-08-25

## 2021-08-25 VITALS
SYSTOLIC BLOOD PRESSURE: 120 MMHG | BODY MASS INDEX: 31.81 KG/M2 | HEART RATE: 96 BPM | RESPIRATION RATE: 18 BRPM | DIASTOLIC BLOOD PRESSURE: 68 MMHG | TEMPERATURE: 97.4 F | WEIGHT: 209.9 LBS | HEIGHT: 68 IN | OXYGEN SATURATION: 97 %

## 2021-08-25 VITALS
HEART RATE: 95 BPM | TEMPERATURE: 97.8 F | BODY MASS INDEX: 31.83 KG/M2 | SYSTOLIC BLOOD PRESSURE: 130 MMHG | OXYGEN SATURATION: 90 % | HEIGHT: 68 IN | DIASTOLIC BLOOD PRESSURE: 65 MMHG | RESPIRATION RATE: 18 BRPM | WEIGHT: 210 LBS

## 2021-08-25 DIAGNOSIS — C61 PROSTATE CANCER (HCC): Primary | ICD-10-CM

## 2021-08-25 DIAGNOSIS — R53.0 NEOPLASTIC MALIGNANT RELATED FATIGUE: ICD-10-CM

## 2021-08-25 PROCEDURE — 96402 CHEMO HORMON ANTINEOPL SQ/IM: CPT

## 2021-08-25 PROCEDURE — 99214 OFFICE O/P EST MOD 30 MIN: CPT | Performed by: INTERNAL MEDICINE

## 2021-08-25 PROCEDURE — 25010000002 LEUPROLIDE 45 MG KIT: Performed by: UROLOGY

## 2021-08-25 RX ADMIN — LEUPROLIDE ACETATE 45 MG: KIT at 14:27

## 2021-08-27 ENCOUNTER — APPOINTMENT (OUTPATIENT)
Dept: ONCOLOGY | Facility: HOSPITAL | Age: 76
End: 2021-08-27

## 2021-09-02 ENCOUNTER — PATIENT OUTREACH (OUTPATIENT)
Dept: ONCOLOGY | Facility: HOSPITAL | Age: 76
End: 2021-09-02

## 2021-09-02 NOTE — OUTREACH NOTE
[facility]    Oral Oncology Patient Outreach      Prescription Details  Consulting Provider:   Deshawn Black MD ([facility] [place] [facility])   Medication and Regimen:  apalutamide [Erleada] 240 mg PO daily       An attempt was made by the Oral Oncology Clinic to contact this patient for a follow-up on their chemotherapy medication. The [facility]cology Clinic can be reached at [phone].   Will attempt to contact later today or tomorrow regarding monthly follow-up visit, before deferring to next month.       Carolee Diaz, PharmD  09/02/21  11:25 CDT

## 2021-09-03 ENCOUNTER — PATIENT OUTREACH (OUTPATIENT)
Dept: ONCOLOGY | Facility: HOSPITAL | Age: 76
End: 2021-09-03

## 2021-09-03 NOTE — OUTREACH NOTE
[facility]    Oral Oncology Patient Outreach      Prescription Details  Consulting Provider:   Deshawn Black MD ([facility] [place] [facility])   Medication and Regimen:  apalutamide [Erleada] 240 mg PO daily       An attempt was made by the [facility] Oncology Clinic to contact this patient for a follow-up on their chemotherapy medication. The [facility]cology Clinic can be reached at [phone].   Attempted contact on 9/2/21 and 9/3/21 for this month's follow-up visit; will defer out to 9/29/21 for next month's visit.       Carolee Diaz, PharmD  09/03/21  15:56 CDT

## 2021-09-13 NOTE — PROGRESS NOTES
[facility] HEMATOLOGY AND ONCOLOGY  [address]  [place] [facility] [address] [phone]    Patient Name: Simeon Tam  Encounter Date: 09/23/2021  YOB: 1945  Patient Number: [id number]      REASON FOR FOLLOW-UP: Simeon Tam is a pleasant 76 y.o.  male who is seen in follow-up for hormone refractory prostate cancer.  He is seen C16D9 of apalutamide.  He is seen alone.  History is obtained from patient.  History is considered to be accurate.       Oncology/Hematology History Overview Note   DIAGNOSTIC ABNORMALITIES:  He presented with rising PSA of 13 and was diagnosed with prostate cancer about 25 years ago.   Previous PSA was 22.26 on 10/05/2016, <0.13 on 07/17/2017, 7.91 on 10/17/2018, 19.29 on 03/19/2020, and 22.25 on 04/07/2020.   He was seen by Dr. Oneil 05/12/2020. Latest PSA was 22.25 ng/ml on 04/07/2020.  Previous PSA 22.26 on 10/05/2016.  Bone scan and CT scan of the abdomen and pelvis were negative for prostate cancer on 10/2016.  Started on Casodex and possibly leuprolide.  PSA  was down to <0.13 on 07/17/207.  Plan for apalutamide after staging scans.   CT abdomen and pelvis 06/11/2020. No evidence of metastatic disease in the abdomen or pelvis. Right renal pelvis and ureter urothelial thickening and hyperemia. Urinary bladder wall is thickened and there is adjacent inflammation. Recommend correlation with urinalysis and exam to exclude cystitis with right-sided pyelitis.  Cirrhosis.  Tiny 8 mm hypodense RIGHT inferior liver lesion on image 33 is too small to characterize.   Bone scan 06/11/2020. No evidence of bone metastases.        PREVIOUS INTERVENTIONS:  He was treated with adjuvant radiation at [place] and androgen ablation with Casodex.  Lupron and Casodex 10/2016. He had 7 months of ADT therapy with Lupron.  Apalutamide 07/22/2020 through present.     Prostate cancer (CMS/HCC)   6/29/2020 -  Chemotherapy     OP PROSTATE Apalutamide     6/30/2020 Initial Diagnosis    Prostate cancer (CMS/Formerly [facility])     7/29/2020 -  Chemotherapy    OP LEUPROLIDE Q6M         PAST MEDICAL HISTORY:  ALLERGIES:  Allergies   Allergen Reactions   • Sulfa Antibiotics Rash     CURRENT MEDICATIONS:  Outpatient Encounter Medications as of 9/23/2021   Medication Sig Dispense Refill   • Apalutamide 60 MG tablet Take 4 tablets by mouth Daily. Take 4 tablets once daily with or without food. Do not crush or chew tablets. 120 tablet 11   • Apple Irving Vn-Grn Tea-Bit Or-Cr (Apple Cider Vinegar Plus) tablet Take 1 tablet by mouth Daily.     • calcium carbonate (OS-JONNY) 600 MG tablet Take 600 mg by mouth Daily.     • cefdinir (OMNICEF) 300 MG capsule Take 1 capsule by mouth 2 (Two) Times a Day. 6 capsule 0   • dutasteride (AVODART) 0.5 MG capsule Take 1 capsule by mouth Daily. 60 capsule 11   • JANUVIA 100 MG tablet Take 100 mg by mouth Daily.     • lisinopril-hydrochlorothiazide (PRINZIDE,ZESTORETIC) 20-12.5 MG per tablet Take 20 tablets by mouth Daily.     • Loperamide HCl (IMODIUM PO) Take 1 dose by mouth As Needed.     • rosuvastatin (CRESTOR) 20 MG tablet Take 10 mg by mouth Daily.     • trospium 60 MG capsule sustained-release 24 hr capsule Take 1 capsule by mouth Every Morning. 60 capsule 11     No facility-administered encounter medications on file as of 9/23/2021.     ADULT ILLNESSES:  Patient Active Problem List   Diagnosis Code   • Hypertension I10   • Hyperlipidemia E78.5   • Diabetes mellitus (CMS/[facility]ly [facility]) E11.9   • Cancer (CMS/[facility]ly [facility]) C80.1   • Arrhythmia I49.9   • Non morbid obesity due to excess calories E66.09   • Prostate cancer (CMS/[facility]ly [facility]) C61   • Anterior urethral stricture N35.914     SURGERIES:  Past Surgical History:   Procedure Laterality Date   • CATARACT EXTRACTION     • CYSTOSCOPY RETROGRADE PYELOGRAM Bilateral 4/21/2021    Procedure: CYSTOSCOPY RETROGRADE PYELOGRAM, possible DIRECT VISION INTERNAL URETHROTOMY;  Surgeon: Matthew Oneil MD;   "Location: [facility] OR;  Service: Urology;  Laterality: Bilateral;   • CYSTOSCOPY URETHROTOMY VISUAL INTERNAL Bilateral 4/21/2021    Procedure: possible DIRECT VISION INTERNAL URETHROTOMY;  Surgeon: Matthew Oneil MD;  Location: [facility] OR;  Service: Urology;  Laterality: Bilateral;   • EXCISION LESION      neck   • KIDNEY STONE SURGERY     • REPLACEMENT TOTAL KNEE Left    • TONSILLECTOMY       HEALTH MAINTENANCE ITEMS:  Health Maintenance Due   Topic Date Due   • URINE MICROALBUMIN  Never done   • Hepatitis B (1 of 3 - Risk 3-dose series) Never done   • TDAP/TD VACCINES (1 - Tdap) Never done   • ZOSTER VACCINE (1 of 2) Never done   • HEPATITIS C SCREENING  Never done   • ANNUAL WELLNESS VISIT  Never done   • LIPID PANEL  Never done   • HEMOGLOBIN A1C  Never done   • DIABETIC EYE EXAM  Never done       <no information>  Last Completed Colonoscopy     This patient has no relevant Health Maintenance data.          There is no immunization history on file for this patient.  Last Completed Mammogram     This patient has no relevant Health Maintenance data.            FAMILY HISTORY:  Family History   Problem Relation Age of Onset   • Dementia Mother      SOCIAL HISTORY:  Social History     Socioeconomic History   • Marital status:      Spouse name: Not on file   • Number of children: Not on file   • Years of education: Not on file   • Highest education level: Not on file   Tobacco Use   • Smoking status: Never Smoker   • Smokeless tobacco: Never Used   Vaping Use   • Vaping Use: Never used   Substance and Sexual Activity   • Alcohol use: No   • Drug use: No   • Sexual activity: Defer       REVIEW OF SYSTEMS:    Review of Systems   Constitutional: Negative for chills, fatigue and fever.        \"I feel good.\"     HENT: Negative for congestion, mouth sores and trouble swallowing.    Eyes: Negative for redness and visual disturbance.   Respiratory: Negative for cough, shortness of breath and wheezing.  " "  Cardiovascular: Negative for chest pain and palpitations.   Gastrointestinal: Negative for abdominal pain, constipation, diarrhea, nausea and vomiting.   Endocrine: Negative for cold intolerance and heat intolerance.   Genitourinary: Negative for dysuria, flank pain and hematuria.   Musculoskeletal: Negative for gait problem and joint swelling.   Skin: Positive for pallor.   Allergic/Immunologic: Negative for food allergies.   Neurological: Negative for dizziness, speech difficulty and weakness.   Hematological: Negative for adenopathy. Does not bruise/bleed easily.   Psychiatric/Behavioral: Negative for agitation, confusion and hallucinations.       VITAL SIGNS: /66   Pulse 92   Temp 97 °F (36.1 °C)   Resp 18   Ht 172.7 cm (68\")   Wt 94.1 kg (207 lb 6.4 oz)   SpO2 96%   BMI 31.54 kg/m²   Pain Score    09/23/21 1326   PainSc: 0-No pain       PHYSICAL EXAMINATION:     Physical Exam  Vitals reviewed.   Constitutional:       General: He is not in acute distress.  HENT:      Head: Normocephalic and atraumatic.   Eyes:      General: No scleral icterus.  Cardiovascular:      Rate and Rhythm: Normal rate and regular rhythm.   Pulmonary:      Effort: No respiratory distress.      Breath sounds: No wheezing or rales.   Abdominal:      General: Bowel sounds are normal. There is no distension.      Palpations: Abdomen is soft.   Musculoskeletal:         General: No swelling.      Cervical back: Neck supple.   Skin:     General: Skin is warm and dry.      Coloration: Skin is pale.   Neurological:      Mental Status: He is alert and oriented to person, place, and time.   Psychiatric:         Mood and Affect: Mood normal.         Behavior: Behavior normal.         Thought Content: Thought content normal.         Judgment: Judgment normal.         LABS    Lab Results - Last 18 Months   Lab Units 09/15/21  1320 08/18/21  1329 07/15/21  1330 06/18/21  1437 05/17/21  1345 04/20/21  1414 03/23/21  1340 02/26/21  1237 " 10/06/20  1343 09/01/20  1427   HEMOGLOBIN g/dL 12.6* 12.8* 13.3 13.7 12.1* 12.4*   < > 13.7   < > 14.7   HEMATOCRIT % 36.8* 36.8* 38.7 40.6 35.3* 37.0*   < > 38.6   < > 43.2   MCV fL 89.3 88.7 89.8 90.2 89.6 91.1   < > 86.9   < > 88.5   WBC 10*3/mm3 6.15 6.05 5.64 6.61 6.40 6.56   < > 6.11   < > 6.01   RDW % 13.2 13.3 13.1 12.9 13.1 13.2   < > 12.7   < > 13.2   MPV fL 10.6 11.5 11.3 10.7 10.6 10.8   < > 10.2   < > 10.9   PLATELETS 10*3/mm3 137* 122* 121* 137* 117* 138*   < > 138*   < > 143   IMM GRAN % %  --   --   --  0.3  --   --   --  0.3  --  0.3   NEUTROS ABS 10*3/mm3 4.42 4.48 3.83 4.44 4.56 4.90   < > 4.40   < > 4.40   LYMPHS ABS 10*3/mm3 0.86 0.87 0.94 1.15 0.97 0.84   < > 0.94   < > 1.00   MONOS ABS 10*3/mm3 0.71 0.52 0.66 0.79 0.65 0.60   < > 0.58   < > 0.41   EOS ABS 10*3/mm3 0.12 0.12 0.17 0.17 0.16 0.16   < > 0.14   < > 0.15   BASOS ABS 10*3/mm3 0.02 0.04 0.03 0.04 0.03 0.03   < > 0.03   < > 0.03   IMMATURE GRANS (ABS) 10*3/mm3  --   --   --  0.02  --   --   --  0.02  --  0.02   NRBC /100 WBC  --   --   --  0.0  --   --   --  0.0  --  0.0    < > = values in this interval not displayed.       Lab Results - Last 18 Months   Lab Units 09/15/21  1320 08/18/21  1329 07/15/21  1330 06/18/21  1437 05/17/21  1345 04/20/21  1414   GLUCOSE mg/dL 212* 217* 178* 135* 156* 176*   SODIUM mmol/L 135* 133* 136 136 134* 133*   POTASSIUM mmol/L 3.8 4.3 4.1 4.3 4.1 4.2   CO2 mmol/L 28.0 25.0 27.0 29.0 27.0 30.0*   CHLORIDE mmol/L 96* 95* 98 96* 96* 94*   ANION GAP mmol/L 11.0 13.0 11.0 11.0 11.0 9.0   CREATININE mg/dL 0.63* 0.69* 0.73* 0.95 0.70* 0.78   BUN mg/dL 13 15 15 15 15 14   BUN / CREAT RATIO  20.6 21.7 20.5 15.8 21.4 17.9   CALCIUM mg/dL 9.5 9.6 9.8 10.2 10.0 9.7   EGFR IF NONAFRICN AM mL/min/1.73 123 111 104 77 109 97   ALK PHOS U/L 70 72 67 67 62 72   TOTAL PROTEIN g/dL 7.2 7.0 7.1 7.3 7.0 7.4   ALT (SGPT) U/L 19 27 25 29 27 27   AST (SGOT) U/L 23 37 32 37 35 36   BILIRUBIN mg/dL 0.3 0.4 0.3 0.3 0.4 0.3    ALBUMIN g/dL 4.00 4.50 4.40 4.50 4.20 4.20   GLOBULIN gm/dL 3.2 2.5 2.7 2.8 2.8 3.2       No results for input(s): MSPIKE, KAPPALAMB, IGLFLC, URICACID, FREEKAPPAL, CEA, LDH, REFLABREPO in the last [id number] hours.    Lab Results - Last 18 Months   Lab Units 09/15/21  1320 08/18/21  1329 07/15/21  1330 06/18/21  1437 05/17/21  1345 04/20/21  1414 03/23/21  1340   IRON mcg/dL  --  70  --   --   --   --   --    TIBC mcg/dL  --  328  --   --   --   --   --    IRON SATURATION %  --  21  --   --   --   --   --    FERRITIN ng/mL  --  383.40  --   --   --   --   --    TSH uIU/mL 3.010  --  3.740 3.010 3.060 4.120 3.670       Simeon Tam reports a pain score of 0.        ASSESSMENT:  1.  Prostate cancer.  PSA recurrence.   AJCC stage (TX, NX, M0)  Treatment status:Casodex and Lupron with PSA recurrence.  On apalutamide and leuprolde (by Dr. Oneil).  2.  Performance status of 0.   3.  Cirrhosis.  4.  Neurogenic bladder.  Self catheterize as needed.   5.  Anterior urethral stricture.  6.  Thrombocytopenia at 102 on 03/19/2020 from cirrhosis.  7.  Grade 1 fatigue from malignancy, history of.         PLAN:  1.    Re: Tolerating apalutamide well.     2.    Re:  Heme status.  Hemoglobin 12.6 and platelet 137 from cirrhosis.   3.    Re:  Pre-office CMP.  GFR 123 ml/minute and glucose 212.   4.    Re:  Pre-office PSA < 0.014 from < 0.014 from < 0.014 from < 0.014 from < 0.014 from < 0.014 from < 0.014 from < 0.014 from < 0.014 from 0.015 from 0.03 from 0.125 from 1.03 from 27.3.   5.   eRx for Compazine 10 mg p.o. every 4 hours as needed for nausea/vomiting, 60, 2 refills if needed.    6.   eRx for C16D1 started on 09/15/2021 apalutamide 60 mg, take four tablets total dose 240 mg po daily, number, 120.  Take either with or without food.  Swallow tablets whole. TSH before apalutamide.  No grapefruit juice or grapefruit.  Observe for adverse side effects like arrhythmia, rash, seizures, thyroid dysfunction,  "hypothyroidism, fall and fractures.  7.    Continue ongoing management per primary care physician and other specialists.  8.    Plan of care discussed with patient.  Understanding expressed.  Patient agreeable to proceed.  9.    Lupron per Dr. Oneil.   10.  Questions were answered to his satisfaction. \"Yes.\"  11.  Advance Care Planning   ACP discussion was declined by the patient. Patient does not have an advance directive, information provided.  12.  Return to office in 3 weeks with pre-office CMP, PSA and CBC with differential.        I have reviewed the assessment and plan and verified the accuracy of it. No changes to assessment and plan since the information was documented. Deshawn Black MD 09/23/21        I spent 31 total minutes, face-to-face, caring for Simeon today.  Greater than 50% of this time involved counseling and/or coordination of care as documented within this note regarding the patient's illness(es), pros and cons of various treatment options, instructions and/or risk reduction.               (Matthwe Oneil MD)  (Oniel Doshi MD)  Lucio Garcia MD    "

## 2021-09-15 ENCOUNTER — LAB (OUTPATIENT)
Dept: LAB | Facility: HOSPITAL | Age: 76
End: 2021-09-15

## 2021-09-15 DIAGNOSIS — R53.0 NEOPLASTIC MALIGNANT RELATED FATIGUE: ICD-10-CM

## 2021-09-15 DIAGNOSIS — C61 PROSTATE CANCER (HCC): ICD-10-CM

## 2021-09-15 LAB
ALBUMIN SERPL-MCNC: 4 G/DL (ref 3.5–5.2)
ALBUMIN/GLOB SERPL: 1.3 G/DL
ALP SERPL-CCNC: 70 U/L (ref 39–117)
ALT SERPL W P-5'-P-CCNC: 19 U/L (ref 1–41)
ANION GAP SERPL CALCULATED.3IONS-SCNC: 11 MMOL/L (ref 5–15)
AST SERPL-CCNC: 23 U/L (ref 1–40)
BASOPHILS # BLD AUTO: 0.02 10*3/MM3 (ref 0–0.2)
BASOPHILS NFR BLD AUTO: 0.3 % (ref 0–1.5)
BILIRUB SERPL-MCNC: 0.3 MG/DL (ref 0–1.2)
BUN SERPL-MCNC: 13 MG/DL (ref 8–23)
BUN/CREAT SERPL: 20.6 (ref 7–25)
CALCIUM SPEC-SCNC: 9.5 MG/DL (ref 8.6–10.5)
CHLORIDE SERPL-SCNC: 96 MMOL/L (ref 98–107)
CO2 SERPL-SCNC: 28 MMOL/L (ref 22–29)
CREAT SERPL-MCNC: 0.63 MG/DL (ref 0.76–1.27)
DEPRECATED RDW RBC AUTO: 43.4 FL (ref 37–54)
EOSINOPHIL # BLD AUTO: 0.12 10*3/MM3 (ref 0–0.4)
EOSINOPHIL NFR BLD AUTO: 2 % (ref 0.3–6.2)
ERYTHROCYTE [DISTWIDTH] IN BLOOD BY AUTOMATED COUNT: 13.2 % (ref 12.3–15.4)
GFR SERPL CREATININE-BSD FRML MDRD: 123 ML/MIN/1.73
GLOBULIN UR ELPH-MCNC: 3.2 GM/DL
GLUCOSE SERPL-MCNC: 212 MG/DL (ref 65–99)
HCT VFR BLD AUTO: 36.8 % (ref 37.5–51)
HGB BLD-MCNC: 12.6 G/DL (ref 13–17.7)
LYMPHOCYTES # BLD AUTO: 0.86 10*3/MM3 (ref 0.7–3.1)
LYMPHOCYTES NFR BLD AUTO: 14 % (ref 19.6–45.3)
MCH RBC QN AUTO: 30.6 PG (ref 26.6–33)
MCHC RBC AUTO-ENTMCNC: 34.2 G/DL (ref 31.5–35.7)
MCV RBC AUTO: 89.3 FL (ref 79–97)
MONOCYTES # BLD AUTO: 0.71 10*3/MM3 (ref 0.1–0.9)
MONOCYTES NFR BLD AUTO: 11.5 % (ref 5–12)
NEUTROPHILS NFR BLD AUTO: 4.42 10*3/MM3 (ref 1.7–7)
NEUTROPHILS NFR BLD AUTO: 71.9 % (ref 42.7–76)
PLATELET # BLD AUTO: 137 10*3/MM3 (ref 140–450)
PMV BLD AUTO: 10.6 FL (ref 6–12)
POTASSIUM SERPL-SCNC: 3.8 MMOL/L (ref 3.5–5.2)
PROT SERPL-MCNC: 7.2 G/DL (ref 6–8.5)
PSA SERPL-MCNC: <0.014 NG/ML (ref 0–4)
RBC # BLD AUTO: 4.12 10*6/MM3 (ref 4.14–5.8)
SODIUM SERPL-SCNC: 135 MMOL/L (ref 136–145)
TSH SERPL DL<=0.05 MIU/L-ACNC: 3.01 UIU/ML (ref 0.27–4.2)
WBC # BLD AUTO: 6.15 10*3/MM3 (ref 3.4–10.8)

## 2021-09-15 PROCEDURE — 84443 ASSAY THYROID STIM HORMONE: CPT

## 2021-09-15 PROCEDURE — 85025 COMPLETE CBC W/AUTO DIFF WBC: CPT

## 2021-09-15 PROCEDURE — 84153 ASSAY OF PSA TOTAL: CPT

## 2021-09-15 PROCEDURE — 36415 COLL VENOUS BLD VENIPUNCTURE: CPT

## 2021-09-15 PROCEDURE — 80053 COMPREHEN METABOLIC PANEL: CPT

## 2021-09-23 ENCOUNTER — OFFICE VISIT (OUTPATIENT)
Dept: ONCOLOGY | Facility: CLINIC | Age: 76
End: 2021-09-23

## 2021-09-23 VITALS
DIASTOLIC BLOOD PRESSURE: 66 MMHG | HEART RATE: 92 BPM | TEMPERATURE: 97 F | BODY MASS INDEX: 31.43 KG/M2 | RESPIRATION RATE: 18 BRPM | OXYGEN SATURATION: 96 % | SYSTOLIC BLOOD PRESSURE: 128 MMHG | HEIGHT: 68 IN | WEIGHT: 207.4 LBS

## 2021-09-23 DIAGNOSIS — C61 PROSTATE CANCER (HCC): Primary | ICD-10-CM

## 2021-09-23 PROCEDURE — 99214 OFFICE O/P EST MOD 30 MIN: CPT | Performed by: INTERNAL MEDICINE

## 2021-09-29 ENCOUNTER — PATIENT OUTREACH (OUTPATIENT)
Dept: ONCOLOGY | Facility: HOSPITAL | Age: 76
End: 2021-09-29

## 2021-09-29 NOTE — OUTREACH NOTE
[facility]    Oral Oncology Patient Outreach      Prescription Details  Consulting Provider:   Deshawn Black MD ([facility])   Medication and Regimen:  apalutamide [Erleada] 240 mg PO daily       An attempt was made by the [facility] Oncology Clinic to contact this patient for a follow-up on their chemotherapy medication. The [facility] can be reached at [phone].   Will attempt to contact patient again tomorrow (9/30/21), before deferring out to next month's follow-up.       Carolee Diaz, PharmD  09/29/21  17:07 CDT

## 2021-09-30 ENCOUNTER — PATIENT OUTREACH (OUTPATIENT)
Dept: ONCOLOGY | Facility: HOSPITAL | Age: 76
End: 2021-09-30

## 2021-09-30 NOTE — OUTREACH NOTE
[facility]    Oral Oncology Patient Outreach      Prescription Details  Consulting Provider:   Deshawn Black MD ([facility]   Medication and Regimen:  apalutamide [Erleada] 240 mg po daily          An attempt was made by the [facility]cology [facility] to contact this patient for a follow-up on their chemotherapy medication. The [facility] can be reached at [phone]. Will follow up next month.        Andrzej Hancock, Jill  09/30/21  13:24 CDT

## 2021-10-01 NOTE — PROGRESS NOTES
[facility] HEMATOLOGY AND ONCOLOGY  [address]  [place] [address] [phone]    Patient Name: Simeon Tam  Encounter Date: 10/14/2021  YOB: 1945  Patient Number: [id number]      REASON FOR FOLLOW-UP: Simeon Tam is a pleasant 76 y.o.  male who is seen in follow-up for hormone refractory prostate cancer.  He is seen C17D2 of apalutamide.  He is seen with spouse, Essence.  History is obtained from patient.  History is considered to be accurate.         Oncology/Hematology History Overview Note   DIAGNOSTIC ABNORMALITIES:  He presented with rising PSA of 13 and was diagnosed with prostate cancer about 25 years ago.   Previous PSA was 22.26 on 10/05/2016, <0.13 on 07/17/2017, 7.91 on 10/17/2018, 19.29 on 03/19/2020, and 22.25 on 04/07/2020.   He was seen by Dr. Oneil 05/12/2020. Latest PSA was 22.25 ng/ml on 04/07/2020.  Previous PSA 22.26 on 10/05/2016.  Bone scan and CT scan of the abdomen and pelvis were negative for prostate cancer on 10/2016.  Started on Casodex and possibly leuprolide.  PSA  was down to <0.13 on 07/17/207.  Plan for apalutamide after staging scans.   CT abdomen and pelvis 06/11/2020. No evidence of metastatic disease in the abdomen or pelvis. Right renal pelvis and ureter urothelial thickening and hyperemia. Urinary bladder wall is thickened and there is adjacent inflammation. Recommend correlation with urinalysis and exam to exclude cystitis with right-sided pyelitis.  Cirrhosis.  Tiny 8 mm hypodense RIGHT inferior liver lesion on image 33 is too small to characterize.   Bone scan 06/11/2020. No evidence of bone metastases.        PREVIOUS INTERVENTIONS:  He was treated with adjuvant radiation at [place] and androgen ablation with Casodex.  Lupron and Casodex 10/2016. He had 7 months of ADT therapy with Lupron.  Apalutamide 07/22/2020 through present.     Prostate cancer (HCC)   6/29/2020 -   Chemotherapy    OP PROSTATE Apalutamide     6/30/2020 Initial Diagnosis    Prostate cancer (CMS/HCC)     7/29/2020 -  Chemotherapy    OP LEUPROLIDE Q6M         PAST MEDICAL HISTORY:  ALLERGIES:  Allergies   Allergen Reactions   • Sulfa Antibiotics Rash     CURRENT MEDICATIONS:  Outpatient Encounter Medications as of 10/14/2021   Medication Sig Dispense Refill   • Apalutamide 60 MG tablet Take 4 tablets by mouth Daily. Take 4 tablets once daily with or without food. Do not crush or chew tablets. 120 tablet 11   • Apple [place] Vn-Grn Tea-Bit Or-Cr (Apple Cider Vinegar Plus) tablet Take 1 tablet by mouth Daily.     • calcium carbonate (OS-JONNY) 600 MG tablet Take 600 mg by mouth Daily.     • cefdinir (OMNICEF) 300 MG capsule Take 1 capsule by mouth 2 (Two) Times a Day. 6 capsule 0   • dutasteride (AVODART) 0.5 MG capsule Take 1 capsule by mouth Daily. 60 capsule 11   • JANUVIA 100 MG tablet Take 100 mg by mouth Daily.     • lisinopril-hydrochlorothiazide (PRINZIDE,ZESTORETIC) 20-12.5 MG per tablet Take 20 tablets by mouth Daily.     • Loperamide HCl (IMODIUM PO) Take 1 dose by mouth As Needed.     • rosuvastatin (CRESTOR) 20 MG tablet Take 10 mg by mouth Daily.     • trospium 60 MG capsule sustained-release 24 hr capsule Take 1 capsule by mouth Every Morning. 60 capsule 11     No facility-administered encounter medications on file as of 10/14/2021.     ADULT ILLNESSES:  Patient Active Problem List   Diagnosis Code   • Hypertension I10   • Hyperlipidemia E78.5   • Diabetes mellitus (HCC) E11.9   • Cancer (HCC) C80.1   • Arrhythmia I49.9   • Non morbid obesity due to excess calories E66.09   • Prostate cancer (HCC) C61   • Anterior urethral stricture N35.914     SURGERIES:  Past Surgical History:   Procedure Laterality Date   • CATARACT EXTRACTION     • CYSTOSCOPY RETROGRADE PYELOGRAM Bilateral 4/21/2021    Procedure: CYSTOSCOPY RETROGRADE PYELOGRAM, possible DIRECT VISION INTERNAL URETHROTOMY;  Surgeon: Matthew Oneil,  "MD;  Location: [facility] OR;  Service: Urology;  Laterality: Bilateral;   • CYSTOSCOPY URETHROTOMY VISUAL INTERNAL Bilateral 4/21/2021    Procedure: possible DIRECT VISION INTERNAL URETHROTOMY;  Surgeon: Matthew Oneil MD;  Location: [facility] OR;  Service: Urology;  Laterality: Bilateral;   • EXCISION LESION      neck   • KIDNEY STONE SURGERY     • REPLACEMENT TOTAL KNEE Left    • TONSILLECTOMY       HEALTH MAINTENANCE ITEMS:  Health Maintenance Due   Topic Date Due   • URINE MICROALBUMIN  Never done   • Hepatitis B (1 of 3 - Risk 3-dose series) Never done   • TDAP/TD VACCINES (1 - Tdap) Never done   • ZOSTER VACCINE (1 of 2) Never done   • HEPATITIS C SCREENING  Never done   • ANNUAL WELLNESS VISIT  Never done   • LIPID PANEL  Never done   • HEMOGLOBIN A1C  Never done   • DIABETIC EYE EXAM  Never done   • INFLUENZA VACCINE  Never done       <no information>  Last Completed Colonoscopy     This patient has no relevant Health Maintenance data.          There is no immunization history on file for this patient.  Last Completed Mammogram     This patient has no relevant Health Maintenance data.            FAMILY HISTORY:  Family History   Problem Relation Age of Onset   • Dementia Mother      SOCIAL HISTORY:  Social History     Socioeconomic History   • Marital status:    Tobacco Use   • Smoking status: Never Smoker   • Smokeless tobacco: Never Used   Vaping Use   • Vaping Use: Never used   Substance and Sexual Activity   • Alcohol use: No   • Drug use: No   • Sexual activity: Defer       REVIEW OF SYSTEMS:    Review of Systems   Constitutional: Negative for chills, fatigue and fever.        \"I feel fine.\"  Tolerating apalutamide.    HENT: Negative for congestion, mouth sores and trouble swallowing.    Eyes: Negative for discharge and redness.   Respiratory: Negative for cough, shortness of breath and wheezing.    Cardiovascular: Negative for chest pain and leg swelling.   Gastrointestinal: Negative for abdominal " "pain, constipation, diarrhea, nausea and vomiting.   Endocrine: Negative for cold intolerance and heat intolerance.   Genitourinary: Negative for flank pain and hematuria.        Self catheterize as needed. \"Overactive bladder.  I sanchezl Dr. Oneil.\"   Musculoskeletal: Negative for gait problem and neck stiffness.   Skin: Negative for pallor.   Allergic/Immunologic: Negative for food allergies.   Neurological: Negative for dizziness, speech difficulty and weakness.   Hematological: Negative for adenopathy. Does not bruise/bleed easily.   Psychiatric/Behavioral: Negative for agitation, confusion and hallucinations.       VITAL SIGNS: /62   Pulse 92   Temp 97.6 °F (36.4 °C)   Resp 18   Ht 172.7 cm (68\")   Wt 93.2 kg (205 lb 8 oz)   SpO2 96%   BMI 31.25 kg/m²   Pain Score    10/14/21 1414   PainSc: 0-No pain       PHYSICAL EXAMINATION:     Physical Exam  Vitals reviewed.   Constitutional:       General: He is not in acute distress.  HENT:      Head: Normocephalic and atraumatic.   Eyes:      General: No scleral icterus.  Cardiovascular:      Rate and Rhythm: Normal rate and regular rhythm.   Pulmonary:      Effort: No respiratory distress.      Breath sounds: No wheezing or rales.   Abdominal:      General: Bowel sounds are normal. There is no distension.      Palpations: Abdomen is soft.   Musculoskeletal:         General: No swelling.      Cervical back: Neck supple.   Skin:     General: Skin is warm and dry.      Coloration: Skin is not pale.   Neurological:      Mental Status: He is alert and oriented to person, place, and time.   Psychiatric:         Mood and Affect: Mood normal.         Behavior: Behavior normal.         Thought Content: Thought content normal.         Judgment: Judgment normal.         LABS    Lab Results - Last 18 Months   Lab Units 10/11/21  1316 09/15/21  1320 08/18/21  1329 07/15/21  1330 06/18/21  1437 05/17/21  1345 03/23/21  1340 02/26/21  1237 10/06/20  1343 09/01/20  1427 "   HEMOGLOBIN g/dL 13.1 12.6* 12.8* 13.3 13.7 12.1*   < > 13.7   < > 14.7   HEMATOCRIT % 38.8 36.8* 36.8* 38.7 40.6 35.3*   < > 38.6   < > 43.2   MCV fL 89.6 89.3 88.7 89.8 90.2 89.6   < > 86.9   < > 88.5   WBC 10*3/mm3 6.81 6.15 6.05 5.64 6.61 6.40   < > 6.11   < > 6.01   RDW % 13.4 13.2 13.3 13.1 12.9 13.1   < > 12.7   < > 13.2   MPV fL 10.6 10.6 11.5 11.3 10.7 10.6   < > 10.2   < > 10.9   PLATELETS 10*3/mm3 135* 137* 122* 121* 137* 117*   < > 138*   < > 143   IMM GRAN % %  --   --   --   --  0.3  --   --  0.3  --  0.3   NEUTROS ABS 10*3/mm3 5.14 4.42 4.48 3.83 4.44 4.56   < > 4.40   < > 4.40   LYMPHS ABS 10*3/mm3 0.93 0.86 0.87 0.94 1.15 0.97   < > 0.94   < > 1.00   MONOS ABS 10*3/mm3 0.57 0.71 0.52 0.66 0.79 0.65   < > 0.58   < > 0.41   EOS ABS 10*3/mm3 0.13 0.12 0.12 0.17 0.17 0.16   < > 0.14   < > 0.15   BASOS ABS 10*3/mm3 0.03 0.02 0.04 0.03 0.04 0.03   < > 0.03   < > 0.03   IMMATURE GRANS (ABS) 10*3/mm3  --   --   --   --  0.02  --   --  0.02  --  0.02   NRBC /100 WBC  --   --   --   --  0.0  --   --  0.0  --  0.0    < > = values in this interval not displayed.       Lab Results - Last 18 Months   Lab Units 10/11/21  1316 09/15/21  1320 08/18/21  1329 07/15/21  1330 06/18/21  1437 05/17/21  1345   GLUCOSE mg/dL 225* 212* 217* 178* 135* 156*   SODIUM mmol/L 136 135* 133* 136 136 134*   POTASSIUM mmol/L 4.3 3.8 4.3 4.1 4.3 4.1   CO2 mmol/L 28.0 28.0 25.0 27.0 29.0 27.0   CHLORIDE mmol/L 96* 96* 95* 98 96* 96*   ANION GAP mmol/L 12.0 11.0 13.0 11.0 11.0 11.0   CREATININE mg/dL 0.75* 0.63* 0.69* 0.73* 0.95 0.70*   BUN mg/dL 12 13 15 15 15 15   BUN / CREAT RATIO  16.0 20.6 21.7 20.5 15.8 21.4   CALCIUM mg/dL 10.2 9.5 9.6 9.8 10.2 10.0   EGFR IF NONAFRICN AM mL/min/1.73 101 123 111 104 77 109   ALK PHOS U/L 65 70 72 67 67 62   TOTAL PROTEIN g/dL 7.4 7.2 7.0 7.1 7.3 7.0   ALT (SGPT) U/L 26 19 27 25 29 27   AST (SGOT) U/L 31 23 37 32 37 35   BILIRUBIN mg/dL 0.4 0.3 0.4 0.3 0.3 0.4   ALBUMIN g/dL 4.40 4.00 4.50 4.40  4.50 4.20   GLOBULIN gm/dL 3.0 3.2 2.5 2.7 2.8 2.8       No results for input(s): MSPIKE, KAPPALAMB, IGLFLC, URICACID, FREEKAPPAL, CEA, LDH, REFLABREPO in the last 95825 hours.    Lab Results - Last 18 Months   Lab Units 09/15/21  1320 08/18/21  1329 07/15/21  1330 06/18/21  1437 05/17/21  1345 04/20/21  1414 03/23/21  1340   IRON mcg/dL  --  70  --   --   --   --   --    TIBC mcg/dL  --  328  --   --   --   --   --    IRON SATURATION %  --  21  --   --   --   --   --    FERRITIN ng/mL  --  383.40  --   --   --   --   --    TSH uIU/mL 3.010  --  3.740 3.010 3.060 4.120 3.670       Simeon Tam reports a pain score of 0.       ASSESSMENT:  1.  Prostate cancer.  PSA recurrence.   AJCC stage (TX, NX, M0)  Treatment status:Casodex and Lupron with PSA recurrence.  On apalutamide and leuprolde (by Dr. Oneil).  2.  Performance status of 0.   3.  Cirrhosis.  4.  Neurogenic bladder.  Self catheterize if needed.   5.  Anterior urethral stricture.  6.  Thrombocytopenia at 102 on 03/19/2020 from cirrhosis.  7.  Grade 1 fatigue from malignancy, history of.         PLAN:  1.    Re: Tolerance to apalutamide.     2.    Re:  Heme status.  Hemoglobin 13.1 and platelet 135 from cirrhosis.   3.    Re:  Pre-office CMP.   ml/minute and glucose 225.   4.    Re:  Pre-office PSA < 0.014 from < 0.014 from < 0.014 from < 0.014 from < 0.014 from < 0.014 from < 0.014 from < 0.014 from < 0.014 from < 0.014 from 0.015 from 0.03 from 0.125 from 1.03 from 27.3.   5.   eRx for Compazine 10 mg p.o. every 4 hours as needed for nausea/vomiting, 60, 2 refills if needed.    6.   eRx for C17D1 started on 10/13/2021 apalutamide 60 mg, take four tablets total dose 240 mg po daily, number, 120.  Take either with or without food.  Swallow tablets whole. TSH before apalutamide.  No grapefruit juice or grapefruit.  Observe for adverse side effects like arrhythmia, rash, seizures, thyroid dysfunction, hypothyroidism, fall and  "fractures.  7.    Continue ongoing management per primary care physician and other specialists.  8.    Plan of care discussed with patient.  Understanding expressed.  Patient agreeable to proceed.  9.    Lupron per Dr. Oneil.   10.  Questions were answered to his satisfaction. \"Right.\"  11.  Advance Care Planning   ACP discussion was declined by the patient. Patient does not have an advance directive, information provided.  12.  Return to office in 5 weeks with pre-office CMP, PSA and CBC with differential.        I have reviewed the assessment and plan and verified the accuracy of it. No changes to assessment and plan since the information was documented. Deshawn Black MD 10/14/21        I spent 32 total minutes, face-to-face, caring for Simeon today.  Greater than 50% of this time involved counseling and/or coordination of care as documented within this note regarding the patient's illness(es), pros and cons of various treatment options, instructions and/or risk reduction.              (Matthew Oneil MD)  (Oniel Doshi MD)  Lucio Garcia MD    "

## 2021-10-11 ENCOUNTER — LAB (OUTPATIENT)
Dept: LAB | Facility: HOSPITAL | Age: 76
End: 2021-10-11

## 2021-10-11 DIAGNOSIS — C61 PROSTATE CANCER (HCC): ICD-10-CM

## 2021-10-11 LAB
ALBUMIN SERPL-MCNC: 4.4 G/DL (ref 3.5–5.2)
ALBUMIN/GLOB SERPL: 1.5 G/DL
ALP SERPL-CCNC: 65 U/L (ref 39–117)
ALT SERPL W P-5'-P-CCNC: 26 U/L (ref 1–41)
ANION GAP SERPL CALCULATED.3IONS-SCNC: 12 MMOL/L (ref 5–15)
AST SERPL-CCNC: 31 U/L (ref 1–40)
BASOPHILS # BLD AUTO: 0.03 10*3/MM3 (ref 0–0.2)
BASOPHILS NFR BLD AUTO: 0.4 % (ref 0–1.5)
BILIRUB SERPL-MCNC: 0.4 MG/DL (ref 0–1.2)
BUN SERPL-MCNC: 12 MG/DL (ref 8–23)
BUN/CREAT SERPL: 16 (ref 7–25)
CALCIUM SPEC-SCNC: 10.2 MG/DL (ref 8.6–10.5)
CHLORIDE SERPL-SCNC: 96 MMOL/L (ref 98–107)
CO2 SERPL-SCNC: 28 MMOL/L (ref 22–29)
CREAT SERPL-MCNC: 0.75 MG/DL (ref 0.76–1.27)
DEPRECATED RDW RBC AUTO: 43.8 FL (ref 37–54)
EOSINOPHIL # BLD AUTO: 0.13 10*3/MM3 (ref 0–0.4)
EOSINOPHIL NFR BLD AUTO: 1.9 % (ref 0.3–6.2)
ERYTHROCYTE [DISTWIDTH] IN BLOOD BY AUTOMATED COUNT: 13.4 % (ref 12.3–15.4)
GFR SERPL CREATININE-BSD FRML MDRD: 101 ML/MIN/1.73
GLOBULIN UR ELPH-MCNC: 3 GM/DL
GLUCOSE SERPL-MCNC: 225 MG/DL (ref 65–99)
HCT VFR BLD AUTO: 38.8 % (ref 37.5–51)
HGB BLD-MCNC: 13.1 G/DL (ref 13–17.7)
LYMPHOCYTES # BLD AUTO: 0.93 10*3/MM3 (ref 0.7–3.1)
LYMPHOCYTES NFR BLD AUTO: 13.7 % (ref 19.6–45.3)
MCH RBC QN AUTO: 30.3 PG (ref 26.6–33)
MCHC RBC AUTO-ENTMCNC: 33.8 G/DL (ref 31.5–35.7)
MCV RBC AUTO: 89.6 FL (ref 79–97)
MONOCYTES # BLD AUTO: 0.57 10*3/MM3 (ref 0.1–0.9)
MONOCYTES NFR BLD AUTO: 8.4 % (ref 5–12)
NEUTROPHILS NFR BLD AUTO: 5.14 10*3/MM3 (ref 1.7–7)
NEUTROPHILS NFR BLD AUTO: 75.5 % (ref 42.7–76)
PLATELET # BLD AUTO: 135 10*3/MM3 (ref 140–450)
PMV BLD AUTO: 10.6 FL (ref 6–12)
POTASSIUM SERPL-SCNC: 4.3 MMOL/L (ref 3.5–5.2)
PROT SERPL-MCNC: 7.4 G/DL (ref 6–8.5)
PSA SERPL-MCNC: <0.014 NG/ML (ref 0–4)
RBC # BLD AUTO: 4.33 10*6/MM3 (ref 4.14–5.8)
SODIUM SERPL-SCNC: 136 MMOL/L (ref 136–145)
WBC # BLD AUTO: 6.81 10*3/MM3 (ref 3.4–10.8)

## 2021-10-11 PROCEDURE — 84153 ASSAY OF PSA TOTAL: CPT

## 2021-10-11 PROCEDURE — 85025 COMPLETE CBC W/AUTO DIFF WBC: CPT

## 2021-10-11 PROCEDURE — 36415 COLL VENOUS BLD VENIPUNCTURE: CPT

## 2021-10-11 PROCEDURE — 80053 COMPREHEN METABOLIC PANEL: CPT

## 2021-10-12 ENCOUNTER — TELEPHONE (OUTPATIENT)
Dept: ONCOLOGY | Facility: CLINIC | Age: 76
End: 2021-10-12

## 2021-10-12 NOTE — TELEPHONE ENCOUNTER
----- Message from Deshawn Black MD sent at 10/11/2021  2:00 PM CDT -----  Inform patient, glucose 225.

## 2021-10-13 ENCOUNTER — TELEPHONE (OUTPATIENT)
Dept: ONCOLOGY | Facility: CLINIC | Age: 76
End: 2021-10-13

## 2021-10-13 NOTE — TELEPHONE ENCOUNTER
Called and spoke with patient wife, Essence. She was informed Simeon Glucose reading was 225 and Dr Black was concerned with this reading, noting his glucose reading has been trending up over the past months. Patient does not have a f/u apt with PCP Dr Garcia, wife was encouraged to christiano a f/u so this can be addressed. She was informed that his Glucose needs to be kept under control due to the issues diabetes can cause. She v/u and will christiano apt with PCP and patient will keep his christiano apt Thursday 10/14/21.

## 2021-10-14 ENCOUNTER — OFFICE VISIT (OUTPATIENT)
Dept: ONCOLOGY | Facility: CLINIC | Age: 76
End: 2021-10-14

## 2021-10-14 VITALS
HEART RATE: 92 BPM | RESPIRATION RATE: 18 BRPM | DIASTOLIC BLOOD PRESSURE: 62 MMHG | SYSTOLIC BLOOD PRESSURE: 112 MMHG | WEIGHT: 205.5 LBS | OXYGEN SATURATION: 96 % | TEMPERATURE: 97.6 F | HEIGHT: 68 IN | BODY MASS INDEX: 31.14 KG/M2

## 2021-10-14 DIAGNOSIS — C61 PROSTATE CANCER (HCC): Primary | ICD-10-CM

## 2021-10-14 PROCEDURE — 99214 OFFICE O/P EST MOD 30 MIN: CPT | Performed by: INTERNAL MEDICINE

## 2021-10-28 ENCOUNTER — PATIENT OUTREACH (OUTPATIENT)
Dept: ONCOLOGY | Facility: HOSPITAL | Age: 76
End: 2021-10-28

## 2021-10-28 NOTE — OUTREACH NOTE
[facility]    Oral Oncology Patient Outreach      Prescription Details  Consulting Provider:   Deshawn Black MD ([facility])   Medication and Regimen:  apalutamide [Erleada] 240 mg PO daily       An attempt was made by the Oral Oncology Clinic to contact this patient for a follow-up on their chemotherapy medication. The [facility] Oncology Clinic can be reached at [phone].   Attempted to contact patient on 10/28/21, will try again tomorrow (10/29/21) before deferring out to next month. Of note, patient has not been spoken with since 8/6/21.       Carolee Diaz, PharmD  10/28/21  17:25 CDT

## 2021-11-01 ENCOUNTER — SPECIALTY PHARMACY (OUTPATIENT)
Dept: ONCOLOGY | Facility: HOSPITAL | Age: 76
End: 2021-11-01

## 2021-11-01 DIAGNOSIS — C61 PROSTATE CANCER (HCC): Primary | ICD-10-CM

## 2021-11-03 NOTE — PROGRESS NOTES
[facility]    Oral Oncology Patient Outreach      Prescription Details  Consulting Provider:   Deshawn Black MD ([facility])   Medication and Regimen:  Erleada 240 mg PO daily     Refills sent to patient's pharmacy    An attempt was made by the [facility] Oncology Clinic to contact this patient for a follow-up on their chemotherapy medication. The [facility]cology Clinic can be reached at [phone].        Andrzej Hancock, Jill  11/03/21  11:27 CDT

## 2021-11-11 ENCOUNTER — LAB (OUTPATIENT)
Dept: LAB | Facility: HOSPITAL | Age: 76
End: 2021-11-11

## 2021-11-11 DIAGNOSIS — C61 PROSTATE CANCER (HCC): ICD-10-CM

## 2021-11-11 LAB
ALBUMIN SERPL-MCNC: 4.2 G/DL (ref 3.5–5.2)
ALBUMIN/GLOB SERPL: 1.2 G/DL
ALP SERPL-CCNC: 62 U/L (ref 39–117)
ALT SERPL W P-5'-P-CCNC: 25 U/L (ref 1–41)
ANION GAP SERPL CALCULATED.3IONS-SCNC: 12 MMOL/L (ref 5–15)
AST SERPL-CCNC: 36 U/L (ref 1–40)
BASOPHILS # BLD AUTO: 0.03 10*3/MM3 (ref 0–0.2)
BASOPHILS NFR BLD AUTO: 0.5 % (ref 0–1.5)
BILIRUB SERPL-MCNC: 0.3 MG/DL (ref 0–1.2)
BUN SERPL-MCNC: 12 MG/DL (ref 8–23)
BUN/CREAT SERPL: 17.9 (ref 7–25)
CALCIUM SPEC-SCNC: 10 MG/DL (ref 8.6–10.5)
CHLORIDE SERPL-SCNC: 96 MMOL/L (ref 98–107)
CO2 SERPL-SCNC: 28 MMOL/L (ref 22–29)
CREAT SERPL-MCNC: 0.67 MG/DL (ref 0.76–1.27)
DEPRECATED RDW RBC AUTO: 43.7 FL (ref 37–54)
EOSINOPHIL # BLD AUTO: 0.14 10*3/MM3 (ref 0–0.4)
EOSINOPHIL NFR BLD AUTO: 2.2 % (ref 0.3–6.2)
ERYTHROCYTE [DISTWIDTH] IN BLOOD BY AUTOMATED COUNT: 13.2 % (ref 12.3–15.4)
GFR SERPL CREATININE-BSD FRML MDRD: 115 ML/MIN/1.73
GLOBULIN UR ELPH-MCNC: 3.4 GM/DL
GLUCOSE SERPL-MCNC: 148 MG/DL (ref 65–99)
HCT VFR BLD AUTO: 39.4 % (ref 37.5–51)
HGB BLD-MCNC: 13.2 G/DL (ref 13–17.7)
IMM GRANULOCYTES # BLD AUTO: 0.04 10*3/MM3 (ref 0–0.05)
IMM GRANULOCYTES NFR BLD AUTO: 0.6 % (ref 0–0.5)
LYMPHOCYTES # BLD AUTO: 0.98 10*3/MM3 (ref 0.7–3.1)
LYMPHOCYTES NFR BLD AUTO: 15.2 % (ref 19.6–45.3)
MCH RBC QN AUTO: 30.3 PG (ref 26.6–33)
MCHC RBC AUTO-ENTMCNC: 33.5 G/DL (ref 31.5–35.7)
MCV RBC AUTO: 90.6 FL (ref 79–97)
MONOCYTES # BLD AUTO: 0.65 10*3/MM3 (ref 0.1–0.9)
MONOCYTES NFR BLD AUTO: 10.1 % (ref 5–12)
NEUTROPHILS NFR BLD AUTO: 4.59 10*3/MM3 (ref 1.7–7)
NEUTROPHILS NFR BLD AUTO: 71.4 % (ref 42.7–76)
NRBC BLD AUTO-RTO: 0 /100 WBC (ref 0–0.2)
PLATELET # BLD AUTO: 156 10*3/MM3 (ref 140–450)
PMV BLD AUTO: 9.6 FL (ref 6–12)
POTASSIUM SERPL-SCNC: 4.3 MMOL/L (ref 3.5–5.2)
PROT SERPL-MCNC: 7.6 G/DL (ref 6–8.5)
RBC # BLD AUTO: 4.35 10*6/MM3 (ref 4.14–5.8)
SODIUM SERPL-SCNC: 136 MMOL/L (ref 136–145)
WBC # BLD AUTO: 6.43 10*3/MM3 (ref 3.4–10.8)

## 2021-11-11 PROCEDURE — 36415 COLL VENOUS BLD VENIPUNCTURE: CPT

## 2021-11-11 PROCEDURE — 85025 COMPLETE CBC W/AUTO DIFF WBC: CPT

## 2021-11-11 PROCEDURE — 80053 COMPREHEN METABOLIC PANEL: CPT

## 2021-11-11 PROCEDURE — 84153 ASSAY OF PSA TOTAL: CPT

## 2021-11-12 LAB — PSA SERPL-MCNC: <0.014 NG/ML (ref 0–4)

## 2021-11-12 NOTE — PROGRESS NOTES
[facility] HEMATOLOGY & ONCOLOGY  [address]  [place] [address] [phone]    Patient Name: Simeon Tam  Encounter Date: 11/18/2021  YOB: 1945  Patient Number: [id number]      REASON FOR FOLLOW-UP: Simeon Tam is a pleasant 76 y.o.  male who is seen in follow-up for hormone refractory prostate cancer.  He is seen C18D9 of apalutamide.  He is seen with spouse, Essence.  History is obtained from patient.  History is considered to be accurate.       Oncology/Hematology History Overview Note   DIAGNOSTIC ABNORMALITIES:  He presented with rising PSA of 13 and was diagnosed with prostate cancer about 25 years ago.   Previous PSA was 22.26 on 10/05/2016, <0.13 on 07/17/2017, 7.91 on 10/17/2018, 19.29 on 03/19/2020, and 22.25 on 04/07/2020.   He was seen by Dr. Oneil 05/12/2020. Latest PSA was 22.25 ng/ml on 04/07/2020.  Previous PSA 22.26 on 10/05/2016.  Bone scan and CT scan of the abdomen and pelvis were negative for prostate cancer on 10/2016.  Started on Casodex and possibly leuprolide.  PSA  was down to <0.13 on 07/17/207.  Plan for apalutamide after staging scans.   CT abdomen and pelvis 06/11/2020. No evidence of metastatic disease in the abdomen or pelvis. Right renal pelvis and ureter urothelial thickening and hyperemia. Urinary bladder wall is thickened and there is adjacent inflammation. Recommend correlation with urinalysis and exam to exclude cystitis with right-sided pyelitis.  Cirrhosis.  Tiny 8 mm hypodense RIGHT inferior liver lesion on image 33 is too small to characterize.   Bone scan 06/11/2020. No evidence of bone metastases.        PREVIOUS INTERVENTIONS:  He was treated with adjuvant radiation at [facility] and androgen ablation with Casodex.  Lupron and Casodex 10/2016. He had 7 months of ADT therapy with Lupron.  Apalutamide 07/22/2020 through present.     Prostate cancer (HCC)   6/29/2020 -   Chemotherapy    OP PROSTATE Apalutamide     6/30/2020 Initial Diagnosis    Prostate cancer (CMS/HCC)     7/29/2020 -  Chemotherapy    OP LEUPROLIDE Q6M         PAST MEDICAL HISTORY:  ALLERGIES:  Allergies   Allergen Reactions   • Sulfa Antibiotics Rash     CURRENT MEDICATIONS:  Outpatient Encounter Medications as of 11/18/2021   Medication Sig Dispense Refill   • Apalutamide 60 MG tablet Take 4 tablets by mouth Daily. Take 4 tablets once daily with or without food. Do not crush or chew tablets. 120 tablet 11   • Apple Irving Vn-Grn Tea-Bit Or-Cr (Apple Cider Vinegar Plus) tablet Take 1 tablet by mouth Daily.     • calcium carbonate (OS-JONNY) 600 MG tablet Take 600 mg by mouth Daily.     • cefdinir (OMNICEF) 300 MG capsule Take 1 capsule by mouth 2 (Two) Times a Day. 6 capsule 0   • dutasteride (AVODART) 0.5 MG capsule Take 1 capsule by mouth Daily. 60 capsule 11   • JANUVIA 100 MG tablet Take 100 mg by mouth Daily.     • lisinopril-hydrochlorothiazide (PRINZIDE,ZESTORETIC) 20-12.5 MG per tablet Take 20 tablets by mouth Daily.     • Loperamide HCl (IMODIUM PO) Take 1 dose by mouth As Needed.     • rosuvastatin (CRESTOR) 20 MG tablet Take 10 mg by mouth Daily.     • trospium 60 MG capsule sustained-release 24 hr capsule Take 1 capsule by mouth Every Morning. 60 capsule 11     No facility-administered encounter medications on file as of 11/18/2021.     ADULT ILLNESSES:  Patient Active Problem List   Diagnosis Code   • Hypertension I10   • Hyperlipidemia E78.5   • Diabetes mellitus (HCC) E11.9   • Cancer (HCC) C80.1   • Arrhythmia I49.9   • Non morbid obesity due to excess calories E66.09   • Prostate cancer (HCC) C61   • Anterior urethral stricture N35.914     SURGERIES:  Past Surgical History:   Procedure Laterality Date   • CATARACT EXTRACTION     • CYSTOSCOPY RETROGRADE PYELOGRAM Bilateral 4/21/2021    Procedure: CYSTOSCOPY RETROGRADE PYELOGRAM, possible DIRECT VISION INTERNAL URETHROTOMY;  Surgeon: Matthew Oneil,  "MD;  Location: [facility];  Service: Urology;  Laterality: Bilateral;   • CYSTOSCOPY URETHROTOMY VISUAL INTERNAL Bilateral 4/21/2021    Procedure: possible DIRECT VISION INTERNAL URETHROTOMY;  Surgeon: Matthew Oneil MD;  Location: [facility];  Service: Urology;  Laterality: Bilateral;   • EXCISION LESION      neck   • KIDNEY STONE SURGERY     • REPLACEMENT TOTAL KNEE Left    • TONSILLECTOMY       HEALTH MAINTENANCE ITEMS:  Health Maintenance Due   Topic Date Due   • URINE MICROALBUMIN  Never done   • Hepatitis B (1 of 3 - Risk 3-dose series) Never done   • TDAP/TD VACCINES (1 - Tdap) Never done   • ZOSTER VACCINE (1 of 2) Never done   • HEPATITIS C SCREENING  Never done   • ANNUAL WELLNESS VISIT  Never done   • LIPID PANEL  Never done   • HEMOGLOBIN A1C  Never done   • DIABETIC EYE EXAM  Never done   • COVID-19 Vaccine (3 - Booster for Moderna series) 07/18/2021   • INFLUENZA VACCINE  Never done       <no information>  Last Completed Colonoscopy     This patient has no relevant Health Maintenance data.          There is no immunization history on file for this patient.  Last Completed Mammogram     This patient has no relevant Health Maintenance data.            FAMILY HISTORY:  Family History   Problem Relation Age of Onset   • Dementia Mother      SOCIAL HISTORY:  Social History     Socioeconomic History   • Marital status:    Tobacco Use   • Smoking status: Never Smoker   • Smokeless tobacco: Never Used   Vaping Use   • Vaping Use: Never used   Substance and Sexual Activity   • Alcohol use: No   • Drug use: No   • Sexual activity: Defer       REVIEW OF SYSTEMS:    Review of Systems   Constitutional: Negative for chills, fatigue and fever.        \"I feel good. The pill is good.\"   HENT: Negative for congestion, mouth sores and trouble swallowing.    Eyes: Negative for discharge and redness.   Respiratory: Negative for cough, shortness of breath and wheezing.    Cardiovascular: Negative for chest pain and " "palpitations.   Gastrointestinal: Negative for abdominal pain, constipation, diarrhea, nausea and vomiting.   Endocrine: Negative for cold intolerance and heat intolerance.   Genitourinary: Negative for difficulty urinating, dysuria and flank pain.   Musculoskeletal: Negative for gait problem and joint swelling.   Skin: Negative for pallor.   Allergic/Immunologic: Negative for food allergies.   Neurological: Negative for dizziness, speech difficulty and weakness.   Hematological: Negative for adenopathy. Does not bruise/bleed easily.   Psychiatric/Behavioral: Negative for agitation, confusion and hallucinations.       VITAL SIGNS: /66   Pulse 92   Temp 97.3 °F (36.3 °C)   Resp 18   Ht 172.7 cm (68\")   Wt 93.2 kg (205 lb 8 oz)   SpO2 94%   BMI 31.25 kg/m²   Pain Score    11/18/21 1350   PainSc: 0-No pain       PHYSICAL EXAMINATION:     Physical Exam  Vitals reviewed.   Constitutional:       General: He is not in acute distress.  HENT:      Head: Normocephalic and atraumatic.   Eyes:      General: No scleral icterus.  Cardiovascular:      Rate and Rhythm: Normal rate and regular rhythm.   Pulmonary:      Effort: No respiratory distress.      Breath sounds: No wheezing or rales.   Abdominal:      General: Bowel sounds are normal.      Palpations: Abdomen is soft.      Tenderness: There is no abdominal tenderness.   Musculoskeletal:         General: No swelling.      Cervical back: Neck supple.   Skin:     General: Skin is warm and dry.      Coloration: Skin is not pale.   Neurological:      Mental Status: He is alert and oriented to person, place, and time.   Psychiatric:         Mood and Affect: Mood normal.         Behavior: Behavior normal.         Thought Content: Thought content normal.         Judgment: Judgment normal.         LABS    Lab Results - Last 18 Months   Lab Units 11/11/21  1339 10/11/21  1316 09/15/21  1320 08/18/21  1329 07/15/21  1330 06/18/21  1437 03/23/21  1340 02/26/21  1237 " 10/06/20  1343 09/01/20  1427   HEMOGLOBIN g/dL 13.2 13.1 12.6* 12.8* 13.3 13.7   < > 13.7   < > 14.7   HEMATOCRIT % 39.4 38.8 36.8* 36.8* 38.7 40.6   < > 38.6   < > 43.2   MCV fL 90.6 89.6 89.3 88.7 89.8 90.2   < > 86.9   < > 88.5   WBC 10*3/mm3 6.43 6.81 6.15 6.05 5.64 6.61   < > 6.11   < > 6.01   RDW % 13.2 13.4 13.2 13.3 13.1 12.9   < > 12.7   < > 13.2   MPV fL 9.6 10.6 10.6 11.5 11.3 10.7   < > 10.2   < > 10.9   PLATELETS 10*3/mm3 156 135* 137* 122* 121* 137*   < > 138*   < > 143   IMM GRAN % % 0.6*  --   --   --   --  0.3  --  0.3  --  0.3   NEUTROS ABS 10*3/mm3 4.59 5.14 4.42 4.48 3.83 4.44   < > 4.40   < > 4.40   LYMPHS ABS 10*3/mm3 0.98 0.93 0.86 0.87 0.94 1.15   < > 0.94   < > 1.00   MONOS ABS 10*3/mm3 0.65 0.57 0.71 0.52 0.66 0.79   < > 0.58   < > 0.41   EOS ABS 10*3/mm3 0.14 0.13 0.12 0.12 0.17 0.17   < > 0.14   < > 0.15   BASOS ABS 10*3/mm3 0.03 0.03 0.02 0.04 0.03 0.04   < > 0.03   < > 0.03   IMMATURE GRANS (ABS) 10*3/mm3 0.04  --   --   --   --  0.02  --  0.02  --  0.02   NRBC /100 WBC 0.0  --   --   --   --  0.0  --  0.0  --  0.0    < > = values in this interval not displayed.       Lab Results - Last 18 Months   Lab Units 11/11/21  1339 10/11/21  1316 09/15/21  1320 08/18/21  1329 07/15/21  1330 06/18/21  1437   GLUCOSE mg/dL 148* 225* 212* 217* 178* 135*   SODIUM mmol/L 136 136 135* 133* 136 136   POTASSIUM mmol/L 4.3 4.3 3.8 4.3 4.1 4.3   CO2 mmol/L 28.0 28.0 28.0 25.0 27.0 29.0   CHLORIDE mmol/L 96* 96* 96* 95* 98 96*   ANION GAP mmol/L 12.0 12.0 11.0 13.0 11.0 11.0   CREATININE mg/dL 0.67* 0.75* 0.63* 0.69* 0.73* 0.95   BUN mg/dL 12 12 13 15 15 15   BUN / CREAT RATIO  17.9 16.0 20.6 21.7 20.5 15.8   CALCIUM mg/dL 10.0 10.2 9.5 9.6 9.8 10.2   EGFR IF NONAFRICN AM mL/min/1.73 115 101 123 111 104 77   ALK PHOS U/L 62 65 70 72 67 67   TOTAL PROTEIN g/dL 7.6 7.4 7.2 7.0 7.1 7.3   ALT (SGPT) U/L 25 26 19 27 25 29   AST (SGOT) U/L 36 31 23 37 32 37   BILIRUBIN mg/dL 0.3 0.4 0.3 0.4 0.3 0.3   ALBUMIN  g/dL 4.20 4.40 4.00 4.50 4.40 4.50   GLOBULIN gm/dL 3.4 3.0 3.2 2.5 2.7 2.8       No results for input(s): MSPIKE, KAPPALAMB, IGLFLC, URICACID, FREEKAPPAL, CEA, LDH, REFLABREPO in the last 10632 hours.    Lab Results - Last 18 Months   Lab Units 09/15/21  1320 08/18/21  1329 07/15/21  1330 06/18/21  1437 05/17/21  1345 04/20/21  1414 03/23/21  1340   IRON mcg/dL  --  70  --   --   --   --   --    TIBC mcg/dL  --  328  --   --   --   --   --    IRON SATURATION %  --  21  --   --   --   --   --    FERRITIN ng/mL  --  383.40  --   --   --   --   --    TSH uIU/mL 3.010  --  3.740 3.010 3.060 4.120 3.670       Simeon Tam reports a pain score of 0.       ASSESSMENT:  1.  Prostate cancer.  PSA recurrence.   AJCC stage (TX, NX, M0)  Treatment status:Casodex and Lupron with PSA recurrence.  On apalutamide and leuprolde (by Dr. Oneil).  2.  Performance status of 0.   3.  Cirrhosis.  Followed by PCP.   4.  Thrombocytopenia at 102 on 03/19/2020 from cirrhosis.         PLAN:  1.    Re: Tolerating apalutamide.     2.    Re:  Heme status.  Hemoglobin 13.2 and platelet 156.   3.    Re:  Pre-office CMP.   ml/minute.   4.    Re:  Pre-office PSA < 0.014 from < 0.014 from < 0.014 from < 0.014 from < 0.014 from < 0.014 from < 0.014 from < 0.014 from < 0.014 from < 0.014 from < 0.014 from 0.015 from 0.03 from 0.125 from 1.03 from 27.3.   5.   eRx for Compazine 10 mg p.o. every 4 hours as needed for nausea/vomiting, 60, 2 refills if needed.    6.   eRx for C18D1 started on 11/10/2021 apalutamide 60 mg, take four tablets total dose 240 mg po daily, number, 120.  Take either with or without food.  Swallow tablets whole. TSH before apalutamide.  No grapefruit juice or grapefruit.  Observe for adverse side effects like abnormal heart rhythms, rash, seizures, thyroid dysfunction, hypothyroidism, fall and fractures.  7.   Continue ongoing management per primary care physician and other  "specialists.  8.   Plan of care discussed with patient.  Understanding expressed.  Patient agreeable to proceed.  9.   Lupron per Dr. Oneil.   10.  Questions were answered to his satisfaction. \"Yea.\"  11.  Advance Care Planning   ACP discussion was declined by the patient. Patient does not have an advance directive, information provided.  12.  Return to office in 4 weeks with pre-office CMP, PSA and CBC with differential.         I have reviewed the assessment and plan and verified the accuracy of it. No changes to assessment and plan since the information was documented. Deshawn Black MD 11/18/21       I spent 31 total minutes, face-to-face, caring for Simeon today.  Greater than 50% of this time involved counseling and/or coordination of care as documented within this note regarding the patient's illness(es), pros and cons of various treatment options, instructions and/or risk reduction.              (Matthew Oneil MD)  Lucio Garcia MD    "

## 2021-11-18 ENCOUNTER — OFFICE VISIT (OUTPATIENT)
Dept: ONCOLOGY | Facility: CLINIC | Age: 76
End: 2021-11-18

## 2021-11-18 VITALS
HEIGHT: 68 IN | BODY MASS INDEX: 31.14 KG/M2 | TEMPERATURE: 97.3 F | SYSTOLIC BLOOD PRESSURE: 128 MMHG | OXYGEN SATURATION: 94 % | RESPIRATION RATE: 18 BRPM | DIASTOLIC BLOOD PRESSURE: 66 MMHG | WEIGHT: 205.5 LBS | HEART RATE: 92 BPM

## 2021-11-18 DIAGNOSIS — C61 PROSTATE CANCER (HCC): Primary | ICD-10-CM

## 2021-11-18 PROCEDURE — 99214 OFFICE O/P EST MOD 30 MIN: CPT | Performed by: INTERNAL MEDICINE

## 2021-11-30 ENCOUNTER — SPECIALTY PHARMACY (OUTPATIENT)
Dept: ONCOLOGY | Facility: HOSPITAL | Age: 76
End: 2021-11-30

## 2021-11-30 NOTE — PROGRESS NOTES
[facility]    Oral Oncology Patient Follow-Up      Consult Details  Consulting Provider:   Deshawn Black MD ([facility])   Medication and Regimen:  Apalutamide 240 mg PO daily     Prescription Review  Dosing & Interactions:   Reviewed, appropriate and no significant interaction noted at this time..   Current Specialty Pharmacy [facility] - [place], [place] - [address] [place] [address] [phone]  - [phone]      Intervention(s):                  I spoke with Essence today for routine follow up regarding Simeon' medication. She reports everything is going well at this time. She noted his PSA was essentially zero and the medication is working as it should. No issues with tolerability.     No questions, concerns, or needs voiced at this time.      Summary:    Patient doing well. No immediate needs identified. Continue routine follow up.      Andrzej Hancock, PharmD  11/30/2021 13:03 CST

## 2021-12-01 NOTE — PROGRESS NOTES
[facility] HEMATOLOGY & ONCOLOGY  [address]  Merged with [facility] [id number] [phone]    Patient Name: Simeon Tam  Encounter Date: 12/15/2021  YOB: 1945  Patient Number: [id number]      REASON FOR FOLLOW-UP: Simeon Tam is a pleasant 76 y.o.  male who is seen in follow-up for hormone refractory prostate cancer.  He is seen C19D8 of apalutamide.  He is seen with spouse, Essence.  History is obtained from patient.  History is considered to be accurate.         Oncology/Hematology History Overview Note   DIAGNOSTIC ABNORMALITIES:  He presented with rising PSA of 13 and was diagnosed with prostate cancer about 25 years ago.   Previous PSA was 22.26 on 10/05/2016, <0.13 on 07/17/2017, 7.91 on 10/17/2018, 19.29 on 03/19/2020, and 22.25 on 04/07/2020.   He was seen by Dr. Oneil 05/12/2020. Latest PSA was 22.25 ng/ml on 04/07/2020.  Previous PSA 22.26 on 10/05/2016.  Bone scan and CT scan of the abdomen and pelvis were negative for prostate cancer on 10/2016.  Started on Casodex and possibly leuprolide.  PSA  was down to <0.13 on 07/17/207.  Plan for apalutamide after staging scans.   CT abdomen and pelvis 06/11/2020. No evidence of metastatic disease in the abdomen or pelvis. Right renal pelvis and ureter urothelial thickening and hyperemia. Urinary bladder wall is thickened and there is adjacent inflammation. Recommend correlation with urinalysis and exam to exclude cystitis with right-sided pyelitis.  Cirrhosis.  Tiny 8 mm hypodense RIGHT inferior liver lesion on image 33 is too small to characterize.   Bone scan 06/11/2020. No evidence of bone metastases.        PREVIOUS INTERVENTIONS:  He was treated with adjuvant radiation at [place] and androgen ablation with Casodex.  Lupron and Casodex 10/2016. He had 7 months of ADT therapy with Lupron.  Apalutamide 07/22/2020 through present.     Prostate cancer (HCC)   6/29/2020 -   Chemotherapy    OP PROSTATE Apalutamide     6/30/2020 Initial Diagnosis    Prostate cancer (CMS/HCC)     7/29/2020 -  Chemotherapy    OP LEUPROLIDE Q6M         PAST MEDICAL HISTORY:  ALLERGIES:  Allergies   Allergen Reactions   • Sulfa Antibiotics Rash     CURRENT MEDICATIONS:  Outpatient Encounter Medications as of 12/15/2021   Medication Sig Dispense Refill   • Apalutamide 60 MG tablet Take 4 tablets by mouth Daily. Take 4 tablets once daily with or without food. Do not crush or chew tablets. 120 tablet 11   • Apple Irving Vn-Grn Tea-Bit Or-Cr (Apple Cider Vinegar Plus) tablet Take 1 tablet by mouth Daily.     • calcium carbonate (OS-JONNY) 600 MG tablet Take 600 mg by mouth Daily.     • cefdinir (OMNICEF) 300 MG capsule Take 1 capsule by mouth 2 (Two) Times a Day. 6 capsule 0   • dutasteride (AVODART) 0.5 MG capsule Take 1 capsule by mouth Daily. 60 capsule 11   • JANUVIA 100 MG tablet Take 100 mg by mouth Daily.     • lisinopril-hydrochlorothiazide (PRINZIDE,ZESTORETIC) 20-12.5 MG per tablet Take 20 tablets by mouth Daily.     • Loperamide HCl (IMODIUM PO) Take 1 dose by mouth As Needed.     • rosuvastatin (CRESTOR) 20 MG tablet Take 10 mg by mouth Daily.     • trospium 60 MG capsule sustained-release 24 hr capsule Take 1 capsule by mouth Every Morning. 60 capsule 11     No facility-administered encounter medications on file as of 12/15/2021.     ADULT ILLNESSES:  Patient Active Problem List   Diagnosis Code   • Hypertension I10   • Hyperlipidemia E78.5   • Diabetes mellitus (HCC) E11.9   • Cancer (HCC) C80.1   • Arrhythmia I49.9   • Non morbid obesity due to excess calories E66.09   • Prostate cancer (HCC) C61   • Anterior urethral stricture N35.914     SURGERIES:  Past Surgical History:   Procedure Laterality Date   • CATARACT EXTRACTION     • CYSTOSCOPY RETROGRADE PYELOGRAM Bilateral 4/21/2021    Procedure: CYSTOSCOPY RETROGRADE PYELOGRAM, possible DIRECT VISION INTERNAL URETHROTOMY;  Surgeon: Matthew Oneil,  "MD;  Location: [facility] OR;  Service: Urology;  Laterality: Bilateral;   • CYSTOSCOPY URETHROTOMY VISUAL INTERNAL Bilateral 4/21/2021    Procedure: possible DIRECT VISION INTERNAL URETHROTOMY;  Surgeon: Matthew Oneil MD;  Location: [facility];  Service: Urology;  Laterality: Bilateral;   • EXCISION LESION      neck   • KIDNEY STONE SURGERY     • REPLACEMENT TOTAL KNEE Left    • TONSILLECTOMY       HEALTH MAINTENANCE ITEMS:  Health Maintenance Due   Topic Date Due   • URINE MICROALBUMIN  Never done   • Pneumococcal Vaccine 65+ (1 of 2 - PPSV23) Never done   • Hepatitis B (1 of 3 - Risk 3-dose series) Never done   • TDAP/TD VACCINES (1 - Tdap) Never done   • ZOSTER VACCINE (1 of 2) Never done   • HEPATITIS C SCREENING  Never done   • ANNUAL WELLNESS VISIT  Never done   • LIPID PANEL  Never done   • HEMOGLOBIN A1C  Never done   • DIABETIC EYE EXAM  Never done   • COVID-19 Vaccine (3 - Booster for Moderna series) 07/18/2021   • INFLUENZA VACCINE  Never done       <no information>  Last Completed Colonoscopy     This patient has no relevant Health Maintenance data.          There is no immunization history on file for this patient.  Last Completed Mammogram     This patient has no relevant Health Maintenance data.            FAMILY HISTORY:  Family History   Problem Relation Age of Onset   • Dementia Mother      SOCIAL HISTORY:  Social History     Socioeconomic History   • Marital status:    Tobacco Use   • Smoking status: Never Smoker   • Smokeless tobacco: Never Used   Vaping Use   • Vaping Use: Never used   Substance and Sexual Activity   • Alcohol use: No   • Drug use: No   • Sexual activity: Defer       REVIEW OF SYSTEMS:    Review of Systems   Constitutional: Negative for chills, fatigue and fever.        \"I feel fine.\"   HENT: Negative for congestion, mouth sores and trouble swallowing.    Eyes: Negative for redness and visual disturbance.   Respiratory: Negative for cough, shortness of breath and " "wheezing.    Cardiovascular: Negative for chest pain and leg swelling.   Gastrointestinal: Negative for abdominal pain, constipation, diarrhea, nausea and vomiting.   Endocrine: Negative for cold intolerance and heat intolerance.   Genitourinary: Negative for difficulty urinating, dysuria and flank pain.   Musculoskeletal: Negative for gait problem and joint swelling.   Skin: Negative for pallor.   Allergic/Immunologic: Negative for food allergies.   Neurological: Negative for dizziness, speech difficulty and weakness.   Hematological: Negative for adenopathy. Does not bruise/bleed easily.   Psychiatric/Behavioral: Negative for agitation, confusion and hallucinations.       VITAL SIGNS: /60   Pulse 88   Temp 97.2 °F (36.2 °C)   Resp 18   Ht 172.7 cm (68\")   Wt 93 kg (205 lb)   SpO2 98%   BMI 31.17 kg/m²   Pain Score    12/15/21 1349   PainSc: 0-No pain       PHYSICAL EXAMINATION:     Physical Exam  Vitals reviewed.   Constitutional:       General: He is not in acute distress.  HENT:      Head: Normocephalic and atraumatic.   Eyes:      General: No scleral icterus.  Cardiovascular:      Rate and Rhythm: Normal rate and regular rhythm.   Pulmonary:      Effort: No respiratory distress.      Breath sounds: No wheezing or rales.   Abdominal:      General: Bowel sounds are normal.      Palpations: Abdomen is soft.      Tenderness: There is no abdominal tenderness.   Musculoskeletal:         General: No swelling.      Cervical back: Neck supple.   Skin:     General: Skin is warm and dry.      Coloration: Skin is not pale.   Neurological:      Mental Status: He is alert and oriented to person, place, and time.   Psychiatric:         Mood and Affect: Mood normal.         Behavior: Behavior normal.         Thought Content: Thought content normal.         Judgment: Judgment normal.         LABS    Lab Results - Last 18 Months   Lab Units 12/08/21  1357 11/11/21  1339 10/11/21  1316 09/15/21  1320 08/18/21  1329 " 07/15/21  1330 06/18/21  1437 06/18/21  1437 03/23/21  1340 02/26/21  1237 10/06/20  1343 09/01/20  1427   HEMOGLOBIN g/dL 13.0 13.2 13.1 12.6* 12.8* 13.3   < > 13.7   < > 13.7   < > 14.7   HEMATOCRIT % 39.7 39.4 38.8 36.8* 36.8* 38.7   < > 40.6   < > 38.6   < > 43.2   MCV fL 91.3 90.6 89.6 89.3 88.7 89.8   < > 90.2   < > 86.9   < > 88.5   WBC 10*3/mm3 6.18 6.43 6.81 6.15 6.05 5.64   < > 6.61   < > 6.11   < > 6.01   RDW % 13.4 13.2 13.4 13.2 13.3 13.1   < > 12.9   < > 12.7   < > 13.2   MPV fL 10.9 9.6 10.6 10.6 11.5 11.3   < > 10.7   < > 10.2   < > 10.9   PLATELETS 10*3/mm3 124* 156 135* 137* 122* 121*   < > 137*   < > 138*   < > 143   IMM GRAN % %  --  0.6*  --   --   --   --   --  0.3  --  0.3  --  0.3   NEUTROS ABS 10*3/mm3 4.28 4.59 5.14 4.42 4.48 3.83   < > 4.44   < > 4.40   < > 4.40   LYMPHS ABS 10*3/mm3 1.05 0.98 0.93 0.86 0.87 0.94   < > 1.15   < > 0.94   < > 1.00   MONOS ABS 10*3/mm3 0.65 0.65 0.57 0.71 0.52 0.66   < > 0.79   < > 0.58   < > 0.41   EOS ABS 10*3/mm3 0.15 0.14 0.13 0.12 0.12 0.17   < > 0.17   < > 0.14   < > 0.15   BASOS ABS 10*3/mm3 0.03 0.03 0.03 0.02 0.04 0.03   < > 0.04   < > 0.03   < > 0.03   IMMATURE GRANS (ABS) 10*3/mm3  --  0.04  --   --   --   --   --  0.02  --  0.02  --  0.02   NRBC /100 WBC  --  0.0  --   --   --   --   --  0.0  --  0.0  --  0.0    < > = values in this interval not displayed.       Lab Results - Last 18 Months   Lab Units 12/08/21  1357 11/11/21  1339 10/11/21  1316 09/15/21  1320 08/18/21  1329 07/15/21  1330   GLUCOSE mg/dL 218* 148* 225* 212* 217* 178*   SODIUM mmol/L 137 136 136 135* 133* 136   POTASSIUM mmol/L 4.1 4.3 4.3 3.8 4.3 4.1   CO2 mmol/L 29.0 28.0 28.0 28.0 25.0 27.0   CHLORIDE mmol/L 96* 96* 96* 96* 95* 98   ANION GAP mmol/L 12.0 12.0 12.0 11.0 13.0 11.0   CREATININE mg/dL 0.82 0.67* 0.75* 0.63* 0.69* 0.73*   BUN mg/dL 14 12 12 13 15 15   BUN / CREAT RATIO  17.1 17.9 16.0 20.6 21.7 20.5   CALCIUM mg/dL 10.1 10.0 10.2 9.5 9.6 9.8   EGFR IF NONAFRICN AM  mL/min/1.73 91 115 101 123 111 104   ALK PHOS U/L 76 62 65 70 72 67   TOTAL PROTEIN g/dL 7.7 7.6 7.4 7.2 7.0 7.1   ALT (SGPT) U/L 23 25 26 19 27 25   AST (SGOT) U/L 32 36 31 23 37 32   BILIRUBIN mg/dL 0.3 0.3 0.4 0.3 0.4 0.3   ALBUMIN g/dL 4.50 4.20 4.40 4.00 4.50 4.40   GLOBULIN gm/dL 3.2 3.4 3.0 3.2 2.5 2.7       No results for input(s): MSPIKE, KAPPALAMB, IGLFLC, URICACID, FREEKAPPAL, CEA, LDH, REFLABREPO in the last 19607 hours.    Lab Results - Last 18 Months   Lab Units 09/15/21  1320 08/18/21  1329 07/15/21  1330 06/18/21  1437 05/17/21  1345 04/20/21  1414 03/23/21  1340   IRON mcg/dL  --  70  --   --   --   --   --    TIBC mcg/dL  --  328  --   --   --   --   --    IRON SATURATION %  --  21  --   --   --   --   --    FERRITIN ng/mL  --  383.40  --   --   --   --   --    TSH uIU/mL 3.010  --  3.740 3.010 3.060 4.120 3.670       Simeon Tam reports a pain score of 0.       ASSESSMENT:  1.  Prostate cancer.  PSA recurrence.   AJCC stage (TX, NX, M0)  Treatment status:Casodex and Lupron with PSA recurrence.  On apalutamide and leuprolde (by Dr. Oneil).  2.  Performance status of 0.   3.  Cirrhosis.  Followed by PCP.   4.  Thrombocytopenia at 102 on 03/19/2020 from cirrhosis.         PLAN:  1.    Re:  Apalutamide tolerance and response.     2.    Re:  Heme status.  Hemoglobin 13 and platelet 124, stable.   3.    Re:  Pre-office CMP.  GFR 91 ml/minute and glucose 218.   4.    Re:  Pre-office PSA <0.014 from < 0.014 from < 0.014 from < 0.014 from < 0.014 from < 0.014 from < 0.014 from < 0.014 from < 0.014 from < 0.014 from < 0.014 from < 0.014 from 0.015 from 0.03 from 0.125 from 1.03 from 27.3.   5.   eRx for Compazine 10 mg p.o. every 4 hours as needed for nausea/vomiting, 60, 2 refills if needed.    6.   eRx for C19D1 started on 12/08/2021 apalutamide 60 mg, take four tablets total dose 240 mg po daily, number, 120.  Take either with or without food.  Swallow tablets whole. TSH  "before apalutamide.  No grapefruit juice or grapefruit.  Observe for adverse side effects like abnormal heart rhythms, rash, seizures, thyroid dysfunction, hypothyroidism, fall and fractures.  7.   Continue ongoing management per primary care physician and other specialists.  8.   Plan of care discussed with patient and spouse.  Understanding expressed.  Patient agreeable to proceed.  9.   Lupron per Dr. Oneil.   10.  Questions were answered to his satisfaction. \"Yes.\"  11.  Advance Care Planning   ACP discussion was declined by the patient. Patient does not have an advance directive, information provided.  12.  Return to office in 4 weeks with pre-office CMP, PSA and CBC with differential.        I have reviewed the assessment and plan and verified the accuracy of it. No changes to assessment and plan since the information was documented. Deshawn Black MD 12/15/21        I spent 30 total minutes, face-to-face, caring for Simeon mcdonald.  Greater than 50% of this time involved counseling and/or coordination of care as documented within this note regarding the patient's illness(es), pros and cons of various treatment options, instructions and/or risk reduction.             (Matthew Oneil MD)  Lucio Garcia MD    "

## 2021-12-08 ENCOUNTER — TELEPHONE (OUTPATIENT)
Dept: ONCOLOGY | Facility: CLINIC | Age: 76
End: 2021-12-08

## 2021-12-08 ENCOUNTER — LAB (OUTPATIENT)
Dept: LAB | Facility: HOSPITAL | Age: 76
End: 2021-12-08

## 2021-12-08 DIAGNOSIS — C61 PROSTATE CANCER (HCC): ICD-10-CM

## 2021-12-08 LAB
ALBUMIN SERPL-MCNC: 4.5 G/DL (ref 3.5–5.2)
ALBUMIN/GLOB SERPL: 1.4 G/DL
ALP SERPL-CCNC: 76 U/L (ref 39–117)
ALT SERPL W P-5'-P-CCNC: 23 U/L (ref 1–41)
ANION GAP SERPL CALCULATED.3IONS-SCNC: 12 MMOL/L (ref 5–15)
AST SERPL-CCNC: 32 U/L (ref 1–40)
BASOPHILS # BLD AUTO: 0.03 10*3/MM3 (ref 0–0.2)
BASOPHILS NFR BLD AUTO: 0.5 % (ref 0–1.5)
BILIRUB SERPL-MCNC: 0.3 MG/DL (ref 0–1.2)
BUN SERPL-MCNC: 14 MG/DL (ref 8–23)
BUN/CREAT SERPL: 17.1 (ref 7–25)
CALCIUM SPEC-SCNC: 10.1 MG/DL (ref 8.6–10.5)
CHLORIDE SERPL-SCNC: 96 MMOL/L (ref 98–107)
CO2 SERPL-SCNC: 29 MMOL/L (ref 22–29)
CREAT SERPL-MCNC: 0.82 MG/DL (ref 0.76–1.27)
DEPRECATED RDW RBC AUTO: 45.2 FL (ref 37–54)
EOSINOPHIL # BLD AUTO: 0.15 10*3/MM3 (ref 0–0.4)
EOSINOPHIL NFR BLD AUTO: 2.4 % (ref 0.3–6.2)
ERYTHROCYTE [DISTWIDTH] IN BLOOD BY AUTOMATED COUNT: 13.4 % (ref 12.3–15.4)
GFR SERPL CREATININE-BSD FRML MDRD: 91 ML/MIN/1.73
GLOBULIN UR ELPH-MCNC: 3.2 GM/DL
GLUCOSE SERPL-MCNC: 218 MG/DL (ref 65–99)
HCT VFR BLD AUTO: 39.7 % (ref 37.5–51)
HGB BLD-MCNC: 13 G/DL (ref 13–17.7)
LYMPHOCYTES # BLD AUTO: 1.05 10*3/MM3 (ref 0.7–3.1)
LYMPHOCYTES NFR BLD AUTO: 17 % (ref 19.6–45.3)
MCH RBC QN AUTO: 29.9 PG (ref 26.6–33)
MCHC RBC AUTO-ENTMCNC: 32.7 G/DL (ref 31.5–35.7)
MCV RBC AUTO: 91.3 FL (ref 79–97)
MONOCYTES # BLD AUTO: 0.65 10*3/MM3 (ref 0.1–0.9)
MONOCYTES NFR BLD AUTO: 10.5 % (ref 5–12)
NEUTROPHILS NFR BLD AUTO: 4.28 10*3/MM3 (ref 1.7–7)
NEUTROPHILS NFR BLD AUTO: 69.3 % (ref 42.7–76)
PLATELET # BLD AUTO: 124 10*3/MM3 (ref 140–450)
PMV BLD AUTO: 10.9 FL (ref 6–12)
POTASSIUM SERPL-SCNC: 4.1 MMOL/L (ref 3.5–5.2)
PROT SERPL-MCNC: 7.7 G/DL (ref 6–8.5)
RBC # BLD AUTO: 4.35 10*6/MM3 (ref 4.14–5.8)
SODIUM SERPL-SCNC: 137 MMOL/L (ref 136–145)
WBC NRBC COR # BLD: 6.18 10*3/MM3 (ref 3.4–10.8)

## 2021-12-08 PROCEDURE — 84153 ASSAY OF PSA TOTAL: CPT

## 2021-12-08 PROCEDURE — 85025 COMPLETE CBC W/AUTO DIFF WBC: CPT

## 2021-12-08 PROCEDURE — 80053 COMPREHEN METABOLIC PANEL: CPT

## 2021-12-08 PROCEDURE — 36415 COLL VENOUS BLD VENIPUNCTURE: CPT

## 2021-12-08 NOTE — TELEPHONE ENCOUNTER
----- Message from Deshawn Black MD sent at 12/8/2021  2:31 PM CST -----  Notify patient.  Glucose 218.    Spoke with patients wife and let her know the above information. She states the patient is pre diabetic I told her she may want to follow up with his PCP in regards to this. She verbalized understanding.    rp

## 2021-12-09 LAB — PSA SERPL-MCNC: <0.014 NG/ML (ref 0–4)

## 2021-12-15 ENCOUNTER — OFFICE VISIT (OUTPATIENT)
Dept: ONCOLOGY | Facility: CLINIC | Age: 76
End: 2021-12-15

## 2021-12-15 VITALS
RESPIRATION RATE: 18 BRPM | SYSTOLIC BLOOD PRESSURE: 124 MMHG | WEIGHT: 205 LBS | DIASTOLIC BLOOD PRESSURE: 60 MMHG | HEIGHT: 68 IN | BODY MASS INDEX: 31.07 KG/M2 | HEART RATE: 88 BPM | OXYGEN SATURATION: 98 % | TEMPERATURE: 97.2 F

## 2021-12-15 DIAGNOSIS — C61 PROSTATE CANCER (HCC): Primary | ICD-10-CM

## 2021-12-15 PROCEDURE — 99214 OFFICE O/P EST MOD 30 MIN: CPT | Performed by: INTERNAL MEDICINE

## 2021-12-27 ENCOUNTER — TELEPHONE (OUTPATIENT)
Dept: UROLOGY | Facility: CLINIC | Age: 76
End: 2021-12-27

## 2021-12-27 ENCOUNTER — TELEPHONE (OUTPATIENT)
Dept: ONCOLOGY | Facility: CLINIC | Age: 76
End: 2021-12-27

## 2021-12-27 NOTE — TELEPHONE ENCOUNTER
Patient wife called and stated the her insurance will not longer be covering trospium and she is wanting something else called in. They have suggested oxybutynin, tolterodine, and myrbeteriq. He had tried the myrbeteriq in the past and it didn't work well for him. I told her I would get in contact with Dr Oneil to see about getting an alternative called in.

## 2021-12-27 NOTE — TELEPHONE ENCOUNTER
Caller: Essence Tam     Relationship: Emergency Contact WIFE    Best call back number: [phone]    What is the best time to reach you: ANYTIME    Who are you requesting to speak with (clinical staff, provider,  specific staff member): DR ROBBINS OR HIS NURSE    Do you know the name of the person who called: ESSENCE    What was the call regarding:     CALLING REGARDING GOT NOTICE IN THE MAIL FORM [facility]  STATING BEGINNING JAN 01 2022 [employer]  WILL BE ON NON PREFERRED LIST AND WILL NOT BE COVERING UNLESS  NOTIFIES THEM       STATES ON LETTER  MAY REQUEST AN EXCEPTION AND ASK FOR COVERAGE BY CALLING THIS NUMBER   [phone]    PLEASE CALL TO DISCUSS WITH ESSENCE    Do you require a callback: YES

## 2021-12-28 NOTE — TELEPHONE ENCOUNTER
Spoke with Essence and discussed with her that Dr. Black is out of the office until January 4, 2022 and that we will work on getting the Erleada approved with [facility] at that time.  Verbalized understanding.

## 2022-01-03 ENCOUNTER — TELEPHONE (OUTPATIENT)
Dept: UROLOGY | Facility: CLINIC | Age: 77
End: 2022-01-03

## 2022-01-03 ENCOUNTER — PATIENT OUTREACH (OUTPATIENT)
Dept: ONCOLOGY | Facility: HOSPITAL | Age: 77
End: 2022-01-03

## 2022-01-03 DIAGNOSIS — N31.9 NEUROGENIC BLADDER: Primary | ICD-10-CM

## 2022-01-03 RX ORDER — TOLTERODINE 4 MG/1
4 CAPSULE, EXTENDED RELEASE ORAL DAILY
Qty: 30 CAPSULE | Refills: 11 | Status: SHIPPED | OUTPATIENT
Start: 2022-01-03 | End: 2022-01-18 | Stop reason: SDUPTHER

## 2022-01-03 NOTE — TELEPHONE ENCOUNTER
----- Message from Lissett Weller sent at 12/27/2021 10:59 AM CST -----  Patients wife called and was wanting an alternative called in for his trospium 60 mg because their insurance will not cover the medications starting 01/01/2022. They will cover oxybutynin and tolterodine, and myrbetriq. The patient tried myrbetriq in 2016 and it didn't work. Please advise. Thanks.

## 2022-01-03 NOTE — OUTREACH NOTE
[facility]    Oral Oncology Patient Outreach      Prescription Details  Consulting Provider:   Deshawn Black MD ([facility])   Medication and Regimen:  Apalutamide 240 mg PO daily       An attempt was made by the Oral Oncology Clinic to contact this patient for a follow-up on their chemotherapy medication. The Oral Oncology Clinic can be reached at [phone].     Attempted to contact patient on 1/3/22, will try again tomorrow (1/4/22) before deferring out to next month.    Carolee Diaz, PharmD  01/03/22  13:38 CST

## 2022-01-03 NOTE — TELEPHONE ENCOUNTER
Will send script for tolterodine at 4mg daily.   Electronically signed by Matthew Oneil MD, 01/03/22, 2:05 AM CST.

## 2022-01-04 ENCOUNTER — SPECIALTY PHARMACY (OUTPATIENT)
Dept: ONCOLOGY | Facility: HOSPITAL | Age: 77
End: 2022-01-04

## 2022-01-04 NOTE — PROGRESS NOTES
[facility]    Oral Oncology Patient Follow-Up      Consult Details  Consulting Provider:   Deshawn Black MD ([facility])   Medication and Regimen:  Apalutamide [Erleada] 240 mg PO daily     Prescription Review  Dosing & Interactions:   Reviewed, appropriate and no significant interaction noted at this time..   Current Specialty Pharmacy [facility] - [address] [phone]  - [phone]      Intervention(s):                  Spoke with patient's spouse for his monthly routine follow-up regarding the oral oncology medication. She stated that he has been tolerating the Erleada really well and has not had any obvious side effects from the medication. She said he has not missed any doses while on the medication. The patient still receives refills through [facility], and has not had any delays in getting those refills. The patient's spouse also stated that there has been no changes to his medications (need to confirm if patient is using [facility] or [facility] the most) and no new allergies that she is aware of.   Finally, the patient's spouse stated he has not had any recent stays in the hospital and has not visited the ER in the last month. I encouraged the patient to reach out anytime with any questions or concerns they might have regarding their oral chemotherapy.     Summary:    No needs expressed by the patient or otherwise identified. Will follow-up next month regarding the patient’s oral chemotherapy with a routine telephone consultation.     Carolee Diaz, PharmD  01/04/2022 17:07 CST

## 2022-01-12 ENCOUNTER — LAB (OUTPATIENT)
Dept: LAB | Facility: HOSPITAL | Age: 77
End: 2022-01-12

## 2022-01-12 DIAGNOSIS — C61 PROSTATE CANCER: ICD-10-CM

## 2022-01-12 LAB
ALBUMIN SERPL-MCNC: 4.6 G/DL (ref 3.5–5.2)
ALBUMIN/GLOB SERPL: 1.6 G/DL
ALP SERPL-CCNC: 74 U/L (ref 39–117)
ALT SERPL W P-5'-P-CCNC: 24 U/L (ref 1–41)
ANION GAP SERPL CALCULATED.3IONS-SCNC: 10 MMOL/L (ref 5–15)
AST SERPL-CCNC: 32 U/L (ref 1–40)
BASOPHILS # BLD AUTO: 0.02 10*3/MM3 (ref 0–0.2)
BASOPHILS NFR BLD AUTO: 0.3 % (ref 0–1.5)
BILIRUB SERPL-MCNC: 0.3 MG/DL (ref 0–1.2)
BUN SERPL-MCNC: 20 MG/DL (ref 8–23)
BUN/CREAT SERPL: 21.5 (ref 7–25)
CALCIUM SPEC-SCNC: 10 MG/DL (ref 8.6–10.5)
CHLORIDE SERPL-SCNC: 95 MMOL/L (ref 98–107)
CO2 SERPL-SCNC: 32 MMOL/L (ref 22–29)
CREAT SERPL-MCNC: 0.93 MG/DL (ref 0.76–1.27)
DEPRECATED RDW RBC AUTO: 43.7 FL (ref 37–54)
EOSINOPHIL # BLD AUTO: 0.16 10*3/MM3 (ref 0–0.4)
EOSINOPHIL NFR BLD AUTO: 2.5 % (ref 0.3–6.2)
ERYTHROCYTE [DISTWIDTH] IN BLOOD BY AUTOMATED COUNT: 13.3 % (ref 12.3–15.4)
GFR SERPL CREATININE-BSD FRML MDRD: 79 ML/MIN/1.73
GLOBULIN UR ELPH-MCNC: 2.9 GM/DL
GLUCOSE SERPL-MCNC: 167 MG/DL (ref 65–99)
HCT VFR BLD AUTO: 41 % (ref 37.5–51)
HGB BLD-MCNC: 13.7 G/DL (ref 13–17.7)
IMM GRANULOCYTES # BLD AUTO: 0.02 10*3/MM3 (ref 0–0.05)
IMM GRANULOCYTES NFR BLD AUTO: 0.3 % (ref 0–0.5)
LYMPHOCYTES # BLD AUTO: 0.94 10*3/MM3 (ref 0.7–3.1)
LYMPHOCYTES NFR BLD AUTO: 14.6 % (ref 19.6–45.3)
MCH RBC QN AUTO: 30.2 PG (ref 26.6–33)
MCHC RBC AUTO-ENTMCNC: 33.4 G/DL (ref 31.5–35.7)
MCV RBC AUTO: 90.3 FL (ref 79–97)
MONOCYTES # BLD AUTO: 0.63 10*3/MM3 (ref 0.1–0.9)
MONOCYTES NFR BLD AUTO: 9.8 % (ref 5–12)
NEUTROPHILS NFR BLD AUTO: 4.66 10*3/MM3 (ref 1.7–7)
NEUTROPHILS NFR BLD AUTO: 72.5 % (ref 42.7–76)
NRBC BLD AUTO-RTO: 0 /100 WBC (ref 0–0.2)
PLATELET # BLD AUTO: 141 10*3/MM3 (ref 140–450)
PMV BLD AUTO: 10.7 FL (ref 6–12)
POTASSIUM SERPL-SCNC: 4.3 MMOL/L (ref 3.5–5.2)
PROT SERPL-MCNC: 7.5 G/DL (ref 6–8.5)
RBC # BLD AUTO: 4.54 10*6/MM3 (ref 4.14–5.8)
SODIUM SERPL-SCNC: 137 MMOL/L (ref 136–145)
WBC NRBC COR # BLD: 6.43 10*3/MM3 (ref 3.4–10.8)

## 2022-01-12 PROCEDURE — 85025 COMPLETE CBC W/AUTO DIFF WBC: CPT

## 2022-01-12 PROCEDURE — 36415 COLL VENOUS BLD VENIPUNCTURE: CPT

## 2022-01-12 PROCEDURE — 84153 ASSAY OF PSA TOTAL: CPT

## 2022-01-12 PROCEDURE — 80053 COMPREHEN METABOLIC PANEL: CPT

## 2022-01-13 LAB — PSA SERPL-MCNC: <0.014 NG/ML (ref 0–4)

## 2022-01-13 NOTE — PROGRESS NOTES
[facility] ON[facility] HEMATOLOGY & ONCOLOGY  [address]  [facility] [address] [phone]    Patient Name: Simeon Tam  Encounter Date: 01/19/2022  YOB: 1945  Patient Number: [id number]      REASON FOR FOLLOW-UP: Simeon Tam is a pleasant 76 y.o.  male who is seen in follow-up for hormone refractory prostate cancer.  He is seen C20D9 of apalutamide.  He is seen with spouse, Essence.  History is obtained from patient. History is considered to be accurate.         Oncology/Hematology History Overview Note   DIAGNOSTIC ABNORMALITIES:  He presented with rising PSA of 13 and was diagnosed with prostate cancer about 25 years ago.   Previous PSA was 22.26 on 10/05/2016, <0.13 on 07/17/2017, 7.91 on 10/17/2018, 19.29 on 03/19/2020, and 22.25 on 04/07/2020.   He was seen by Dr. Oneil 05/12/2020. Latest PSA was 22.25 ng/ml on 04/07/2020.  Previous PSA 22.26 on 10/05/2016.  Bone scan and CT scan of the abdomen and pelvis were negative for prostate cancer on 10/2016.  Started on Casodex and possibly leuprolide.  PSA  was down to <0.13 on 07/17/207.  Plan for apalutamide after staging scans.   CT abdomen and pelvis 06/11/2020. No evidence of metastatic disease in the abdomen or pelvis. Right renal pelvis and ureter urothelial thickening and hyperemia. Urinary bladder wall is thickened and there is adjacent inflammation. Recommend correlation with urinalysis and exam to exclude cystitis with right-sided pyelitis.  Cirrhosis.  Tiny 8 mm hypodense RIGHT inferior liver lesion on image 33 is too small to characterize.   Bone scan 06/11/2020. No evidence of bone metastases.        PREVIOUS INTERVENTIONS:  He was treated with adjuvant radiation at [place] and androgen ablation with Casodex.  Lupron and Casodex 10/2016. He had 7 months of ADT therapy with Lupron.  Apalutamide 07/22/2020 through present.     Prostate cancer (HCC)   6/29/2020 -   Chemotherapy    OP PROSTATE Apalutamide     6/30/2020 Initial Diagnosis    Prostate cancer (CMS/HCC)     7/29/2020 -  Chemotherapy    OP LEUPROLIDE Q6M         PAST MEDICAL HISTORY:  ALLERGIES:  Allergies   Allergen Reactions   • Sulfa Antibiotics Rash     CURRENT MEDICATIONS:  Outpatient Encounter Medications as of 1/19/2022   Medication Sig Dispense Refill   • Apalutamide 60 MG tablet Take 4 tablets by mouth Daily. Take 4 tablets once daily with or without food. Do not crush or chew tablets. 120 tablet 11   • Apple Irving Vn-Grn Tea-Bit Or-Cr (Apple Cider Vinegar Plus) tablet Take 1 tablet by mouth Daily.     • calcium carbonate (OS-JONNY) 600 MG tablet Take 600 mg by mouth Daily.     • dutasteride (AVODART) 0.5 MG capsule Take 1 capsule by mouth Daily. 60 capsule 11   • JANUVIA 100 MG tablet Take 100 mg by mouth Daily.     • lisinopril-hydrochlorothiazide (PRINZIDE,ZESTORETIC) 20-12.5 MG per tablet Take 1 tablet by mouth Daily.     • Loperamide HCl (IMODIUM PO) Take 1 dose by mouth As Needed.     • rosuvastatin (CRESTOR) 20 MG tablet Take 20 mg by mouth Every Night.     • tolterodine LA (DETROL LA) 4 MG 24 hr capsule Take 1 capsule by mouth Daily. 30 capsule 11   • [DISCONTINUED] tolterodine LA (DETROL LA) 4 MG 24 hr capsule Take 1 capsule by mouth Daily. 30 capsule 11     No facility-administered encounter medications on file as of 1/19/2022.     ADULT ILLNESSES:  Patient Active Problem List   Diagnosis Code   • Hypertension I10   • Hyperlipidemia E78.5   • Diabetes mellitus (HCC) E11.9   • Cancer (HCC) C80.1   • Arrhythmia I49.9   • Non morbid obesity due to excess calories E66.09   • Prostate cancer (HCC) C61   • Anterior urethral stricture N35.914     SURGERIES:  Past Surgical History:   Procedure Laterality Date   • CATARACT EXTRACTION     • CYSTOSCOPY RETROGRADE PYELOGRAM Bilateral 4/21/2021    Procedure: CYSTOSCOPY RETROGRADE PYELOGRAM, possible DIRECT VISION INTERNAL URETHROTOMY;  Surgeon: Matthew Oneil  MD;  Location: [facility] OR;  Service: Urology;  Laterality: Bilateral;   • CYSTOSCOPY URETHROTOMY VISUAL INTERNAL Bilateral 4/21/2021    Procedure: possible DIRECT VISION INTERNAL URETHROTOMY;  Surgeon: Matthew Oneil MD;  Location: [facility] OR;  Service: Urology;  Laterality: Bilateral;   • EXCISION LESION      neck   • KIDNEY STONE SURGERY     • REPLACEMENT TOTAL KNEE Left    • TONSILLECTOMY       HEALTH MAINTENANCE ITEMS:  Health Maintenance Due   Topic Date Due   • URINE MICROALBUMIN  Never done   • Pneumococcal Vaccine 65+ (1 of 2 - PPSV23) Never done   • Hepatitis B (1 of 3 - Risk 3-dose series) Never done   • TDAP/TD VACCINES (1 - Tdap) Never done   • ZOSTER VACCINE (1 of 2) Never done   • HEPATITIS C SCREENING  Never done   • ANNUAL WELLNESS VISIT  Never done   • LIPID PANEL  Never done   • HEMOGLOBIN A1C  Never done   • DIABETIC EYE EXAM  Never done   • COVID-19 Vaccine (3 - Booster for Moderna series) 06/18/2021   • INFLUENZA VACCINE  Never done       <no information>  Last Completed Colonoscopy     This patient has no relevant Health Maintenance data.          There is no immunization history on file for this patient.  Last Completed Mammogram     This patient has no relevant Health Maintenance data.            FAMILY HISTORY:  Family History   Problem Relation Age of Onset   • Dementia Mother      SOCIAL HISTORY:  Social History     Socioeconomic History   • Marital status:    Tobacco Use   • Smoking status: Never Smoker   • Smokeless tobacco: Never Used   Vaping Use   • Vaping Use: Never used   Substance and Sexual Activity   • Alcohol use: No   • Drug use: No   • Sexual activity: Defer       REVIEW OF SYSTEMS:    Review of Systems   Constitutional: Positive for fatigue. Negative for chills and fever.   HENT: Negative for congestion, mouth sores and trouble swallowing.    Eyes: Negative for redness and visual disturbance.   Respiratory: Negative for cough, shortness of breath and wheezing.   "  Cardiovascular: Negative for chest pain and palpitations.   Gastrointestinal: Negative for abdominal pain, constipation, diarrhea, nausea and vomiting.   Endocrine: Negative for polydipsia and polyphagia.   Genitourinary: Negative for difficulty urinating and hematuria.        \"I catheterize several times a day.\"   Musculoskeletal: Negative for gait problem and neck stiffness.   Skin: Negative for pallor.   Allergic/Immunologic: Negative for food allergies.   Neurological: Negative for dizziness, syncope and weakness.   Hematological: Negative for adenopathy. Does not bruise/bleed easily.   Psychiatric/Behavioral: Negative for agitation, confusion and hallucinations.       VITAL SIGNS: /62   Pulse 95   Temp 97.6 °F (36.4 °C)   Resp 18   Ht 172.7 cm (68\")   Wt 93.8 kg (206 lb 14.4 oz)   SpO2 99%   BMI 31.46 kg/m²   Pain Score    01/19/22 1359   PainSc: 0-No pain       PHYSICAL EXAMINATION:     Physical Exam  Vitals reviewed.   Constitutional:       General: He is not in acute distress.  HENT:      Head: Normocephalic and atraumatic.   Eyes:      General: No scleral icterus.  Cardiovascular:      Rate and Rhythm: Normal rate.   Pulmonary:      Effort: No respiratory distress.      Breath sounds: No wheezing or rales.   Abdominal:      General: Bowel sounds are normal.      Palpations: Abdomen is soft.      Tenderness: There is no abdominal tenderness.   Musculoskeletal:         General: No swelling.      Cervical back: Neck supple.   Skin:     General: Skin is warm.      Coloration: Skin is not pale.   Neurological:      Mental Status: He is alert and oriented to person, place, and time.   Psychiatric:         Mood and Affect: Mood normal.         Behavior: Behavior normal.         Thought Content: Thought content normal.         Judgment: Judgment normal.         LABS    Lab Results - Last 18 Months   Lab Units 01/12/22  1336 12/08/21  1357 11/11/21  1339 10/11/21  1316 09/15/21  1320 08/18/21  1329 " 07/15/21  1330 06/18/21  1437 03/23/21  1340 02/26/21  1237 10/06/20  1343 09/01/20  1427   HEMOGLOBIN g/dL 13.7 13.0 13.2 13.1 12.6* 12.8*   < > 13.7   < > 13.7   < > 14.7   HEMATOCRIT % 41.0 39.7 39.4 38.8 36.8* 36.8*   < > 40.6   < > 38.6   < > 43.2   MCV fL 90.3 91.3 90.6 89.6 89.3 88.7   < > 90.2   < > 86.9   < > 88.5   WBC 10*3/mm3 6.43 6.18 6.43 6.81 6.15 6.05   < > 6.61   < > 6.11   < > 6.01   RDW % 13.3 13.4 13.2 13.4 13.2 13.3   < > 12.9   < > 12.7   < > 13.2   MPV fL 10.7 10.9 9.6 10.6 10.6 11.5   < > 10.7   < > 10.2   < > 10.9   PLATELETS 10*3/mm3 141 124* 156 135* 137* 122*   < > 137*   < > 138*   < > 143   IMM GRAN % % 0.3  --  0.6*  --   --   --   --  0.3  --  0.3  --  0.3   NEUTROS ABS 10*3/mm3 4.66 4.28 4.59 5.14 4.42 4.48   < > 4.44   < > 4.40   < > 4.40   LYMPHS ABS 10*3/mm3 0.94 1.05 0.98 0.93 0.86 0.87   < > 1.15   < > 0.94   < > 1.00   MONOS ABS 10*3/mm3 0.63 0.65 0.65 0.57 0.71 0.52   < > 0.79   < > 0.58   < > 0.41   EOS ABS 10*3/mm3 0.16 0.15 0.14 0.13 0.12 0.12   < > 0.17   < > 0.14   < > 0.15   BASOS ABS 10*3/mm3 0.02 0.03 0.03 0.03 0.02 0.04   < > 0.04   < > 0.03   < > 0.03   IMMATURE GRANS (ABS) 10*3/mm3 0.02  --  0.04  --   --   --   --  0.02  --  0.02  --  0.02   NRBC /100 WBC 0.0  --  0.0  --   --   --   --  0.0  --  0.0  --  0.0    < > = values in this interval not displayed.       Lab Results - Last 18 Months   Lab Units 01/12/22  1336 12/08/21  1357 11/11/21  1339 10/11/21  1316 09/15/21  1320 08/18/21  1329   GLUCOSE mg/dL 167* 218* 148* 225* 212* 217*   SODIUM mmol/L 137 137 136 136 135* 133*   POTASSIUM mmol/L 4.3 4.1 4.3 4.3 3.8 4.3   CO2 mmol/L 32.0* 29.0 28.0 28.0 28.0 25.0   CHLORIDE mmol/L 95* 96* 96* 96* 96* 95*   ANION GAP mmol/L 10.0 12.0 12.0 12.0 11.0 13.0   CREATININE mg/dL 0.93 0.82 0.67* 0.75* 0.63* 0.69*   BUN mg/dL 20 14 12 12 13 15   BUN / CREAT RATIO  21.5 17.1 17.9 16.0 20.6 21.7   CALCIUM mg/dL 10.0 10.1 10.0 10.2 9.5 9.6   EGFR IF NONAFRICN AM mL/min/1.73 79  "91 115 101 123 111   ALK PHOS U/L 74 76 62 65 70 72   TOTAL PROTEIN g/dL 7.5 7.7 7.6 7.4 7.2 7.0   ALT (SGPT) U/L 24 23 25 26 19 27   AST (SGOT) U/L 32 32 36 31 23 37   BILIRUBIN mg/dL 0.3 0.3 0.3 0.4 0.3 0.4   ALBUMIN g/dL 4.60 4.50 4.20 4.40 4.00 4.50   GLOBULIN gm/dL 2.9 3.2 3.4 3.0 3.2 2.5       No results for input(s): MSPIKE, KAPPALAMB, IGLFLC, URICACID, FREEKAPPAL, CEA, LDH, REFLABREPO in the last 83008 hours.    Lab Results - Last 18 Months   Lab Units 09/15/21  1320 08/18/21  1329 07/15/21  1330 06/18/21  1437 05/17/21  1345 04/20/21  1414 03/23/21  1340   IRON mcg/dL  --  70  --   --   --   --   --    TIBC mcg/dL  --  328  --   --   --   --   --    IRON SATURATION %  --  21  --   --   --   --   --    FERRITIN ng/mL  --  383.40  --   --   --   --   --    TSH uIU/mL 3.010  --  3.740 3.010 3.060 4.120 3.670       Simeon Tam reports a pain score of 0.       ASSESSMENT:  1.  Prostate cancer.  PSA recurrence.   AJCC stage (TX, NX, M0)  Treatment status:Casodex and Lupron with PSA recurrence.  On apalutamide and leuprolde (by Dr. Oneil).  2.  Performance status of 1.   3.  Thrombocytopenia at 102 on 03/19/2020 from cirrhosis.  4.  Cirrhosis, followed by PCP.        PLAN:  1.    Re: Tolerance and response to apalutamide.  \"Doing good.\"  2.    Re:  Heme status.  Hemoglobin 13.7 and platelet 141.  3.    Re:  Pre-office CMP.  GFR 79 ml/minute and glucose 167.   4.    Re:  Pre-office PSA at < 0.014 from <0.014 from < 0.014 from < 0.014 from < 0.014 from < 0.014 from < 0.014 from < 0.014 from < 0.014 from < 0.014 from < 0.014 from < 0.014 from < 0.014 from 0.015 from 0.03 from 0.125 from 1.03 from 27.3.   5.   eRx for Compazine 10 mg p.o. every 4 hours as needed for nausea/vomiting, 60, 2 refills if needed.    6.   eRx for C20D1 started on 01/05/2022 apalutamide 60 mg, take four tablets total dose 240 mg po daily, number, 120.  Take either with or without food.  Swallow tablets whole. TSH " "before apalutamide.  No grapefruit juice or grapefruit.  Observe for adverse side effects like arrhythmias, rash, seizures, thyroid dysfunction, hypothyroidism, fall and fractures.  7.   Continue ongoing management per primary care physician and other specialists.  8.   Plan of care discussed with patient and spouse.  Understanding expressed.  Patient agreeable to proceed.  9.   Lupron per Dr. Oneil.   10.  Questions were answered to his satisfaction. \"I got you.\"  11.  Advance Care Planning   ACP discussion was declined by the patient. Patient does not have an advance directive, information provided.  12.  Return to office in 4 weeks with pre-office CMP, PSA and CBC with differential.         I have reviewed the assessment and plan and verified the accuracy of it. No changes to assessment and plan since the information was documented. Deshawn Black MD 01/19/22       I spent 31 total minutes, face-to-face, caring for Simeon mcdonald.  Greater than 50% of this time involved counseling and/or coordination of care as documented within this note regarding the patient's illness(es), pros and cons of various treatment options, instructions and/or risk reduction.                  (Matthew Oneil MD)  Lucio Garcia MD    "

## 2022-01-14 RX ORDER — PROCHLORPERAZINE MALEATE 10 MG
10 TABLET ORAL EVERY 4 HOURS PRN
Qty: 60 TABLET | Refills: 2 | Status: CANCELLED | OUTPATIENT
Start: 2022-01-14

## 2022-01-18 DIAGNOSIS — N31.9 NEUROGENIC BLADDER: ICD-10-CM

## 2022-01-18 RX ORDER — TOLTERODINE 4 MG/1
4 CAPSULE, EXTENDED RELEASE ORAL DAILY
Qty: 30 CAPSULE | Refills: 11 | Status: SHIPPED | OUTPATIENT
Start: 2022-01-18 | End: 2023-02-15

## 2022-01-19 ENCOUNTER — OFFICE VISIT (OUTPATIENT)
Dept: ONCOLOGY | Facility: CLINIC | Age: 77
End: 2022-01-19

## 2022-01-19 VITALS
OXYGEN SATURATION: 99 % | HEART RATE: 95 BPM | DIASTOLIC BLOOD PRESSURE: 62 MMHG | BODY MASS INDEX: 31.36 KG/M2 | TEMPERATURE: 97.6 F | RESPIRATION RATE: 18 BRPM | SYSTOLIC BLOOD PRESSURE: 138 MMHG | HEIGHT: 68 IN | WEIGHT: 206.9 LBS

## 2022-01-19 DIAGNOSIS — C61 PROSTATE CANCER: Primary | ICD-10-CM

## 2022-01-19 PROCEDURE — 99214 OFFICE O/P EST MOD 30 MIN: CPT | Performed by: INTERNAL MEDICINE

## 2022-01-20 ENCOUNTER — PATIENT OUTREACH (OUTPATIENT)
Dept: ONCOLOGY | Facility: HOSPITAL | Age: 77
End: 2022-01-20

## 2022-01-20 NOTE — OUTREACH NOTE
[facility]    Oral Oncology Patient Outreach      Prescription Details  Consulting Provider:   Deshawn Black MD ([facility])   Medication and Regimen:  Apalutamide [Erleada] 240 mg PO daily       An attempt was made by the Oral Oncology Clinic to contact this patient for a follow-up on their chemotherapy medication. The Oral Oncology Clinic can be reached at [phone].     Attempted to contact patient on 1/20/22, will try again tomorrow (1/21/22) before deferring out to next month.       Carolee Diaz, PharmD  01/20/22  17:43 CST

## 2022-01-21 ENCOUNTER — SPECIALTY PHARMACY (OUTPATIENT)
Dept: ONCOLOGY | Facility: HOSPITAL | Age: 77
End: 2022-01-21

## 2022-01-21 NOTE — PROGRESS NOTES
[facility]    Oral Oncology Patient Follow-Up      Consult Details  Consulting Provider:   Deshawn Black MD ([facility])   Medication and Regimen:  Apalutamide 60 mg PO daily     Prescription Review  Dosing & Interactions:   Reviewed, appropriate and no significant interaction noted at this time..   Current Specialty Pharmacy [facility] - [place], [place] - [address] [place] [address] - [phone]  - [phone]      Intervention(s):                  I spoke with Essence today for the monthly follow up regarding Simeon' medication. She reports everything is going well at this time. No side effects or tolerability issues reported today. Refills are coming from [facility] without issue. She reports they had received a letter from their insurance stating there would be some changes in their coverage regarding this medication; this letter had them concerned. Not long thereafter another letter came through stating the previous letter was a mistake. There are no known changes to his coverage for Apalutamide.     Refills are plenty, a 1 year RX was sent in November.     No changes to health conditions or medications since last assessment. No urgent or acute care visits.     I gave Essence our phone number in case anything changes or they need to reach us regarding an insurance issue or refill issue.      Summary:    Patient doing well on apalutamide. No issues to report. Continue routine follow up.      Andrzej Hancock, PharmD  01/21/2022 15:56 CST

## 2022-01-25 ENCOUNTER — TELEPHONE (OUTPATIENT)
Dept: UROLOGY | Facility: CLINIC | Age: 77
End: 2022-01-25

## 2022-01-25 DIAGNOSIS — N31.9 NEUROGENIC BLADDER: ICD-10-CM

## 2022-01-25 RX ORDER — DUTASTERIDE 0.5 MG/1
0.5 CAPSULE, LIQUID FILLED ORAL DAILY
Qty: 60 CAPSULE | Refills: 11 | Status: SHIPPED | OUTPATIENT
Start: 2022-01-25 | End: 2022-01-26 | Stop reason: SDUPTHER

## 2022-01-26 ENCOUNTER — TELEPHONE (OUTPATIENT)
Dept: UROLOGY | Facility: CLINIC | Age: 77
End: 2022-01-26

## 2022-01-26 DIAGNOSIS — N31.9 NEUROGENIC BLADDER: ICD-10-CM

## 2022-01-26 RX ORDER — DUTASTERIDE 0.5 MG/1
0.5 CAPSULE, LIQUID FILLED ORAL DAILY
Qty: 60 CAPSULE | Refills: 11 | Status: SHIPPED | OUTPATIENT
Start: 2022-01-26

## 2022-02-02 NOTE — PROGRESS NOTES
Chief Complaint  Follow-up prostate cancer and anterior urethral stricture  Subjective          Simeon Tam presents to [facility] for follow-up of to chronic urologic issues as follows:  -Prostate cancer: Over 20 years ago the patient was diagnosed with adenocarcinoma the prostate. He was treated with EBRT and ADT. He developed a biochemical recurrence. He is now on leuprolide and apalutamide. Dr. Black added the latter. He is followed by Dr. Black  -He also has an anterior urethral stricture. In April 2021 I took him to the operating room for urethral dilatation with serial dilators passed over wire. To keep this open he is continue to do intermittent catheterization.        Current Outpatient Medications:   •  Apalutamide 60 MG tablet, Take 4 tablets by mouth Daily. Take 4 tablets once daily with or without food. Do not crush or chew tablets., Disp: 120 tablet, Rfl: 11  •  calcium carbonate (OS-JONNY) 600 MG tablet, Take 600 mg by mouth Daily., Disp: , Rfl:   •  dutasteride (AVODART) 0.5 MG capsule, Take 1 capsule by mouth Daily., Disp: 60 capsule, Rfl: 11  •  JANUVIA 100 MG tablet, Take 100 mg by mouth Daily., Disp: , Rfl:   •  lisinopril-hydrochlorothiazide (PRINZIDE,ZESTORETIC) 20-12.5 MG per tablet, Take 1 tablet by mouth Daily., Disp: , Rfl:   •  Loperamide HCl (IMODIUM PO), Take 1 dose by mouth As Needed., Disp: , Rfl:   •  rosuvastatin (CRESTOR) 20 MG tablet, Take 20 mg by mouth Every Night., Disp: , Rfl:   •  tolterodine LA (DETROL LA) 4 MG 24 hr capsule, Take 1 capsule by mouth Daily., Disp: 30 capsule, Rfl: 11  •  Apple Irving Vn-Grn Tea-Bit Or-Cr (Apple Cider Vinegar Plus) tablet, Take 1 tablet by mouth Daily., Disp: , Rfl:   Past Medical History:   Diagnosis Date   • Arrhythmia    • Cancer (HCC)     prostate   • Diabetes mellitus (HCC)    • History of sepsis    • Hyperlipidemia    • Hypertension    • UTI (urinary tract infection)      Past Surgical History:   Procedure Laterality  "Date   • CATARACT EXTRACTION     • CYSTOSCOPY RETROGRADE PYELOGRAM Bilateral 4/21/2021    Procedure: CYSTOSCOPY RETROGRADE PYELOGRAM, possible DIRECT VISION INTERNAL URETHROTOMY;  Surgeon: Matthew Oneil MD;  Location: [facility] OR;  Service: Urology;  Laterality: Bilateral;   • CYSTOSCOPY URETHROTOMY VISUAL INTERNAL Bilateral 4/21/2021    Procedure: possible DIRECT VISION INTERNAL URETHROTOMY;  Surgeon: Matthew Oneil MD;  Location: [facility] OR;  Service: Urology;  Laterality: Bilateral;   • EXCISION LESION      neck   • KIDNEY STONE SURGERY     • REPLACEMENT TOTAL KNEE Left    • TONSILLECTOMY             Review  of systems  Constitutional: Negative for chills or fever.   Gastrointestinal: Negative for abdominal pain, anal bleeding or blood in stool.           Objective   PHYSICAL EXAM  Vital Signs:   Temp 98.2 °F (36.8 °C)   Ht 172.3 cm (67.84\")   Wt 93.4 kg (206 lb)   BMI 31.47 kg/m²     Constitutional: Patient is without distress or deformity.  Vital signs are reviewed as above.    Neuro: No confusion; No disorientation; Alert and oriented  Pulmonary: No respiratory distress.   Skin: No pallor or diaphoresis      DATA  Result Review :              Results for orders placed or performed in visit on 02/07/22   POC Urinalysis Dipstick, Multipro    Specimen: Urine   Result Value Ref Range    Color Yellow Yellow, Straw, Dark Yellow, Viviana    Clarity, UA Clear Clear    Glucose, UA Negative Negative, 1000 mg/dL (3+) mg/dL    Bilirubin Negative Negative    Ketones, UA Negative Negative    Specific Gravity  1.010 1.005 - 1.030    Blood, UA Moderate (A) Negative    pH, Urine 6.5 5.0 - 8.0    Protein, POC Negative Negative mg/dL    Urobilinogen, UA Normal Normal    Nitrite, UA Positive (A) Negative    Leukocytes Small (1+) (A) Negative             Lab Results   Component Value Date    PSA <0.014 01/12/2022    PSA <0.014 12/08/2021    PSA <0.014 11/11/2021          ASSESSMENT AND PLAN          Problem List Items " Addressed This Visit        Genitourinary and Reproductive     Anterior urethral stricture    Overview     Added automatically from request for surgery [id number]            Hematology and Neoplasia    Prostate cancer (HCC) - Primary    Relevant Orders    POC Urinalysis Dipstick, Multipro (Completed)      -Continue catheterizations.  No need for intervention at this time.  -Patient continues to do well on systemic therapy for prostate cancer. PSA remains undetectable. I will continue leuprolide as scheduled. Apalutamide per Dr. Bermudez's management.  See me in 6 months with a PSA drawn prior to the visit unless Dr. Black does it within 3 months of the appointment.        FOLLOW UP     No follow-ups on file.        (Please note that portions of this note were completed with a voice recognition program.)  Matthew Oneil MD  02/07/22  15:06 CST

## 2022-02-07 ENCOUNTER — OFFICE VISIT (OUTPATIENT)
Dept: UROLOGY | Facility: CLINIC | Age: 77
End: 2022-02-07

## 2022-02-07 VITALS — HEIGHT: 68 IN | TEMPERATURE: 98.2 F | BODY MASS INDEX: 31.22 KG/M2 | WEIGHT: 206 LBS

## 2022-02-07 DIAGNOSIS — C61 PROSTATE CANCER: Primary | ICD-10-CM

## 2022-02-07 DIAGNOSIS — N35.914 ANTERIOR URETHRAL STRICTURE: ICD-10-CM

## 2022-02-07 LAB
BILIRUB BLD-MCNC: NEGATIVE MG/DL
CLARITY, POC: CLEAR
COLOR UR: YELLOW
GLUCOSE UR STRIP-MCNC: NEGATIVE MG/DL
KETONES UR QL: NEGATIVE
LEUKOCYTE EST, POC: ABNORMAL
NITRITE UR-MCNC: POSITIVE MG/ML
PH UR: 6.5 [PH] (ref 5–8)
PROT UR STRIP-MCNC: NEGATIVE MG/DL
RBC # UR STRIP: ABNORMAL /UL
SP GR UR: 1.01 (ref 1–1.03)
UROBILINOGEN UR QL: NORMAL

## 2022-02-07 PROCEDURE — 81003 URINALYSIS AUTO W/O SCOPE: CPT | Performed by: UROLOGY

## 2022-02-07 PROCEDURE — 99214 OFFICE O/P EST MOD 30 MIN: CPT | Performed by: UROLOGY

## 2022-02-09 ENCOUNTER — LAB (OUTPATIENT)
Dept: LAB | Facility: HOSPITAL | Age: 77
End: 2022-02-09

## 2022-02-09 DIAGNOSIS — C61 PROSTATE CANCER: ICD-10-CM

## 2022-02-09 LAB
ALBUMIN SERPL-MCNC: 4.4 G/DL (ref 3.5–5.2)
ALBUMIN/GLOB SERPL: 1.5 G/DL
ALP SERPL-CCNC: 64 U/L (ref 39–117)
ALT SERPL W P-5'-P-CCNC: 17 U/L (ref 1–41)
ANION GAP SERPL CALCULATED.3IONS-SCNC: 10 MMOL/L (ref 5–15)
AST SERPL-CCNC: 22 U/L (ref 1–40)
BASOPHILS # BLD AUTO: 0.02 10*3/MM3 (ref 0–0.2)
BASOPHILS NFR BLD AUTO: 0.4 % (ref 0–1.5)
BILIRUB SERPL-MCNC: 0.4 MG/DL (ref 0–1.2)
BUN SERPL-MCNC: 13 MG/DL (ref 8–23)
BUN/CREAT SERPL: 19.7 (ref 7–25)
CALCIUM SPEC-SCNC: 9.3 MG/DL (ref 8.6–10.5)
CHLORIDE SERPL-SCNC: 97 MMOL/L (ref 98–107)
CO2 SERPL-SCNC: 30 MMOL/L (ref 22–29)
CREAT SERPL-MCNC: 0.66 MG/DL (ref 0.76–1.27)
DEPRECATED RDW RBC AUTO: 44.4 FL (ref 37–54)
EOSINOPHIL # BLD AUTO: 0.15 10*3/MM3 (ref 0–0.4)
EOSINOPHIL NFR BLD AUTO: 2.7 % (ref 0.3–6.2)
ERYTHROCYTE [DISTWIDTH] IN BLOOD BY AUTOMATED COUNT: 13.2 % (ref 12.3–15.4)
GFR SERPL CREATININE-BSD FRML MDRD: 117 ML/MIN/1.73
GLOBULIN UR ELPH-MCNC: 2.9 GM/DL
GLUCOSE SERPL-MCNC: 175 MG/DL (ref 65–99)
HCT VFR BLD AUTO: 39.3 % (ref 37.5–51)
HGB BLD-MCNC: 12.6 G/DL (ref 13–17.7)
LYMPHOCYTES # BLD AUTO: 0.98 10*3/MM3 (ref 0.7–3.1)
LYMPHOCYTES NFR BLD AUTO: 17.4 % (ref 19.6–45.3)
MCH RBC QN AUTO: 29.2 PG (ref 26.6–33)
MCHC RBC AUTO-ENTMCNC: 32.1 G/DL (ref 31.5–35.7)
MCV RBC AUTO: 91.2 FL (ref 79–97)
MONOCYTES # BLD AUTO: 0.62 10*3/MM3 (ref 0.1–0.9)
MONOCYTES NFR BLD AUTO: 11 % (ref 5–12)
NEUTROPHILS NFR BLD AUTO: 3.85 10*3/MM3 (ref 1.7–7)
NEUTROPHILS NFR BLD AUTO: 68.1 % (ref 42.7–76)
PLATELET # BLD AUTO: 122 10*3/MM3 (ref 140–450)
PMV BLD AUTO: 10.4 FL (ref 6–12)
POTASSIUM SERPL-SCNC: 3.8 MMOL/L (ref 3.5–5.2)
PROT SERPL-MCNC: 7.3 G/DL (ref 6–8.5)
PSA SERPL-MCNC: <0.014 NG/ML (ref 0–4)
RBC # BLD AUTO: 4.31 10*6/MM3 (ref 4.14–5.8)
SODIUM SERPL-SCNC: 137 MMOL/L (ref 136–145)
WBC NRBC COR # BLD: 5.64 10*3/MM3 (ref 3.4–10.8)

## 2022-02-09 PROCEDURE — 84153 ASSAY OF PSA TOTAL: CPT

## 2022-02-09 PROCEDURE — 85025 COMPLETE CBC W/AUTO DIFF WBC: CPT

## 2022-02-09 PROCEDURE — 36415 COLL VENOUS BLD VENIPUNCTURE: CPT

## 2022-02-09 PROCEDURE — 80053 COMPREHEN METABOLIC PANEL: CPT

## 2022-02-09 NOTE — PROGRESS NOTES
[facility] HEMATOLOGY & ONCOLOGY  [address]  [place] [address] [phone]    Patient Name: Simeon Tam  Encounter Date: 02/16/2022  YOB: 1945  Patient Number: [id number]      REASON FOR FOLLOW-UP: Simeon Tam is a pleasant 76 y.o.  male who is seen in follow-up for hormone refractory prostate cancer.  He is seen C21D15 of apalutamide.  He is seen alone.  History is obtained from patient. History is considered to be accurate.         Oncology/Hematology History Overview Note   DIAGNOSTIC ABNORMALITIES:  He presented with rising PSA of 13 and was diagnosed with prostate cancer about 25 years ago.   Previous PSA was 22.26 on 10/05/2016, <0.13 on 07/17/2017, 7.91 on 10/17/2018, 19.29 on 03/19/2020, and 22.25 on 04/07/2020.   He was seen by Dr. Oneil 05/12/2020. Latest PSA was 22.25 ng/ml on 04/07/2020.  Previous PSA 22.26 on 10/05/2016.  Bone scan and CT scan of the abdomen and pelvis were negative for prostate cancer on 10/2016.  Started on Casodex and possibly leuprolide.  PSA  was down to <0.13 on 07/17/207.  Plan for apalutamide after staging scans.   CT abdomen and pelvis 06/11/2020. No evidence of metastatic disease in the abdomen or pelvis. Right renal pelvis and ureter urothelial thickening and hyperemia. Urinary bladder wall is thickened and there is adjacent inflammation. Recommend correlation with urinalysis and exam to exclude cystitis with right-sided pyelitis.  Cirrhosis.  Tiny 8 mm hypodense RIGHT inferior liver lesion on image 33 is too small to characterize.   Bone scan 06/11/2020. No evidence of bone metastases.        PREVIOUS INTERVENTIONS:  He was treated with adjuvant radiation at [facility] and androgen ablation with Casodex.  Lupron and Casodex 10/2016. He had 7 months of ADT therapy with Lupron.  Apalutamide 07/22/2020 through present.     Prostate cancer (HCC)   6/29/2020 -  Chemotherapy     OP PROSTATE Apalutamide     6/30/2020 Initial Diagnosis    Prostate cancer (CMS/HCC)     7/29/2020 -  Chemotherapy    OP LEUPROLIDE Q6M         PAST MEDICAL HISTORY:  ALLERGIES:  Allergies   Allergen Reactions   • Sulfa Antibiotics Rash     CURRENT MEDICATIONS:  Outpatient Encounter Medications as of 2/16/2022   Medication Sig Dispense Refill   • Apalutamide 60 MG tablet Take 4 tablets by mouth Daily. Take 4 tablets once daily with or without food. Do not crush or chew tablets. 120 tablet 11   • Apple Irving Vn-Grn Tea-Bit Or-Cr (Apple Cider Vinegar Plus) tablet Take 1 tablet by mouth Daily.     • calcium carbonate (OS-JONNY) 600 MG tablet Take 600 mg by mouth Daily.     • dutasteride (AVODART) 0.5 MG capsule Take 1 capsule by mouth Daily. 60 capsule 11   • JANUVIA 100 MG tablet Take 100 mg by mouth Daily.     • lisinopril-hydrochlorothiazide (PRINZIDE,ZESTORETIC) 20-12.5 MG per tablet Take 1 tablet by mouth Daily.     • Loperamide HCl (IMODIUM PO) Take 1 dose by mouth As Needed.     • rosuvastatin (CRESTOR) 20 MG tablet Take 20 mg by mouth Every Night.     • tolterodine LA (DETROL LA) 4 MG 24 hr capsule Take 1 capsule by mouth Daily. 30 capsule 11     No facility-administered encounter medications on file as of 2/16/2022.     ADULT ILLNESSES:  Patient Active Problem List   Diagnosis Code   • Hypertension I10   • Hyperlipidemia E78.5   • Diabetes mellitus (HCC) E11.9   • Cancer ([facility]) C80.1   • Arrhythmia I49.9   • Non morbid obesity due to excess calories E66.09   • Prostate cancer (Formerly [facility]) C61   • Anterior urethral stricture N35.914     SURGERIES:  Past Surgical History:   Procedure Laterality Date   • CATARACT EXTRACTION     • CYSTOSCOPY RETROGRADE PYELOGRAM Bilateral 4/21/2021    Procedure: CYSTOSCOPY RETROGRADE PYELOGRAM, possible DIRECT VISION INTERNAL URETHROTOMY;  Surgeon: Matthew Oneil MD;  Location: [facility];  Service: Urology;  Laterality: Bilateral;   • CYSTOSCOPY URETHROTOMY VISUAL INTERNAL Bilateral  "4/21/2021    Procedure: possible DIRECT VISION INTERNAL URETHROTOMY;  Surgeon: Matthew Oneil MD;  Location: [facility];  Service: Urology;  Laterality: Bilateral;   • EXCISION LESION      neck   • KIDNEY STONE SURGERY     • REPLACEMENT TOTAL KNEE Left    • TONSILLECTOMY       HEALTH MAINTENANCE ITEMS:  Health Maintenance Due   Topic Date Due   • URINE MICROALBUMIN  Never done   • Pneumococcal Vaccine 65+ (1 of 2 - PPSV23) Never done   • Hepatitis B (1 of 3 - Risk 3-dose series) Never done   • TDAP/TD VACCINES (1 - Tdap) Never done   • ZOSTER VACCINE (1 of 2) Never done   • HEPATITIS C SCREENING  Never done   • ANNUAL WELLNESS VISIT  Never done   • LIPID PANEL  Never done   • HEMOGLOBIN A1C  Never done   • DIABETIC EYE EXAM  Never done   • COVID-19 Vaccine (3 - Booster for Moderna series) 06/18/2021   • INFLUENZA VACCINE  Never done       <no information>  Last Completed Colonoscopy     This patient has no relevant Health Maintenance data.          There is no immunization history on file for this patient.  Last Completed Mammogram     This patient has no relevant Health Maintenance data.            FAMILY HISTORY:  Family History   Problem Relation Age of Onset   • Dementia Mother      SOCIAL HISTORY:  Social History     Socioeconomic History   • Marital status:    Tobacco Use   • Smoking status: Never Smoker   • Smokeless tobacco: Never Used   Vaping Use   • Vaping Use: Never used   Substance and Sexual Activity   • Alcohol use: No   • Drug use: No   • Sexual activity: Defer       REVIEW OF SYSTEMS:    Review of Systems   Constitutional: Positive for fatigue. Negative for chills and fever.        \"I am fine.\"   HENT: Negative for congestion, hearing loss and mouth sores.    Eyes: Negative for redness and visual disturbance.   Respiratory: Negative for cough, shortness of breath and wheezing.    Cardiovascular: Negative for chest pain and palpitations.   Gastrointestinal: Negative for abdominal pain, " "constipation, diarrhea, nausea and vomiting.   Endocrine: Negative for polydipsia and polyphagia.   Genitourinary: Negative for flank pain.        Self catheterization. \"It varies.\"   Musculoskeletal: Negative for gait problem and myalgias.   Skin: Positive for pallor.   Allergic/Immunologic: Negative for food allergies.   Neurological: Negative for dizziness, speech difficulty and weakness.   Hematological: Negative for adenopathy. Does not bruise/bleed easily.   Psychiatric/Behavioral: Negative for agitation, confusion and hallucinations.       VITAL SIGNS: /62   Pulse 82   Temp 97.8 °F (36.6 °C)   Resp 18   Ht 172.3 cm (67.84\")   Wt 93.8 kg (206 lb 14.4 oz)   SpO2 95%   BMI 31.61 kg/m²   Pain Score    02/16/22 1334   PainSc: 0-No pain       PHYSICAL EXAMINATION:     Physical Exam  Vitals reviewed.   Constitutional:       General: He is not in acute distress.  HENT:      Head: Normocephalic and atraumatic.   Eyes:      General: No scleral icterus.  Cardiovascular:      Rate and Rhythm: Normal rate.   Pulmonary:      Effort: No respiratory distress.      Breath sounds: No wheezing or rales.   Abdominal:      General: Bowel sounds are normal. There is no distension.      Palpations: Abdomen is soft.   Musculoskeletal:         General: No swelling.      Cervical back: Neck supple.   Skin:     General: Skin is warm.      Coloration: Skin is pale.   Neurological:      Mental Status: He is alert and oriented to person, place, and time.   Psychiatric:         Mood and Affect: Mood normal.         Behavior: Behavior normal.         Thought Content: Thought content normal.         Judgment: Judgment normal.         LABS    Lab Results - Last 18 Months   Lab Units 02/09/22  1300 01/12/22  1336 12/08/21  1357 11/11/21  1339 10/11/21  1316 09/15/21  1320 07/15/21  1330 06/18/21  1437 03/23/21  1340 02/26/21  1237 10/06/20  1343 09/01/20  1427   HEMOGLOBIN g/dL 12.6* 13.7 13.0 13.2 13.1 12.6*   < > 13.7   < > 13.7 "   < > 14.7   HEMATOCRIT % 39.3 41.0 39.7 39.4 38.8 36.8*   < > 40.6   < > 38.6   < > 43.2   MCV fL 91.2 90.3 91.3 90.6 89.6 89.3   < > 90.2   < > 86.9   < > 88.5   WBC 10*3/mm3 5.64 6.43 6.18 6.43 6.81 6.15   < > 6.61   < > 6.11   < > 6.01   RDW % 13.2 13.3 13.4 13.2 13.4 13.2   < > 12.9   < > 12.7   < > 13.2   MPV fL 10.4 10.7 10.9 9.6 10.6 10.6   < > 10.7   < > 10.2   < > 10.9   PLATELETS 10*3/mm3 122* 141 124* 156 135* 137*   < > 137*   < > 138*   < > 143   IMM GRAN % %  --  0.3  --  0.6*  --   --   --  0.3  --  0.3  --  0.3   NEUTROS ABS 10*3/mm3 3.85 4.66 4.28 4.59 5.14 4.42   < > 4.44   < > 4.40   < > 4.40   LYMPHS ABS 10*3/mm3 0.98 0.94 1.05 0.98 0.93 0.86   < > 1.15   < > 0.94   < > 1.00   MONOS ABS 10*3/mm3 0.62 0.63 0.65 0.65 0.57 0.71   < > 0.79   < > 0.58   < > 0.41   EOS ABS 10*3/mm3 0.15 0.16 0.15 0.14 0.13 0.12   < > 0.17   < > 0.14   < > 0.15   BASOS ABS 10*3/mm3 0.02 0.02 0.03 0.03 0.03 0.02   < > 0.04   < > 0.03   < > 0.03   IMMATURE GRANS (ABS) 10*3/mm3  --  0.02  --  0.04  --   --   --  0.02  --  0.02  --  0.02   NRBC /100 WBC  --  0.0  --  0.0  --   --   --  0.0  --  0.0  --  0.0    < > = values in this interval not displayed.       Lab Results - Last 18 Months   Lab Units 02/09/22  1300 01/12/22  1336 12/08/21  1357 11/11/21  1339 10/11/21  1316 09/15/21  1320   GLUCOSE mg/dL 175* 167* 218* 148* 225* 212*   SODIUM mmol/L 137 137 137 136 136 135*   POTASSIUM mmol/L 3.8 4.3 4.1 4.3 4.3 3.8   CO2 mmol/L 30.0* 32.0* 29.0 28.0 28.0 28.0   CHLORIDE mmol/L 97* 95* 96* 96* 96* 96*   ANION GAP mmol/L 10.0 10.0 12.0 12.0 12.0 11.0   CREATININE mg/dL 0.66* 0.93 0.82 0.67* 0.75* 0.63*   BUN mg/dL 13 20 14 12 12 13   BUN / CREAT RATIO  19.7 21.5 17.1 17.9 16.0 20.6   CALCIUM mg/dL 9.3 10.0 10.1 10.0 10.2 9.5   EGFR IF NONAFRICN AM mL/min/1.73 117 79 91 115 101 123   ALK PHOS U/L 64 74 76 62 65 70   TOTAL PROTEIN g/dL 7.3 7.5 7.7 7.6 7.4 7.2   ALT (SGPT) U/L 17 24 23 25 26 19   AST (SGOT) U/L 22 32 32 36 31  "23   BILIRUBIN mg/dL 0.4 0.3 0.3 0.3 0.4 0.3   ALBUMIN g/dL 4.40 4.60 4.50 4.20 4.40 4.00   GLOBULIN gm/dL 2.9 2.9 3.2 3.4 3.0 3.2       No results for input(s): MSPIKE, KAPPALAMB, IGLFLC, URICACID, FREEKAPPAL, CEA, LDH, REFLABREPO in the last 48097 hours.    Lab Results - Last 18 Months   Lab Units 09/15/21  1320 08/18/21  1329 07/15/21  1330 06/18/21  1437 05/17/21  1345 04/20/21  1414 03/23/21  1340   IRON mcg/dL  --  70  --   --   --   --   --    TIBC mcg/dL  --  328  --   --   --   --   --    IRON SATURATION %  --  21  --   --   --   --   --    FERRITIN ng/mL  --  383.40  --   --   --   --   --    TSH uIU/mL 3.010  --  3.740 3.010 3.060 4.120 3.670       Simeon Tam reports a pain score of 0.       ASSESSMENT:  1.  Prostate cancer.  PSA recurrence.   AJCC stage (TX, NX, M0)  Treatment status:Casodex and Lupron with PSA recurrence.  On apalutamide and leuprolde (by Dr. Oneil).  2.  Performance status of 1.  3.  Thrombocytopenia at 102 on 03/19/2020 from cirrhosis.  4.  Cirrhosis, contributing to thrombocytopenia and followed by PCP.  5.  Urethral stricture. Self catheterization as needed.         PLAN:  1.    Re: Tolerance to apalutamide.  \"Everything good.\" Seen by Dr. Oneil 02/07/2022. Continue catheterization as needed.   2.    Re:  Heme status.  Hemoglobin 12.6 and platelet 122 from 141 from 124.  3.    Re:  Pre-office CMP.  GFR 117 ml/minute and glucose 175.   4.    Re:  Pre-office PSA at < 0.014 from < 0.014 from <0.014 from < 0.014 from < 0.014 from < 0.014 from < 0.014 from < 0.014 from < 0.014 from < 0.014 from < 0.014 from < 0.014 from < 0.014 from < 0.014 from 0.015 from 0.03 from 0.125 from 1.03 from 27.3.   5.   eRx for Compazine 10 mg p.o. every 4 hours as needed for nausea/vomiting, 60, 2 refills if needed.    6.   eRx for C21D1 started on 02/02/2022 apalutamide 60 mg, take four tablets total dose 240 mg po daily, number, 120.  Take either with or without " "food.  Swallow tablets whole. TSH before apalutamide.  No grapefruit juice or grapefruit.  Observe for adverse side effects like arrhythmias, rash, seizures, thyroid dysfunction, hypothyroidism, fall and fractures.  7.   Continue ongoing management per primary care physician and other specialists.  8.   Plan of care discussed with patient.  Understanding expressed.  Patient agreeable to proceed.  9.   Lupron per Dr. Oneil.   10.  Questions were answered to his satisfaction. \"Good.\"  11.  Advance Care Planning   ACP discussion was declined by the patient. Patient does not have an advance directive, information provided.  12.  Return to office in 4 weeks with pre-office CMP, PSA and CBC with differential.        I have reviewed the assessment and plan and verified the accuracy of it. No changes to assessment and plan since the information was documented. Deshawn Black MD 02/16/22         I spent 30 total minutes, face-to-face, caring for Simeon today.  Greater than 50% of this time involved counseling and/or coordination of care as documented within this note regarding the patient's illness(es), pros and cons of various treatment options, instructions and/or risk reduction.                (Matthew Oneil MD)  Lucio Garcia MD    "

## 2022-02-16 ENCOUNTER — OFFICE VISIT (OUTPATIENT)
Dept: ONCOLOGY | Facility: CLINIC | Age: 77
End: 2022-02-16

## 2022-02-16 VITALS
RESPIRATION RATE: 18 BRPM | BODY MASS INDEX: 31.36 KG/M2 | WEIGHT: 206.9 LBS | HEART RATE: 82 BPM | HEIGHT: 68 IN | SYSTOLIC BLOOD PRESSURE: 138 MMHG | TEMPERATURE: 97.8 F | OXYGEN SATURATION: 95 % | DIASTOLIC BLOOD PRESSURE: 62 MMHG

## 2022-02-16 DIAGNOSIS — C61 PROSTATE CANCER: Primary | ICD-10-CM

## 2022-02-16 PROCEDURE — 99214 OFFICE O/P EST MOD 30 MIN: CPT | Performed by: INTERNAL MEDICINE

## 2022-02-17 ENCOUNTER — SPECIALTY PHARMACY (OUTPATIENT)
Dept: ONCOLOGY | Facility: HOSPITAL | Age: 77
End: 2022-02-17

## 2022-02-17 NOTE — PROGRESS NOTES
[facility] Specialty Pharmacy Services    Oral Oncology Patient Follow-Up      Consult Details  Consulting Provider:   Deshawn Black MD ([facility] [place] [facility] On[facility])   Medication and Regimen:  Apalutamide 240 PO daily     Prescription Review  Dosing & Interactions:   Reviewed, appropriate and no significant interaction noted at this time..   Current Specialty Pharmacy [facility] - [place], [place] - [address] [place] [address] [phone]  - [phone]      Specialty Pharmacy Follow up Note     Simeon is a 76 y.o. male, who is followed by the specialty pharmacy service for Apalutamide. This patient was seen or contacted today for a routine follow up regarding their specialty medication.     Visit Type: telephone with Essence     Indication, effectiveness, safety and convenience of his specialty medication(s) were reviewed today.     Reviewed and verified with patient: Medication, Dose, Compliance    Refill history appropriate, if able to assess: yes    Patient Consultation  History provided by: Essence  History limited by: n/a  Oral Chemo Agent: Apalutamide  Consultation complete: yes  Drug Interactions Reviewed: yes no chg    Toxicity Assessment  Dermatologic Toxicity: no  Fluid Retention: no  Gastrointestinal Toxicity: no  CNS Toxicity: no  Other Toxicity: no  Required urgent care visit or ED visit since last assessment: no    Results  Abnormal renal function:Estimated Creatinine Clearance: 84.3 mL/min (A) (by C-G formula based on SCr of 0.66 mg/dL (L)).    Abnormal LFTs: no  Abnormal CBC: ok    Concerns  Financial Concerns: no  Plan of Care Concerns: no  Other: no     Discussion with patient:    Spoke with Essence today. Everything is okay per her report. No tolerability issues. No needs. Medication is auto sent out by specialty pharmacy.     Needs Today: None  Assistance Required Today: None    Patient encouraged to contact us if any questions or concerns arise.     The next routine follow up will be  scheduled approximately 28-30 days from today.        Electronically signed 2/16/22 at 09:17 CST by:  Andrzej Hancock PharmD  Specialty Pharmacist - Hematology & Oncology  [facility]  [phone]

## 2022-03-03 NOTE — PROGRESS NOTES
[facility] ON[facility] HEMATOLOGY & ONCOLOGY  [address]  [facility] [address] [phone]    Patient Name: Simeon Tam  Encounter Date: 03/16/2022  YOB: 1945  Patient Number: [id number]      REASON FOR FOLLOW-UP: Simeon Tam is a pleasant 76 y.o.  male who is seen in follow-up for hormone refractory prostate cancer.  He is seen C22D15 of apalutamide.  He is seen with spouse Essence.  History is obtained from patient. History is considered to be accurate.         Oncology/Hematology History Overview Note   DIAGNOSTIC ABNORMALITIES:  He presented with rising PSA of 13 and was diagnosed with prostate cancer about 25 years ago.   Previous PSA was 22.26 on 10/05/2016, <0.13 on 07/17/2017, 7.91 on 10/17/2018, 19.29 on 03/19/2020, and 22.25 on 04/07/2020.   He was seen by Dr. Oneil 05/12/2020. Latest PSA was 22.25 ng/ml on 04/07/2020.  Previous PSA 22.26 on 10/05/2016.  Bone scan and CT scan of the abdomen and pelvis were negative for prostate cancer on 10/2016.  Started on Casodex and possibly leuprolide.  PSA  was down to <0.13 on 07/17/207.  Plan for apalutamide after staging scans.   CT abdomen and pelvis 06/11/2020. No evidence of metastatic disease in the abdomen or pelvis. Right renal pelvis and ureter urothelial thickening and hyperemia. Urinary bladder wall is thickened and there is adjacent inflammation. Recommend correlation with urinalysis and exam to exclude cystitis with right-sided pyelitis.  Cirrhosis.  Tiny 8 mm hypodense RIGHT inferior liver lesion on image 33 is too small to characterize.   Bone scan 06/11/2020. No evidence of bone metastases.        PREVIOUS INTERVENTIONS:  He was treated with adjuvant radiation at [place] and androgen ablation with Casodex.  Lupron and Casodex 10/2016. He had 7 months of ADT therapy with Lupron.  Apalutamide 07/22/2020 through present.     Prostate cancer (HCC)   6/29/2020 -   Chemotherapy    OP PROSTATE Apalutamide     6/30/2020 Initial Diagnosis    Prostate cancer (CMS/HCC)     7/29/2020 -  Chemotherapy    OP LEUPROLIDE Q6M         PAST MEDICAL HISTORY:  ALLERGIES:  Allergies   Allergen Reactions   • Sulfa Antibiotics Rash     CURRENT MEDICATIONS:  Outpatient Encounter Medications as of 3/16/2022   Medication Sig Dispense Refill   • Apalutamide 60 MG tablet Take 4 tablets by mouth Daily. Take 4 tablets once daily with or without food. Do not crush or chew tablets. 120 tablet 11   • Apple Irving Vn-Grn Tea-Bit Or-Cr (Apple Cider Vinegar Plus) tablet Take 1 tablet by mouth Daily.     • calcium carbonate (OS-JONNY) 600 MG tablet Take 600 mg by mouth Daily.     • dutasteride (AVODART) 0.5 MG capsule Take 1 capsule by mouth Daily. 60 capsule 11   • JANUVIA 100 MG tablet Take 100 mg by mouth Daily.     • lisinopril-hydrochlorothiazide (PRINZIDE,ZESTORETIC) 20-12.5 MG per tablet Take 1 tablet by mouth Daily.     • Loperamide HCl (IMODIUM PO) Take 1 dose by mouth As Needed.     • rosuvastatin (CRESTOR) 20 MG tablet Take 20 mg by mouth Every Night.     • tolterodine LA (DETROL LA) 4 MG 24 hr capsule Take 1 capsule by mouth Daily. 30 capsule 11     No facility-administered encounter medications on file as of 3/16/2022.     ADULT ILLNESSES:  Patient Active Problem List   Diagnosis Code   • Hypertension I10   • Hyperlipidemia E78.5   • Diabetes mellitus (HCC) E11.9   • Cancer (HCC) C80.1   • Arrhythmia I49.9   • Non morbid obesity due to excess calories E66.09   • Prostate cancer ([facility]) C61   • Anterior urethral stricture N35.914     SURGERIES:  Past Surgical History:   Procedure Laterality Date   • CATARACT EXTRACTION     • CYSTOSCOPY RETROGRADE PYELOGRAM Bilateral 4/21/2021    Procedure: CYSTOSCOPY RETROGRADE PYELOGRAM, possible DIRECT VISION INTERNAL URETHROTOMY;  Surgeon: Matthew Oneil MD;  Location: [facility];  Service: Urology;  Laterality: Bilateral;   • CYSTOSCOPY URETHROTOMY VISUAL INTERNAL  "Bilateral 4/21/2021    Procedure: possible DIRECT VISION INTERNAL URETHROTOMY;  Surgeon: Matthew Oneil MD;  Location: [facility] OR;  Service: Urology;  Laterality: Bilateral;   • EXCISION LESION      neck   • KIDNEY STONE SURGERY     • REPLACEMENT TOTAL KNEE Left    • TONSILLECTOMY       HEALTH MAINTENANCE ITEMS:  Health Maintenance Due   Topic Date Due   • URINE MICROALBUMIN  Never done   • Hepatitis B (1 of 3 - Risk 3-dose series) Never done   • TDAP/TD VACCINES (1 - Tdap) Never done   • ZOSTER VACCINE (1 of 2) Never done   • Pneumococcal Vaccine 65+ (1 of 1 - PPSV23) Never done   • HEPATITIS C SCREENING  Never done   • ANNUAL WELLNESS VISIT  Never done   • LIPID PANEL  Never done   • HEMOGLOBIN A1C  Never done   • DIABETIC EYE EXAM  Never done   • COVID-19 Vaccine (3 - Booster for Moderna series) 06/18/2021   • INFLUENZA VACCINE  Never done       <no information>  Last Completed Colonoscopy     This patient has no relevant Health Maintenance data.          There is no immunization history on file for this patient.  Last Completed Mammogram     This patient has no relevant Health Maintenance data.            FAMILY HISTORY:  Family History   Problem Relation Age of Onset   • Dementia Mother      SOCIAL HISTORY:  Social History     Socioeconomic History   • Marital status:    Tobacco Use   • Smoking status: Never Smoker   • Smokeless tobacco: Never Used   Vaping Use   • Vaping Use: Never used   Substance and Sexual Activity   • Alcohol use: No   • Drug use: No   • Sexual activity: Defer       REVIEW OF SYSTEMS:    Review of Systems   Constitutional: Positive for fatigue. Negative for chills and fever.        \"I am alright.\"   HENT: Negative for congestion, mouth sores and trouble swallowing.    Eyes: Negative for redness and visual disturbance.   Respiratory: Negative for cough, shortness of breath and wheezing.    Cardiovascular: Negative for chest pain and palpitations.   Gastrointestinal: Negative for " "abdominal pain, constipation, diarrhea, nausea and vomiting.   Endocrine: Negative for polydipsia and polyphagia.   Genitourinary: Negative for flank pain and hematuria.        He self catheterize. \"It varies.\"   Musculoskeletal: Negative for gait problem and joint swelling.   Skin: Negative for pallor.   Allergic/Immunologic: Negative for food allergies.   Neurological: Negative for dizziness, speech difficulty and weakness.   Hematological: Negative for adenopathy. Does not bruise/bleed easily.   Psychiatric/Behavioral: Negative for agitation, confusion and hallucinations.       VITAL SIGNS: /68   Pulse 92   Temp 98 °F (36.7 °C)   Resp 16   Ht 172.3 cm (67.84\")   Wt 91.2 kg (201 lb 1.6 oz)   SpO2 96%   BMI 30.72 kg/m²   Pain Score    03/16/22 1257   PainSc: 0-No pain       PHYSICAL EXAMINATION:     Physical Exam  Vitals reviewed.   Constitutional:       General: He is not in acute distress.  HENT:      Head: Normocephalic and atraumatic.   Eyes:      General: No scleral icterus.  Cardiovascular:      Rate and Rhythm: Normal rate.   Pulmonary:      Effort: No respiratory distress.      Breath sounds: No wheezing or rales.   Abdominal:      General: Bowel sounds are normal.      Palpations: Abdomen is soft.      Tenderness: There is no abdominal tenderness.   Musculoskeletal:         General: No swelling.      Cervical back: Neck supple.   Skin:     General: Skin is warm.      Coloration: Skin is not pale.   Neurological:      Mental Status: He is alert and oriented to person, place, and time.   Psychiatric:         Mood and Affect: Mood normal.         Behavior: Behavior normal.         Thought Content: Thought content normal.         Judgment: Judgment normal.         LABS    Lab Results - Last 18 Months   Lab Units 03/09/22  1334 02/09/22  1300 01/12/22  1336 12/08/21  1357 11/11/21  1339 10/11/21  1316 07/15/21  1330 06/18/21  1437 03/23/21  1340 02/26/21  1237   HEMOGLOBIN g/dL 13.3 12.6* 13.7 13.0 " 13.2 13.1   < > 13.7   < > 13.7   HEMATOCRIT % 39.4 39.3 41.0 39.7 39.4 38.8   < > 40.6   < > 38.6   MCV fL 91.0 91.2 90.3 91.3 90.6 89.6   < > 90.2   < > 86.9   WBC 10*3/mm3 6.37 5.64 6.43 6.18 6.43 6.81   < > 6.61   < > 6.11   RDW % 13.0 13.2 13.3 13.4 13.2 13.4   < > 12.9   < > 12.7   MPV fL 10.4 10.4 10.7 10.9 9.6 10.6   < > 10.7   < > 10.2   PLATELETS 10*3/mm3 130* 122* 141 124* 156 135*   < > 137*   < > 138*   IMM GRAN % %  --   --  0.3  --  0.6*  --   --  0.3  --  0.3   NEUTROS ABS 10*3/mm3 4.93 3.85 4.66 4.28 4.59 5.14   < > 4.44   < > 4.40   LYMPHS ABS 10*3/mm3 0.81 0.98 0.94 1.05 0.98 0.93   < > 1.15   < > 0.94   MONOS ABS 10*3/mm3 0.46 0.62 0.63 0.65 0.65 0.57   < > 0.79   < > 0.58   EOS ABS 10*3/mm3 0.11 0.15 0.16 0.15 0.14 0.13   < > 0.17   < > 0.14   BASOS ABS 10*3/mm3 0.04 0.02 0.02 0.03 0.03 0.03   < > 0.04   < > 0.03   IMMATURE GRANS (ABS) 10*3/mm3  --   --  0.02  --  0.04  --   --  0.02  --  0.02   NRBC /100 WBC  --   --  0.0  --  0.0  --   --  0.0  --  0.0    < > = values in this interval not displayed.       Lab Results - Last 18 Months   Lab Units 03/09/22  1334 02/09/22  1300 01/12/22  1336 12/08/21  1357 11/11/21  1339 10/11/21  1316 09/15/21  1320   GLUCOSE mg/dL 194* 175* 167* 218* 148* 225* 212*   SODIUM mmol/L 134* 137 137 137 136 136 135*   POTASSIUM mmol/L 4.0 3.8 4.3 4.1 4.3 4.3 3.8   CO2 mmol/L 26.0 30.0* 32.0* 29.0 28.0 28.0 28.0   CHLORIDE mmol/L 95* 97* 95* 96* 96* 96* 96*   ANION GAP mmol/L 13.0 10.0 10.0 12.0 12.0 12.0 11.0   CREATININE mg/dL 0.70* 0.66* 0.93 0.82 0.67* 0.75* 0.63*   BUN mg/dL 14 13 20 14 12 12 13   BUN / CREAT RATIO  20.0 19.7 21.5 17.1 17.9 16.0 20.6   CALCIUM mg/dL 9.8 9.3 10.0 10.1 10.0 10.2 9.5   EGFR IF NONAFRICN AM mL/min/1.73  --  117 79 91 115 101 123   ALK PHOS U/L 66 64 74 76 62 65 70   TOTAL PROTEIN g/dL 7.3 7.3 7.5 7.7 7.6 7.4 7.2   ALT (SGPT) U/L 18 17 24 23 25 26 19   AST (SGOT) U/L 23 22 32 32 36 31 23   BILIRUBIN mg/dL 0.3 0.4 0.3 0.3 0.3 0.4 0.3  "  ALBUMIN g/dL 4.40 4.40 4.60 4.50 4.20 4.40 4.00   GLOBULIN gm/dL 2.9 2.9 2.9 3.2 3.4 3.0 3.2       No results for input(s): MSPIKE, KAPPALAMB, IGLFLC, URICACID, FREEKAPPAL, CEA, LDH, REFLABREPO in the last 12241 hours.      Lab Results - Last 18 Months   Lab Units 09/15/21  1320 08/18/21  1329 07/15/21  1330 06/18/21  1437 05/17/21  1345 04/20/21  1414 03/23/21  1340   IRON mcg/dL  --  70  --   --   --   --   --    TIBC mcg/dL  --  328  --   --   --   --   --    IRON SATURATION %  --  21  --   --   --   --   --    FERRITIN ng/mL  --  383.40  --   --   --   --   --    TSH uIU/mL 3.010  --  3.740 3.010 3.060 4.120 3.670       Simeon Tam reports a pain score of 0.        ASSESSMENT:  1.  Prostate cancer.  PSA recurrence.   AJCC stage (TX, NX, M0)  Treatment status:Casodex and Lupron with PSA recurrence.  On apalutamide and leuprolde (by Dr. Oneil).  2.  Performance status of 1.  3.  Thrombocytopenia at 102 on 03/19/2020 from cirrhosis.  4.  Cirrhosis, contributing to thrombocytopenia and followed by PCP.  5.  Urethral stricture. Self catheterization as indicated.         PLAN:  1.    Re: Tolerating apalutamide.  \"It's doing fine.\"  2.    Re:  Heme status.  Hemoglobin 13.3 and platelet 130 from 122.  3.    Re:  Pre-office CMP.  GFR  94.3 ml/minute and glucose 194.   4.    Re:  Pre-office PSA at <0.01 from < 0.014 from < 0.014 from <0.014 from < 0.014 from < 0.014 from < 0.014 from < 0.014 from < 0.014 from < 0.014 from < 0.014 from < 0.014 from < 0.014 from < 0.014 from < 0.014 from 0.015 from 0.03 from 0.125 from 1.03 from 27.3.   5.   eRx for Compazine 10 mg p.o. every 4 hours as needed for nausea/vomiting, 60, 2 refills if needed.    6.   eRx for C22D1 started on 03/02/2022 apalutamide 60 mg, take four tablets total dose 240 mg po daily, number, 120.  Take either with or without food.  Swallow tablets whole. TSH before apalutamide.  No grapefruit juice or " "grapefruit.  Observe for adverse side effects like arrhythmias, rash, seizures, thyroid dysfunction, hypothyroidism, fall and fractures.  7.   Continue ongoing management per primary care physician and other specialists.  8.   Plan of care discussed with patient and spouse.  Understanding expressed.  Patient agreeable to proceed.  9.   Lupron per Dr. Oneil.   10.  Questions were answered to his satisfaction. \"Okay..\"  11.  Advance Care Planning   ACP discussion was declined by the patient. Patient does not have an advance directive, information provided.  12.  Return to office in 4 weeks with pre-office CMP, PSA and CBC with differential.         I have reviewed the assessment and plan and verified the accuracy of it. No changes to assessment and plan since the information was documented. Deshawn Black MD 03/16/22       I spent 31 total minutes, face-to-face, caring for Simeon today.  Greater than 50% of this time involved counseling and/or coordination of care as documented within this note regarding the patient's illness(es), pros and cons of various treatment options, instructions and/or risk reduction.                (Matthew Oneil MD)  Lucio Garcia MD  "

## 2022-03-09 ENCOUNTER — LAB (OUTPATIENT)
Dept: LAB | Facility: HOSPITAL | Age: 77
End: 2022-03-09

## 2022-03-09 DIAGNOSIS — C61 PROSTATE CANCER: ICD-10-CM

## 2022-03-09 LAB
ALBUMIN SERPL-MCNC: 4.4 G/DL (ref 3.5–5.2)
ALBUMIN/GLOB SERPL: 1.5 G/DL
ALP SERPL-CCNC: 66 U/L (ref 39–117)
ALT SERPL W P-5'-P-CCNC: 18 U/L (ref 1–41)
ANION GAP SERPL CALCULATED.3IONS-SCNC: 13 MMOL/L (ref 5–15)
AST SERPL-CCNC: 23 U/L (ref 1–40)
BASOPHILS # BLD AUTO: 0.04 10*3/MM3 (ref 0–0.2)
BASOPHILS NFR BLD AUTO: 0.6 % (ref 0–1.5)
BILIRUB SERPL-MCNC: 0.3 MG/DL (ref 0–1.2)
BUN SERPL-MCNC: 14 MG/DL (ref 8–23)
BUN/CREAT SERPL: 20 (ref 7–25)
CALCIUM SPEC-SCNC: 9.8 MG/DL (ref 8.6–10.5)
CHLORIDE SERPL-SCNC: 95 MMOL/L (ref 98–107)
CO2 SERPL-SCNC: 26 MMOL/L (ref 22–29)
CREAT SERPL-MCNC: 0.7 MG/DL (ref 0.76–1.27)
DEPRECATED RDW RBC AUTO: 43.3 FL (ref 37–54)
EGFRCR SERPLBLD CKD-EPI 2021: 94.3 ML/MIN/1.73
EOSINOPHIL # BLD AUTO: 0.11 10*3/MM3 (ref 0–0.4)
EOSINOPHIL NFR BLD AUTO: 1.7 % (ref 0.3–6.2)
ERYTHROCYTE [DISTWIDTH] IN BLOOD BY AUTOMATED COUNT: 13 % (ref 12.3–15.4)
GLOBULIN UR ELPH-MCNC: 2.9 GM/DL
GLUCOSE SERPL-MCNC: 194 MG/DL (ref 65–99)
HCT VFR BLD AUTO: 39.4 % (ref 37.5–51)
HGB BLD-MCNC: 13.3 G/DL (ref 13–17.7)
LYMPHOCYTES # BLD AUTO: 0.81 10*3/MM3 (ref 0.7–3.1)
LYMPHOCYTES NFR BLD AUTO: 12.7 % (ref 19.6–45.3)
MCH RBC QN AUTO: 30.7 PG (ref 26.6–33)
MCHC RBC AUTO-ENTMCNC: 33.8 G/DL (ref 31.5–35.7)
MCV RBC AUTO: 91 FL (ref 79–97)
MONOCYTES # BLD AUTO: 0.46 10*3/MM3 (ref 0.1–0.9)
MONOCYTES NFR BLD AUTO: 7.2 % (ref 5–12)
NEUTROPHILS NFR BLD AUTO: 4.93 10*3/MM3 (ref 1.7–7)
NEUTROPHILS NFR BLD AUTO: 77.5 % (ref 42.7–76)
PLATELET # BLD AUTO: 130 10*3/MM3 (ref 140–450)
PMV BLD AUTO: 10.4 FL (ref 6–12)
POTASSIUM SERPL-SCNC: 4 MMOL/L (ref 3.5–5.2)
PROT SERPL-MCNC: 7.3 G/DL (ref 6–8.5)
RBC # BLD AUTO: 4.33 10*6/MM3 (ref 4.14–5.8)
SODIUM SERPL-SCNC: 134 MMOL/L (ref 136–145)
WBC NRBC COR # BLD: 6.37 10*3/MM3 (ref 3.4–10.8)

## 2022-03-09 PROCEDURE — 84153 ASSAY OF PSA TOTAL: CPT

## 2022-03-09 PROCEDURE — 36415 COLL VENOUS BLD VENIPUNCTURE: CPT

## 2022-03-09 PROCEDURE — 80053 COMPREHEN METABOLIC PANEL: CPT

## 2022-03-09 PROCEDURE — 85025 COMPLETE CBC W/AUTO DIFF WBC: CPT

## 2022-03-10 LAB — PSA SERPL-MCNC: <0.014 NG/ML (ref 0–4)

## 2022-03-15 RX ORDER — PROCHLORPERAZINE MALEATE 10 MG
10 TABLET ORAL EVERY 4 HOURS PRN
Qty: 60 TABLET | Refills: 2 | Status: CANCELLED | OUTPATIENT
Start: 2022-03-15

## 2022-03-16 ENCOUNTER — OFFICE VISIT (OUTPATIENT)
Dept: ONCOLOGY | Facility: CLINIC | Age: 77
End: 2022-03-16

## 2022-03-16 VITALS
RESPIRATION RATE: 16 BRPM | SYSTOLIC BLOOD PRESSURE: 126 MMHG | DIASTOLIC BLOOD PRESSURE: 68 MMHG | WEIGHT: 201.1 LBS | HEIGHT: 68 IN | OXYGEN SATURATION: 96 % | HEART RATE: 92 BPM | TEMPERATURE: 98 F | BODY MASS INDEX: 30.48 KG/M2

## 2022-03-16 DIAGNOSIS — C61 PROSTATE CANCER: Primary | ICD-10-CM

## 2022-03-16 PROCEDURE — 99214 OFFICE O/P EST MOD 30 MIN: CPT | Performed by: INTERNAL MEDICINE

## 2022-03-21 ENCOUNTER — SPECIALTY PHARMACY (OUTPATIENT)
Dept: ONCOLOGY | Facility: HOSPITAL | Age: 77
End: 2022-03-21

## 2022-03-21 NOTE — PROGRESS NOTES
[facility]    Oral Oncology Patient Follow-Up      Consult Details  Consulting Provider:   Deshawn Black MD ([facility])   Medication and Regimen:  Erleada 240 mg PO daily     Prescription Review  Dosing & Interactions:   Reviewed, appropriate and no significant interaction noted at this time..   Current Specialty Pharmacy [facility] [place], [place] - [address] [place] [address] [phone]  - [phone]       Specialty Pharmacy Follow up Note     Simeon is a 76 y.o. male, who is followed by the specialty pharmacy service for Erleada. This patient was seen or contacted today for a routine follow up regarding their specialty medication.     Visit Type: telephone with Essence     Indication, effectiveness, safety and convenience of his specialty medication(s) were reviewed today.     Reviewed and verified with patient: Medication, Dose, Compliance    Refill history appropriate, if able to assess: yes, seems appropriate    Patient Consultation  History provided by: Essence  History limited by: none  Appropriate Historian: yes, seems appropriate  Oral Chemo Agent: Erleada  Consultation complete: yes  Drug Interactions Reviewed: yes    Compliance Assessment  Missed doses in last month?: none reported  If so, reason: N/A    Toxicity Assessment  Dermatologic Toxicity: no  Fluid Retention: no  Gastrointestinal Toxicity: no  CNS Toxicity: no  Other Toxicity: no  Required urgent care visit or ED visit since last assessment: no    Lab Results  Abnormal renal function:Estimated Creatinine Clearance: 95.1 mL/min (A) (by C-G formula based on SCr of 0.7 mg/dL (L)).  Abnormal LFTs: no  Abnormal CBC: reviewed    Concerns  Financial Concerns: no  Plan of Care Concerns: no  Other: no     Discussion with patient:    Spoke with Essence today. She reports things are going well with regard to Erleada. [facility] is very fast and on top of it getting their refills to them before they run out.  She reports Simeon is doing okay, he has a sore throat right now, for which their PCP has prescribed some antibiotics for him to take for a few days; these were prescribed very recently as a result of a tele-health visit. Essence was unable to find the prescriptions in the house at the moment in order to give me the exact names of the ones prescribed, but we discussed them. She says one of them was a liquid, called Nystatin. We reviewed no DDI with Nystatin. The other she says is an oral tablet where he takes two tablets on the first day and then one tablet daily for 4 days - we discussed this is most likely a Z-Rod (Azithromycin). No DDI with Azithromycin.     No needs, questions or concerns at this time.     Needs Today: None  Assistance Required Today: None    Patient encouraged to contact us if any questions or concerns arise.     The next routine follow up will be scheduled approximately 28-30 days from today.        Electronically signed 03/21/22 at 16:38 CDT by:  Andrzej Hancock, Jill  Specialty Pharmacist - Hematology & Oncology  [facility]  [phone]

## 2022-04-01 NOTE — PROGRESS NOTES
[facility] HEMATOLOGY & ONCOLOGY  [address]  [facility] [address] [phone]    Patient Name: Simeon Tam  Encounter Date: 04/12/2022  YOB: 1945  Patient Number: [id number]      REASON FOR FOLLOW-UP:Simeon Tam is a pleasant 76 y.o.  male who is seen in follow-up for hormone refractory prostate cancer.  He is seen C23D13 of apalutamide.  He is seen with spouse Essence. History is obtained from patient. History is considered to be accurate.      Oncology/Hematology History Overview Note   DIAGNOSTIC ABNORMALITIES:  He presented with rising PSA of 13 and was diagnosed with prostate cancer about 25 years ago.   Previous PSA was 22.26 on 10/05/2016, <0.13 on 07/17/2017, 7.91 on 10/17/2018, 19.29 on 03/19/2020, and 22.25 on 04/07/2020.   He was seen by Dr. Oneil 05/12/2020. Latest PSA was 22.25 ng/ml on 04/07/2020.  Previous PSA 22.26 on 10/05/2016.  Bone scan and CT scan of the abdomen and pelvis were negative for prostate cancer on 10/2016.  Started on Casodex and possibly leuprolide.  PSA  was down to <0.13 on 07/17/207.  Plan for apalutamide after staging scans.   CT abdomen and pelvis 06/11/2020. No evidence of metastatic disease in the abdomen or pelvis. Right renal pelvis and ureter urothelial thickening and hyperemia. Urinary bladder wall is thickened and there is adjacent inflammation. Recommend correlation with urinalysis and exam to exclude cystitis with right-sided pyelitis.  Cirrhosis.  Tiny 8 mm hypodense RIGHT inferior liver lesion on image 33 is too small to characterize.   Bone scan 06/11/2020. No evidence of bone metastases.        PREVIOUS INTERVENTIONS:  He was treated with adjuvant radiation at [place] and androgen ablation with Casodex.  Lupron and Casodex 10/2016. He had 7 months of ADT therapy with Lupron.  Apalutamide 07/22/2020 through present.     Prostate cancer (HCC)   6/29/2020 -   Chemotherapy    OP PROSTATE Apalutamide     6/30/2020 Initial Diagnosis    Prostate cancer (CMS/HCC)     7/29/2020 -  Chemotherapy    OP LEUPROLIDE Q6M         PAST MEDICAL HISTORY:  ALLERGIES:  Allergies   Allergen Reactions   • Sulfa Antibiotics Rash     CURRENT MEDICATIONS:  Outpatient Encounter Medications as of 4/12/2022   Medication Sig Dispense Refill   • Apalutamide 60 MG tablet Take 4 tablets by mouth Daily. Take 4 tablets once daily with or without food. Do not crush or chew tablets. 120 tablet 11   • Apple Irving Vn-Grn Tea-Bit Or-Cr (Apple Cider Vinegar Plus) tablet Take 1 tablet by mouth Daily.     • calcium carbonate (OS-JONNY) 600 MG tablet Take 600 mg by mouth Daily.     • dutasteride (AVODART) 0.5 MG capsule Take 1 capsule by mouth Daily. 60 capsule 11   • JANUVIA 100 MG tablet Take 100 mg by mouth Daily.     • lisinopril-hydrochlorothiazide (PRINZIDE,ZESTORETIC) 20-12.5 MG per tablet Take 1 tablet by mouth Daily.     • Loperamide HCl (IMODIUM PO) Take 1 dose by mouth As Needed.     • rosuvastatin (CRESTOR) 20 MG tablet Take 20 mg by mouth Every Night.     • tolterodine LA (DETROL LA) 4 MG 24 hr capsule Take 1 capsule by mouth Daily. 30 capsule 11     No facility-administered encounter medications on file as of 4/12/2022.     ADULT ILLNESSES:  Patient Active Problem List   Diagnosis Code   • Hypertension I10   • Hyperlipidemia E78.5   • Diabetes mellitus (HCC) E11.9   • Cancer (HCC) C80.1   • Arrhythmia I49.9   • Non morbid obesity due to excess calories E66.09   • Prostate cancer ([facility]) C61   • Anterior urethral stricture N35.914     SURGERIES:  Past Surgical History:   Procedure Laterality Date   • CATARACT EXTRACTION     • CYSTOSCOPY RETROGRADE PYELOGRAM Bilateral 4/21/2021    Procedure: CYSTOSCOPY RETROGRADE PYELOGRAM, possible DIRECT VISION INTERNAL URETHROTOMY;  Surgeon: Matthew Oneil MD;  Location: [facility];  Service: Urology;  Laterality: Bilateral;   • CYSTOSCOPY URETHROTOMY VISUAL INTERNAL  "Bilateral 4/21/2021    Procedure: possible DIRECT VISION INTERNAL URETHROTOMY;  Surgeon: Matthew Oneil MD;  Location: [facility];  Service: Urology;  Laterality: Bilateral;   • EXCISION LESION      neck   • KIDNEY STONE SURGERY     • REPLACEMENT TOTAL KNEE Left    • TONSILLECTOMY       HEALTH MAINTENANCE ITEMS:  Health Maintenance Due   Topic Date Due   • URINE MICROALBUMIN  Never done   • Pneumococcal Vaccine 65+ (1 - PCV) Never done   • Hepatitis B (1 of 3 - Risk 3-dose series) Never done   • TDAP/TD VACCINES (1 - Tdap) Never done   • ZOSTER VACCINE (1 of 2) Never done   • HEPATITIS C SCREENING  Never done   • ANNUAL WELLNESS VISIT  Never done   • LIPID PANEL  Never done   • HEMOGLOBIN A1C  Never done   • DIABETIC EYE EXAM  Never done   • COVID-19 Vaccine (3 - Booster for Moderna series) 06/18/2021       <no information>  Last Completed Colonoscopy     This patient has no relevant Health Maintenance data.          There is no immunization history on file for this patient.  Last Completed Mammogram     This patient has no relevant Health Maintenance data.            FAMILY HISTORY:  Family History   Problem Relation Age of Onset   • Dementia Mother      SOCIAL HISTORY:  Social History     Socioeconomic History   • Marital status:    Tobacco Use   • Smoking status: Never Smoker   • Smokeless tobacco: Never Used   Vaping Use   • Vaping Use: Never used   Substance and Sexual Activity   • Alcohol use: No   • Drug use: No   • Sexual activity: Defer       REVIEW OF SYSTEMS:    Review of Systems   Constitutional: Positive for fatigue. Negative for chills and fever.        \"Low energy.\"   HENT: Negative for congestion, facial swelling and mouth sores.    Eyes: Negative for redness and visual disturbance.   Respiratory: Negative for cough, shortness of breath and wheezing.    Cardiovascular: Negative for chest pain and palpitations.   Gastrointestinal: Negative for abdominal pain, constipation, diarrhea, nausea " "and vomiting.   Endocrine: Negative for polydipsia and polyphagia.   Genitourinary: Positive for difficulty urinating. Negative for flank pain and hematuria.        Self catheterize as needed.   Musculoskeletal: Negative for gait problem and joint swelling.   Skin: Negative for pallor.   Allergic/Immunologic: Negative for food allergies.   Neurological: Negative for dizziness, speech difficulty and weakness.   Hematological: Negative for adenopathy. Does not bruise/bleed easily.   Psychiatric/Behavioral: Negative for agitation, confusion and hallucinations.       VITAL SIGNS: /64   Pulse 65   Temp 97.8 °F (36.6 °C)   Resp 18   Ht 172.3 cm (67.84\")   Wt 91.6 kg (202 lb)   SpO2 97%   BMI 30.86 kg/m²   Pain Score    04/12/22 1501   PainSc: 0-No pain       PHYSICAL EXAMINATION:     Physical Exam  Vitals reviewed.   Constitutional:       General: He is not in acute distress.  HENT:      Head: Normocephalic and atraumatic.   Eyes:      General: No scleral icterus.  Cardiovascular:      Rate and Rhythm: Normal rate.   Pulmonary:      Effort: No respiratory distress.      Breath sounds: No wheezing.   Abdominal:      General: Bowel sounds are normal.      Palpations: Abdomen is soft.      Tenderness: There is no abdominal tenderness.   Musculoskeletal:         General: No swelling.      Cervical back: Neck supple.   Skin:     General: Skin is warm and dry.      Coloration: Skin is not pale.   Neurological:      Mental Status: He is alert and oriented to person, place, and time.   Psychiatric:         Mood and Affect: Mood normal.         Behavior: Behavior normal.         Thought Content: Thought content normal.         Judgment: Judgment normal.         LABS    Lab Results - Last 18 Months   Lab Units 04/06/22  1337 03/09/22  1334 02/09/22  1300 01/12/22  1336 12/08/21  1357 11/11/21  1339 07/15/21  1330 06/18/21  1437 03/23/21  1340 02/26/21  1237   HEMOGLOBIN g/dL 13.9 13.3 12.6* 13.7 13.0 13.2   < > 13.7   " < > 13.7   HEMATOCRIT % 41.0 39.4 39.3 41.0 39.7 39.4   < > 40.6   < > 38.6   MCV fL 91.5 91.0 91.2 90.3 91.3 90.6   < > 90.2   < > 86.9   WBC 10*3/mm3 5.81 6.37 5.64 6.43 6.18 6.43   < > 6.61   < > 6.11   RDW % 13.0 13.0 13.2 13.3 13.4 13.2   < > 12.9   < > 12.7   MPV fL 10.8 10.4 10.4 10.7 10.9 9.6   < > 10.7   < > 10.2   PLATELETS 10*3/mm3 133* 130* 122* 141 124* 156   < > 137*   < > 138*   IMM GRAN % %  --   --   --  0.3  --  0.6*  --  0.3  --  0.3   NEUTROS ABS 10*3/mm3 4.06 4.93 3.85 4.66 4.28 4.59   < > 4.44   < > 4.40   LYMPHS ABS 10*3/mm3 0.95 0.81 0.98 0.94 1.05 0.98   < > 1.15   < > 0.94   MONOS ABS 10*3/mm3 0.55 0.46 0.62 0.63 0.65 0.65   < > 0.79   < > 0.58   EOS ABS 10*3/mm3 0.20 0.11 0.15 0.16 0.15 0.14   < > 0.17   < > 0.14   BASOS ABS 10*3/mm3 0.04 0.04 0.02 0.02 0.03 0.03   < > 0.04   < > 0.03   IMMATURE GRANS (ABS) 10*3/mm3  --   --   --  0.02  --  0.04  --  0.02  --  0.02   NRBC /100 WBC  --   --   --  0.0  --  0.0  --  0.0  --  0.0    < > = values in this interval not displayed.       Lab Results - Last 18 Months   Lab Units 04/06/22  1337 03/09/22  1334 02/09/22  1300 01/12/22  1336 12/08/21  1357 11/11/21  1339 10/11/21  1316 09/15/21  1320   GLUCOSE mg/dL 123* 194* 175* 167* 218* 148* 225* 212*   SODIUM mmol/L 136 134* 137 137 137 136 136 135*   POTASSIUM mmol/L 4.2 4.0 3.8 4.3 4.1 4.3 4.3 3.8   CO2 mmol/L 29.0 26.0 30.0* 32.0* 29.0 28.0 28.0 28.0   CHLORIDE mmol/L 97* 95* 97* 95* 96* 96* 96* 96*   ANION GAP mmol/L 10.0 13.0 10.0 10.0 12.0 12.0 12.0 11.0   CREATININE mg/dL 0.65* 0.70* 0.66* 0.93 0.82 0.67* 0.75* 0.63*   BUN mg/dL 14 14 13 20 14 12 12 13   BUN / CREAT RATIO  21.5 20.0 19.7 21.5 17.1 17.9 16.0 20.6   CALCIUM mg/dL 10.0 9.8 9.3 10.0 10.1 10.0 10.2 9.5   EGFR IF NONAFRICN AM mL/min/1.73  --   --  117 79 91 115 101 123   ALK PHOS U/L 66 66 64 74 76 62 65 70   TOTAL PROTEIN g/dL 7.5 7.3 7.3 7.5 7.7 7.6 7.4 7.2   ALT (SGPT) U/L 17 18 17 24 23 25 26 19   AST (SGOT) U/L 26 23 22 32 32  "36 31 23   BILIRUBIN mg/dL 0.4 0.3 0.4 0.3 0.3 0.3 0.4 0.3   ALBUMIN g/dL 4.40 4.40 4.40 4.60 4.50 4.20 4.40 4.00   GLOBULIN gm/dL 3.1 2.9 2.9 2.9 3.2 3.4 3.0 3.2       No results for input(s): MSPIKE, KAPPALAMB, IGLFLC, URICACID, FREEKAPPAL, CEA, LDH, REFLABREPO in the last 41210 hours.    Lab Results - Last 18 Months   Lab Units 09/15/21  1320 08/18/21  1329 07/15/21  1330 06/18/21  1437 05/17/21  1345 04/20/21  1414 03/23/21  1340   IRON mcg/dL  --  70  --   --   --   --   --    TIBC mcg/dL  --  328  --   --   --   --   --    IRON SATURATION %  --  21  --   --   --   --   --    FERRITIN ng/mL  --  383.40  --   --   --   --   --    TSH uIU/mL 3.010  --  3.740 3.010 3.060 4.120 3.670       Simeon Tam reports a pain score of 0.        ASSESSMENT:  1.   Prostate cancer.  PSA recurrence.   AJCC stage (TX, NX, M0)  Treatment status:Casodex and Lupron with PSA recurrence.  On apalutamide and leuprolde (by Dr. Oneil).  2.   Performance status of 1.  3.   Thrombocytopenia from cirrhosis.  4.   Cirrhosis, etiology of thrombocytopenia and followed by PCP.  5.   Urethral stricture. Self catheterization as indicated.         PLAN:  1.    Re: Tolerating apalutamide.  \"Pills are fine*.  2.    Re:  Heme status.  Hemoglobin 13.9, WBC 5.8, and platelet 133 from 130 from 122.  3.    Re:  Pre-office CMP.  GFR 96.4 ml/minute and glucose 123.   4.    Re:  Pre-office PSA at <0.014 from <0.01 from < 0.014 from < 0.014 from <0.014 from < 0.014 from < 0.014 from < 0.014 from < 0.014 from < 0.014 from < 0.014 from < 0.014 from < 0.014 from < 0.014 from < 0.014 from < 0.014 from 0.015 from 0.03 from 0.125 from 1.03 from 27.3.   5.   eRx for Compazine 10 mg p.o. every 4 hours as needed for nausea/vomiting, 60, 2 refills if needed.    6.   eRx for C23D1 started on 03/30/2022 apalutamide 60 mg, take four tablets total dose 240 mg po daily, number, 120.  Take either with or without food.  Swallow tablets " "whole. TSH before apalutamide.  No grapefruit juice or grapefruit.  Observe for adverse side effects like arrhythmias, rash, seizures, hypothyroidism, fall and fractures.  7.   Continue ongoing management per primary care physician and other specialists.  8.   Plan of care discussed with patient and spouse.  Understanding expressed.  Patient agreeable to proceed.  9.   Lupron per Dr. Oneil.   10.  Questions were answered to his satisfaction. \"Good.\"  11.  Advance Care Planning   ACP discussion was declined by the patient. Patient does not have an advance directive, information provided.  12.  Return to office in 4 weeks with pre-office CMP, PSA and CBC with differential.         I have reviewed the assessment and plan and verified the accuracy of it. No changes to assessment and plan since the information was documented. Deshawn Black MD 04/12/22       I spent 32 total minutes, face-to-face, caring for Simeon mcdonald.  Greater than 50% of this time involved counseling and/or coordination of care as documented within this note regarding the patient's illness(es), pros and cons of various treatment options, instructions and/or risk reduction.                (Matthew Oneil MD)  Lucio Garcia MD      "

## 2022-04-06 ENCOUNTER — LAB (OUTPATIENT)
Dept: LAB | Facility: HOSPITAL | Age: 77
End: 2022-04-06

## 2022-04-06 DIAGNOSIS — C61 PROSTATE CANCER: ICD-10-CM

## 2022-04-06 LAB
ALBUMIN SERPL-MCNC: 4.4 G/DL (ref 3.5–5.2)
ALBUMIN/GLOB SERPL: 1.4 G/DL
ALP SERPL-CCNC: 66 U/L (ref 39–117)
ALT SERPL W P-5'-P-CCNC: 17 U/L (ref 1–41)
ANION GAP SERPL CALCULATED.3IONS-SCNC: 10 MMOL/L (ref 5–15)
AST SERPL-CCNC: 26 U/L (ref 1–40)
BASOPHILS # BLD AUTO: 0.04 10*3/MM3 (ref 0–0.2)
BASOPHILS NFR BLD AUTO: 0.7 % (ref 0–1.5)
BILIRUB SERPL-MCNC: 0.4 MG/DL (ref 0–1.2)
BUN SERPL-MCNC: 14 MG/DL (ref 8–23)
BUN/CREAT SERPL: 21.5 (ref 7–25)
CALCIUM SPEC-SCNC: 10 MG/DL (ref 8.6–10.5)
CHLORIDE SERPL-SCNC: 97 MMOL/L (ref 98–107)
CO2 SERPL-SCNC: 29 MMOL/L (ref 22–29)
CREAT SERPL-MCNC: 0.65 MG/DL (ref 0.76–1.27)
DEPRECATED RDW RBC AUTO: 43.8 FL (ref 37–54)
EGFRCR SERPLBLD CKD-EPI 2021: 96.4 ML/MIN/1.73
EOSINOPHIL # BLD AUTO: 0.2 10*3/MM3 (ref 0–0.4)
EOSINOPHIL NFR BLD AUTO: 3.4 % (ref 0.3–6.2)
ERYTHROCYTE [DISTWIDTH] IN BLOOD BY AUTOMATED COUNT: 13 % (ref 12.3–15.4)
GLOBULIN UR ELPH-MCNC: 3.1 GM/DL
GLUCOSE SERPL-MCNC: 123 MG/DL (ref 65–99)
HCT VFR BLD AUTO: 41 % (ref 37.5–51)
HGB BLD-MCNC: 13.9 G/DL (ref 13–17.7)
LYMPHOCYTES # BLD AUTO: 0.95 10*3/MM3 (ref 0.7–3.1)
LYMPHOCYTES NFR BLD AUTO: 16.4 % (ref 19.6–45.3)
MCH RBC QN AUTO: 31 PG (ref 26.6–33)
MCHC RBC AUTO-ENTMCNC: 33.9 G/DL (ref 31.5–35.7)
MCV RBC AUTO: 91.5 FL (ref 79–97)
MONOCYTES # BLD AUTO: 0.55 10*3/MM3 (ref 0.1–0.9)
MONOCYTES NFR BLD AUTO: 9.5 % (ref 5–12)
NEUTROPHILS NFR BLD AUTO: 4.06 10*3/MM3 (ref 1.7–7)
NEUTROPHILS NFR BLD AUTO: 69.8 % (ref 42.7–76)
PLATELET # BLD AUTO: 133 10*3/MM3 (ref 140–450)
PMV BLD AUTO: 10.8 FL (ref 6–12)
POTASSIUM SERPL-SCNC: 4.2 MMOL/L (ref 3.5–5.2)
PROT SERPL-MCNC: 7.5 G/DL (ref 6–8.5)
RBC # BLD AUTO: 4.48 10*6/MM3 (ref 4.14–5.8)
SODIUM SERPL-SCNC: 136 MMOL/L (ref 136–145)
WBC NRBC COR # BLD: 5.81 10*3/MM3 (ref 3.4–10.8)

## 2022-04-06 PROCEDURE — 36415 COLL VENOUS BLD VENIPUNCTURE: CPT

## 2022-04-06 PROCEDURE — 85025 COMPLETE CBC W/AUTO DIFF WBC: CPT

## 2022-04-06 PROCEDURE — 80053 COMPREHEN METABOLIC PANEL: CPT

## 2022-04-06 PROCEDURE — 84153 ASSAY OF PSA TOTAL: CPT

## 2022-04-07 LAB — PSA SERPL-MCNC: <0.014 NG/ML (ref 0–4)

## 2022-04-11 RX ORDER — PROCHLORPERAZINE MALEATE 10 MG
10 TABLET ORAL EVERY 4 HOURS PRN
Qty: 60 TABLET | Refills: 2 | Status: CANCELLED | OUTPATIENT
Start: 2022-04-11

## 2022-04-12 ENCOUNTER — OFFICE VISIT (OUTPATIENT)
Dept: ONCOLOGY | Facility: CLINIC | Age: 77
End: 2022-04-12

## 2022-04-12 VITALS
SYSTOLIC BLOOD PRESSURE: 138 MMHG | HEIGHT: 68 IN | OXYGEN SATURATION: 97 % | TEMPERATURE: 97.8 F | DIASTOLIC BLOOD PRESSURE: 64 MMHG | BODY MASS INDEX: 30.62 KG/M2 | HEART RATE: 65 BPM | RESPIRATION RATE: 18 BRPM | WEIGHT: 202 LBS

## 2022-04-12 DIAGNOSIS — C61 PROSTATE CANCER: Primary | ICD-10-CM

## 2022-04-12 PROCEDURE — 99214 OFFICE O/P EST MOD 30 MIN: CPT | Performed by: INTERNAL MEDICINE

## 2022-04-14 ENCOUNTER — PATIENT OUTREACH (OUTPATIENT)
Dept: ONCOLOGY | Facility: HOSPITAL | Age: 77
End: 2022-04-14

## 2022-04-14 NOTE — OUTREACH NOTE
[facility]    Oral Oncology Patient Outreach      Prescription Details  Consulting Provider:   Deshawn Black MD ([facility] [place] [facility])   Medication and Regimen:  Erleada 240 mg PO daily       An attempt was made by the [facility] to contact this patient for a follow-up on their chemotherapy medication. The [facility] can be reached at [phone].        Andrzej Hancock PharmD  04/14/22  17:48 CDT     Unable to reach. No return call yet  Andrzej Hancock PharmD  04/19/22  14:58 CDT

## 2022-05-03 NOTE — PROGRESS NOTES
[facility] ON[facility] HEMATOLOGY & ONCOLOGY  [address]  [place] [address] [phone]    Patient Name: Simeon Tam  Encounter Date: 05/11/2022  YOB: 1945  Patient Number: [id number]      REASON FOR FOLLOW-UP:Simeon Tam is a pleasant 76 y.o.  male who is seen in follow-up for hormone refractory prostate cancer.  He is seen C24D15 of apalutamide.  He is seen with spouse, Essence. History is obtained from patient.  History is considered to be accurate.         Oncology/Hematology History Overview Note   DIAGNOSTIC ABNORMALITIES:  He presented with rising PSA of 13 and was diagnosed with prostate cancer about 25 years ago.   Previous PSA was 22.26 on 10/05/2016, <0.13 on 07/17/2017, 7.91 on 10/17/2018, 19.29 on 03/19/2020, and 22.25 on 04/07/2020.   He was seen by Dr. Oneil 05/12/2020. Latest PSA was 22.25 ng/ml on 04/07/2020.  Previous PSA 22.26 on 10/05/2016.  Bone scan and CT scan of the abdomen and pelvis were negative for prostate cancer on 10/2016.  Started on Casodex and possibly leuprolide.  PSA  was down to <0.13 on 07/17/207.  Plan for apalutamide after staging scans.   CT abdomen and pelvis 06/11/2020. No evidence of metastatic disease in the abdomen or pelvis. Right renal pelvis and ureter urothelial thickening and hyperemia. Urinary bladder wall is thickened and there is adjacent inflammation. Recommend correlation with urinalysis and exam to exclude cystitis with right-sided pyelitis.  Cirrhosis.  Tiny 8 mm hypodense RIGHT inferior liver lesion on image 33 is too small to characterize.   Bone scan 06/11/2020. No evidence of bone metastases.        PREVIOUS INTERVENTIONS:  He was treated with adjuvant radiation at [place] and androgen ablation with Casodex.  Lupron and Casodex 10/2016. He had 7 months of ADT therapy with Lupron.  Apalutamide 07/22/2020 through present.     Prostate cancer (HCC)   6/29/2020 -   Chemotherapy    OP PROSTATE Apalutamide     6/30/2020 Initial Diagnosis    Prostate cancer (CMS/HCC)     7/29/2020 -  Chemotherapy    OP LEUPROLIDE Q6M         PAST MEDICAL HISTORY:  ALLERGIES:  Allergies   Allergen Reactions   • Sulfa Antibiotics Rash     CURRENT MEDICATIONS:  Outpatient Encounter Medications as of 5/11/2022   Medication Sig Dispense Refill   • Apalutamide 60 MG tablet Take 4 tablets by mouth Daily. Take 4 tablets once daily with or without food. Do not crush or chew tablets. 120 tablet 11   • Apple Irving Vn-Grn Tea-Bit Or-Cr (Apple Cider Vinegar Plus) tablet Take 1 tablet by mouth Daily.     • calcium carbonate (OS-JONNY) 600 MG tablet Take 600 mg by mouth Daily.     • dutasteride (AVODART) 0.5 MG capsule Take 1 capsule by mouth Daily. 60 capsule 11   • JANUVIA 100 MG tablet Take 100 mg by mouth Daily.     • lisinopril-hydrochlorothiazide (PRINZIDE,ZESTORETIC) 20-12.5 MG per tablet Take 1 tablet by mouth Daily.     • Loperamide HCl (IMODIUM PO) Take 1 dose by mouth As Needed.     • rosuvastatin (CRESTOR) 20 MG tablet Take 20 mg by mouth Every Night.     • tolterodine LA (DETROL LA) 4 MG 24 hr capsule Take 1 capsule by mouth Daily. 30 capsule 11     No facility-administered encounter medications on file as of 5/11/2022.     ADULT ILLNESSES:  Patient Active Problem List   Diagnosis Code   • Hypertension I10   • Hyperlipidemia E78.5   • Diabetes mellitus (HCC) E11.9   • Cancer (HCC) C80.1   • Arrhythmia I49.9   • Non morbid obesity due to excess calories E66.09   • Prostate cancer ([facility]) C61   • Anterior urethral stricture N35.914     SURGERIES:  Past Surgical History:   Procedure Laterality Date   • CATARACT EXTRACTION     • CYSTOSCOPY RETROGRADE PYELOGRAM Bilateral 4/21/2021    Procedure: CYSTOSCOPY RETROGRADE PYELOGRAM, possible DIRECT VISION INTERNAL URETHROTOMY;  Surgeon: Matthew Oneil MD;  Location: [facility];  Service: Urology;  Laterality: Bilateral;   • CYSTOSCOPY URETHROTOMY VISUAL INTERNAL  Bilateral 4/21/2021    Procedure: possible DIRECT VISION INTERNAL URETHROTOMY;  Surgeon: Matthew Oneil MD;  Location: [facility];  Service: Urology;  Laterality: Bilateral;   • EXCISION LESION      neck   • KIDNEY STONE SURGERY     • REPLACEMENT TOTAL KNEE Left    • TONSILLECTOMY       HEALTH MAINTENANCE ITEMS:  Health Maintenance Due   Topic Date Due   • URINE MICROALBUMIN  Never done   • Pneumococcal Vaccine 65+ (1 - PCV) Never done   • Hepatitis B (1 of 3 - Risk 3-dose series) Never done   • TDAP/TD VACCINES (1 - Tdap) Never done   • ZOSTER VACCINE (1 of 2) Never done   • HEPATITIS C SCREENING  Never done   • ANNUAL WELLNESS VISIT  Never done   • LIPID PANEL  Never done   • HEMOGLOBIN A1C  Never done   • DIABETIC EYE EXAM  Never done       <no information>  Last Completed Colonoscopy     This patient has no relevant Health Maintenance data.          There is no immunization history on file for this patient.  Last Completed Mammogram     This patient has no relevant Health Maintenance data.            FAMILY HISTORY:  Family History   Problem Relation Age of Onset   • Dementia Mother      SOCIAL HISTORY:  Social History     Socioeconomic History   • Marital status:    Tobacco Use   • Smoking status: Never Smoker   • Smokeless tobacco: Never Used   Vaping Use   • Vaping Use: Never used   Substance and Sexual Activity   • Alcohol use: No   • Drug use: No   • Sexual activity: Defer       REVIEW OF SYSTEMS:    Review of Systems   Constitutional: Positive for fatigue. Negative for chills and fever.   HENT: Negative for congestion, mouth sores and trouble swallowing.    Eyes: Negative for redness and visual disturbance.   Respiratory: Negative for cough, shortness of breath and wheezing.    Cardiovascular: Negative for chest pain and palpitations.   Gastrointestinal: Negative for abdominal pain, constipation, diarrhea, nausea and vomiting.   Endocrine: Negative for polydipsia and polyphagia.   Genitourinary:  "Negative for flank pain and hematuria.        He has to self catheterize as needed.    Musculoskeletal: Negative for gait problem and neck stiffness.   Skin: Negative for pallor.   Allergic/Immunologic: Negative for food allergies.   Neurological: Negative for dizziness, speech difficulty and weakness.   Hematological: Negative for adenopathy. Does not bruise/bleed easily.   Psychiatric/Behavioral: Negative for agitation, confusion and hallucinations.       VITAL SIGNS: /68   Pulse 84   Temp 97.9 °F (36.6 °C)   Resp 18   Ht 172.3 cm (67.84\")   Wt 91.4 kg (201 lb 8 oz)   SpO2 96%   BMI 30.78 kg/m²   Pain Score    05/11/22 1347   PainSc: 0-No pain       PHYSICAL EXAMINATION:     Physical Exam  Vitals reviewed.   Constitutional:       General: He is not in acute distress.  HENT:      Head: Normocephalic and atraumatic.   Eyes:      General: No scleral icterus.  Cardiovascular:      Rate and Rhythm: Normal rate.   Pulmonary:      Effort: No respiratory distress.      Breath sounds: No wheezing or rales.   Abdominal:      General: Bowel sounds are normal.      Palpations: Abdomen is soft.      Tenderness: There is no abdominal tenderness.   Musculoskeletal:         General: No swelling.      Cervical back: Neck supple.   Skin:     General: Skin is warm and dry.      Coloration: Skin is not pale.   Neurological:      Mental Status: He is alert and oriented to person, place, and time.   Psychiatric:         Mood and Affect: Mood normal.         Behavior: Behavior normal.         Thought Content: Thought content normal.         Judgment: Judgment normal.         LABS    Lab Results - Last 18 Months   Lab Units 05/04/22  1324 04/06/22  1337 03/09/22  1334 02/09/22  1300 01/12/22  1336 12/08/21  1357 11/11/21  1339 07/15/21  1330 06/18/21  1437 03/23/21  1340 02/26/21  1237   HEMOGLOBIN g/dL 13.0 13.9 13.3 12.6* 13.7 13.0 13.2   < > 13.7   < > 13.7   HEMATOCRIT % 39.0 41.0 39.4 39.3 41.0 39.7 39.4   < > 40.6   < " > 38.6   MCV fL 91.3 91.5 91.0 91.2 90.3 91.3 90.6   < > 90.2   < > 86.9   WBC 10*3/mm3 7.94 5.81 6.37 5.64 6.43 6.18 6.43   < > 6.61   < > 6.11   RDW % 13.1 13.0 13.0 13.2 13.3 13.4 13.2   < > 12.9   < > 12.7   MPV fL 10.7 10.8 10.4 10.4 10.7 10.9 9.6   < > 10.7   < > 10.2   PLATELETS 10*3/mm3 120* 133* 130* 122* 141 124* 156   < > 137*   < > 138*   IMM GRAN % %  --   --   --   --  0.3  --  0.6*  --  0.3  --  0.3   NEUTROS ABS 10*3/mm3 6.19 4.06 4.93 3.85 4.66 4.28 4.59   < > 4.44   < > 4.40   LYMPHS ABS 10*3/mm3 0.90 0.95 0.81 0.98 0.94 1.05 0.98   < > 1.15   < > 0.94   MONOS ABS 10*3/mm3 0.63 0.55 0.46 0.62 0.63 0.65 0.65   < > 0.79   < > 0.58   EOS ABS 10*3/mm3 0.16 0.20 0.11 0.15 0.16 0.15 0.14   < > 0.17   < > 0.14   BASOS ABS 10*3/mm3 0.04 0.04 0.04 0.02 0.02 0.03 0.03   < > 0.04   < > 0.03   IMMATURE GRANS (ABS) 10*3/mm3  --   --   --   --  0.02  --  0.04  --  0.02  --  0.02   NRBC /100 WBC  --   --   --   --  0.0  --  0.0  --  0.0  --  0.0    < > = values in this interval not displayed.       Lab Results - Last 18 Months   Lab Units 05/04/22  1324 04/06/22  1337 03/09/22  1334 02/09/22  1300 01/12/22  1336 12/08/21  1357 11/11/21  1339 10/11/21  1316 09/15/21  1320   GLUCOSE mg/dL 142* 123* 194* 175* 167* 218* 148* 225* 212*   SODIUM mmol/L 133* 136 134* 137 137 137 136 136 135*   POTASSIUM mmol/L 4.1 4.2 4.0 3.8 4.3 4.1 4.3 4.3 3.8   CO2 mmol/L 26.0 29.0 26.0 30.0* 32.0* 29.0 28.0 28.0 28.0   CHLORIDE mmol/L 95* 97* 95* 97* 95* 96* 96* 96* 96*   ANION GAP mmol/L 12.0 10.0 13.0 10.0 10.0 12.0 12.0 12.0 11.0   CREATININE mg/dL 0.72* 0.65* 0.70* 0.66* 0.93 0.82 0.67* 0.75* 0.63*   BUN mg/dL 18 14 14 13 20 14 12 12 13   BUN / CREAT RATIO  25.0 21.5 20.0 19.7 21.5 17.1 17.9 16.0 20.6   CALCIUM mg/dL 9.7 10.0 9.8 9.3 10.0 10.1 10.0 10.2 9.5   EGFR IF NONAFRICN AM mL/min/1.73  --   --   --  117 79 91 115 101 123   ALK PHOS U/L 63 66 66 64 74 76 62 65 70   TOTAL PROTEIN g/dL 7.1 7.5 7.3 7.3 7.5 7.7 7.6 7.4 7.2  "  ALT (SGPT) U/L 20 17 18 17 24 23 25 26 19   AST (SGOT) U/L 24 26 23 22 32 32 36 31 23   BILIRUBIN mg/dL 0.4 0.4 0.3 0.4 0.3 0.3 0.3 0.4 0.3   ALBUMIN g/dL 4.20 4.40 4.40 4.40 4.60 4.50 4.20 4.40 4.00   GLOBULIN gm/dL 2.9 3.1 2.9 2.9 2.9 3.2 3.4 3.0 3.2       No results for input(s): MSPIKE, KAPPALAMB, IGLFLC, URICACID, FREEKAPPAL, CEA, LDH, REFLABREPO in the last 14236 hours.    Lab Results - Last 18 Months   Lab Units 09/15/21  1320 08/18/21  1329 07/15/21  1330 06/18/21  1437 05/17/21  1345 04/20/21  1414 03/23/21  1340   IRON mcg/dL  --  70  --   --   --   --   --    TIBC mcg/dL  --  328  --   --   --   --   --    IRON SATURATION %  --  21  --   --   --   --   --    FERRITIN ng/mL  --  383.40  --   --   --   --   --    TSH uIU/mL 3.010  --  3.740 3.010 3.060 4.120 3.670       Simeon Tam reports a pain score of 0.        ASSESSMENT:  1.   Prostate cancer.  PSA recurrence.   AJCC stage (TX, NX, M0)  Treatment status:Casodex and Lupron with PSA recurrence.  On apalutamide and leuprolde (by Dr. Oneil).  2.   Performance status of 1.  3.   Thrombocytopenia from cirrhosis.  4.   Cirrhosis, etiology of thrombocytopenia and followed by PCP.  5.   Urethral stricture. Self catheterization as indicated.         PLAN:  1.    Re: Tolerating apalutamide.  \"Taking it every night.\"  2.    Re:  Heme status.  Hemoglobin 13, WBC 7.94, and platelet 120 from 133 from 130 from 122.  3.    Re:  Pre-office CMP.  GFR 93.5 ml/minute and glucose 142.   4.    Re:  Pre-office PSA at <0.014 from <0.014 from <0.01 from < 0.014 from < 0.014 from <0.014 from < 0.014 from < 0.014 from < 0.014 from < 0.014 from < 0.014 from < 0.014 from < 0.014 from < 0.014 from < 0.014 from < 0.014 from < 0.014 from 0.015 from 0.03 from 0.125 from 1.03 from 27.3.   5.   eRx for Compazine 10 mg p.o. every 4 hours as needed for nausea/vomiting, 60, 2 refills if needed.    6.   eRx for C24D1 started on 04/27/2022 apalutamide 60 mg, " "take four tablets total dose 240 mg po daily, number, 120.  Take either with or without food.  Swallow tablets whole. TSH before apalutamide.  No grapefruit juice or grapefruit.  Observe for adverse side effects like arrhythmias, rash, seizures, hypothyroidism, fall and fractures.  7.   Continue ongoing management per primary care physician and other specialists.  8.   Plan of care discussed with patient and spouse.  Understanding expressed.  Patient agreeable to proceed.  9.   Lupron per Dr. Oneil.   10.  Questions were answered to his satisfaction. \"Yea.\"  11.  Advance Care Planning   ACP discussion was declined by the patient. Patient does not have an advance directive, information provided.  12.  Return to office in 4 weeks with pre-office CMP, PSA and CBC with differential.        I have reviewed the assessment and plan and verified the accuracy of it. No changes to assessment and plan since the information was documented. Deshawn Black MD 05/11/22        I spent 31 total minutes, face-to-face, caring for Simeon today.  Greater than 50% of this time involved counseling and/or coordination of care as documented within this note regarding the patient's illness(es), pros and cons of various treatment options, instructions and/or risk reduction.              (Matthew Oneil MD)  Lucio Garcia MD    "

## 2022-05-04 ENCOUNTER — LAB (OUTPATIENT)
Dept: LAB | Facility: HOSPITAL | Age: 77
End: 2022-05-04

## 2022-05-04 DIAGNOSIS — C61 PROSTATE CANCER: ICD-10-CM

## 2022-05-04 LAB
ALBUMIN SERPL-MCNC: 4.2 G/DL (ref 3.5–5.2)
ALBUMIN/GLOB SERPL: 1.4 G/DL
ALP SERPL-CCNC: 63 U/L (ref 39–117)
ALT SERPL W P-5'-P-CCNC: 20 U/L (ref 1–41)
ANION GAP SERPL CALCULATED.3IONS-SCNC: 12 MMOL/L (ref 5–15)
AST SERPL-CCNC: 24 U/L (ref 1–40)
BASOPHILS # BLD AUTO: 0.04 10*3/MM3 (ref 0–0.2)
BASOPHILS NFR BLD AUTO: 0.5 % (ref 0–1.5)
BILIRUB SERPL-MCNC: 0.4 MG/DL (ref 0–1.2)
BUN SERPL-MCNC: 18 MG/DL (ref 8–23)
BUN/CREAT SERPL: 25 (ref 7–25)
CALCIUM SPEC-SCNC: 9.7 MG/DL (ref 8.6–10.5)
CHLORIDE SERPL-SCNC: 95 MMOL/L (ref 98–107)
CO2 SERPL-SCNC: 26 MMOL/L (ref 22–29)
CREAT SERPL-MCNC: 0.72 MG/DL (ref 0.76–1.27)
DEPRECATED RDW RBC AUTO: 43.9 FL (ref 37–54)
EGFRCR SERPLBLD CKD-EPI 2021: 93.5 ML/MIN/1.73
EOSINOPHIL # BLD AUTO: 0.16 10*3/MM3 (ref 0–0.4)
EOSINOPHIL NFR BLD AUTO: 2 % (ref 0.3–6.2)
ERYTHROCYTE [DISTWIDTH] IN BLOOD BY AUTOMATED COUNT: 13.1 % (ref 12.3–15.4)
GLOBULIN UR ELPH-MCNC: 2.9 GM/DL
GLUCOSE SERPL-MCNC: 142 MG/DL (ref 65–99)
HCT VFR BLD AUTO: 39 % (ref 37.5–51)
HGB BLD-MCNC: 13 G/DL (ref 13–17.7)
LYMPHOCYTES # BLD AUTO: 0.9 10*3/MM3 (ref 0.7–3.1)
LYMPHOCYTES NFR BLD AUTO: 11.3 % (ref 19.6–45.3)
MCH RBC QN AUTO: 30.4 PG (ref 26.6–33)
MCHC RBC AUTO-ENTMCNC: 33.3 G/DL (ref 31.5–35.7)
MCV RBC AUTO: 91.3 FL (ref 79–97)
MONOCYTES # BLD AUTO: 0.63 10*3/MM3 (ref 0.1–0.9)
MONOCYTES NFR BLD AUTO: 7.9 % (ref 5–12)
NEUTROPHILS NFR BLD AUTO: 6.19 10*3/MM3 (ref 1.7–7)
NEUTROPHILS NFR BLD AUTO: 78 % (ref 42.7–76)
PLATELET # BLD AUTO: 120 10*3/MM3 (ref 140–450)
PMV BLD AUTO: 10.7 FL (ref 6–12)
POTASSIUM SERPL-SCNC: 4.1 MMOL/L (ref 3.5–5.2)
PROT SERPL-MCNC: 7.1 G/DL (ref 6–8.5)
RBC # BLD AUTO: 4.27 10*6/MM3 (ref 4.14–5.8)
SODIUM SERPL-SCNC: 133 MMOL/L (ref 136–145)
WBC NRBC COR # BLD: 7.94 10*3/MM3 (ref 3.4–10.8)

## 2022-05-04 PROCEDURE — 84153 ASSAY OF PSA TOTAL: CPT

## 2022-05-04 PROCEDURE — 85025 COMPLETE CBC W/AUTO DIFF WBC: CPT

## 2022-05-04 PROCEDURE — 36415 COLL VENOUS BLD VENIPUNCTURE: CPT

## 2022-05-04 PROCEDURE — 80053 COMPREHEN METABOLIC PANEL: CPT

## 2022-05-05 LAB — PSA SERPL-MCNC: <0.014 NG/ML (ref 0–4)

## 2022-05-10 RX ORDER — PROCHLORPERAZINE MALEATE 10 MG
10 TABLET ORAL EVERY 4 HOURS PRN
Qty: 60 TABLET | Refills: 2 | Status: CANCELLED | OUTPATIENT
Start: 2022-05-10

## 2022-05-11 ENCOUNTER — OFFICE VISIT (OUTPATIENT)
Dept: ONCOLOGY | Facility: CLINIC | Age: 77
End: 2022-05-11

## 2022-05-11 VITALS
BODY MASS INDEX: 30.54 KG/M2 | WEIGHT: 201.5 LBS | OXYGEN SATURATION: 96 % | RESPIRATION RATE: 18 BRPM | HEIGHT: 68 IN | SYSTOLIC BLOOD PRESSURE: 122 MMHG | DIASTOLIC BLOOD PRESSURE: 68 MMHG | TEMPERATURE: 97.9 F | HEART RATE: 84 BPM

## 2022-05-11 DIAGNOSIS — C61 PROSTATE CANCER: Primary | ICD-10-CM

## 2022-05-11 PROCEDURE — 99214 OFFICE O/P EST MOD 30 MIN: CPT | Performed by: INTERNAL MEDICINE

## 2022-05-19 ENCOUNTER — SPECIALTY PHARMACY (OUTPATIENT)
Dept: ONCOLOGY | Facility: HOSPITAL | Age: 77
End: 2022-05-19

## 2022-05-19 RX ORDER — DICLOFENAC SODIUM 75 MG/1
75 TABLET, DELAYED RELEASE ORAL 2 TIMES DAILY PRN
COMMUNITY

## 2022-05-19 NOTE — PROGRESS NOTES
[facility]    Oral Oncology Patient Follow-Up      Consult Details  Consulting Provider:   Deshawn Black MD ([facility])   Medication and Regimen:  Erleada 240 mg PO daily     Prescription Review  Dosing & Interactions:   Reviewed, appropriate and no significant interaction noted at this time..   Current Specialty Pharmacy [facility] - [place], [place] - [address] [place] [address] [phone]  - [phone] FX        Intervention(s):                  Spoke with Ms. Tam for the patient's monthly routine telephone consultation follow-up regarding the oral oncology medication. She stated that he has been tolerating the Erleada really well and has not had any obvious side effects from the medication - she states that he has lost a lot of teeth but the only connection I could possible make is that there is a connection with bone fracture - she expressed understanding but wanted to mention it to me. He has not missed any doses while on the medication. The patient still receives refills through [facility], and has not had any delays in getting those refills. The patient also stated that there has been no changes to his maintenance medications and no new allergies that she is aware of.   Finally, the patient has not had any recent stays in the hospital and has not visited the ER in the last month due to the Erleada. I encouraged the patient to reach out anytime with any questions or concerns they might have regarding their oral chemotherapy.     Summary:    No needs expressed by the patient or otherwise identified. Will follow-up next month regarding the patient’s oral chemotherapy with a routine telephone consultation. The next routine follow-up will be scheduled approximately 28-30 days from today.       Electronically signed 05/19/2022 16:27 CDT by:  Carolee Diaz PharmD  Specialty Pharmacist - Hematology & Oncology  [facility]  [facility]  [phone]

## 2022-06-01 ENCOUNTER — LAB (OUTPATIENT)
Dept: LAB | Facility: HOSPITAL | Age: 77
End: 2022-06-01

## 2022-06-01 DIAGNOSIS — C61 PROSTATE CANCER: ICD-10-CM

## 2022-06-01 LAB
ALBUMIN SERPL-MCNC: 4.5 G/DL (ref 3.5–5.2)
ALBUMIN/GLOB SERPL: 1.6 G/DL
ALP SERPL-CCNC: 58 U/L (ref 39–117)
ALT SERPL W P-5'-P-CCNC: 17 U/L (ref 1–41)
ANION GAP SERPL CALCULATED.3IONS-SCNC: 12 MMOL/L (ref 5–15)
AST SERPL-CCNC: 22 U/L (ref 1–40)
BASOPHILS # BLD AUTO: 0.04 10*3/MM3 (ref 0–0.2)
BASOPHILS NFR BLD AUTO: 0.6 % (ref 0–1.5)
BILIRUB SERPL-MCNC: 0.3 MG/DL (ref 0–1.2)
BUN SERPL-MCNC: 15 MG/DL (ref 8–23)
BUN/CREAT SERPL: 21.1 (ref 7–25)
CALCIUM SPEC-SCNC: 9.7 MG/DL (ref 8.6–10.5)
CHLORIDE SERPL-SCNC: 95 MMOL/L (ref 98–107)
CO2 SERPL-SCNC: 28 MMOL/L (ref 22–29)
CREAT SERPL-MCNC: 0.71 MG/DL (ref 0.76–1.27)
DEPRECATED RDW RBC AUTO: 42.6 FL (ref 37–54)
EGFRCR SERPLBLD CKD-EPI 2021: 93.9 ML/MIN/1.73
EOSINOPHIL # BLD AUTO: 0.12 10*3/MM3 (ref 0–0.4)
EOSINOPHIL NFR BLD AUTO: 1.8 % (ref 0.3–6.2)
ERYTHROCYTE [DISTWIDTH] IN BLOOD BY AUTOMATED COUNT: 13 % (ref 12.3–15.4)
GLOBULIN UR ELPH-MCNC: 2.9 GM/DL
GLUCOSE SERPL-MCNC: 182 MG/DL (ref 65–99)
HCT VFR BLD AUTO: 39.1 % (ref 37.5–51)
HGB BLD-MCNC: 13.4 G/DL (ref 13–17.7)
IMM GRANULOCYTES # BLD AUTO: 0.03 10*3/MM3 (ref 0–0.05)
IMM GRANULOCYTES NFR BLD AUTO: 0.4 % (ref 0–0.5)
LYMPHOCYTES # BLD AUTO: 0.92 10*3/MM3 (ref 0.7–3.1)
LYMPHOCYTES NFR BLD AUTO: 13.6 % (ref 19.6–45.3)
MCH RBC QN AUTO: 30.9 PG (ref 26.6–33)
MCHC RBC AUTO-ENTMCNC: 34.3 G/DL (ref 31.5–35.7)
MCV RBC AUTO: 90.1 FL (ref 79–97)
MONOCYTES # BLD AUTO: 0.56 10*3/MM3 (ref 0.1–0.9)
MONOCYTES NFR BLD AUTO: 8.3 % (ref 5–12)
NEUTROPHILS NFR BLD AUTO: 5.07 10*3/MM3 (ref 1.7–7)
NEUTROPHILS NFR BLD AUTO: 75.3 % (ref 42.7–76)
NRBC BLD AUTO-RTO: 0 /100 WBC (ref 0–0.2)
PLATELET # BLD AUTO: 134 10*3/MM3 (ref 140–450)
PMV BLD AUTO: 10.8 FL (ref 6–12)
POTASSIUM SERPL-SCNC: 4.2 MMOL/L (ref 3.5–5.2)
PROT SERPL-MCNC: 7.4 G/DL (ref 6–8.5)
RBC # BLD AUTO: 4.34 10*6/MM3 (ref 4.14–5.8)
SODIUM SERPL-SCNC: 135 MMOL/L (ref 136–145)
WBC NRBC COR # BLD: 6.74 10*3/MM3 (ref 3.4–10.8)

## 2022-06-01 PROCEDURE — 36415 COLL VENOUS BLD VENIPUNCTURE: CPT

## 2022-06-01 PROCEDURE — 80053 COMPREHEN METABOLIC PANEL: CPT

## 2022-06-01 PROCEDURE — 84153 ASSAY OF PSA TOTAL: CPT

## 2022-06-01 PROCEDURE — 84403 ASSAY OF TOTAL TESTOSTERONE: CPT

## 2022-06-01 PROCEDURE — 85025 COMPLETE CBC W/AUTO DIFF WBC: CPT

## 2022-06-02 LAB
PSA SERPL-MCNC: <0.014 NG/ML (ref 0–4)
TESTOST SERPL-MCNC: 235 NG/DL (ref 193–740)

## 2022-06-09 ENCOUNTER — OFFICE VISIT (OUTPATIENT)
Dept: ONCOLOGY | Facility: CLINIC | Age: 77
End: 2022-06-09

## 2022-06-09 VITALS
WEIGHT: 200.2 LBS | RESPIRATION RATE: 16 BRPM | HEIGHT: 68 IN | SYSTOLIC BLOOD PRESSURE: 124 MMHG | HEART RATE: 102 BPM | TEMPERATURE: 97.8 F | BODY MASS INDEX: 30.34 KG/M2 | DIASTOLIC BLOOD PRESSURE: 70 MMHG | OXYGEN SATURATION: 94 %

## 2022-06-09 DIAGNOSIS — C61 PROSTATE CANCER: Primary | ICD-10-CM

## 2022-06-09 PROCEDURE — 99214 OFFICE O/P EST MOD 30 MIN: CPT | Performed by: INTERNAL MEDICINE

## 2022-06-15 ENCOUNTER — PATIENT OUTREACH (OUTPATIENT)
Dept: ONCOLOGY | Facility: HOSPITAL | Age: 77
End: 2022-06-15

## 2022-06-15 NOTE — OUTREACH NOTE
[facility]    Oral Oncology Patient Outreach      Prescription Details  Consulting Provider:   Deshawn Black MD ([facility])   Medication and Regimen:  Erleada 240 mg PO daily       An attempt was made by the Oral Oncology Clinic to contact this patient for a follow-up on their chemotherapy medication. The Oral Oncology Clinic can be reached at [phone].        Electronically signed 06/15/2022 15:36 CDT by:  ELIZABETH JADE PharmD  Specialty Pharmacist - Hematology & Oncology  [facility]  [phone]

## 2022-06-16 ENCOUNTER — PATIENT OUTREACH (OUTPATIENT)
Dept: ONCOLOGY | Facility: HOSPITAL | Age: 77
End: 2022-06-16

## 2022-06-16 NOTE — OUTREACH NOTE
[facility]    Oral Oncology Patient Outreach      Prescription Details  Consulting Provider:   Deshawn Black MD ([facility])   Medication and Regimen:  Erleada 240 mg PO daily       An attempt was made by the Oral Oncology Clinic to contact this patient for a follow-up on their chemotherapy medication. The Oral Oncology Clinic can be reached at [phone].        Electronically signed 06/16/2022 16:55 CDT by:  ELIZABETH JADE PharmD  Specialty Pharmacist - Hematology & Oncology  [facility]  [phone]

## 2022-06-21 NOTE — PROGRESS NOTES
[facility] ON[facility] HEMATOLOGY & ONCOLOGY  [address]  [place] [address] [phone]    Patient Name: Simeon Tam  Encounter Date: 06/30/2022  YOB: 1945  Patient Number: [id number]      REASON FOR FOLLOW-UP: Simeon Tam is a pleasant 77 y.o.  male who is seen in follow-up for hormone refractory prostate cancer.  He is seen C26D9 of apalutamide.  He is seen alone. History is obtained from patient.  History is a reliable historian.         Oncology/Hematology History Overview Note   DIAGNOSTIC ABNORMALITIES:  He presented with rising PSA of 13 and was diagnosed with prostate cancer about 25 years ago.   Previous PSA was 22.26 on 10/05/2016, <0.13 on 07/17/2017, 7.91 on 10/17/2018, 19.29 on 03/19/2020, and 22.25 on 04/07/2020.   He was seen by Dr. Oneil 05/12/2020. Latest PSA was 22.25 ng/ml on 04/07/2020.  Previous PSA 22.26 on 10/05/2016.  Bone scan and CT scan of the abdomen and pelvis were negative for prostate cancer on 10/2016.  Started on Casodex and possibly leuprolide.  PSA  was down to <0.13 on 07/17/207.  Plan for apalutamide after staging scans.   CT abdomen and pelvis 06/11/2020. No evidence of metastatic disease in the abdomen or pelvis. Right renal pelvis and ureter urothelial thickening and hyperemia. Urinary bladder wall is thickened and there is adjacent inflammation. Recommend correlation with urinalysis and exam to exclude cystitis with right-sided pyelitis.  Cirrhosis.  Tiny 8 mm hypodense RIGHT inferior liver lesion on image 33 is too small to characterize.   Bone scan 06/11/2020. No evidence of bone metastases.        PREVIOUS INTERVENTIONS:  He was treated with adjuvant radiation at [facility] and androgen ablation with Casodex.  Lupron and Casodex 10/2016. He had 7 months of ADT therapy with Lupron.  Apalutamide 07/22/2020 through present.     Prostate cancer (HCC)   6/29/2020 -  Chemotherapy    OP  PROSTATE Apalutamide     6/30/2020 Initial Diagnosis    Prostate cancer (CMS/HCC)     7/29/2020 -  Chemotherapy    OP LEUPROLIDE Q6M         PAST MEDICAL HISTORY:  ALLERGIES:  Allergies   Allergen Reactions   • Sulfa Antibiotics Rash     CURRENT MEDICATIONS:  Outpatient Encounter Medications as of 6/30/2022   Medication Sig Dispense Refill   • Apalutamide 60 MG tablet Take 4 tablets by mouth Daily. Take 4 tablets once daily with or without food. Do not crush or chew tablets. 120 tablet 11   • Apple Irving Vn-Grn Tea-Bit Or-Cr (Apple Cider Vinegar Plus) tablet Take 1 tablet by mouth Daily.     • calcium carbonate (OS-JONNY) 600 MG tablet Take 600 mg by mouth Daily.     • diclofenac (VOLTAREN) 75 MG EC tablet Take 75 mg by mouth 2 (Two) Times a Day As Needed (shoulder pain).     • dutasteride (AVODART) 0.5 MG capsule Take 1 capsule by mouth Daily. 60 capsule 11   • JANUVIA 100 MG tablet Take 100 mg by mouth Daily.     • lisinopril-hydrochlorothiazide (PRINZIDE,ZESTORETIC) 20-12.5 MG per tablet Take 1 tablet by mouth Daily.     • Loperamide HCl (IMODIUM PO) Take 1 dose by mouth As Needed.     • rosuvastatin (CRESTOR) 20 MG tablet Take 10 mg by mouth Every Night.     • tolterodine LA (DETROL LA) 4 MG 24 hr capsule Take 1 capsule by mouth Daily. 30 capsule 11     No facility-administered encounter medications on file as of 6/30/2022.     ADULT ILLNESSES:  Patient Active Problem List   Diagnosis Code   • Hypertension I10   • Hyperlipidemia E78.5   • Diabetes mellitus (HCC) E11.9   • Cancer (HCC) C80.1   • Arrhythmia I49.9   • Non morbid obesity due to excess calories E66.09   • Prostate cancer (formerly [facility]) C61   • Anterior urethral stricture N35.914     SURGERIES:  Past Surgical History:   Procedure Laterality Date   • CATARACT EXTRACTION     • CYSTOSCOPY RETROGRADE PYELOGRAM Bilateral 4/21/2021    Procedure: CYSTOSCOPY RETROGRADE PYELOGRAM, possible DIRECT VISION INTERNAL URETHROTOMY;  Surgeon: Matthew Oneil MD;  Location:   "[facility] OR;  Service: Urology;  Laterality: Bilateral;   • CYSTOSCOPY URETHROTOMY VISUAL INTERNAL Bilateral 4/21/2021    Procedure: possible DIRECT VISION INTERNAL URETHROTOMY;  Surgeon: Matthew Oneil MD;  Location:  [facility] OR;  Service: Urology;  Laterality: Bilateral;   • EXCISION LESION      neck   • KIDNEY STONE SURGERY     • REPLACEMENT TOTAL KNEE Left    • TONSILLECTOMY       HEALTH MAINTENANCE ITEMS:  Health Maintenance Due   Topic Date Due   • URINE MICROALBUMIN  Never done   • Pneumococcal Vaccine 65+ (1 - PCV) Never done   • Hepatitis B (1 of 3 - Risk 3-dose series) Never done   • TDAP/TD VACCINES (1 - Tdap) Never done   • ZOSTER VACCINE (1 of 2) Never done   • HEPATITIS C SCREENING  Never done   • ANNUAL WELLNESS VISIT  Never done   • LIPID PANEL  Never done   • HEMOGLOBIN A1C  Never done   • DIABETIC EYE EXAM  Never done   • COVID-19 Vaccine (4 - Booster) 05/18/2021       <no information>  Last Completed Colonoscopy     This patient has no relevant Health Maintenance data.          There is no immunization history on file for this patient.  Last Completed Mammogram     This patient has no relevant Health Maintenance data.            FAMILY HISTORY:  Family History   Problem Relation Age of Onset   • Dementia Mother      SOCIAL HISTORY:  Social History     Socioeconomic History   • Marital status:    Tobacco Use   • Smoking status: Never Smoker   • Smokeless tobacco: Never Used   Vaping Use   • Vaping Use: Never used   Substance and Sexual Activity   • Alcohol use: No   • Drug use: No   • Sexual activity: Defer       REVIEW OF SYSTEMS:    Review of Systems   Constitutional: Negative for chills, fatigue and fever.        \"I feel pretty good.\"   HENT: Negative for congestion, facial swelling and mouth sores.    Eyes: Negative for redness and visual disturbance.   Respiratory: Negative for cough, shortness of breath and wheezing.    Cardiovascular: Negative for chest pain and palpitations. " "  Gastrointestinal: Negative for abdominal pain, constipation, diarrhea, nausea and vomiting.   Endocrine: Negative for polydipsia and polyphagia.   Genitourinary: Negative for flank pain and hematuria.   Musculoskeletal: Negative for gait problem and joint swelling.   Skin: Negative for pallor.   Allergic/Immunologic: Negative for food allergies.   Neurological: Negative for dizziness, speech difficulty and weakness.   Hematological: Negative for adenopathy. Does not bruise/bleed easily.   Psychiatric/Behavioral: Negative for agitation, confusion and hallucinations.       VITAL SIGNS: /72   Pulse 78   Temp 97.6 °F (36.4 °C)   Resp 18   Ht 172.3 cm (67.84\")   Wt 89.2 kg (196 lb 11.2 oz)   SpO2 93%   BMI 30.05 kg/m²   Pain Score    06/30/22 1525   PainSc:   2       PHYSICAL EXAMINATION:     Physical Exam  Vitals reviewed.   Constitutional:       General: He is not in acute distress.  HENT:      Head: Normocephalic and atraumatic.   Eyes:      General: No scleral icterus.  Cardiovascular:      Rate and Rhythm: Normal rate.   Pulmonary:      Effort: No respiratory distress.      Breath sounds: No wheezing or rales.   Abdominal:      General: Bowel sounds are normal.      Palpations: Abdomen is soft.      Tenderness: There is no abdominal tenderness.   Musculoskeletal:         General: No swelling.      Cervical back: Neck supple.   Skin:     General: Skin is warm and dry.      Coloration: Skin is not pale.   Neurological:      Mental Status: He is alert and oriented to person, place, and time.   Psychiatric:         Mood and Affect: Mood normal.         Behavior: Behavior normal.         Thought Content: Thought content normal.         Judgment: Judgment normal.         LABS    Lab Results - Last 18 Months   Lab Units 06/30/22  1449 06/01/22  1345 05/04/22  1324 04/06/22  1337 03/09/22  1334 02/09/22  1300 01/12/22  1336 12/08/21  1357 11/11/21  1339 07/15/21  1330 06/18/21  1437 03/23/21  1340 " 02/26/21  1237   HEMOGLOBIN g/dL 13.6 13.4 13.0 13.9 13.3 12.6* 13.7   < > 13.2   < > 13.7   < > 13.7   HEMATOCRIT % 39.3 39.1 39.0 41.0 39.4 39.3 41.0   < > 39.4   < > 40.6   < > 38.6   MCV fL 89.9 90.1 91.3 91.5 91.0 91.2 90.3   < > 90.6   < > 90.2   < > 86.9   WBC 10*3/mm3 6.52 6.74 7.94 5.81 6.37 5.64 6.43   < > 6.43   < > 6.61   < > 6.11   RDW % 13.0 13.0 13.1 13.0 13.0 13.2 13.3   < > 13.2   < > 12.9   < > 12.7   MPV fL 10.5 10.8 10.7 10.8 10.4 10.4 10.7   < > 9.6   < > 10.7   < > 10.2   PLATELETS 10*3/mm3 140 134* 120* 133* 130* 122* 141   < > 156   < > 137*   < > 138*   IMM GRAN % %  --  0.4  --   --   --   --  0.3  --  0.6*  --  0.3  --  0.3   NEUTROS ABS 10*3/mm3 4.55 5.07 6.19 4.06 4.93 3.85 4.66   < > 4.59   < > 4.44   < > 4.40   LYMPHS ABS 10*3/mm3 1.04 0.92 0.90 0.95 0.81 0.98 0.94   < > 0.98   < > 1.15   < > 0.94   MONOS ABS 10*3/mm3 0.67 0.56 0.63 0.55 0.46 0.62 0.63   < > 0.65   < > 0.79   < > 0.58   EOS ABS 10*3/mm3 0.19 0.12 0.16 0.20 0.11 0.15 0.16   < > 0.14   < > 0.17   < > 0.14   BASOS ABS 10*3/mm3 0.04 0.04 0.04 0.04 0.04 0.02 0.02   < > 0.03   < > 0.04   < > 0.03   IMMATURE GRANS (ABS) 10*3/mm3  --  0.03  --   --   --   --  0.02  --  0.04  --  0.02  --  0.02   NRBC /100 WBC  --  0.0  --   --   --   --  0.0  --  0.0  --  0.0  --  0.0    < > = values in this interval not displayed.       Lab Results - Last 18 Months   Lab Units 06/30/22  1449 06/01/22  1345 05/04/22  1324 04/06/22  1337 03/09/22  1334 02/09/22  1300 01/12/22  1336 12/08/21  1357 11/11/21  1339 10/11/21  1316 09/15/21  1320   GLUCOSE mg/dL 126* 182* 142* 123* 194* 175* 167* 218* 148* 225* 212*   SODIUM mmol/L 136 135* 133* 136 134* 137 137 137 136 136 135*   POTASSIUM mmol/L 4.1 4.2 4.1 4.2 4.0 3.8 4.3 4.1 4.3 4.3 3.8   CO2 mmol/L 27.0 28.0 26.0 29.0 26.0 30.0* 32.0* 29.0 28.0 28.0 28.0   CHLORIDE mmol/L 98 95* 95* 97* 95* 97* 95* 96* 96* 96* 96*   ANION GAP mmol/L 11.0 12.0 12.0 10.0 13.0 10.0 10.0 12.0 12.0 12.0 11.0  "  CREATININE mg/dL 0.77 0.71* 0.72* 0.65* 0.70* 0.66* 0.93 0.82 0.67* 0.75* 0.63*   BUN mg/dL 19 15 18 14 14 13 20 14 12 12 13   BUN / CREAT RATIO  24.7 21.1 25.0 21.5 20.0 19.7 21.5 17.1 17.9 16.0 20.6   CALCIUM mg/dL 10.0 9.7 9.7 10.0 9.8 9.3 10.0 10.1 10.0 10.2 9.5   EGFR IF NONAFRICN AM mL/min/1.73  --   --   --   --   --  117 79 91 115 101 123   ALK PHOS U/L 62 58 63 66 66 64 74 76 62 65 70   TOTAL PROTEIN g/dL 7.7 7.4 7.1 7.5 7.3 7.3 7.5 7.7 7.6 7.4 7.2   ALT (SGPT) U/L 19 17 20 17 18 17 24 23 25 26 19   AST (SGOT) U/L 22 22 24 26 23 22 32 32 36 31 23   BILIRUBIN mg/dL 0.4 0.3 0.4 0.4 0.3 0.4 0.3 0.3 0.3 0.4 0.3   ALBUMIN g/dL 4.80 4.50 4.20 4.40 4.40 4.40 4.60 4.50 4.20 4.40 4.00   GLOBULIN gm/dL 2.9 2.9 2.9 3.1 2.9 2.9 2.9 3.2 3.4 3.0 3.2       No results for input(s): MSPIKE, KAPPALAMB, IGLFLC, URICACID, FREEKAPPAL, CEA, LDH, REFLABREPO in the last [id number] hours.    Lab Results - Last 18 Months   Lab Units 09/15/21  1320 08/18/21  1329 07/15/21  1330 06/18/21  1437 05/17/21  1345 04/20/21  1414 03/23/21  1340   IRON mcg/dL  --  70  --   --   --   --   --    TIBC mcg/dL  --  328  --   --   --   --   --    IRON SATURATION %  --  21  --   --   --   --   --    FERRITIN ng/mL  --  383.40  --   --   --   --   --    TSH uIU/mL 3.010  --  3.740 3.010 3.060 4.120 3.670       Simeon Tam reports a pain score of 2.  Given his pain assessment as noted, treatment options were discussed and the following options were decided upon as a follow-up plan to address the patient's pain: continuation of current treatment plan for pain.        ASSESSMENT:  1.   Prostate cancer, hormone refractory.   AJCC stage (TX, NX, M0)  Treatment status:Casodex and Lupron with PSA recurrence.  On apalutamide and leuprolde (by Dr. Oneil).  2.   Performance status of 0.  3.   Thrombocytopenia from cirrhosis.  4.   Cirrhosis, etiology of thrombocytopenia and followed by PCP.  5.   Urethral stricture. Self catheterization as needed. \"It " "varies.\"         PLAN:  1.    Re: Tolerance to apalutamide.  \"Doing fine.\"  2.    Re:  Heme status.  Hemoglobin 13.6, WBC 6.5, and platelet 140 from 134 from 120 from 133.  3.    Re:  Pre-office CMP.  GFR 91.1 ml/minute and glucose 126.   4.    Re:  Pre-office PSA pending from < 0.014  from <0.014 from <0.014 from <0.01 from < 0.014 from < 0.014 from <0.014 from < 0.014 from < 0.014 from < 0.014 from < 0.014 from < 0.014 from < 0.014 from < 0.014 from < 0.014 from < 0.014 from < 0.014 from < 0.014 from 0.015 from 0.03 from 0.125 from 1.03 from 27.3. Testosterone pending from 235, latest Lupron 45 mg on 08/25/2021.  5.   eRx for Compazine 10 mg p.o. every 4 hours as needed for nausea/vomiting, 60, 2 refills if needed.    6.   eRx for C26D1 started on 06/22/2022 apalutamide 60 mg, take four tablets total dose 240 mg po daily, number, 120.  Take either with or without food.  Swallow tablets whole. TSH before apalutamide.  No grapefruit juice or grapefruit.  Observe for adverse side effects like arrhythmias, rash, seizures, hypothyroidism, fall and fractures.  7.   Continue ongoing management per primary care physician and other specialists.  8.   Plan of care discussed with patient.  Understanding expressed.  Patient agreeable to proceed.  9.   Lupron per Dr. Oneil.   10.  Questions were answered to his satisfaction. \"Yes.\"  11.  Advance Care Planning   ACP discussion was declined by the patient. Patient does not have an advance directive, information provided.  12.  Return to office in 5 weeks with CMP, PSA and CBC with differential, same day        I have reviewed the assessment and plan and verified the accuracy of it. No changes to assessment and plan since the information was documented. Deshawn Black MD 06/30/22       I spent 30 total minutes, face-to-face, caring for Simeon today.  Greater than 50% of this time involved counseling and/or coordination of care as documented within this " note regarding the patient's illness(es), pros and cons of various treatment options, instructions and/or risk reduction.                 MD Lucio Jackson MD

## 2022-06-28 ENCOUNTER — TELEPHONE (OUTPATIENT)
Dept: UROLOGY | Facility: CLINIC | Age: 77
End: 2022-06-28

## 2022-06-30 ENCOUNTER — LAB (OUTPATIENT)
Dept: LAB | Facility: HOSPITAL | Age: 77
End: 2022-06-30

## 2022-06-30 ENCOUNTER — OFFICE VISIT (OUTPATIENT)
Dept: ONCOLOGY | Facility: CLINIC | Age: 77
End: 2022-06-30

## 2022-06-30 VITALS
DIASTOLIC BLOOD PRESSURE: 72 MMHG | WEIGHT: 196.7 LBS | RESPIRATION RATE: 18 BRPM | OXYGEN SATURATION: 93 % | SYSTOLIC BLOOD PRESSURE: 130 MMHG | BODY MASS INDEX: 29.81 KG/M2 | TEMPERATURE: 97.6 F | HEIGHT: 68 IN | HEART RATE: 78 BPM

## 2022-06-30 DIAGNOSIS — C61 PROSTATE CANCER: Primary | ICD-10-CM

## 2022-06-30 DIAGNOSIS — C61 PROSTATE CANCER: ICD-10-CM

## 2022-06-30 LAB
ALBUMIN SERPL-MCNC: 4.8 G/DL (ref 3.5–5.2)
ALBUMIN/GLOB SERPL: 1.7 G/DL
ALP SERPL-CCNC: 62 U/L (ref 39–117)
ALT SERPL W P-5'-P-CCNC: 19 U/L (ref 1–41)
ANION GAP SERPL CALCULATED.3IONS-SCNC: 11 MMOL/L (ref 5–15)
AST SERPL-CCNC: 22 U/L (ref 1–40)
BASOPHILS # BLD AUTO: 0.04 10*3/MM3 (ref 0–0.2)
BASOPHILS NFR BLD AUTO: 0.6 % (ref 0–1.5)
BILIRUB SERPL-MCNC: 0.4 MG/DL (ref 0–1.2)
BUN SERPL-MCNC: 19 MG/DL (ref 8–23)
BUN/CREAT SERPL: 24.7 (ref 7–25)
CALCIUM SPEC-SCNC: 10 MG/DL (ref 8.6–10.5)
CHLORIDE SERPL-SCNC: 98 MMOL/L (ref 98–107)
CO2 SERPL-SCNC: 27 MMOL/L (ref 22–29)
CREAT SERPL-MCNC: 0.77 MG/DL (ref 0.76–1.27)
DEPRECATED RDW RBC AUTO: 42.7 FL (ref 37–54)
EGFRCR SERPLBLD CKD-EPI 2021: 91.1 ML/MIN/1.73
EOSINOPHIL # BLD AUTO: 0.19 10*3/MM3 (ref 0–0.4)
EOSINOPHIL NFR BLD AUTO: 2.9 % (ref 0.3–6.2)
ERYTHROCYTE [DISTWIDTH] IN BLOOD BY AUTOMATED COUNT: 13 % (ref 12.3–15.4)
GLOBULIN UR ELPH-MCNC: 2.9 GM/DL
GLUCOSE SERPL-MCNC: 126 MG/DL (ref 65–99)
HCT VFR BLD AUTO: 39.3 % (ref 37.5–51)
HGB BLD-MCNC: 13.6 G/DL (ref 13–17.7)
LYMPHOCYTES # BLD AUTO: 1.04 10*3/MM3 (ref 0.7–3.1)
LYMPHOCYTES NFR BLD AUTO: 16 % (ref 19.6–45.3)
MCH RBC QN AUTO: 31.1 PG (ref 26.6–33)
MCHC RBC AUTO-ENTMCNC: 34.6 G/DL (ref 31.5–35.7)
MCV RBC AUTO: 89.9 FL (ref 79–97)
MONOCYTES # BLD AUTO: 0.67 10*3/MM3 (ref 0.1–0.9)
MONOCYTES NFR BLD AUTO: 10.3 % (ref 5–12)
NEUTROPHILS NFR BLD AUTO: 4.55 10*3/MM3 (ref 1.7–7)
NEUTROPHILS NFR BLD AUTO: 69.7 % (ref 42.7–76)
PLATELET # BLD AUTO: 140 10*3/MM3 (ref 140–450)
PMV BLD AUTO: 10.5 FL (ref 6–12)
POTASSIUM SERPL-SCNC: 4.1 MMOL/L (ref 3.5–5.2)
PROT SERPL-MCNC: 7.7 G/DL (ref 6–8.5)
RBC # BLD AUTO: 4.37 10*6/MM3 (ref 4.14–5.8)
SODIUM SERPL-SCNC: 136 MMOL/L (ref 136–145)
WBC NRBC COR # BLD: 6.52 10*3/MM3 (ref 3.4–10.8)

## 2022-06-30 PROCEDURE — 36415 COLL VENOUS BLD VENIPUNCTURE: CPT

## 2022-06-30 PROCEDURE — 80053 COMPREHEN METABOLIC PANEL: CPT

## 2022-06-30 PROCEDURE — 85025 COMPLETE CBC W/AUTO DIFF WBC: CPT

## 2022-06-30 PROCEDURE — 84403 ASSAY OF TOTAL TESTOSTERONE: CPT

## 2022-06-30 PROCEDURE — 99214 OFFICE O/P EST MOD 30 MIN: CPT | Performed by: INTERNAL MEDICINE

## 2022-06-30 PROCEDURE — 84153 ASSAY OF PSA TOTAL: CPT

## 2022-07-01 ENCOUNTER — TELEPHONE (OUTPATIENT)
Dept: UROLOGY | Facility: CLINIC | Age: 77
End: 2022-07-01

## 2022-07-01 LAB
PSA SERPL-MCNC: <0.014 NG/ML (ref 0–4)
TESTOST SERPL-MCNC: 443 NG/DL (ref 193–740)

## 2022-07-19 ENCOUNTER — SPECIALTY PHARMACY (OUTPATIENT)
Dept: ONCOLOGY | Facility: HOSPITAL | Age: 77
End: 2022-07-19

## 2022-07-19 NOTE — PROGRESS NOTES
Specialty Pharmacy Refill Coordination Note     Simeon is a 77 y.o. male contacted today regarding refills of  Erleada specialty medication(s).    Reviewed and verified with patient: Spoke with MsDelores Yonatan for the patient's monthly routine telephone consultation follow-up regarding the oral oncology medication. She stated that he has been tolerating the Erleada really well and has not had any obvious side effects from the medication. He has not missed any doses while on the medication. The patient still receives refills through [facility], and has not had any delays in getting those refills. The patient also stated that there has been no changes to his maintenance medications and no new allergies that she is aware of.   Finally, the patient has not had any recent stays in the hospital and has not visited the ER in the last month due to the Erleada. I encouraged the patient to reach out anytime with any questions or concerns they might have regarding their oral chemotherapy.  No immediate needs were expressed by the patient or otherwise identified. Encouraged patient to reach out at any time with any concerns that may arise. Will reach out to patient in 28-30 days with a routine telephone consultation         Specialty medication(s) and dose(s) confirmed: yes    Refill Questions    Flowsheet Row Most Recent Value   Changes to allergies? No   Changes to medications? No   New conditions since last clinic visit No   Unplanned office visit, urgent care, ED, or hospital admission in the last 4 weeks  No   How does patient/caregiver feel medication is working? Good   Financial problems or insurance changes  No   Since the previous refill, were any specialty medication doses or scheduled injections missed or delayed?  No   Does this patient require a clinical escalation to a pharmacist? No                     Follow-up: 4 week(s)   Any Watson CPhT  Specialty Pharmacy Technician and Care Coordinator  Pharmacy Patient    [facility]  [phone]- Office       Any Hernandezr  Specialty Pharmacy Technician

## 2022-08-03 ENCOUNTER — INFUSION (OUTPATIENT)
Dept: ONCOLOGY | Facility: HOSPITAL | Age: 77
End: 2022-08-03

## 2022-08-03 ENCOUNTER — LAB (OUTPATIENT)
Dept: LAB | Facility: HOSPITAL | Age: 77
End: 2022-08-03

## 2022-08-03 ENCOUNTER — OFFICE VISIT (OUTPATIENT)
Dept: ONCOLOGY | Facility: CLINIC | Age: 77
End: 2022-08-03

## 2022-08-03 VITALS
HEART RATE: 93 BPM | HEIGHT: 68 IN | WEIGHT: 198.4 LBS | BODY MASS INDEX: 30.07 KG/M2 | DIASTOLIC BLOOD PRESSURE: 70 MMHG | RESPIRATION RATE: 18 BRPM | SYSTOLIC BLOOD PRESSURE: 126 MMHG | TEMPERATURE: 98.3 F | OXYGEN SATURATION: 96 %

## 2022-08-03 VITALS
SYSTOLIC BLOOD PRESSURE: 110 MMHG | OXYGEN SATURATION: 94 % | DIASTOLIC BLOOD PRESSURE: 56 MMHG | HEART RATE: 98 BPM | TEMPERATURE: 97.9 F | RESPIRATION RATE: 18 BRPM

## 2022-08-03 DIAGNOSIS — C61 PROSTATE CANCER: Primary | ICD-10-CM

## 2022-08-03 LAB
ALBUMIN SERPL-MCNC: 4.5 G/DL (ref 3.5–5.2)
ALBUMIN/GLOB SERPL: 1.5 G/DL
ALP SERPL-CCNC: 64 U/L (ref 39–117)
ALT SERPL W P-5'-P-CCNC: 19 U/L (ref 1–41)
ANION GAP SERPL CALCULATED.3IONS-SCNC: 7 MMOL/L (ref 5–15)
AST SERPL-CCNC: 26 U/L (ref 1–40)
BASOPHILS # BLD AUTO: 0.03 10*3/MM3 (ref 0–0.2)
BASOPHILS NFR BLD AUTO: 0.4 % (ref 0–1.5)
BILIRUB SERPL-MCNC: 0.4 MG/DL (ref 0–1.2)
BUN SERPL-MCNC: 14 MG/DL (ref 8–23)
BUN/CREAT SERPL: 17.7 (ref 7–25)
CALCIUM SPEC-SCNC: 9.9 MG/DL (ref 8.6–10.5)
CHLORIDE SERPL-SCNC: 96 MMOL/L (ref 98–107)
CO2 SERPL-SCNC: 31 MMOL/L (ref 22–29)
CREAT SERPL-MCNC: 0.79 MG/DL (ref 0.76–1.27)
DEPRECATED RDW RBC AUTO: 43 FL (ref 37–54)
EGFRCR SERPLBLD CKD-EPI 2021: 90.4 ML/MIN/1.73
EOSINOPHIL # BLD AUTO: 0.14 10*3/MM3 (ref 0–0.4)
EOSINOPHIL NFR BLD AUTO: 1.9 % (ref 0.3–6.2)
ERYTHROCYTE [DISTWIDTH] IN BLOOD BY AUTOMATED COUNT: 13.1 % (ref 12.3–15.4)
GLOBULIN UR ELPH-MCNC: 3 GM/DL
GLUCOSE SERPL-MCNC: 189 MG/DL (ref 65–99)
HCT VFR BLD AUTO: 39 % (ref 37.5–51)
HGB BLD-MCNC: 13.3 G/DL (ref 13–17.7)
HOLD SPECIMEN: NORMAL
IMM GRANULOCYTES # BLD AUTO: 0.02 10*3/MM3 (ref 0–0.05)
IMM GRANULOCYTES NFR BLD AUTO: 0.3 % (ref 0–0.5)
LYMPHOCYTES # BLD AUTO: 0.93 10*3/MM3 (ref 0.7–3.1)
LYMPHOCYTES NFR BLD AUTO: 12.9 % (ref 19.6–45.3)
MCH RBC QN AUTO: 30.8 PG (ref 26.6–33)
MCHC RBC AUTO-ENTMCNC: 34.1 G/DL (ref 31.5–35.7)
MCV RBC AUTO: 90.3 FL (ref 79–97)
MONOCYTES # BLD AUTO: 0.57 10*3/MM3 (ref 0.1–0.9)
MONOCYTES NFR BLD AUTO: 7.9 % (ref 5–12)
NEUTROPHILS NFR BLD AUTO: 5.5 10*3/MM3 (ref 1.7–7)
NEUTROPHILS NFR BLD AUTO: 76.6 % (ref 42.7–76)
NRBC BLD AUTO-RTO: 0 /100 WBC (ref 0–0.2)
PLATELET # BLD AUTO: 124 10*3/MM3 (ref 140–450)
PMV BLD AUTO: 10.6 FL (ref 6–12)
POTASSIUM SERPL-SCNC: 4 MMOL/L (ref 3.5–5.2)
PROT SERPL-MCNC: 7.5 G/DL (ref 6–8.5)
RBC # BLD AUTO: 4.32 10*6/MM3 (ref 4.14–5.8)
SODIUM SERPL-SCNC: 134 MMOL/L (ref 136–145)
WBC NRBC COR # BLD: 7.19 10*3/MM3 (ref 3.4–10.8)

## 2022-08-03 PROCEDURE — 36415 COLL VENOUS BLD VENIPUNCTURE: CPT

## 2022-08-03 PROCEDURE — 84403 ASSAY OF TOTAL TESTOSTERONE: CPT

## 2022-08-03 PROCEDURE — 25010000002 LEUPROLIDE 45 MG KIT: Performed by: UROLOGY

## 2022-08-03 PROCEDURE — 80053 COMPREHEN METABOLIC PANEL: CPT

## 2022-08-03 PROCEDURE — 96402 CHEMO HORMON ANTINEOPL SQ/IM: CPT

## 2022-08-03 PROCEDURE — 85025 COMPLETE CBC W/AUTO DIFF WBC: CPT

## 2022-08-03 PROCEDURE — 99214 OFFICE O/P EST MOD 30 MIN: CPT | Performed by: INTERNAL MEDICINE

## 2022-08-03 PROCEDURE — 84153 ASSAY OF PSA TOTAL: CPT

## 2022-08-03 RX ADMIN — LEUPROLIDE ACETATE 45 MG: KIT at 15:01

## 2022-08-04 LAB
PSA SERPL-MCNC: 0.02 NG/ML (ref 0–4)
TESTOST SERPL-MCNC: 527 NG/DL (ref 193–740)
TESTOST SERPL-MCNC: 580 NG/DL (ref 193–740)

## 2022-08-08 NOTE — PROGRESS NOTES
Chief Complaint  Follow-up adenocarcinoma the prostate and anterior urethral stricture    Subjective          Simeon Tam presents to [facility] UROLOGY   Here today for follow-up to chronic urologic issues as follows:  -Adenocarcinoma prostate: Patient is currently on apalutamide and leuprolide.  He is tolerating these well.  He remains asymptomatic.  Good PSA control as well.  -Anterior urethra stricture: Patient currently catheterizes himself up to 3 times daily.  Said he has had a little more difficulty passing the catheter but its not been problematic.  He has had no hematuria with this.  In between he does void with a stream that is slow relative to what it was in the past but much better since we started addressing the stricture.        Current Outpatient Medications:   •  Apalutamide 60 MG tablet, Take 4 tablets by mouth Daily. Take 4 tablets once daily with or without food. Do not crush or chew tablets., Disp: 120 tablet, Rfl: 11  •  Apple Irving Vn-Grn Tea-Bit Or-Cr (Apple Cider Vinegar Plus) tablet, Take 1 tablet by mouth Daily., Disp: , Rfl:   •  calcium carbonate (OS-JONNY) 600 MG tablet, Take 600 mg by mouth Daily., Disp: , Rfl:   •  diclofenac (VOLTAREN) 75 MG EC tablet, Take 75 mg by mouth 2 (Two) Times a Day As Needed (shoulder pain)., Disp: , Rfl:   •  dutasteride (AVODART) 0.5 MG capsule, Take 1 capsule by mouth Daily., Disp: 60 capsule, Rfl: 11  •  JANUVIA 100 MG tablet, Take 100 mg by mouth Daily., Disp: , Rfl:   •  lisinopril-hydrochlorothiazide (PRINZIDE,ZESTORETIC) 20-12.5 MG per tablet, Take 1 tablet by mouth Daily., Disp: , Rfl:   •  Loperamide HCl (IMODIUM PO), Take 1 dose by mouth As Needed., Disp: , Rfl:   •  rosuvastatin (CRESTOR) 20 MG tablet, Take 10 mg by mouth Every Night., Disp: , Rfl:   •  tolterodine LA (DETROL LA) 4 MG 24 hr capsule, Take 1 capsule by mouth Daily., Disp: 30 capsule, Rfl: 11  Past Medical History:   Diagnosis Date   • Arrhythmia    • Cancer (HCC)   "   prostate   • Diabetes mellitus (HCC)    • History of sepsis    • Hyperlipidemia    • Hypertension    • UTI (urinary tract infection)      Past Surgical History:   Procedure Laterality Date   • CATARACT EXTRACTION     • CYSTOSCOPY RETROGRADE PYELOGRAM Bilateral 4/21/2021    Procedure: CYSTOSCOPY RETROGRADE PYELOGRAM, possible DIRECT VISION INTERNAL URETHROTOMY;  Surgeon: Matthew Oneil MD;  Location: [facility];  Service: Urology;  Laterality: Bilateral;   • CYSTOSCOPY URETHROTOMY VISUAL INTERNAL Bilateral 4/21/2021    Procedure: possible DIRECT VISION INTERNAL URETHROTOMY;  Surgeon: Matthew Oneil MD;  Location: [facility] OR;  Service: Urology;  Laterality: Bilateral;   • EXCISION LESION      neck   • KIDNEY STONE SURGERY     • REPLACEMENT TOTAL KNEE Left    • TONSILLECTOMY             Review  of systems  Constitutional: Negative for chills or fever.   Gastrointestinal: Negative for abdominal pain, anal bleeding or blood in stool.           Objective   PHYSICAL EXAM  Vital Signs:   Temp 97.6 °F (36.4 °C)   Ht 172.7 cm (68\")   Wt 89.1 kg (196 lb 6.4 oz)   BMI 29.86 kg/m²     Constitutional: Patient is without distress or deformity.  Vital signs are reviewed as above.    Neuro: No confusion; No disorientation; Alert and oriented  Pulmonary: No respiratory distress.   Skin: No pallor or diaphoresis      DATA  Result Review :              Results for orders placed or performed in visit on 08/11/22   POC Urinalysis Dipstick, Multipro    Specimen: Urine   Result Value Ref Range    Color Yellow Yellow, Straw, Dark Yellow, Viviana    Clarity, UA Clear Clear    Glucose, UA Negative Negative mg/dL    Bilirubin Negative Negative    Ketones, UA Negative Negative    Specific Gravity  1.010 1.005 - 1.030    Blood, UA Trace (A) Negative    pH, Urine 7.0 5.0 - 8.0    Protein, POC Negative Negative mg/dL    Urobilinogen, UA Normal Normal    Nitrite, UA Negative Negative    Leukocytes Small (1+) (A) Negative     Lab Results "   Component Value Date    PSA 0.018 08/03/2022    PSA <0.014 06/30/2022    PSA <0.014 06/01/2022                      ASSESSMENT AND PLAN          Problem List Items Addressed This Visit        Genitourinary and Reproductive     Anterior urethral stricture    Overview     Added automatically from request for surgery [id number]            Hematology and Neoplasia    Prostate cancer (HCC) - Primary    Relevant Orders    POC Urinalysis Dipstick, Multipro (Completed)      -Continue intermittent catheterization to dilate stricture.  -Good PSA control with the apalutamide and leuprolide regimen.  I will continue him on the leuprolide indefinitely.        FOLLOW UP     No follow-ups on file.        (Please note that portions of this note were completed with a voice recognition program.)  Matthew Oneil MD  08/11/22  14:13 CDT

## 2022-08-11 ENCOUNTER — OFFICE VISIT (OUTPATIENT)
Dept: UROLOGY | Facility: CLINIC | Age: 77
End: 2022-08-11

## 2022-08-11 VITALS — BODY MASS INDEX: 29.77 KG/M2 | WEIGHT: 196.4 LBS | TEMPERATURE: 97.6 F | HEIGHT: 68 IN

## 2022-08-11 DIAGNOSIS — N35.914 ANTERIOR URETHRAL STRICTURE: ICD-10-CM

## 2022-08-11 DIAGNOSIS — C61 PROSTATE CANCER: Primary | ICD-10-CM

## 2022-08-11 LAB
BILIRUB BLD-MCNC: NEGATIVE MG/DL
CLARITY, POC: CLEAR
COLOR UR: YELLOW
GLUCOSE UR STRIP-MCNC: NEGATIVE MG/DL
KETONES UR QL: NEGATIVE
LEUKOCYTE EST, POC: ABNORMAL
NITRITE UR-MCNC: NEGATIVE MG/ML
PH UR: 7 [PH] (ref 5–8)
PROT UR STRIP-MCNC: NEGATIVE MG/DL
RBC # UR STRIP: ABNORMAL /UL
SP GR UR: 1.01 (ref 1–1.03)
UROBILINOGEN UR QL: NORMAL

## 2022-08-11 PROCEDURE — 99214 OFFICE O/P EST MOD 30 MIN: CPT | Performed by: UROLOGY

## 2022-08-11 PROCEDURE — 81003 URINALYSIS AUTO W/O SCOPE: CPT | Performed by: UROLOGY

## 2022-08-16 NOTE — PROGRESS NOTES
[facility] HEMATOLOGY & ONCOLOGY  [address]  [facility] [address] [phone]    Patient Name: Simeon Tam  Encounter Date: 08/31/2022  YOB: 1945  Patient Number: [id number]      REASON FOR FOLLOW-UP: Simeon Tam is a pleasant 77 y.o.  male who is seen in follow-up for hormone refractory prostate cancer.  He is seen C28D15 of apalutamide.  He is seen with his spouse Essence. History is obtained from patient.  History is accurate.        Oncology/Hematology History Overview Note   DIAGNOSTIC ABNORMALITIES:  He presented with rising PSA of 13 and was diagnosed with prostate cancer about 25 years ago.   Previous PSA was 22.26 on 10/05/2016, <0.13 on 07/17/2017, 7.91 on 10/17/2018, 19.29 on 03/19/2020, and 22.25 on 04/07/2020.   He was seen by Dr. Oneil 05/12/2020. Latest PSA was 22.25 ng/ml on 04/07/2020.  Previous PSA 22.26 on 10/05/2016.  Bone scan and CT scan of the abdomen and pelvis were negative for prostate cancer on 10/2016.  Started on Casodex and possibly leuprolide.  PSA  was down to <0.13 on 07/17/207.  Plan for apalutamide after staging scans.   CT abdomen and pelvis 06/11/2020. No evidence of metastatic disease in the abdomen or pelvis. Right renal pelvis and ureter urothelial thickening and hyperemia. Urinary bladder wall is thickened and there is adjacent inflammation. Recommend correlation with urinalysis and exam to exclude cystitis with right-sided pyelitis.  Cirrhosis.  Tiny 8 mm hypodense RIGHT inferior liver lesion on image 33 is too small to characterize.   Bone scan 06/11/2020. No evidence of bone metastases.        PREVIOUS INTERVENTIONS:  He was treated with adjuvant radiation at [facility] and androgen ablation with Casodex.  Lupron and Casodex 10/2016. He had 7 months of ADT therapy with Lupron.  Apalutamide 07/22/2020 through present.     Prostate cancer (HCC)   6/29/2020 -  Chemotherapy    OP  PROSTATE Apalutamide     6/30/2020 Initial Diagnosis    Prostate cancer (CMS/HCC)     7/29/2020 -  Chemotherapy    OP LEUPROLIDE 45MG Q6M - ORDER EXPIRES 8/2/23         PAST MEDICAL HISTORY:  ALLERGIES:  Allergies   Allergen Reactions   • Sulfa Antibiotics Rash     CURRENT MEDICATIONS:  Outpatient Encounter Medications as of 8/31/2022   Medication Sig Dispense Refill   • Apalutamide 60 MG tablet Take 4 tablets by mouth Daily. Take 4 tablets once daily with or without food. Do not crush or chew tablets. 120 tablet 11   • Apple Irving Vn-Grn Tea-Bit Or-Cr (Apple Cider Vinegar Plus) tablet Take 1 tablet by mouth Daily.     • calcium carbonate (OS-JONNY) 600 MG tablet Take 600 mg by mouth Daily.     • diclofenac (VOLTAREN) 75 MG EC tablet Take 75 mg by mouth 2 (Two) Times a Day As Needed (shoulder pain).     • dutasteride (AVODART) 0.5 MG capsule Take 1 capsule by mouth Daily. 60 capsule 11   • JANUVIA 100 MG tablet Take 100 mg by mouth Daily.     • lisinopril-hydrochlorothiazide (PRINZIDE,ZESTORETIC) 20-12.5 MG per tablet Take 1 tablet by mouth Daily.     • Loperamide HCl (IMODIUM PO) Take 1 dose by mouth As Needed.     • rosuvastatin (CRESTOR) 20 MG tablet Take 10 mg by mouth Every Night.     • tolterodine LA (DETROL LA) 4 MG 24 hr capsule Take 1 capsule by mouth Daily. 30 capsule 11     No facility-administered encounter medications on file as of 8/31/2022.     ADULT ILLNESSES:  Patient Active Problem List   Diagnosis Code   • Hypertension I10   • Hyperlipidemia E78.5   • Diabetes mellitus (HCC) E11.9   • Cancer ([facility]) C80.1   • Arrhythmia I49.9   • Non morbid obesity due to excess calories E66.09   • Prostate cancer ([facility]) C61   • Anterior urethral stricture N35.914     SURGERIES:  Past Surgical History:   Procedure Laterality Date   • CATARACT EXTRACTION     • CYSTOSCOPY RETROGRADE PYELOGRAM Bilateral 4/21/2021    Procedure: CYSTOSCOPY RETROGRADE PYELOGRAM, possible DIRECT VISION INTERNAL URETHROTOMY;  Surgeon: Clarice  "Matthew RUTHERFORD MD;  Location: [facility];  Service: Urology;  Laterality: Bilateral;   • CYSTOSCOPY URETHROTOMY VISUAL INTERNAL Bilateral 4/21/2021    Procedure: possible DIRECT VISION INTERNAL URETHROTOMY;  Surgeon: Matthew Oneil MD;  Location: [facility];  Service: Urology;  Laterality: Bilateral;   • EXCISION LESION      neck   • KIDNEY STONE SURGERY     • REPLACEMENT TOTAL KNEE Left    • TONSILLECTOMY       HEALTH MAINTENANCE ITEMS:  Health Maintenance Due   Topic Date Due   • URINE MICROALBUMIN  Never done   • Pneumococcal Vaccine 65+ (1 - PCV) Never done   • Hepatitis B (1 of 3 - Risk 3-dose series) Never done   • TDAP/TD VACCINES (1 - Tdap) Never done   • ZOSTER VACCINE (1 of 2) Never done   • HEPATITIS C SCREENING  Never done   • ANNUAL WELLNESS VISIT  Never done   • LIPID PANEL  Never done   • HEMOGLOBIN A1C  Never done   • DIABETIC EYE EXAM  Never done   • COVID-19 Vaccine (4 - Booster) 05/18/2021       <no information>  Last Completed Colonoscopy     This patient has no relevant Health Maintenance data.          There is no immunization history on file for this patient.  Last Completed Mammogram     This patient has no relevant Health Maintenance data.            FAMILY HISTORY:  Family History   Problem Relation Age of Onset   • Dementia Mother      SOCIAL HISTORY:  Social History     Socioeconomic History   • Marital status:    Tobacco Use   • Smoking status: Never Smoker   • Smokeless tobacco: Never Used   Vaping Use   • Vaping Use: Never used   Substance and Sexual Activity   • Alcohol use: No   • Drug use: No   • Sexual activity: Defer       REVIEW OF SYSTEMS:    Review of Systems   Constitutional: Negative for chills, fatigue and fever.        \"I feel fine.\"   HENT: Negative for congestion, mouth sores and trouble swallowing.    Eyes: Negative for redness and visual disturbance.   Respiratory: Negative for cough, shortness of breath and wheezing.    Cardiovascular: Negative for chest pain and " "leg swelling.   Gastrointestinal: Negative for abdominal pain, nausea and vomiting.   Endocrine: Negative for polydipsia and polyphagia.   Genitourinary: Negative for flank pain and hematuria.        Self catheter as needed.   Musculoskeletal: Negative for gait problem and joint swelling.   Skin: Negative for pallor.   Allergic/Immunologic: Negative for food allergies.   Neurological: Negative for dizziness, facial asymmetry and speech difficulty.   Hematological: Negative for adenopathy. Does not bruise/bleed easily.   Psychiatric/Behavioral: Negative for agitation, confusion and hallucinations.       VITAL SIGNS: /54   Pulse 94   Temp 97.9 °F (36.6 °C)   Resp 18   Ht 172.7 cm (68\")   Wt 90.6 kg (199 lb 11.2 oz)   SpO2 96%   BMI 30.36 kg/m²   Pain Score    08/31/22 1306   PainSc: 0-No pain       PHYSICAL EXAMINATION:     Physical Exam  Vitals reviewed.   Constitutional:       General: He is not in acute distress.  HENT:      Head: Normocephalic and atraumatic.   Eyes:      General: No scleral icterus.  Cardiovascular:      Rate and Rhythm: Normal rate.   Pulmonary:      Effort: No respiratory distress.      Breath sounds: No wheezing or rales.   Abdominal:      General: Bowel sounds are normal.      Palpations: Abdomen is soft.      Tenderness: There is no abdominal tenderness.   Musculoskeletal:         General: No swelling.      Cervical back: Neck supple.   Skin:     General: Skin is warm.      Coloration: Skin is not pale.   Neurological:      Mental Status: He is alert and oriented to person, place, and time.   Psychiatric:         Mood and Affect: Mood normal.         Behavior: Behavior normal.         Thought Content: Thought content normal.         Judgment: Judgment normal.         LABS    Lab Results - Last 18 Months   Lab Units 08/31/22  1255 08/03/22  1359 06/30/22  1449 06/01/22  1345 05/04/22  1324 04/06/22  1337 02/09/22  1300 01/12/22  1336 12/08/21  1357 11/11/21  1339 07/15/21  1330 " 06/18/21  1437   HEMOGLOBIN g/dL 13.5 13.3 13.6 13.4 13.0 13.9   < > 13.7   < > 13.2   < > 13.7   HEMATOCRIT % 38.7 39.0 39.3 39.1 39.0 41.0   < > 41.0   < > 39.4   < > 40.6   MCV fL 88.8 90.3 89.9 90.1 91.3 91.5   < > 90.3   < > 90.6   < > 90.2   WBC 10*3/mm3 6.43 7.19 6.52 6.74 7.94 5.81   < > 6.43   < > 6.43   < > 6.61   RDW % 13.2 13.1 13.0 13.0 13.1 13.0   < > 13.3   < > 13.2   < > 12.9   MPV fL 10.7 10.6 10.5 10.8 10.7 10.8   < > 10.7   < > 9.6   < > 10.7   PLATELETS 10*3/mm3 132* 124* 140 134* 120* 133*   < > 141   < > 156   < > 137*   IMM GRAN % %  --  0.3  --  0.4  --   --   --  0.3  --  0.6*  --  0.3   NEUTROS ABS 10*3/mm3 4.55 5.50 4.55 5.07 6.19 4.06   < > 4.66   < > 4.59   < > 4.44   LYMPHS ABS 10*3/mm3 1.03 0.93 1.04 0.92 0.90 0.95   < > 0.94   < > 0.98   < > 1.15   MONOS ABS 10*3/mm3 0.62 0.57 0.67 0.56 0.63 0.55   < > 0.63   < > 0.65   < > 0.79   EOS ABS 10*3/mm3 0.17 0.14 0.19 0.12 0.16 0.20   < > 0.16   < > 0.14   < > 0.17   BASOS ABS 10*3/mm3 0.05 0.03 0.04 0.04 0.04 0.04   < > 0.02   < > 0.03   < > 0.04   IMMATURE GRANS (ABS) 10*3/mm3  --  0.02  --  0.03  --   --   --  0.02  --  0.04  --  0.02   NRBC /100 WBC  --  0.0  --  0.0  --   --   --  0.0  --  0.0  --  0.0    < > = values in this interval not displayed.       Lab Results - Last 18 Months   Lab Units 08/03/22  1359 06/30/22  1449 06/01/22  1345 05/04/22  1324 04/06/22  1337 03/09/22  1334 02/09/22  1300 01/12/22  1336 12/08/21  1357 11/11/21  1339 10/11/21  1316 09/15/21  1320   GLUCOSE mg/dL 189* 126* 182* 142* 123* 194* 175* 167* 218* 148* 225* 212*   SODIUM mmol/L 134* 136 135* 133* 136 134* 137 137 137 136 136 135*   POTASSIUM mmol/L 4.0 4.1 4.2 4.1 4.2 4.0 3.8 4.3 4.1 4.3 4.3 3.8   CO2 mmol/L 31.0* 27.0 28.0 26.0 29.0 26.0 30.0* 32.0* 29.0 28.0 28.0 28.0   CHLORIDE mmol/L 96* 98 95* 95* 97* 95* 97* 95* 96* 96* 96* 96*   ANION GAP mmol/L 7.0 11.0 12.0 12.0 10.0 13.0 10.0 10.0 12.0 12.0 12.0 11.0   CREATININE mg/dL 0.79 0.77 0.71* 0.72*  "0.65* 0.70* 0.66* 0.93 0.82 0.67* 0.75* 0.63*   BUN mg/dL 14 19 15 18 14 14 13 20 14 12 12 13   BUN / CREAT RATIO  17.7 24.7 21.1 25.0 21.5 20.0 19.7 21.5 17.1 17.9 16.0 20.6   CALCIUM mg/dL 9.9 10.0 9.7 9.7 10.0 9.8 9.3 10.0 10.1 10.0 10.2 9.5   EGFR IF NONAFRICN AM mL/min/1.73  --   --   --   --   --   --  117 79 91 115 101 123   ALK PHOS U/L 64 62 58 63 66 66 64 74 76 62 65 70   TOTAL PROTEIN g/dL 7.5 7.7 7.4 7.1 7.5 7.3 7.3 7.5 7.7 7.6 7.4 7.2   ALT (SGPT) U/L 19 19 17 20 17 18 17 24 23 25 26 19   AST (SGOT) U/L 26 22 22 24 26 23 22 32 32 36 31 23   BILIRUBIN mg/dL 0.4 0.4 0.3 0.4 0.4 0.3 0.4 0.3 0.3 0.3 0.4 0.3   ALBUMIN g/dL 4.50 4.80 4.50 4.20 4.40 4.40 4.40 4.60 4.50 4.20 4.40 4.00   GLOBULIN gm/dL 3.0 2.9 2.9 2.9 3.1 2.9 2.9 2.9 3.2 3.4 3.0 3.2       No results for input(s): MSPIKE, KAPPALAMB, IGLFLC, URICACID, FREEKAPPAL, CEA, LDH, REFLABREPO in the last 83561 hours.    Lab Results - Last 18 Months   Lab Units 09/15/21  1320 08/18/21  1329 07/15/21  1330 06/18/21  1437 05/17/21  1345 04/20/21  1414 03/23/21  1340   IRON mcg/dL  --  70  --   --   --   --   --    TIBC mcg/dL  --  328  --   --   --   --   --    IRON SATURATION %  --  21  --   --   --   --   --    FERRITIN ng/mL  --  383.40  --   --   --   --   --    TSH uIU/mL 3.010  --  3.740 3.010 3.060 4.120 3.670       Simeon Tam reports a pain score of 0.       ASSESSMENT:  1.   Prostate cancer, hormone refractory.   AJCC stage (TX, NX, M0)  Treatment status:Casodex and Lupron with PSA recurrence.  On apalutamide and leuprolde (by Dr. Oneil).  2.   Performance status of 0.  3.   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  4.   Cirrhosis, etiology of thrombocytopenia and followed by PCP.  5.   Urethral stricture. Self catheterization as needed. \"Just as needed.\"           PLAN:  1.    Re: Tolerance to apalutamide.  \"They are doing fine.\" Seen by Dr. Oneil on 08/11/2022.  Good PSA control with apalutamide and Lupron regimen.  Continue " "Lupron indefinitely.  2.    Re:  Heme status.  Hemoglobin 13.5, WBC 6.43, and platelet 132 from 124 from 140 from 134 from 120.  3.    Re:  Pre-office CMP.  GFR 92.5 ml/minute and glucose 146.   4.    Re:  Pre-office PSA pending from 0.018 (forgot my Lupron) from < 0.014 from < 0.014  from <0.014 from <0.014 from <0.01 from < 0.014 from < 0.014 from <0.014 from < 0.014 from < 0.014 from < 0.014 from < 0.014 from < 0.014 from < 0.014 from < 0.014 from < 0.014 from < 0.014 from < 0.014 from < 0.014 from 0.015 from 0.03 from 0.125 from 1.03 from 27.3. Testosterone pending from 527 from 580 from 443 from 235, latest Lupron 45 mg on 08/03/2022 from 08/25/2022.  5.   eRx for Compazine 10 mg p.o. every 4 hours as needed for nausea/vomiting, 60, 2 refills if needed.    6.   eRx for C28D1 started on 08/17/2022 apalutamide 60 mg, take four tablets total dose 240 mg po daily, number, 120.  Take either with or without food.  Swallow tablets whole. TSH before apalutamide.  No grapefruit juice or grapefruit.  Observe for adverse side effects like hypothyroidism, arrhythmias, rash, seizures, fall and fractures.  7.   Continue ongoing management per primary care physician and other specialists.  8.   Plan of care discussed with patient and spouse.  Understanding expressed.  Patient agreeable to proceed.  9.   Lupron per Dr. Oneil. Latest 08/03/2022.  10.  Questions were answered to his satisfaction. \"Yes.\"  11.  Advance Care Planning   ACP discussion was declined by the patient. Patient does not have an advance directive, information provided.  12.  Return to office in 4 weeks with CMP, PSA, testosterone, and CBC with differential, same day           I have reviewed the assessment and plan and verified the accuracy of it. No changes to assessment and plan since the information was documented. Deshawn Black MD 08/31/22         I spent 32 total minutes, face-to-face, caring for Simeon today.  Greater than 50% of " this time involved counseling and/or coordination of care as documented within this note regarding the patient's illness(es), pros and cons of various treatment options, instructions and/or risk reduction.                    (Matthew Oneil MD)  Lucio Garcia MD

## 2022-08-18 ENCOUNTER — SPECIALTY PHARMACY (OUTPATIENT)
Dept: ONCOLOGY | Facility: HOSPITAL | Age: 77
End: 2022-08-18

## 2022-08-18 NOTE — PROGRESS NOTES
"[facility]    Oral Oncology Patient Follow-Up      Consult Details  Consulting Provider:   Deshawn Black MD ([facility])   Medication and Regimen:  Erleada 240 mg PO daily     Prescription Review  Dosing & Interactions:   Reviewed, appropriate and no significant interaction noted at this time..   Current Specialty Pharmacy [facility] - [address] [phone] [facility] [phone] FX        Intervention(s):                  Spoke with Ms. Tam for the patient's monthly routine telephone consultation follow-up regarding the oral oncology medication. She stated that he has been tolerating the Erleada really well and has not had any obvious side effects from the medication - \"nothing new about it.\" He has not missed any doses while on the medication. The patient still receives refills through [facility], and has not had any delays in getting those refills - \"just got one in the mail.\" The patient also stated that there has been no changes to his maintenance medications and no new allergies that she is aware of.   Finally, the patient has not had any recent stays in the hospital and has not visited the ER in the last month due to the Erleada. I encouraged the patient to reach out anytime with any questions or concerns they might have regarding their oral chemotherapy.     Summary:    No needs expressed by the patient or otherwise identified. Will follow-up next month regarding the patient’s oral chemotherapy with a routine telephone consultation. The next routine follow-up will be scheduled approximately 28-30 days from today.       Electronically signed 08/18/2022 16:43 CDT by:  Carolee Diaz PharmD  Specialty Pharmacist - Hematology & Oncology  [facility]  [phone]    "

## 2022-08-31 ENCOUNTER — OFFICE VISIT (OUTPATIENT)
Dept: ONCOLOGY | Facility: CLINIC | Age: 77
End: 2022-08-31

## 2022-08-31 ENCOUNTER — LAB (OUTPATIENT)
Dept: LAB | Facility: HOSPITAL | Age: 77
End: 2022-08-31

## 2022-08-31 VITALS
RESPIRATION RATE: 18 BRPM | HEART RATE: 94 BPM | WEIGHT: 199.7 LBS | DIASTOLIC BLOOD PRESSURE: 54 MMHG | BODY MASS INDEX: 30.26 KG/M2 | SYSTOLIC BLOOD PRESSURE: 118 MMHG | TEMPERATURE: 97.9 F | HEIGHT: 68 IN | OXYGEN SATURATION: 96 %

## 2022-08-31 DIAGNOSIS — C61 PROSTATE CANCER: Primary | ICD-10-CM

## 2022-08-31 LAB
ALBUMIN SERPL-MCNC: 4.7 G/DL (ref 3.5–5.2)
ALBUMIN/GLOB SERPL: 1.6 G/DL
ALP SERPL-CCNC: 67 U/L (ref 39–117)
ALT SERPL W P-5'-P-CCNC: 17 U/L (ref 1–41)
ANION GAP SERPL CALCULATED.3IONS-SCNC: 10 MMOL/L (ref 5–15)
AST SERPL-CCNC: 24 U/L (ref 1–40)
BASOPHILS # BLD AUTO: 0.05 10*3/MM3 (ref 0–0.2)
BASOPHILS NFR BLD AUTO: 0.8 % (ref 0–1.5)
BILIRUB SERPL-MCNC: 0.4 MG/DL (ref 0–1.2)
BUN SERPL-MCNC: 14 MG/DL (ref 8–23)
BUN/CREAT SERPL: 19.2 (ref 7–25)
CALCIUM SPEC-SCNC: 10.2 MG/DL (ref 8.6–10.5)
CHLORIDE SERPL-SCNC: 97 MMOL/L (ref 98–107)
CO2 SERPL-SCNC: 29 MMOL/L (ref 22–29)
CREAT SERPL-MCNC: 0.73 MG/DL (ref 0.76–1.27)
DEPRECATED RDW RBC AUTO: 42.8 FL (ref 37–54)
EGFRCR SERPLBLD CKD-EPI 2021: 92.5 ML/MIN/1.73
EOSINOPHIL # BLD AUTO: 0.17 10*3/MM3 (ref 0–0.4)
EOSINOPHIL NFR BLD AUTO: 2.6 % (ref 0.3–6.2)
ERYTHROCYTE [DISTWIDTH] IN BLOOD BY AUTOMATED COUNT: 13.2 % (ref 12.3–15.4)
GLOBULIN UR ELPH-MCNC: 3 GM/DL
GLUCOSE SERPL-MCNC: 146 MG/DL (ref 65–99)
HCT VFR BLD AUTO: 38.7 % (ref 37.5–51)
HGB BLD-MCNC: 13.5 G/DL (ref 13–17.7)
HOLD SPECIMEN: NORMAL
LYMPHOCYTES # BLD AUTO: 1.03 10*3/MM3 (ref 0.7–3.1)
LYMPHOCYTES NFR BLD AUTO: 16 % (ref 19.6–45.3)
MCH RBC QN AUTO: 31 PG (ref 26.6–33)
MCHC RBC AUTO-ENTMCNC: 34.9 G/DL (ref 31.5–35.7)
MCV RBC AUTO: 88.8 FL (ref 79–97)
MONOCYTES # BLD AUTO: 0.62 10*3/MM3 (ref 0.1–0.9)
MONOCYTES NFR BLD AUTO: 9.6 % (ref 5–12)
NEUTROPHILS NFR BLD AUTO: 4.55 10*3/MM3 (ref 1.7–7)
NEUTROPHILS NFR BLD AUTO: 70.8 % (ref 42.7–76)
PLATELET # BLD AUTO: 132 10*3/MM3 (ref 140–450)
PMV BLD AUTO: 10.7 FL (ref 6–12)
POTASSIUM SERPL-SCNC: 3.9 MMOL/L (ref 3.5–5.2)
PROT SERPL-MCNC: 7.7 G/DL (ref 6–8.5)
PSA SERPL-MCNC: 0.01 NG/ML (ref 0–4)
RBC # BLD AUTO: 4.36 10*6/MM3 (ref 4.14–5.8)
SODIUM SERPL-SCNC: 136 MMOL/L (ref 136–145)
WBC NRBC COR # BLD: 6.43 10*3/MM3 (ref 3.4–10.8)

## 2022-08-31 PROCEDURE — 80053 COMPREHEN METABOLIC PANEL: CPT

## 2022-08-31 PROCEDURE — 36415 COLL VENOUS BLD VENIPUNCTURE: CPT

## 2022-08-31 PROCEDURE — 84153 ASSAY OF PSA TOTAL: CPT

## 2022-08-31 PROCEDURE — 85025 COMPLETE CBC W/AUTO DIFF WBC: CPT

## 2022-08-31 PROCEDURE — 99214 OFFICE O/P EST MOD 30 MIN: CPT | Performed by: INTERNAL MEDICINE

## 2022-09-12 NOTE — PROGRESS NOTES
[facility] HEMATOLOGY & ONCOLOGY [place]  [address]  [facility] [address] [phone]    Patient Name: Simeon Tam  Encounter Date: 09/26/2022  YOB: 1945  Patient Number: [id number]      REASON FOR FOLLOW-UP: Simeon Tam is a pleasant 77 y.o.  male who is seen in follow-up for hormone refractory prostate cancer.  He is seen C29D13 of apalutamide.  He is seen with his spouse Essence. History is obtained from patient and spouse.  History is considered accurate.        Oncology/Hematology History Overview Note   DIAGNOSTIC ABNORMALITIES:  He presented with rising PSA of 13 and was diagnosed with prostate cancer about 25 years ago.   Previous PSA was 22.26 on 10/05/2016, <0.13 on 07/17/2017, 7.91 on 10/17/2018, 19.29 on 03/19/2020, and 22.25 on 04/07/2020.   He was seen by Dr. Oneil 05/12/2020. Latest PSA was 22.25 ng/ml on 04/07/2020.  Previous PSA 22.26 on 10/05/2016.  Bone scan and CT scan of the abdomen and pelvis were negative for prostate cancer on 10/2016.  Started on Casodex and possibly leuprolide.  PSA  was down to <0.13 on 07/17/207.  Plan for apalutamide after staging scans.   CT abdomen and pelvis 06/11/2020. No evidence of metastatic disease in the abdomen or pelvis. Right renal pelvis and ureter urothelial thickening and hyperemia. Urinary bladder wall is thickened and there is adjacent inflammation. Recommend correlation with urinalysis and exam to exclude cystitis with right-sided pyelitis.  Cirrhosis.  Tiny 8 mm hypodense RIGHT inferior liver lesion on image 33 is too small to characterize.   Bone scan 06/11/2020. No evidence of bone metastases.        PREVIOUS INTERVENTIONS:  He was treated with adjuvant radiation at [place] and androgen ablation with Casodex.  Lupron and Casodex 10/2016. He had 7 months of ADT therapy with Lupron.  Apalutamide 07/22/2020 through present.     Prostate cancer (HCC)    6/29/2020 -  Chemotherapy    OP PROSTATE Apalutamide     6/30/2020 Initial Diagnosis    Prostate cancer (CMS/HCC)     7/29/2020 -  Chemotherapy    OP LEUPROLIDE 45MG Q6M - ORDER EXPIRES 8/2/23         PAST MEDICAL HISTORY:  ALLERGIES:  Allergies   Allergen Reactions   • Sulfa Antibiotics Rash     CURRENT MEDICATIONS:  Outpatient Encounter Medications as of 9/26/2022   Medication Sig Dispense Refill   • Apalutamide 60 MG tablet Take 4 tablets by mouth Daily. Take 4 tablets once daily with or without food. Do not crush or chew tablets. 120 tablet 11   • Apple Irving Vn-Grn Tea-Bit Or-Cr (Apple Cider Vinegar Plus) tablet Take 1 tablet by mouth Daily.     • calcium carbonate (OS-JONNY) 600 MG tablet Take 600 mg by mouth Daily.     • diclofenac (VOLTAREN) 75 MG EC tablet Take 75 mg by mouth 2 (Two) Times a Day As Needed (shoulder pain).     • dutasteride (AVODART) 0.5 MG capsule Take 1 capsule by mouth Daily. 60 capsule 11   • JANUVIA 100 MG tablet Take 100 mg by mouth Daily.     • lisinopril-hydrochlorothiazide (PRINZIDE,ZESTORETIC) 20-12.5 MG per tablet Take 1 tablet by mouth Daily.     • Loperamide HCl (IMODIUM PO) Take 1 dose by mouth As Needed.     • rosuvastatin (CRESTOR) 20 MG tablet Take 10 mg by mouth Every Night.     • tolterodine LA (DETROL LA) 4 MG 24 hr capsule Take 1 capsule by mouth Daily. 30 capsule 11     No facility-administered encounter medications on file as of 9/26/2022.     ADULT ILLNESSES:  Patient Active Problem List   Diagnosis Code   • Hypertension I10   • Hyperlipidemia E78.5   • Diabetes mellitus (HCC) E11.9   • Cancer ([facility]) C80.1   • Arrhythmia I49.9   • Non morbid obesity due to excess calories E66.09   • Prostate cancer ([facility]) C61   • Anterior urethral stricture N35.914     SURGERIES:  Past Surgical History:   Procedure Laterality Date   • CATARACT EXTRACTION     • CYSTOSCOPY RETROGRADE PYELOGRAM Bilateral 4/21/2021    Procedure: CYSTOSCOPY RETROGRADE PYELOGRAM, possible DIRECT VISION INTERNAL  "URETHROTOMY;  Surgeon: Matthew Oneil MD;  Location: [facility] OR;  Service: Urology;  Laterality: Bilateral;   • CYSTOSCOPY URETHROTOMY VISUAL INTERNAL Bilateral 4/21/2021    Procedure: possible DIRECT VISION INTERNAL URETHROTOMY;  Surgeon: Matthew Oneil MD;  Location: [facility] OR;  Service: Urology;  Laterality: Bilateral;   • EXCISION LESION      neck   • KIDNEY STONE SURGERY     • REPLACEMENT TOTAL KNEE Left    • TONSILLECTOMY       HEALTH MAINTENANCE ITEMS:  Health Maintenance Due   Topic Date Due   • URINE MICROALBUMIN  Never done   • Pneumococcal Vaccine 65+ (1 - PCV) Never done   • Hepatitis B (1 of 3 - Risk 3-dose series) Never done   • TDAP/TD VACCINES (1 - Tdap) Never done   • ZOSTER VACCINE (1 of 2) Never done   • HEPATITIS C SCREENING  Never done   • ANNUAL WELLNESS VISIT  Never done   • LIPID PANEL  Never done   • HEMOGLOBIN A1C  Never done   • DIABETIC EYE EXAM  Never done   • COVID-19 Vaccine (4 - Booster) 03/15/2021       <no information>  Last Completed Colonoscopy     This patient has no relevant Health Maintenance data.          There is no immunization history on file for this patient.  Last Completed Mammogram     This patient has no relevant Health Maintenance data.            FAMILY HISTORY:  Family History   Problem Relation Age of Onset   • Dementia Mother      SOCIAL HISTORY:  Social History     Socioeconomic History   • Marital status:    Tobacco Use   • Smoking status: Never Smoker   • Smokeless tobacco: Never Used   Vaping Use   • Vaping Use: Never used   Substance and Sexual Activity   • Alcohol use: No   • Drug use: No   • Sexual activity: Defer       REVIEW OF SYSTEMS:    Review of Systems   Constitutional: Negative for chills, fatigue and fever.        \"I feel good.\"   HENT: Negative for congestion, mouth sores and trouble swallowing.    Eyes: Negative for redness and visual disturbance.   Respiratory: Negative for cough, shortness of breath and wheezing.    Cardiovascular: " "Negative for chest pain and palpitations.   Gastrointestinal: Negative for abdominal pain, nausea and vomiting.   Endocrine: Negative for polydipsia and polyphagia.   Genitourinary: Positive for difficulty urinating. Negative for hematuria.        \"I use the catheter.\"   Musculoskeletal: Negative for gait problem and joint swelling.   Skin: Positive for pallor.   Allergic/Immunologic: Negative for food allergies.   Neurological: Negative for dizziness and facial asymmetry.   Hematological: Negative for adenopathy. Does not bruise/bleed easily.   Psychiatric/Behavioral: Negative for agitation, confusion and hallucinations.       VITAL SIGNS: /70   Pulse 78   Temp 97.6 °F (36.4 °C)   Resp 18   Ht 172.7 cm (68\")   Wt 89.7 kg (197 lb 11.2 oz)   SpO2 96%   BMI 30.06 kg/m²   Pain Score    09/26/22 1349   PainSc: 0-No pain       PHYSICAL EXAMINATION:     Physical Exam  Vitals reviewed.   Constitutional:       General: He is not in acute distress.  HENT:      Head: Normocephalic and atraumatic.   Eyes:      General: No scleral icterus.  Cardiovascular:      Rate and Rhythm: Normal rate.   Pulmonary:      Effort: No respiratory distress.      Breath sounds: No wheezing or rales.   Abdominal:      General: Bowel sounds are normal. There is no distension.      Palpations: Abdomen is soft.   Musculoskeletal:         General: No swelling.      Cervical back: Neck supple.   Skin:     General: Skin is warm.      Coloration: Skin is pale.   Neurological:      Mental Status: He is alert and oriented to person, place, and time.   Psychiatric:         Mood and Affect: Mood normal.         Behavior: Behavior normal.         Thought Content: Thought content normal.         Judgment: Judgment normal.         LABS    Lab Results - Last 18 Months   Lab Units 09/26/22  1318 08/31/22  1255 08/03/22  1359 06/30/22  1449 06/01/22  1345 05/04/22  1324 02/09/22  1300 01/12/22  1336 12/08/21  1357 11/11/21  1339 07/15/21  1330 " 06/18/21  1437   HEMOGLOBIN g/dL 12.9* 13.5 13.3 13.6 13.4 13.0   < > 13.7   < > 13.2   < > 13.7   HEMATOCRIT % 36.9* 38.7 39.0 39.3 39.1 39.0   < > 41.0   < > 39.4   < > 40.6   MCV fL 89.8 88.8 90.3 89.9 90.1 91.3   < > 90.3   < > 90.6   < > 90.2   WBC 10*3/mm3 4.97 6.43 7.19 6.52 6.74 7.94   < > 6.43   < > 6.43   < > 6.61   RDW % 13.1 13.2 13.1 13.0 13.0 13.1   < > 13.3   < > 13.2   < > 12.9   MPV fL 10.9 10.7 10.6 10.5 10.8 10.7   < > 10.7   < > 9.6   < > 10.7   PLATELETS 10*3/mm3 120* 132* 124* 140 134* 120*   < > 141   < > 156   < > 137*   IMM GRAN % %  --   --  0.3  --  0.4  --   --  0.3  --  0.6*  --  0.3   NEUTROS ABS 10*3/mm3 3.68 4.55 5.50 4.55 5.07 6.19   < > 4.66   < > 4.59   < > 4.44   LYMPHS ABS 10*3/mm3 0.71 1.03 0.93 1.04 0.92 0.90   < > 0.94   < > 0.98   < > 1.15   MONOS ABS 10*3/mm3 0.43 0.62 0.57 0.67 0.56 0.63   < > 0.63   < > 0.65   < > 0.79   EOS ABS 10*3/mm3 0.10 0.17 0.14 0.19 0.12 0.16   < > 0.16   < > 0.14   < > 0.17   BASOS ABS 10*3/mm3 0.03 0.05 0.03 0.04 0.04 0.04   < > 0.02   < > 0.03   < > 0.04   IMMATURE GRANS (ABS) 10*3/mm3  --   --  0.02  --  0.03  --   --  0.02  --  0.04  --  0.02   NRBC /100 WBC  --   --  0.0  --  0.0  --   --  0.0  --  0.0  --  0.0    < > = values in this interval not displayed.       Lab Results - Last 18 Months   Lab Units 09/26/22  1318 08/31/22  1255 08/03/22  1359 06/30/22  1449 06/01/22  1345 05/04/22  1324 03/09/22  1334 02/09/22  1300 01/12/22  1336 12/08/21  1357 11/11/21  1339 10/11/21  1316 09/15/21  1320   GLUCOSE mg/dL 212* 146* 189* 126* 182* 142*   < > 175* 167* 218* 148* 225* 212*   SODIUM mmol/L 135* 136 134* 136 135* 133*   < > 137 137 137 136 136 135*   POTASSIUM mmol/L 3.9 3.9 4.0 4.1 4.2 4.1   < > 3.8 4.3 4.1 4.3 4.3 3.8   CO2 mmol/L 26.0 29.0 31.0* 27.0 28.0 26.0   < > 30.0* 32.0* 29.0 28.0 28.0 28.0   CHLORIDE mmol/L 96* 97* 96* 98 95* 95*   < > 97* 95* 96* 96* 96* 96*   ANION GAP mmol/L 13.0 10.0 7.0 11.0 12.0 12.0   < > 10.0 10.0 12.0  "12.0 12.0 11.0   CREATININE mg/dL 0.66* 0.73* 0.79 0.77 0.71* 0.72*   < > 0.66* 0.93 0.82 0.67* 0.75* 0.63*   BUN mg/dL 12 14 14 19 15 18   < > 13 20 14 12 12 13   BUN / CREAT RATIO  18.2 19.2 17.7 24.7 21.1 25.0   < > 19.7 21.5 17.1 17.9 16.0 20.6   CALCIUM mg/dL 9.6 10.2 9.9 10.0 9.7 9.7   < > 9.3 10.0 10.1 10.0 10.2 9.5   EGFR IF NONAFRICN AM mL/min/1.73  --   --   --   --   --   --   --  117 79 91 115 101 123   ALK PHOS U/L 67 67 64 62 58 63   < > 64 74 76 62 65 70   TOTAL PROTEIN g/dL 7.0 7.7 7.5 7.7 7.4 7.1   < > 7.3 7.5 7.7 7.6 7.4 7.2   ALT (SGPT) U/L 15 17 19 19 17 20   < > 17 24 23 25 26 19   AST (SGOT) U/L 21 24 26 22 22 24   < > 22 32 32 36 31 23   BILIRUBIN mg/dL 0.4 0.4 0.4 0.4 0.3 0.4   < > 0.4 0.3 0.3 0.3 0.4 0.3   ALBUMIN g/dL 4.30 4.70 4.50 4.80 4.50 4.20   < > 4.40 4.60 4.50 4.20 4.40 4.00   GLOBULIN gm/dL 2.7 3.0 3.0 2.9 2.9 2.9   < > 2.9 2.9 3.2 3.4 3.0 3.2    < > = values in this interval not displayed.       No results for input(s): MSPIKE, KAPPALAMB, IGLFLC, URICACID, FREEKAPPAL, CEA, LDH, REFLABREPO in the last 80890 hours.    Lab Results - Last 18 Months   Lab Units 09/15/21  1320 08/18/21  1329 07/15/21  1330 06/18/21  1437 05/17/21  1345 04/20/21  1414   IRON mcg/dL  --  70  --   --   --   --    TIBC mcg/dL  --  328  --   --   --   --    IRON SATURATION %  --  21  --   --   --   --    FERRITIN ng/mL  --  383.40  --   --   --   --    TSH uIU/mL 3.010  --  3.740 3.010 3.060 4.120       Simeon Tam reports a pain score of 0.        ASSESSMENT:  1.   Prostate cancer, hormone refractory.   AJCC stage ([place], NX, M0)  Treatment status:Casodex and Lupron with PSA recurrence.  On apalutamide and leuprolde (by Dr. Oneil).  2.   Good performance status of 0.  3.   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  4.   Cirrhosis, etiology of thrombocytopenia and followed by PCP.  5.   Urethral stricture. Self catheterization as needed. \"I just say as needed.\"            PLAN:  1.    " "Re: Tolerance to apalutamide.  \"Okay, I take it every night..\"   2.    Re:  Heme status.  Hemoglobin 12.9, WBC 4.97, and platelet 120 from 132 from 124 from 140 from 134 from 120.  3.    Re:  Pre-office CMP.  GFR 95.4 ml/minute and glucose 212.   4.    Re:  Pre-office PSA pending from 0.015 from 0.018 (forgot my Lupron) from < 0.014 from < 0.014  from <0.014 from <0.014 from <0.01 from < 0.014 from < 0.014 from <0.014 from < 0.014 from < 0.014 from < 0.014 from < 0.014 from < 0.014 from < 0.014 from < 0.014 from < 0.014 from < 0.014 from < 0.014 from < 0.014 from 0.015 from 0.03 from 0.125 from 1.03 from 27.3. Testosterone pending from 527 from 580 from 443 from 235.  5.   eRx for Compazine 10 mg p.o. every 4 hours as needed for nausea/vomiting, 60, 2 refills if needed.    6.   eRx for C29D1 started on 09/14/2022 apalutamide 60 mg, take four tablets total dose 240 mg po daily, number, 120.  Take either with or without food.  Swallow tablets whole. TSH before apalutamide.  No grapefruit juice or grapefruit.  Observe for adverse side effects like arrhythmias, rash, seizures, hypothyroidism, fall and fractures.  7.   Continue ongoing management per primary care physician and other specialists.  8.   Plan of care discussed with patient and spouse Essence.  Understanding expressed.  Patient agreeable to proceed.  9.   Lupron per Dr. Oneil.  It is ordered every 6 months.  10.  Questions were answered to his satisfaction. \"Good.\"  11.  Advance Care Planning   ACP discussion was declined by the patient. Patient does not have an advance directive, information provided.  12.  Return to office in 4 weeks with CMP, PSA, testosterone, and CBC with differential, same day         I have reviewed the assessment and plan and verified the accuracy of it. No changes to assessment and plan since the information was documented. Deshawn Black MD 09/26/22        I spent 31 total minutes, face-to-face, caring for Simeon" today.  Greater than 50% of this time involved counseling and/or coordination of care as documented within this note regarding the patient's illness(es), pros and cons of various treatment options, instructions and/or risk reduction.                   (Matthew Oneil MD)  Lucio Garcia MD

## 2022-09-16 ENCOUNTER — SPECIALTY PHARMACY (OUTPATIENT)
Dept: ONCOLOGY | Facility: HOSPITAL | Age: 77
End: 2022-09-16

## 2022-09-16 NOTE — PROGRESS NOTES
[facility]    Oral Oncology Patient Follow-Up      Consult Details  Consulting Provider:   Deshawn Black MD ([facility])   Medication and Regimen:  Erleada 240 mg PO daily     Prescription Review  Dosing & Interactions:   Reviewed, appropriate and no significant interaction noted at this time..   Current Specialty Pharmacy [facility] - [address] [phone]  - [phone] FX        Intervention(s):                  Spoke with Ms. Tam for the patient's monthly routine telephone consultation follow-up regarding the oral oncology medication. She stated that he has been tolerating the Erleada really well and has not had any obvious side effects from the medication. He has not missed any doses while on the medication. The patient still receives refills through [facility], and has not had any delays in getting those refills. Also, no new refills are needed at this time according to the patient. The patient's spouse also stated that there has been no changes to his maintenance medications and no new allergies that she is aware of.   Finally, the patient has not had any recent stays in the hospital and has not visited the ER in the last month due to the Erleada. I encouraged the patient to reach out anytime with any questions or concerns they might have regarding their oral chemotherapy.     Summary:    No needs expressed by the patient or otherwise identified. Will follow-up next month regarding the patient’s oral chemotherapy with a routine telephone consultation. The next routine follow-up will be scheduled approximately 28-30 days from today.       Electronically signed 09/16/2022 15:21 CDT by:  Carolee Diaz PharmD  Specialty Pharmacist - Hematology & Oncology  [facility]  [phone]

## 2022-09-26 ENCOUNTER — OFFICE VISIT (OUTPATIENT)
Dept: ONCOLOGY | Facility: CLINIC | Age: 77
End: 2022-09-26

## 2022-09-26 ENCOUNTER — LAB (OUTPATIENT)
Dept: LAB | Facility: HOSPITAL | Age: 77
End: 2022-09-26

## 2022-09-26 VITALS
HEART RATE: 78 BPM | HEIGHT: 68 IN | TEMPERATURE: 97.6 F | DIASTOLIC BLOOD PRESSURE: 70 MMHG | BODY MASS INDEX: 29.96 KG/M2 | WEIGHT: 197.7 LBS | SYSTOLIC BLOOD PRESSURE: 124 MMHG | RESPIRATION RATE: 18 BRPM | OXYGEN SATURATION: 96 %

## 2022-09-26 DIAGNOSIS — C61 PROSTATE CANCER: Primary | ICD-10-CM

## 2022-09-26 LAB
ALBUMIN SERPL-MCNC: 4.3 G/DL (ref 3.5–5.2)
ALBUMIN/GLOB SERPL: 1.6 G/DL
ALP SERPL-CCNC: 67 U/L (ref 39–117)
ALT SERPL W P-5'-P-CCNC: 15 U/L (ref 1–41)
ANION GAP SERPL CALCULATED.3IONS-SCNC: 13 MMOL/L (ref 5–15)
AST SERPL-CCNC: 21 U/L (ref 1–40)
BASOPHILS # BLD AUTO: 0.03 10*3/MM3 (ref 0–0.2)
BASOPHILS NFR BLD AUTO: 0.6 % (ref 0–1.5)
BILIRUB SERPL-MCNC: 0.4 MG/DL (ref 0–1.2)
BUN SERPL-MCNC: 12 MG/DL (ref 8–23)
BUN/CREAT SERPL: 18.2 (ref 7–25)
CALCIUM SPEC-SCNC: 9.6 MG/DL (ref 8.6–10.5)
CHLORIDE SERPL-SCNC: 96 MMOL/L (ref 98–107)
CO2 SERPL-SCNC: 26 MMOL/L (ref 22–29)
CREAT SERPL-MCNC: 0.66 MG/DL (ref 0.76–1.27)
DEPRECATED RDW RBC AUTO: 42.9 FL (ref 37–54)
EGFRCR SERPLBLD CKD-EPI 2021: 95.4 ML/MIN/1.73
EOSINOPHIL # BLD AUTO: 0.1 10*3/MM3 (ref 0–0.4)
EOSINOPHIL NFR BLD AUTO: 2 % (ref 0.3–6.2)
ERYTHROCYTE [DISTWIDTH] IN BLOOD BY AUTOMATED COUNT: 13.1 % (ref 12.3–15.4)
GLOBULIN UR ELPH-MCNC: 2.7 GM/DL
GLUCOSE SERPL-MCNC: 212 MG/DL (ref 65–99)
HCT VFR BLD AUTO: 36.9 % (ref 37.5–51)
HGB BLD-MCNC: 12.9 G/DL (ref 13–17.7)
HOLD SPECIMEN: NORMAL
LYMPHOCYTES # BLD AUTO: 0.71 10*3/MM3 (ref 0.7–3.1)
LYMPHOCYTES NFR BLD AUTO: 14.3 % (ref 19.6–45.3)
MCH RBC QN AUTO: 31.4 PG (ref 26.6–33)
MCHC RBC AUTO-ENTMCNC: 35 G/DL (ref 31.5–35.7)
MCV RBC AUTO: 89.8 FL (ref 79–97)
MONOCYTES # BLD AUTO: 0.43 10*3/MM3 (ref 0.1–0.9)
MONOCYTES NFR BLD AUTO: 8.7 % (ref 5–12)
NEUTROPHILS NFR BLD AUTO: 3.68 10*3/MM3 (ref 1.7–7)
NEUTROPHILS NFR BLD AUTO: 74 % (ref 42.7–76)
PLATELET # BLD AUTO: 120 10*3/MM3 (ref 140–450)
PMV BLD AUTO: 10.9 FL (ref 6–12)
POTASSIUM SERPL-SCNC: 3.9 MMOL/L (ref 3.5–5.2)
PROT SERPL-MCNC: 7 G/DL (ref 6–8.5)
PSA SERPL-MCNC: 0.02 NG/ML (ref 0–4)
RBC # BLD AUTO: 4.11 10*6/MM3 (ref 4.14–5.8)
SODIUM SERPL-SCNC: 135 MMOL/L (ref 136–145)
TESTOST SERPL-MCNC: 18.1 NG/DL (ref 193–740)
WBC NRBC COR # BLD: 4.97 10*3/MM3 (ref 3.4–10.8)

## 2022-09-26 PROCEDURE — 80053 COMPREHEN METABOLIC PANEL: CPT

## 2022-09-26 PROCEDURE — 84153 ASSAY OF PSA TOTAL: CPT

## 2022-09-26 PROCEDURE — 84403 ASSAY OF TOTAL TESTOSTERONE: CPT

## 2022-09-26 PROCEDURE — 36415 COLL VENOUS BLD VENIPUNCTURE: CPT

## 2022-09-26 PROCEDURE — 99214 OFFICE O/P EST MOD 30 MIN: CPT | Performed by: INTERNAL MEDICINE

## 2022-09-26 PROCEDURE — 85025 COMPLETE CBC W/AUTO DIFF WBC: CPT

## 2022-10-03 ENCOUNTER — SPECIALTY PHARMACY (OUTPATIENT)
Dept: ONCOLOGY | Facility: HOSPITAL | Age: 77
End: 2022-10-03

## 2022-10-03 DIAGNOSIS — C61 PROSTATE CANCER: Primary | ICD-10-CM

## 2022-10-03 NOTE — PROGRESS NOTES
[facility]    Oral Oncology Patient Refill      Consult Details  Consulting Provider:   Deshawn Black MD ([facility])   Medication and Regimen:  Erleada 240 mg PO daily     Prescription Review  Dosing & Interactions:   Reviewed, appropriate and no significant interaction noted at this time..   Current Specialty Pharmacy [facility] - [place], [place] - [address] [place] [address] [phone]  - [phone] FX      Intervention(s)/Discussion:                  Received the following message for a requested prescription:         Summary:    Submitted refill per office visit note on 9/26/22 and requested prescription from pharmacy. We will continue to follow patient regarding the patient’s oral chemotherapy with a routine telephone consultation.     Carolee Diaz, PharmD  10/03/2022 07:55 CDT

## 2022-10-11 ENCOUNTER — PATIENT OUTREACH (OUTPATIENT)
Dept: ONCOLOGY | Facility: HOSPITAL | Age: 77
End: 2022-10-11

## 2022-10-11 NOTE — OUTREACH NOTE
[facility]    Oral Oncology Patient Outreach      Prescription Details  Consulting Provider:   Deshawn Black MD ([facility])   Medication and Regimen:  Erleada 240 mg PO daily       An attempt was made by the Oral Oncology Clinic to contact this patient for a follow-up on their chemotherapy medication. The Oral Oncology Clinic can be reached at [phone].     Attempted to contact patient on 10/11/22 for follow-up, will try again tomorrow (10/12/22) before deferring out to next month.       Electronically signed 10/11/2022 16:26 CDT by:  Carolee Diaz, PharmD  Specialty Pharmacist - Hematology & Oncology  [facility]  [phone]

## 2022-10-12 NOTE — PROGRESS NOTES
[facility] HEMATOLOGY & ONCOLOGY [place]  [address]  [place] [address] [phone]    Patient Name: Simeon Tam  Encounter Date: 10/25/2022  YOB: 1945  Patient Number: [id number]      REASON FOR FOLLOW-UP: Simeon Tam is a pleasant 77 y.o.  male who is seen in follow-up for hormone refractory prostate cancer.  He is seen C30D14 of apalutamide.  He is seen with his spouse Essence. History is obtained from patient and his spouse.   Patient and spouse are reliable historian.        Oncology/Hematology History Overview Note   DIAGNOSTIC ABNORMALITIES:  He presented with rising PSA of 13 and was diagnosed with prostate cancer about 25 years ago.   Previous PSA was 22.26 on 10/05/2016, <0.13 on 07/17/2017, 7.91 on 10/17/2018, 19.29 on 03/19/2020, and 22.25 on 04/07/2020.   He was seen by Dr. Oneil 05/12/2020. Latest PSA was 22.25 ng/ml on 04/07/2020.  Previous PSA 22.26 on 10/05/2016.  Bone scan and CT scan of the abdomen and pelvis were negative for prostate cancer on 10/2016.  Started on Casodex and possibly leuprolide.  PSA  was down to <0.13 on 07/17/207.  Plan for apalutamide after staging scans.   CT abdomen and pelvis 06/11/2020. No evidence of metastatic disease in the abdomen or pelvis. Right renal pelvis and ureter urothelial thickening and hyperemia. Urinary bladder wall is thickened and there is adjacent inflammation. Recommend correlation with urinalysis and exam to exclude cystitis with right-sided pyelitis.  Cirrhosis.  Tiny 8 mm hypodense RIGHT inferior liver lesion on image 33 is too small to characterize.   Bone scan 06/11/2020. No evidence of bone metastases.        PREVIOUS INTERVENTIONS:  He was treated with adjuvant radiation at [place] and androgen ablation with Casodex.  Lupron and Casodex 10/2016. He had 7 months of ADT therapy with Lupron.  Apalutamide 07/22/2020 through present.     Prostate  cancer (HCC)   6/29/2020 -  Chemotherapy    OP PROSTATE Apalutamide     6/30/2020 Initial Diagnosis    Prostate cancer (CMS/HCC)     7/29/2020 -  Chemotherapy    OP LEUPROLIDE 45MG Q6M - ORDER EXPIRES 8/2/23         PAST MEDICAL HISTORY:  ALLERGIES:  Allergies   Allergen Reactions   • Sulfa Antibiotics Rash     CURRENT MEDICATIONS:  Outpatient Encounter Medications as of 10/25/2022   Medication Sig Dispense Refill   • Apalutamide 60 MG tablet Take 4 tablets by mouth Daily. Take 4 tablets once daily with or without food. Do not crush or chew tablets. 120 tablet 11   • Apalutamide 60 MG tablet Take 4 tablets by mouth Daily. Take with or without food. Do not crush or chew tablets. 120 tablet 11   • Apple Irving Vn-Grn Tea-Bit Or-Cr (Apple Cider Vinegar Plus) tablet Take 1 tablet by mouth Daily.     • calcium carbonate (OS-JONNY) 600 MG tablet Take 600 mg by mouth Daily.     • diclofenac (VOLTAREN) 75 MG EC tablet Take 75 mg by mouth 2 (Two) Times a Day As Needed (shoulder pain).     • dutasteride (AVODART) 0.5 MG capsule Take 1 capsule by mouth Daily. 60 capsule 11   • JANUVIA 100 MG tablet Take 100 mg by mouth Daily.     • lisinopril-hydrochlorothiazide (PRINZIDE,ZESTORETIC) 20-12.5 MG per tablet Take 1 tablet by mouth Daily.     • Loperamide HCl (IMODIUM PO) Take 1 dose by mouth As Needed.     • rosuvastatin (CRESTOR) 20 MG tablet Take 10 mg by mouth Every Night.     • tolterodine LA (DETROL LA) 4 MG 24 hr capsule Take 1 capsule by mouth Daily. 30 capsule 11     No facility-administered encounter medications on file as of 10/25/2022.     ADULT ILLNESSES:  Patient Active Problem List   Diagnosis Code   • Hypertension I10   • Hyperlipidemia E78.5   • Diabetes mellitus (HCC) E11.9   • Cancer (HCC) C80.1   • Arrhythmia I49.9   • Non morbid obesity due to excess calories E66.09   • Prostate cancer ([facility]) C61   • Anterior urethral stricture N35.914     SURGERIES:  Past Surgical History:   Procedure Laterality Date   • CATARACT  "EXTRACTION     • CYSTOSCOPY RETROGRADE PYELOGRAM Bilateral 4/21/2021    Procedure: CYSTOSCOPY RETROGRADE PYELOGRAM, possible DIRECT VISION INTERNAL URETHROTOMY;  Surgeon: Matthew Oneil MD;  Location: [facility] OR;  Service: Urology;  Laterality: Bilateral;   • CYSTOSCOPY URETHROTOMY VISUAL INTERNAL Bilateral 4/21/2021    Procedure: possible DIRECT VISION INTERNAL URETHROTOMY;  Surgeon: Matthew Oneil MD;  Location:  [facility] OR;  Service: Urology;  Laterality: Bilateral;   • EXCISION LESION      neck   • KIDNEY STONE SURGERY     • REPLACEMENT TOTAL KNEE Left    • TONSILLECTOMY       HEALTH MAINTENANCE ITEMS:  Health Maintenance Due   Topic Date Due   • URINE MICROALBUMIN  Never done   • Pneumococcal Vaccine 65+ (1 - PCV) Never done   • TDAP/TD VACCINES (1 - Tdap) Never done   • ZOSTER VACCINE (1 of 2) Never done   • Hepatitis B (1 of 3 - Risk 3-dose series) Never done   • HEPATITIS C SCREENING  Never done   • ANNUAL WELLNESS VISIT  Never done   • LIPID PANEL  Never done   • HEMOGLOBIN A1C  Never done   • DIABETIC EYE EXAM  Never done   • COVID-19 Vaccine (4 - Booster) 03/15/2021   • INFLUENZA VACCINE  Never done       <no information>  Last Completed Colonoscopy     This patient has no relevant Health Maintenance data.          There is no immunization history on file for this patient.  Last Completed Mammogram     This patient has no relevant Health Maintenance data.            FAMILY HISTORY:  Family History   Problem Relation Age of Onset   • Dementia Mother      SOCIAL HISTORY:  Social History     Socioeconomic History   • Marital status:    Tobacco Use   • Smoking status: Never   • Smokeless tobacco: Never   Vaping Use   • Vaping Use: Never used   Substance and Sexual Activity   • Alcohol use: No   • Drug use: No   • Sexual activity: Defer       REVIEW OF SYSTEMS:    Review of Systems   Constitutional: Positive for fatigue. Negative for fever and unexpected weight change.        \"I feel 80%.\"   HENT: " "Negative for congestion, mouth sores and trouble swallowing.    Eyes: Negative for discharge and redness.   Respiratory: Negative for cough, shortness of breath and wheezing.    Cardiovascular: Negative for chest pain and palpitations.   Gastrointestinal: Negative for abdominal pain, constipation, diarrhea, nausea and vomiting.   Endocrine: Negative for polydipsia and polyphagia.   Genitourinary: Negative for flank pain and hematuria.        \"Self catheter. It varies.\"   Musculoskeletal: Negative for gait problem and neck stiffness.   Skin: Negative for pallor.   Allergic/Immunologic: Negative for food allergies.   Neurological: Negative for dizziness, facial asymmetry and speech difficulty.   Hematological: Negative for adenopathy. Does not bruise/bleed easily.   Psychiatric/Behavioral: Negative for agitation, confusion and hallucinations.       VITAL SIGNS: /58   Pulse 78   Temp 98 °F (36.7 °C)   Resp 18   Ht 172.7 cm (68\")   Wt 90.1 kg (198 lb 9.6 oz)   SpO2 96%   BMI 30.20 kg/m²   Pain Score    10/25/22 1430   PainSc: 0-No pain       PHYSICAL EXAMINATION:     Physical Exam  Vitals reviewed.   Constitutional:       General: He is not in acute distress.  HENT:      Head: Normocephalic and atraumatic.   Eyes:      General: No scleral icterus.  Cardiovascular:      Rate and Rhythm: Normal rate.   Pulmonary:      Effort: No respiratory distress.      Breath sounds: No wheezing or rales.   Abdominal:      General: Bowel sounds are normal.      Palpations: Abdomen is soft.   Musculoskeletal:         General: No swelling.      Cervical back: Neck supple.   Skin:     General: Skin is warm.      Coloration: Skin is not pale.   Neurological:      Mental Status: He is alert and oriented to person, place, and time.   Psychiatric:         Mood and Affect: Mood normal.         Behavior: Behavior normal.         Thought Content: Thought content normal.         Judgment: Judgment normal.         LABS    Lab Results " - Last 18 Months   Lab Units 10/25/22  1333 09/26/22  1318 08/31/22  1255 08/03/22  1359 06/30/22  1449 06/01/22  1345 02/09/22  1300 01/12/22  1336 12/08/21  1357 11/11/21  1339 07/15/21  1330 06/18/21  1437   HEMOGLOBIN g/dL 13.9 12.9* 13.5 13.3 13.6 13.4   < > 13.7   < > 13.2   < > 13.7   HEMATOCRIT % 41.0 36.9* 38.7 39.0 39.3 39.1   < > 41.0   < > 39.4   < > 40.6   MCV fL 91.5 89.8 88.8 90.3 89.9 90.1   < > 90.3   < > 90.6   < > 90.2   WBC 10*3/mm3 6.01 4.97 6.43 7.19 6.52 6.74   < > 6.43   < > 6.43   < > 6.61   RDW % 13.2 13.1 13.2 13.1 13.0 13.0   < > 13.3   < > 13.2   < > 12.9   MPV fL 10.9 10.9 10.7 10.6 10.5 10.8   < > 10.7   < > 9.6   < > 10.7   PLATELETS 10*3/mm3 123* 120* 132* 124* 140 134*   < > 141   < > 156   < > 137*   IMM GRAN % %  --   --   --  0.3  --  0.4  --  0.3  --  0.6*  --  0.3   NEUTROS ABS 10*3/mm3 4.33 3.68 4.55 5.50 4.55 5.07   < > 4.66   < > 4.59   < > 4.44   LYMPHS ABS 10*3/mm3 0.90 0.71 1.03 0.93 1.04 0.92   < > 0.94   < > 0.98   < > 1.15   MONOS ABS 10*3/mm3 0.58 0.43 0.62 0.57 0.67 0.56   < > 0.63   < > 0.65   < > 0.79   EOS ABS 10*3/mm3 0.14 0.10 0.17 0.14 0.19 0.12   < > 0.16   < > 0.14   < > 0.17   BASOS ABS 10*3/mm3 0.04 0.03 0.05 0.03 0.04 0.04   < > 0.02   < > 0.03   < > 0.04   IMMATURE GRANS (ABS) 10*3/mm3  --   --   --  0.02  --  0.03  --  0.02  --  0.04  --  0.02   NRBC /100 WBC  --   --   --  0.0  --  0.0  --  0.0  --  0.0  --  0.0    < > = values in this interval not displayed.       Lab Results - Last 18 Months   Lab Units 10/25/22  1333 09/26/22  1318 08/31/22  1255 08/03/22  1359 06/30/22  1449 06/01/22  1345 03/09/22  1334 02/09/22  1300 01/12/22  1336 12/08/21  1357 11/11/21  1339 10/11/21  1316 09/15/21  1320   GLUCOSE mg/dL 153* 212* 146* 189* 126* 182*   < > 175* 167* 218* 148* 225* 212*   SODIUM mmol/L 138 135* 136 134* 136 135*   < > 137 137 137 136 136 135*   POTASSIUM mmol/L 3.7 3.9 3.9 4.0 4.1 4.2   < > 3.8 4.3 4.1 4.3 4.3 3.8   CO2 mmol/L 30.0* 26.0 29.0  31.0* 27.0 28.0   < > 30.0* 32.0* 29.0 28.0 28.0 28.0   CHLORIDE mmol/L 97* 96* 97* 96* 98 95*   < > 97* 95* 96* 96* 96* 96*   ANION GAP mmol/L 11.0 13.0 10.0 7.0 11.0 12.0   < > 10.0 10.0 12.0 12.0 12.0 11.0   CREATININE mg/dL 0.67* 0.66* 0.73* 0.79 0.77 0.71*   < > 0.66* 0.93 0.82 0.67* 0.75* 0.63*   BUN mg/dL 15 12 14 14 19 15   < > 13 20 14 12 12 13   BUN / CREAT RATIO  22.4 18.2 19.2 17.7 24.7 21.1   < > 19.7 21.5 17.1 17.9 16.0 20.6   CALCIUM mg/dL 10.2 9.6 10.2 9.9 10.0 9.7   < > 9.3 10.0 10.1 10.0 10.2 9.5   EGFR IF NONAFRICN AM mL/min/1.73  --   --   --   --   --   --   --  117 79 91 115 101 123   ALK PHOS U/L 69 67 67 64 62 58   < > 64 74 76 62 65 70   TOTAL PROTEIN g/dL 7.8 7.0 7.7 7.5 7.7 7.4   < > 7.3 7.5 7.7 7.6 7.4 7.2   ALT (SGPT) U/L 18 15 17 19 19 17   < > 17 24 23 25 26 19   AST (SGOT) U/L 25 21 24 26 22 22   < > 22 32 32 36 31 23   BILIRUBIN mg/dL 0.3 0.4 0.4 0.4 0.4 0.3   < > 0.4 0.3 0.3 0.3 0.4 0.3   ALBUMIN g/dL 4.80 4.30 4.70 4.50 4.80 4.50   < > 4.40 4.60 4.50 4.20 4.40 4.00   GLOBULIN gm/dL 3.0 2.7 3.0 3.0 2.9 2.9   < > 2.9 2.9 3.2 3.4 3.0 3.2    < > = values in this interval not displayed.       No results for input(s): MSPIKE, KAPPALAMB, IGLFLC, URICACID, FREEKAPPAL, CEA, LDH, REFLABREPO in the last 04188 hours.    Lab Results - Last 18 Months   Lab Units 09/15/21  1320 08/18/21  1329 07/15/21  1330 06/18/21  1437 05/17/21  1345   IRON mcg/dL  --  70  --   --   --    TIBC mcg/dL  --  328  --   --   --    IRON SATURATION %  --  21  --   --   --    FERRITIN ng/mL  --  383.40  --   --   --    TSH uIU/mL 3.010  --  3.740 3.010 3.060       Simeon Tam reports a pain score of 0.        ASSESSMENT:  1.   Prostate cancer, hormone refractory.   AJCC stage ([place], NX, M0)  Treatment status:Casodex and Lupron with PSA recurrence.  On apalutamide and leuprolde (by Dr. Oneil).  2.   Performance status of 1.  3.   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  4.   Cirrhosis, etiology  "of thrombocytopenia and followed by PCP.  5.   Urethral stricture. Self catheterization as needed. \"It varies.\"          PLAN:  1.    Re: Tolerance to apalutamide.  \"It's doing fine.\"   2.    Re:  Heme status.  Hemoglobin 13.9, WBC 6.01 and platelet 123 from 120 from 132 from 124 from 140 from 134 from 120.  3.    Re:  Pre-office CMP.  GFR 95 ml/minute and glucose 153.   4.    Re:  Pre-office PSA pending from 0.016 from 0.015 from 0.018 (forgot my Lupron) from < 0.014 from < 0.014  from <0.014 from <0.014 from <0.01 from < 0.014 from < 0.014 from <0.014 from < 0.014 from < 0.014 from < 0.014 from < 0.014 from < 0.014 from < 0.014 from < 0.014 from < 0.014 from < 0.014 from < 0.014 from < 0.014 from 0.015 from 0.03 from 0.125 from 1.03 from 27.3. Testosterone pending from 18.1 from 527 from 580 from 443 from 235.  5.   eRx for Compazine 10 mg p.o. every 4 hours as needed for nausea/vomiting, 60, 2 refills if needed.    6.   eRx for C30D1 started on 10/12/2022 apalutamide 60 mg, take four tablets total dose 240 mg po daily, number, 120.  Take either with or without food.  Swallow tablets whole. TSH before apalutamide.  No grapefruit juice or grapefruit.  Observe for adverse side effects like arrhythmias, rash, seizures, hypothyroidism, fall and fractures.  7.   Continue ongoing management per primary care physician and other specialists.  8.   Plan of care discussed with patient and spouse Essence.  Understanding expressed.  Patient agreeable to proceed.  9.   Lupron per Dr. Oneil.  It is ordered every 6 months.  10.  Questions were answered to his satisfaction. \"It's good.\"  11.  Advance Care Planning   ACP discussion was declined by the patient. Patient does not have an advance directive, information provided.  12.  Return to office in 4 weeks with CMP, PSA, testosterone, and CBC with differential, same day         I have reviewed the assessment and plan and verified the accuracy of it. No " changes to assessment and plan since the information was documented. Deshawn Black MD 10/25/22       I spent 30 total minutes, face-to-face, caring for Simeon today.  Greater than 50% of this time involved counseling and/or coordination of care as documented within this note regarding the patient's illness(es), pros and cons of various treatment options, instructions and/or risk reduction.                   (Matthew Oneil MD)  Lucio Garcia MD

## 2022-10-25 ENCOUNTER — TELEPHONE (OUTPATIENT)
Dept: UROLOGY | Facility: CLINIC | Age: 77
End: 2022-10-25

## 2022-10-25 ENCOUNTER — OFFICE VISIT (OUTPATIENT)
Dept: ONCOLOGY | Facility: CLINIC | Age: 77
End: 2022-10-25

## 2022-10-25 ENCOUNTER — LAB (OUTPATIENT)
Dept: LAB | Facility: HOSPITAL | Age: 77
End: 2022-10-25

## 2022-10-25 VITALS
WEIGHT: 198.6 LBS | HEIGHT: 68 IN | BODY MASS INDEX: 30.1 KG/M2 | RESPIRATION RATE: 18 BRPM | DIASTOLIC BLOOD PRESSURE: 58 MMHG | HEART RATE: 78 BPM | OXYGEN SATURATION: 96 % | SYSTOLIC BLOOD PRESSURE: 112 MMHG | TEMPERATURE: 98 F

## 2022-10-25 DIAGNOSIS — C61 PROSTATE CANCER: Primary | ICD-10-CM

## 2022-10-25 LAB
ALBUMIN SERPL-MCNC: 4.8 G/DL (ref 3.5–5.2)
ALBUMIN/GLOB SERPL: 1.6 G/DL
ALP SERPL-CCNC: 69 U/L (ref 39–117)
ALT SERPL W P-5'-P-CCNC: 18 U/L (ref 1–41)
ANION GAP SERPL CALCULATED.3IONS-SCNC: 11 MMOL/L (ref 5–15)
AST SERPL-CCNC: 25 U/L (ref 1–40)
BASOPHILS # BLD AUTO: 0.04 10*3/MM3 (ref 0–0.2)
BASOPHILS NFR BLD AUTO: 0.7 % (ref 0–1.5)
BILIRUB SERPL-MCNC: 0.3 MG/DL (ref 0–1.2)
BUN SERPL-MCNC: 15 MG/DL (ref 8–23)
BUN/CREAT SERPL: 22.4 (ref 7–25)
CALCIUM SPEC-SCNC: 10.2 MG/DL (ref 8.6–10.5)
CHLORIDE SERPL-SCNC: 97 MMOL/L (ref 98–107)
CO2 SERPL-SCNC: 30 MMOL/L (ref 22–29)
CREAT SERPL-MCNC: 0.67 MG/DL (ref 0.76–1.27)
DEPRECATED RDW RBC AUTO: 44.5 FL (ref 37–54)
EGFRCR SERPLBLD CKD-EPI 2021: 95 ML/MIN/1.73
EOSINOPHIL # BLD AUTO: 0.14 10*3/MM3 (ref 0–0.4)
EOSINOPHIL NFR BLD AUTO: 2.3 % (ref 0.3–6.2)
ERYTHROCYTE [DISTWIDTH] IN BLOOD BY AUTOMATED COUNT: 13.2 % (ref 12.3–15.4)
GLOBULIN UR ELPH-MCNC: 3 GM/DL
GLUCOSE SERPL-MCNC: 153 MG/DL (ref 65–99)
HCT VFR BLD AUTO: 41 % (ref 37.5–51)
HGB BLD-MCNC: 13.9 G/DL (ref 13–17.7)
HOLD SPECIMEN: NORMAL
LYMPHOCYTES # BLD AUTO: 0.9 10*3/MM3 (ref 0.7–3.1)
LYMPHOCYTES NFR BLD AUTO: 15 % (ref 19.6–45.3)
MCH RBC QN AUTO: 31 PG (ref 26.6–33)
MCHC RBC AUTO-ENTMCNC: 33.9 G/DL (ref 31.5–35.7)
MCV RBC AUTO: 91.5 FL (ref 79–97)
MONOCYTES # BLD AUTO: 0.58 10*3/MM3 (ref 0.1–0.9)
MONOCYTES NFR BLD AUTO: 9.7 % (ref 5–12)
NEUTROPHILS NFR BLD AUTO: 4.33 10*3/MM3 (ref 1.7–7)
NEUTROPHILS NFR BLD AUTO: 72 % (ref 42.7–76)
PLATELET # BLD AUTO: 123 10*3/MM3 (ref 140–450)
PMV BLD AUTO: 10.9 FL (ref 6–12)
POTASSIUM SERPL-SCNC: 3.7 MMOL/L (ref 3.5–5.2)
PROT SERPL-MCNC: 7.8 G/DL (ref 6–8.5)
RBC # BLD AUTO: 4.48 10*6/MM3 (ref 4.14–5.8)
SODIUM SERPL-SCNC: 138 MMOL/L (ref 136–145)
WBC NRBC COR # BLD: 6.01 10*3/MM3 (ref 3.4–10.8)

## 2022-10-25 PROCEDURE — 84153 ASSAY OF PSA TOTAL: CPT

## 2022-10-25 PROCEDURE — 36415 COLL VENOUS BLD VENIPUNCTURE: CPT

## 2022-10-25 PROCEDURE — 80053 COMPREHEN METABOLIC PANEL: CPT

## 2022-10-25 PROCEDURE — 84403 ASSAY OF TOTAL TESTOSTERONE: CPT

## 2022-10-25 PROCEDURE — 99214 OFFICE O/P EST MOD 30 MIN: CPT | Performed by: INTERNAL MEDICINE

## 2022-10-25 PROCEDURE — 85025 COMPLETE CBC W/AUTO DIFF WBC: CPT

## 2022-10-25 NOTE — TELEPHONE ENCOUNTER
Caller: PAOLO    Relationship to patient: WIFE    Best call back number: [phone]    Patient is needing: PT IS STATING THAT THEY ARE CALLING TO GET AN APPOINTMENT SCHEDULED FOR DR ARRINGTON IN THE [place] LOCATION. SHE SAID SHE WAS TOLD TO CALL BACK IN TWO MONTHS TO SCHEDULE HIS FOLLOW UP APPOINTMENT. THE SCHEDULE IS NOT AVAILABLE YET BUT SHE IS WANTING TO BE PUT ON WHEN THE SCHEDULE IS CREATED. PER BRAYAN TO SEND OVER TE. THANKS.

## 2022-10-26 LAB
PSA SERPL-MCNC: 0.02 NG/ML (ref 0–4)
TESTOST SERPL-MCNC: 34.1 NG/DL (ref 193–740)

## 2022-11-10 NOTE — PROGRESS NOTES
[facility] HEMATOLOGY & ONCOLOGY [place]  [address]  [place] [address] [phone]    Patient Name: Simeon Tam  Encounter Date: 11/30/2022  YOB: 1945  Patient Number: [id number]      REASON FOR FOLLOW-UP: Simeon Tam is a pleasant 77 y.o.  male who is seen in follow-up for hormone refractory prostate cancer.  He is seen C31D22 of apalutamide.  He is seen with spouse. History is obtained from patient.   Patient is a reliable historian.       Oncology/Hematology History Overview Note   DIAGNOSTIC ABNORMALITIES:  He presented with rising PSA of 13 and was diagnosed with prostate cancer about 25 years ago.   Previous PSA was 22.26 on 10/05/2016, <0.13 on 07/17/2017, 7.91 on 10/17/2018, 19.29 on 03/19/2020, and 22.25 on 04/07/2020.   He was seen by Dr. Oneil 05/12/2020. Latest PSA was 22.25 ng/ml on 04/07/2020.  Previous PSA 22.26 on 10/05/2016.  Bone scan and CT scan of the abdomen and pelvis were negative for prostate cancer on 10/2016.  Started on Casodex and possibly leuprolide.  PSA  was down to <0.13 on 07/17/207.  Plan for apalutamide after staging scans.   CT abdomen and pelvis 06/11/2020. No evidence of metastatic disease in the abdomen or pelvis. Right renal pelvis and ureter urothelial thickening and hyperemia. Urinary bladder wall is thickened and there is adjacent inflammation. Recommend correlation with urinalysis and exam to exclude cystitis with right-sided pyelitis.  Cirrhosis.  Tiny 8 mm hypodense RIGHT inferior liver lesion on image 33 is too small to characterize.   Bone scan 06/11/2020. No evidence of bone metastases.        PREVIOUS INTERVENTIONS:  He was treated with adjuvant radiation at [facility] and androgen ablation with Casodex.  Lupron and Casodex 10/2016. He had 7 months of ADT therapy with Lupron.  Apalutamide 07/22/2020 through present.     Prostate cancer (HCC)   6/29/2020 -   Chemotherapy    OP PROSTATE Apalutamide     6/30/2020 Initial Diagnosis    Prostate cancer (CMS/HCC)     7/29/2020 -  Chemotherapy    OP LEUPROLIDE 45MG Q6M - ORDER EXPIRES 8/2/23         PAST MEDICAL HISTORY:  ALLERGIES:  Allergies   Allergen Reactions   • Sulfa Antibiotics Rash     CURRENT MEDICATIONS:  Outpatient Encounter Medications as of 11/30/2022   Medication Sig Dispense Refill   • Apalutamide 60 MG tablet Take 4 tablets by mouth Daily. Take 4 tablets once daily with or without food. Do not crush or chew tablets. 120 tablet 11   • Apalutamide 60 MG tablet Take 4 tablets by mouth Daily. Take with or without food. Do not crush or chew tablets. 120 tablet 11   • Apple Irving Vn-Grn Tea-Bit Or-Cr (Apple Cider Vinegar Plus) tablet Take 1 tablet by mouth Daily.     • calcium carbonate (OS-JONNY) 600 MG tablet Take 600 mg by mouth Daily.     • diclofenac (VOLTAREN) 75 MG EC tablet Take 75 mg by mouth 2 (Two) Times a Day As Needed (shoulder pain).     • dutasteride (AVODART) 0.5 MG capsule Take 1 capsule by mouth Daily. 60 capsule 11   • JANUVIA 100 MG tablet Take 100 mg by mouth Daily.     • lisinopril-hydrochlorothiazide (PRINZIDE,ZESTORETIC) 20-12.5 MG per tablet Take 1 tablet by mouth Daily.     • Loperamide HCl (IMODIUM PO) Take 1 dose by mouth As Needed.     • rosuvastatin (CRESTOR) 20 MG tablet Take 10 mg by mouth Every Night.     • tolterodine LA (DETROL LA) 4 MG 24 hr capsule Take 1 capsule by mouth Daily. 30 capsule 11     No facility-administered encounter medications on file as of 11/30/2022.     ADULT ILLNESSES:  Patient Active Problem List   Diagnosis Code   • Hypertension I10   • Hyperlipidemia E78.5   • Diabetes mellitus (HCC) E11.9   • Cancer (HCC) C80.1   • Arrhythmia I49.9   • Non morbid obesity due to excess calories E66.09   • Prostate cancer (HCC) C61   • Anterior urethral stricture N35.914     SURGERIES:  Past Surgical History:   Procedure Laterality Date   • CATARACT EXTRACTION     • CYSTOSCOPY  RETROGRADE PYELOGRAM Bilateral 4/21/2021    Procedure: CYSTOSCOPY RETROGRADE PYELOGRAM, possible DIRECT VISION INTERNAL URETHROTOMY;  Surgeon: Matthew Oneil MD;  Location: [facility] OR;  Service: Urology;  Laterality: Bilateral;   • CYSTOSCOPY URETHROTOMY VISUAL INTERNAL Bilateral 4/21/2021    Procedure: possible DIRECT VISION INTERNAL URETHROTOMY;  Surgeon: Matthew Oneil MD;  Location:  [facility] OR;  Service: Urology;  Laterality: Bilateral;   • EXCISION LESION      neck   • KIDNEY STONE SURGERY     • REPLACEMENT TOTAL KNEE Left    • TONSILLECTOMY       HEALTH MAINTENANCE ITEMS:  Health Maintenance Due   Topic Date Due   • URINE MICROALBUMIN  Never done   • Pneumococcal Vaccine 65+ (1 - PCV) Never done   • TDAP/TD VACCINES (1 - Tdap) Never done   • ZOSTER VACCINE (1 of 2) Never done   • Hepatitis B (1 of 3 - Risk 3-dose series) Never done   • HEPATITIS C SCREENING  Never done   • ANNUAL WELLNESS VISIT  Never done   • LIPID PANEL  Never done   • HEMOGLOBIN A1C  Never done   • DIABETIC EYE EXAM  Never done   • COVID-19 Vaccine (4 - Booster) 03/15/2021   • INFLUENZA VACCINE  Never done       <no information>  Last Completed Colonoscopy     This patient has no relevant Health Maintenance data.          There is no immunization history on file for this patient.  Last Completed Mammogram     This patient has no relevant Health Maintenance data.            FAMILY HISTORY:  Family History   Problem Relation Age of Onset   • Dementia Mother      SOCIAL HISTORY:  Social History     Socioeconomic History   • Marital status:    Tobacco Use   • Smoking status: Never   • Smokeless tobacco: Never   Vaping Use   • Vaping Use: Never used   Substance and Sexual Activity   • Alcohol use: No   • Drug use: No   • Sexual activity: Defer       REVIEW OF SYSTEMS:    Review of Systems   Constitutional: Positive for fatigue. Negative for chills and fever.   HENT: Negative for congestion, mouth sores and trouble swallowing.    Eyes:  "Negative for redness and visual disturbance.   Respiratory: Negative for cough, shortness of breath and wheezing.    Gastrointestinal: Negative for abdominal pain, nausea and vomiting.   Endocrine: Negative for polydipsia and polyphagia.   Genitourinary: Negative for flank pain and hematuria.        \"Self catheter.\"   Musculoskeletal: Negative for gait problem and myalgias.   Skin: Negative for pallor.   Allergic/Immunologic: Negative for food allergies.   Neurological: Negative for dizziness, speech difficulty and weakness.   Hematological: Negative for adenopathy. Does not bruise/bleed easily.   Psychiatric/Behavioral: Negative for agitation, confusion and hallucinations.       VITAL SIGNS: /62   Pulse 80   Temp 97.2 °F (36.2 °C)   Resp 18   Ht 172.7 cm (68\")   Wt 91.1 kg (200 lb 14.4 oz)   SpO2 95%   BMI 30.55 kg/m²  Gained 2 pounds.  Pain Score    11/30/22 1359   PainSc: 2  Comment: I have a tooth ache       PHYSICAL EXAMINATION:     Physical Exam  Vitals reviewed.   Constitutional:       General: He is not in acute distress.  HENT:      Head: Normocephalic and atraumatic.   Eyes:      General: No scleral icterus.  Cardiovascular:      Rate and Rhythm: Normal rate.   Pulmonary:      Effort: No respiratory distress.      Breath sounds: No wheezing or rales.   Abdominal:      General: Bowel sounds are normal.      Palpations: Abdomen is soft.      Tenderness: There is no abdominal tenderness.   Musculoskeletal:         General: No swelling.      Cervical back: Neck supple.   Skin:     General: Skin is warm.      Coloration: Skin is not pale.   Neurological:      Mental Status: He is alert and oriented to person, place, and time.   Psychiatric:         Mood and Affect: Mood normal.         Behavior: Behavior normal.         Thought Content: Thought content normal.         Judgment: Judgment normal.         LABS    Lab Results - Last 18 Months   Lab Units 11/30/22  1348 10/25/22  1333 09/26/22  1318 " 08/31/22  1255 08/03/22  1359 06/30/22  1449 06/01/22  1345 02/09/22  1300 01/12/22  1336 12/08/21  1357 11/11/21  1339 07/15/21  1330 06/18/21  1437   HEMOGLOBIN g/dL 13.5 13.9 12.9* 13.5 13.3 13.6 13.4   < > 13.7   < > 13.2   < > 13.7   HEMATOCRIT % 41.5 41.0 36.9* 38.7 39.0 39.3 39.1   < > 41.0   < > 39.4   < > 40.6   MCV fL 92.2 91.5 89.8 88.8 90.3 89.9 90.1   < > 90.3   < > 90.6   < > 90.2   WBC 10*3/mm3 6.78 6.01 4.97 6.43 7.19 6.52 6.74   < > 6.43   < > 6.43   < > 6.61   RDW % 13.2 13.2 13.1 13.2 13.1 13.0 13.0   < > 13.3   < > 13.2   < > 12.9   MPV fL 10.5 10.9 10.9 10.7 10.6 10.5 10.8   < > 10.7   < > 9.6   < > 10.7   PLATELETS 10*3/mm3 113* 123* 120* 132* 124* 140 134*   < > 141   < > 156   < > 137*   IMM GRAN % %  --   --   --   --  0.3  --  0.4  --  0.3  --  0.6*  --  0.3   NEUTROS ABS 10*3/mm3 4.97 4.33 3.68 4.55 5.50 4.55 5.07   < > 4.66   < > 4.59   < > 4.44   LYMPHS ABS 10*3/mm3 0.93 0.90 0.71 1.03 0.93 1.04 0.92   < > 0.94   < > 0.98   < > 1.15   MONOS ABS 10*3/mm3 0.63 0.58 0.43 0.62 0.57 0.67 0.56   < > 0.63   < > 0.65   < > 0.79   EOS ABS 10*3/mm3 0.18 0.14 0.10 0.17 0.14 0.19 0.12   < > 0.16   < > 0.14   < > 0.17   BASOS ABS 10*3/mm3 0.04 0.04 0.03 0.05 0.03 0.04 0.04   < > 0.02   < > 0.03   < > 0.04   IMMATURE GRANS (ABS) 10*3/mm3  --   --   --   --  0.02  --  0.03  --  0.02  --  0.04  --  0.02   NRBC /100 WBC  --   --   --   --  0.0  --  0.0  --  0.0  --  0.0  --  0.0    < > = values in this interval not displayed.       Lab Results - Last 18 Months   Lab Units 10/25/22  1333 09/26/22  1318 08/31/22  1255 08/03/22  1359 06/30/22  1449 06/01/22  1345 03/09/22  1334 02/09/22  1300 01/12/22  1336 12/08/21  1357 11/11/21  1339 10/11/21  1316 09/15/21  1320   GLUCOSE mg/dL 153* 212* 146* 189* 126* 182*   < > 175* 167* 218* 148* 225* 212*   SODIUM mmol/L 138 135* 136 134* 136 135*   < > 137 137 137 136 136 135*   POTASSIUM mmol/L 3.7 3.9 3.9 4.0 4.1 4.2   < > 3.8 4.3 4.1 4.3 4.3 3.8   CO2 mmol/L  "30.0* 26.0 29.0 31.0* 27.0 28.0   < > 30.0* 32.0* 29.0 28.0 28.0 28.0   CHLORIDE mmol/L 97* 96* 97* 96* 98 95*   < > 97* 95* 96* 96* 96* 96*   ANION GAP mmol/L 11.0 13.0 10.0 7.0 11.0 12.0   < > 10.0 10.0 12.0 12.0 12.0 11.0   CREATININE mg/dL 0.67* 0.66* 0.73* 0.79 0.77 0.71*   < > 0.66* 0.93 0.82 0.67* 0.75* 0.63*   BUN mg/dL 15 12 14 14 19 15   < > 13 20 14 12 12 13   BUN / CREAT RATIO  22.4 18.2 19.2 17.7 24.7 21.1   < > 19.7 21.5 17.1 17.9 16.0 20.6   CALCIUM mg/dL 10.2 9.6 10.2 9.9 10.0 9.7   < > 9.3 10.0 10.1 10.0 10.2 9.5   EGFR IF NONAFRICN AM mL/min/1.73  --   --   --   --   --   --   --  117 79 91 115 101 123   ALK PHOS U/L 69 67 67 64 62 58   < > 64 74 76 62 65 70   TOTAL PROTEIN g/dL 7.8 7.0 7.7 7.5 7.7 7.4   < > 7.3 7.5 7.7 7.6 7.4 7.2   ALT (SGPT) U/L 18 15 17 19 19 17   < > 17 24 23 25 26 19   AST (SGOT) U/L 25 21 24 26 22 22   < > 22 32 32 36 31 23   BILIRUBIN mg/dL 0.3 0.4 0.4 0.4 0.4 0.3   < > 0.4 0.3 0.3 0.3 0.4 0.3   ALBUMIN g/dL 4.80 4.30 4.70 4.50 4.80 4.50   < > 4.40 4.60 4.50 4.20 4.40 4.00   GLOBULIN gm/dL 3.0 2.7 3.0 3.0 2.9 2.9   < > 2.9 2.9 3.2 3.4 3.0 3.2    < > = values in this interval not displayed.       No results for input(s): MSPIKE, KAPPALAMB, IGLFLC, URICACID, FREEKAPPAL, CEA, LDH, REFLABREPO in the last 72634 hours.    Lab Results - Last 18 Months   Lab Units 09/15/21  1320 08/18/21  1329 07/15/21  1330 06/18/21  1437   IRON mcg/dL  --  70  --   --    TIBC mcg/dL  --  328  --   --    IRON SATURATION %  --  21  --   --    FERRITIN ng/mL  --  383.40  --   --    TSH uIU/mL 3.010  --  3.740 3.010       Simeon Tam reports a pain score of 2. \"My tooth. I am on antibiotic.\"  Given his pain assessment as noted, treatment options were discussed and the following options were decided upon as a follow-up plan to address the patient's pain: continuation of current treatment plan for pain.        ASSESSMENT:  1.   Prostate cancer, hormone refractory.   AJCC stage (TX, NX, M0)  Treatment " "status:Casodex and Lupron with PSA recurrence.  On apalutamide and leuprolde (by Dr. Oneil).  2.   Performance status of 1.  3.   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  4.   Cirrhosis, etiology of thrombocytopenia and followed by PCP.  5.   Urethral stricture. Self catheterization as needed. \"That varies.\"            PLAN:  1.    Re:  Apalutamide tolerance.  \"Fine. No effects.\"   2.    Re:  Heme status.  Hemoglobin 13.5, WBC 6.78 and platelet 113 from 123 from 120 from 132 from 124 from 140 from 134 from 120.  3.    Re:  Pre-office CMP.  GFR pending ml/minute and glucose pending.   4.    Re:  Pre-office PSA pending from 0.020 from 0.016 from 0.015 from 0.018 (forgot my Lupron) from < 0.014 from < 0.014  from <0.014 from <0.014 from <0.01 from < 0.014 from < 0.014 from <0.014 from < 0.014 from < 0.014 from < 0.014 from < 0.014 from < 0.014 from < 0.014 from < 0.014 from < 0.014 from < 0.014 from < 0.014 from < 0.014 from 0.015 from 0.03 from 0.125 from 1.03 from 27.3. Testosterone pending from 34.1 from 18.1 from 527 from 580 from 443 from 235.  5.   eRx for Compazine 10 mg p.o. every 4 hours as needed for nausea/vomiting, 60, 2 refills if needed.    6.   eRx for C31D1 started on 11/09/2022 apalutamide 60 mg, take four tablets total dose 240 mg po daily, number, 120.  Take either with or without food.  Swallow tablets whole. TSH before apalutamide.  No grapefruit juice or grapefruit.  Observe for adverse side effects like arrhythmias, rash, seizures, hypothyroidism, fall and fractures.  7.   Continue ongoing management per primary care physician and other specialists.  8.   Plan of care discussed with patient and spouse.  Understanding expressed.  Patient agreeable to proceed.  9.   Lupron per Dr. Oneil.  It is ordered every 6 months.  10.  Questions were answered to his satisfaction. \"Yes, definitely.\"  11.  Advance Care Planning   ACP discussion was declined by the " patient. Patient does not have an advance directive, information provided.  12.  Return to office in 5 weeks with CMP, PSA, testosterone, and CBC with differential, same day         I have reviewed the assessment and plan and verified the accuracy of it. No changes to assessment and plan since the information was documented. Deshawn Black MD 11/30/22       I spent 31 total minutes, face-to-face, caring for Simeon today.  Greater than 50% of this time involved counseling and/or coordination of care as documented within this note regarding the patient's illness(es), pros and cons of various treatment options, instructions and/or risk reduction.                      (Matthew Oneil MD)  Lucio Garcia MD

## 2022-11-14 ENCOUNTER — SPECIALTY PHARMACY (OUTPATIENT)
Dept: ONCOLOGY | Facility: HOSPITAL | Age: 77
End: 2022-11-14

## 2022-11-14 NOTE — PROGRESS NOTES
Specialty Pharmacy Refill Coordination Note     Simeon is a 77 y.o. male contacted today regarding refills of  Erleada specialty medication(s).    Reviewed and verified with patient:Spoke with MsDelores Yonatan for the patient's monthly routine telephone consultation follow-up regarding the oral oncology medication. She stated that he has been tolerating the Erleada really well and has not had any obvious side effects from the medication. He has not missed any doses while on the medication. The patient still receives refills through [facility], and has not had any delays in getting those refills. Also, no new refills are needed at this time according to the patient. The patient's spouse also stated that there has been no changes to his maintenance medications and no new allergies that she is aware of.   Finally, the patient has not had any recent stays in the hospital and has not visited the ER in the last month due to the Erleada. I encouraged the patient to reach out anytime with any questions or concerns they might have regarding their oral chemotherapy.          Specialty medication(s) and dose(s) confirmed: yes    Refill Questions    Flowsheet Row Most Recent Value   Changes to allergies? No   Changes to medications? No   New conditions since last clinic visit No   Unplanned office visit, urgent care, ED, or hospital admission in the last 4 weeks  No   How does patient/caregiver feel medication is working? Good   Financial problems or insurance changes  No   Since the previous refill, were any specialty medication doses or scheduled injections missed or delayed?  No   Does this patient require a clinical escalation to a pharmacist? No                     Follow-up: 4 week(s)   Any Watson CPhT  Specialty Pharmacy Technician and Care Coordinator  Pharmacy Patient   [facility]  [phone]- Office       Any Watson  Specialty Pharmacy Technician

## 2022-11-30 ENCOUNTER — LAB (OUTPATIENT)
Dept: LAB | Facility: HOSPITAL | Age: 77
End: 2022-11-30

## 2022-11-30 ENCOUNTER — OFFICE VISIT (OUTPATIENT)
Dept: ONCOLOGY | Facility: CLINIC | Age: 77
End: 2022-11-30

## 2022-11-30 VITALS
RESPIRATION RATE: 18 BRPM | TEMPERATURE: 97.2 F | SYSTOLIC BLOOD PRESSURE: 120 MMHG | OXYGEN SATURATION: 95 % | HEART RATE: 80 BPM | BODY MASS INDEX: 30.45 KG/M2 | WEIGHT: 200.9 LBS | DIASTOLIC BLOOD PRESSURE: 62 MMHG | HEIGHT: 68 IN

## 2022-11-30 DIAGNOSIS — C61 PROSTATE CANCER: Primary | ICD-10-CM

## 2022-11-30 LAB
ALBUMIN SERPL-MCNC: 4.3 G/DL (ref 3.5–5.2)
ALBUMIN/GLOB SERPL: 1.4 G/DL
ALP SERPL-CCNC: 67 U/L (ref 39–117)
ALT SERPL W P-5'-P-CCNC: 20 U/L (ref 1–41)
ANION GAP SERPL CALCULATED.3IONS-SCNC: 11 MMOL/L (ref 5–15)
AST SERPL-CCNC: 26 U/L (ref 1–40)
BASOPHILS # BLD AUTO: 0.04 10*3/MM3 (ref 0–0.2)
BASOPHILS NFR BLD AUTO: 0.6 % (ref 0–1.5)
BILIRUB SERPL-MCNC: 0.4 MG/DL (ref 0–1.2)
BUN SERPL-MCNC: 17 MG/DL (ref 8–23)
BUN/CREAT SERPL: 28.3 (ref 7–25)
CALCIUM SPEC-SCNC: 9.9 MG/DL (ref 8.6–10.5)
CHLORIDE SERPL-SCNC: 96 MMOL/L (ref 98–107)
CO2 SERPL-SCNC: 29 MMOL/L (ref 22–29)
CREAT SERPL-MCNC: 0.6 MG/DL (ref 0.76–1.27)
DEPRECATED RDW RBC AUTO: 44.4 FL (ref 37–54)
EGFRCR SERPLBLD CKD-EPI 2021: 98.2 ML/MIN/1.73
EOSINOPHIL # BLD AUTO: 0.18 10*3/MM3 (ref 0–0.4)
EOSINOPHIL NFR BLD AUTO: 2.7 % (ref 0.3–6.2)
ERYTHROCYTE [DISTWIDTH] IN BLOOD BY AUTOMATED COUNT: 13.2 % (ref 12.3–15.4)
GLOBULIN UR ELPH-MCNC: 3 GM/DL
GLUCOSE SERPL-MCNC: 134 MG/DL (ref 65–99)
HCT VFR BLD AUTO: 41.5 % (ref 37.5–51)
HGB BLD-MCNC: 13.5 G/DL (ref 13–17.7)
HOLD SPECIMEN: NORMAL
LYMPHOCYTES # BLD AUTO: 0.93 10*3/MM3 (ref 0.7–3.1)
LYMPHOCYTES NFR BLD AUTO: 13.7 % (ref 19.6–45.3)
MCH RBC QN AUTO: 30 PG (ref 26.6–33)
MCHC RBC AUTO-ENTMCNC: 32.5 G/DL (ref 31.5–35.7)
MCV RBC AUTO: 92.2 FL (ref 79–97)
MONOCYTES # BLD AUTO: 0.63 10*3/MM3 (ref 0.1–0.9)
MONOCYTES NFR BLD AUTO: 9.3 % (ref 5–12)
NEUTROPHILS NFR BLD AUTO: 4.97 10*3/MM3 (ref 1.7–7)
NEUTROPHILS NFR BLD AUTO: 73.3 % (ref 42.7–76)
PLATELET # BLD AUTO: 113 10*3/MM3 (ref 140–450)
PMV BLD AUTO: 10.5 FL (ref 6–12)
POTASSIUM SERPL-SCNC: 4.1 MMOL/L (ref 3.5–5.2)
PROT SERPL-MCNC: 7.3 G/DL (ref 6–8.5)
RBC # BLD AUTO: 4.5 10*6/MM3 (ref 4.14–5.8)
SODIUM SERPL-SCNC: 136 MMOL/L (ref 136–145)
WBC NRBC COR # BLD: 6.78 10*3/MM3 (ref 3.4–10.8)

## 2022-11-30 PROCEDURE — 84403 ASSAY OF TOTAL TESTOSTERONE: CPT

## 2022-11-30 PROCEDURE — 36415 COLL VENOUS BLD VENIPUNCTURE: CPT

## 2022-11-30 PROCEDURE — 99214 OFFICE O/P EST MOD 30 MIN: CPT | Performed by: INTERNAL MEDICINE

## 2022-11-30 PROCEDURE — 80053 COMPREHEN METABOLIC PANEL: CPT

## 2022-11-30 PROCEDURE — 85025 COMPLETE CBC W/AUTO DIFF WBC: CPT

## 2022-11-30 PROCEDURE — 84153 ASSAY OF PSA TOTAL: CPT

## 2022-12-01 LAB
PSA SERPL-MCNC: 0.02 NG/ML (ref 0–4)
TESTOST SERPL-MCNC: 25.1 NG/DL (ref 193–740)

## 2022-12-06 ENCOUNTER — PATIENT OUTREACH (OUTPATIENT)
Dept: ONCOLOGY | Facility: HOSPITAL | Age: 77
End: 2022-12-06

## 2022-12-06 NOTE — OUTREACH NOTE
[facility] Pharmacy Services    Oral Oncology Patient Outreach      Prescription Details  Consulting Provider:   Deshawn Black MD ([facility])   Medication and Regimen:  Erleada 240 mg PO daily       An attempt was made by the Oral Oncology Clinic to contact this patient for a follow-up on their chemotherapy medication. The Oral Oncology Clinic can be reached at [phone].     Attempted to contact patient on 12/6/22 for follow-up, will try again tomorrow (12/7/22) before deferring out to next month.       Electronically signed 12/06/2022 17:31 CST by:  Carolee Diaz, PharmD  Specialty Pharmacist - Hematology & Oncology  [facility]  [phone]

## 2022-12-08 ENCOUNTER — SPECIALTY PHARMACY (OUTPATIENT)
Dept: ONCOLOGY | Facility: HOSPITAL | Age: 77
End: 2022-12-08

## 2022-12-09 NOTE — PROGRESS NOTES
[facility] Specialty Pharmacy Services    Oral Oncology Patient Outreach      Prescription Details  Consulting Provider:   Deshawn Black MD ([facility] [place] [facility])   Medication and Regimen:  Erleada 240 mg PO daily       An attempt was made by the Oral Oncology Clinic to contact this patient for a follow-up on their chemotherapy medication. The Oral Oncology Clinic can be reached at [phone].     Attempted to contact patient for follow-up on 12/6/22 and 12/8/22, will defer patient out to 1/4/23 for next telephone consultation.       Electronically signed 12/08/2022 19:01 CST by:  Carolee Diaz, PharmD  Specialty Pharmacist - Hematology & Oncology  [facility]  [phone]

## 2022-12-20 NOTE — PROGRESS NOTES
[facility] HEMATOLOGY & ONCOLOGY [place]  [address]  [place] [address] [phone]    Patient Name: Simeon Tam  Encounter Date: 01/04/2023  YOB: 1945  Patient Number: [id number]      REASON FOR FOLLOW-UP:Simeon Tam is a pleasant 77 y.o.  male who is seen in follow-up for hormone refractory prostate cancer.  He is seen C33D1 of apalutamide.  He is seen alone. History is obtained from patient.  He is a reliable historian.        Oncology/Hematology History Overview Note   DIAGNOSTIC ABNORMALITIES:  He presented with rising PSA of 13 and was diagnosed with prostate cancer about 25 years ago.   Previous PSA was 22.26 on 10/05/2016, <0.13 on 07/17/2017, 7.91 on 10/17/2018, 19.29 on 03/19/2020, and 22.25 on 04/07/2020.   He was seen by Dr. Oneil 05/12/2020. Latest PSA was 22.25 ng/ml on 04/07/2020.  Previous PSA 22.26 on 10/05/2016.  Bone scan and CT scan of the abdomen and pelvis were negative for prostate cancer on 10/2016.  Started on Casodex and possibly leuprolide.  PSA  was down to <0.13 on 07/17/207.  Plan for apalutamide after staging scans.   CT abdomen and pelvis 06/11/2020. No evidence of metastatic disease in the abdomen or pelvis. Right renal pelvis and ureter urothelial thickening and hyperemia. Urinary bladder wall is thickened and there is adjacent inflammation. Recommend correlation with urinalysis and exam to exclude cystitis with right-sided pyelitis.  Cirrhosis.  Tiny 8 mm hypodense RIGHT inferior liver lesion on image 33 is too small to characterize.   Bone scan 06/11/2020. No evidence of bone metastases.        PREVIOUS INTERVENTIONS:  He was treated with adjuvant radiation at [facility] and androgen ablation with Casodex.  Lupron and Casodex 10/2016. He had 7 months of ADT therapy with Lupron.  Apalutamide 07/22/2020 through present.     Prostate cancer (HCC)   6/29/2020 -  Chemotherapy    OP  PROSTATE Apalutamide     6/30/2020 Initial Diagnosis    Prostate cancer (CMS/HCC)     7/29/2020 -  Chemotherapy    OP LEUPROLIDE 45MG Q6M - ORDER EXPIRES 8/2/23         PAST MEDICAL HISTORY:  ALLERGIES:  Allergies   Allergen Reactions   • Sulfa Antibiotics Rash     CURRENT MEDICATIONS:  Outpatient Encounter Medications as of 1/4/2023   Medication Sig Dispense Refill   • Apalutamide 60 MG tablet Take 4 tablets by mouth Daily. Take 4 tablets once daily with or without food. Do not crush or chew tablets. 120 tablet 11   • Apalutamide 60 MG tablet Take 4 tablets by mouth Daily. Take with or without food. Do not crush or chew tablets. 120 tablet 11   • Apple Irving Vn-Grn Tea-Bit Or-Cr (Apple Cider Vinegar Plus) tablet Take 1 tablet by mouth Daily.     • calcium carbonate (OS-JONNY) 600 MG tablet Take 600 mg by mouth Daily.     • diclofenac (VOLTAREN) 75 MG EC tablet Take 75 mg by mouth 2 (Two) Times a Day As Needed (shoulder pain).     • dutasteride (AVODART) 0.5 MG capsule Take 1 capsule by mouth Daily. 60 capsule 11   • JANUVIA 100 MG tablet Take 100 mg by mouth Daily.     • lisinopril-hydrochlorothiazide (PRINZIDE,ZESTORETIC) 20-12.5 MG per tablet Take 1 tablet by mouth Daily.     • Loperamide HCl (IMODIUM PO) Take 1 dose by mouth As Needed.     • rosuvastatin (CRESTOR) 20 MG tablet Take 10 mg by mouth Every Night.     • tolterodine LA (DETROL LA) 4 MG 24 hr capsule Take 1 capsule by mouth Daily. 30 capsule 11     No facility-administered encounter medications on file as of 1/4/2023.     ADULT ILLNESSES:  Patient Active Problem List   Diagnosis Code   • Hypertension I10   • Hyperlipidemia E78.5   • Diabetes mellitus (HCC) E11.9   • Cancer (HCC) C80.1   • Arrhythmia I49.9   • Non morbid obesity due to excess calories E66.09   • Prostate cancer (HCC) C61   • Anterior urethral stricture N35.914     SURGERIES:  Past Surgical History:   Procedure Laterality Date   • CATARACT EXTRACTION     • CYSTOSCOPY RETROGRADE PYELOGRAM  Bilateral 4/21/2021    Procedure: CYSTOSCOPY RETROGRADE PYELOGRAM, possible DIRECT VISION INTERNAL URETHROTOMY;  Surgeon: Matthew Oneil MD;  Location: [facility] OR;  Service: Urology;  Laterality: Bilateral;   • CYSTOSCOPY URETHROTOMY VISUAL INTERNAL Bilateral 4/21/2021    Procedure: possible DIRECT VISION INTERNAL URETHROTOMY;  Surgeon: Matthew Oneil MD;  Location:  [facility] OR;  Service: Urology;  Laterality: Bilateral;   • EXCISION LESION      neck   • KIDNEY STONE SURGERY     • REPLACEMENT TOTAL KNEE Left    • TONSILLECTOMY       HEALTH MAINTENANCE ITEMS:  Health Maintenance Due   Topic Date Due   • URINE MICROALBUMIN  Never done   • Pneumococcal Vaccine 65+ (1 - PCV) Never done   • TDAP/TD VACCINES (1 - Tdap) Never done   • ZOSTER VACCINE (1 of 2) Never done   • Hepatitis B (1 of 3 - Risk 3-dose series) Never done   • HEPATITIS C SCREENING  Never done   • ANNUAL WELLNESS VISIT  Never done   • LIPID PANEL  Never done   • HEMOGLOBIN A1C  Never done   • DIABETIC EYE EXAM  Never done   • COVID-19 Vaccine (4 - Booster) 03/15/2021   • INFLUENZA VACCINE  Never done       <no information>  Last Completed Colonoscopy     This patient has no relevant Health Maintenance data.          There is no immunization history on file for this patient.  Last Completed Mammogram     This patient has no relevant Health Maintenance data.            FAMILY HISTORY:  Family History   Problem Relation Age of Onset   • Dementia Mother      SOCIAL HISTORY:  Social History     Socioeconomic History   • Marital status:    Tobacco Use   • Smoking status: Never   • Smokeless tobacco: Never   Vaping Use   • Vaping Use: Never used   Substance and Sexual Activity   • Alcohol use: No   • Drug use: No   • Sexual activity: Defer       REVIEW OF SYSTEMS:    Review of Systems   Constitutional: Negative for fatigue, fever and unexpected weight change.        \"I feel good.\"   HENT: Negative for congestion, mouth sores and trouble swallowing.     Eyes: Negative for redness and visual disturbance.   Respiratory: Negative for cough, shortness of breath and wheezing.    Cardiovascular: Negative for chest pain and leg swelling.   Gastrointestinal: Negative for abdominal pain, nausea and vomiting.   Endocrine: Negative for polydipsia and polyphagia.   Genitourinary: Negative for flank pain and hematuria.        Self catheterize. \"As needed.\"   Musculoskeletal: Negative for gait problem and neck stiffness.   Skin: Negative for pallor.   Allergic/Immunologic: Negative for food allergies.   Neurological: Negative for dizziness, speech difficulty and weakness.   Hematological: Negative for adenopathy. Does not bruise/bleed easily.   Psychiatric/Behavioral: Negative for agitation, confusion and hallucinations.       VITAL SIGNS: /66   Pulse 86   Temp 97.7 °F (36.5 °C)   Resp 18   Ht 172.7 cm (68\")   Wt 89.4 kg (197 lb)   SpO2 93%   BMI 29.95 kg/m²  Lost 3 pounds. \"I can't believe that.\"  Pain Score    01/04/23 1420   PainSc: 0-No pain       PHYSICAL EXAMINATION:     Physical Exam  Vitals reviewed.   Constitutional:       General: He is not in acute distress.  HENT:      Head: Normocephalic and atraumatic.   Eyes:      General: No scleral icterus.  Cardiovascular:      Rate and Rhythm: Normal rate.   Pulmonary:      Effort: No respiratory distress.      Breath sounds: No wheezing or rales.   Abdominal:      General: Bowel sounds are normal.      Palpations: Abdomen is soft.      Tenderness: There is no abdominal tenderness.   Musculoskeletal:         General: No swelling.      Cervical back: Neck supple.   Skin:     General: Skin is warm.      Coloration: Skin is not pale.   Neurological:      Mental Status: He is alert and oriented to person, place, and time.   Psychiatric:         Mood and Affect: Mood normal.         Behavior: Behavior normal.         Thought Content: Thought content normal.         Judgment: Judgment normal.         LABS    Lab  Results - Last 18 Months   Lab Units 01/04/23  1336 11/30/22  1348 10/25/22  1333 09/26/22  1318 08/31/22  1255 08/03/22  1359 06/30/22  1449 06/01/22  1345 02/09/22  1300 01/12/22  1336 12/08/21  1357 11/11/21  1339   HEMOGLOBIN g/dL 13.3 13.5 13.9 12.9* 13.5 13.3   < > 13.4   < > 13.7   < > 13.2   HEMATOCRIT % 40.2 41.5 41.0 36.9* 38.7 39.0   < > 39.1   < > 41.0   < > 39.4   MCV fL 90.7 92.2 91.5 89.8 88.8 90.3   < > 90.1   < > 90.3   < > 90.6   WBC 10*3/mm3 6.14 6.78 6.01 4.97 6.43 7.19   < > 6.74   < > 6.43   < > 6.43   RDW % 13.1 13.2 13.2 13.1 13.2 13.1   < > 13.0   < > 13.3   < > 13.2   MPV fL 10.9 10.5 10.9 10.9 10.7 10.6   < > 10.8   < > 10.7   < > 9.6   PLATELETS 10*3/mm3 124* 113* 123* 120* 132* 124*   < > 134*   < > 141   < > 156   IMM GRAN % %  --   --   --   --   --  0.3  --  0.4  --  0.3  --  0.6*   NEUTROS ABS 10*3/mm3 4.62 4.97 4.33 3.68 4.55 5.50   < > 5.07   < > 4.66   < > 4.59   LYMPHS ABS 10*3/mm3 0.88 0.93 0.90 0.71 1.03 0.93   < > 0.92   < > 0.94   < > 0.98   MONOS ABS 10*3/mm3 0.48 0.63 0.58 0.43 0.62 0.57   < > 0.56   < > 0.63   < > 0.65   EOS ABS 10*3/mm3 0.11 0.18 0.14 0.10 0.17 0.14   < > 0.12   < > 0.16   < > 0.14   BASOS ABS 10*3/mm3 0.03 0.04 0.04 0.03 0.05 0.03   < > 0.04   < > 0.02   < > 0.03   IMMATURE GRANS (ABS) 10*3/mm3  --   --   --   --   --  0.02  --  0.03  --  0.02  --  0.04   NRBC /100 WBC  --   --   --   --   --  0.0  --  0.0  --  0.0  --  0.0    < > = values in this interval not displayed.       Lab Results - Last 18 Months   Lab Units 01/04/23  1336 11/30/22  1348 10/25/22  1333 09/26/22  1318 08/31/22  1255 08/03/22  1359 03/09/22  1334 02/09/22  1300 01/12/22  1336 12/08/21  1357 11/11/21  1339 10/11/21  1316 09/15/21  1320   GLUCOSE mg/dL 195* 134* 153* 212* 146* 189*   < > 175* 167* 218* 148* 225* 212*   SODIUM mmol/L 135* 136 138 135* 136 134*   < > 137 137 137 136 136 135*   POTASSIUM mmol/L 3.9 4.1 3.7 3.9 3.9 4.0   < > 3.8 4.3 4.1 4.3 4.3 3.8   CO2 mmol/L 26.0  29.0 30.0* 26.0 29.0 31.0*   < > 30.0* 32.0* 29.0 28.0 28.0 28.0   CHLORIDE mmol/L 95* 96* 97* 96* 97* 96*   < > 97* 95* 96* 96* 96* 96*   ANION GAP mmol/L 14.0 11.0 11.0 13.0 10.0 7.0   < > 10.0 10.0 12.0 12.0 12.0 11.0   CREATININE mg/dL 0.62* 0.60* 0.67* 0.66* 0.73* 0.79   < > 0.66* 0.93 0.82 0.67* 0.75* 0.63*   BUN mg/dL 15 17 15 12 14 14   < > 13 20 14 12 12 13   BUN / CREAT RATIO  24.2 28.3* 22.4 18.2 19.2 17.7   < > 19.7 21.5 17.1 17.9 16.0 20.6   CALCIUM mg/dL 9.7 9.9 10.2 9.6 10.2 9.9   < > 9.3 10.0 10.1 10.0 10.2 9.5   EGFR IF NONAFRICN AM mL/min/1.73  --   --   --   --   --   --   --  117 79 91 115 101 123   ALK PHOS U/L 67 67 69 67 67 64   < > 64 74 76 62 65 70   TOTAL PROTEIN g/dL 7.6 7.3 7.8 7.0 7.7 7.5   < > 7.3 7.5 7.7 7.6 7.4 7.2   ALT (SGPT) U/L 20 20 18 15 17 19   < > 17 24 23 25 26 19   AST (SGOT) U/L 27 26 25 21 24 26   < > 22 32 32 36 31 23   BILIRUBIN mg/dL 0.5 0.4 0.3 0.4 0.4 0.4   < > 0.4 0.3 0.3 0.3 0.4 0.3   ALBUMIN g/dL 4.4 4.30 4.80 4.30 4.70 4.50   < > 4.40 4.60 4.50 4.20 4.40 4.00   GLOBULIN gm/dL 3.2 3.0 3.0 2.7 3.0 3.0   < > 2.9 2.9 3.2 3.4 3.0 3.2    < > = values in this interval not displayed.       No results for input(s): MSPIKE, KAPPALAMB, IGLFLC, URICACID, FREEKAPPAL, CEA, LDH, REFLABREPO in the last 08690 hours.    Lab Results - Last 18 Months   Lab Units 09/15/21  1320 08/18/21  1329 07/15/21  1330   IRON mcg/dL  --  70  --    TIBC mcg/dL  --  328  --    IRON SATURATION %  --  21  --    FERRITIN ng/mL  --  383.40  --    TSH uIU/mL 3.010  --  3.740       Simeon Tam reports a pain score of 0.        ASSESSMENT:  1.   Prostate cancer, hormone refractory.   AJCC stage (TX, NX, M0)  Treatment status:Casodex and Lupron with PSA recurrence.  Treated with apalutamide and leuprolde (by Dr. Oneil).  2.   Good performance status of 0.  3.   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 On observation.  4.   Cirrhosis, etiology of thrombocytopenia and followed by PCP.  5.    Urethral stricture. Self catheterization as needed. \"That varies.\"             PLAN:  1.    Re:  Apalutamide tolerance.  \"It's doing fine. Everything good.\"   2.    Re:  Heme status.  Hemoglobin 13.3, WBC 6.1 and platelet 124 from 113 from 123 from 120 from 132 from 124 from 140 from 134 from 120.  3.    Re:  Pre-office CMP.  GFR 97.2 from 98.2 ml/minute and glucose 195. \"I had KFC.\"  4.    Re:  Pre-office PSA pending from 0.019 from 0.020 from 0.016 from 0.015 from 0.018 (forgot my Lupron) from < 0.014 from < 0.014  from <0.014 from <0.014 from <0.01 from < 0.014 from < 0.014 from <0.014 from < 0.014 from < 0.014 from < 0.014 from < 0.014 from < 0.014 from < 0.014 from < 0.014 from < 0.014 from < 0.014 from < 0.014 from < 0.014 from 0.015 from 0.03 from 0.125 from 1.03 from 27.3. Testosterone pending from 25.1 from 34.1 from 18.1 from 527 from 580 from 443 from 235.  5.   eRx for Compazine 10 mg p.o. every 4 hours as needed for nausea/vomiting, 60, 2 refills if needed.    6.   eRx for C33D1 start on 01/04/2023 apalutamide 60 mg, take four tablets total dose 240 mg po daily, number, 120.  Take either with or without food.  Swallow tablets whole. TSH before apalutamide.  No grapefruit juice or grapefruit.  Observe for adverse effects like seizures, rash, arrhythmias, hypothyroidism, fall and fractures.  7.   Continue ongoing management per primary care physician and other specialists.  8.   Plan of care discussed with patient.  Understanding expressed.  Patient agreeable to proceed.  9.   Lupron per Dr. Oneil.  It is ordered every 6 months.  10.  Questions were answered to his satisfaction. \"Yea.\"  11.  Advance Care Planning   ACP discussion was declined by the patient. Patient does not have an advance directive, information provided.   12.  Return to office in 4 weeks with CMP, PSA, testosterone, and CBC with differential, same day         I have reviewed the assessment and plan and  verified the accuracy of it. No changes to assessment and plan since the information was documented. Deshawn Black MD 01/04/23       I spent 33 total minutes, face-to-face, caring for Simeon today.  Greater than 50% of this time involved counseling and/or coordination of care as documented within this note regarding the patient's illness(es), pros and cons of various treatment options, instructions and/or risk reduction.                       (Matthew Oneil MD)  Lucio Garcia MD

## 2023-01-02 ENCOUNTER — PATIENT OUTREACH (OUTPATIENT)
Dept: ONCOLOGY | Facility: HOSPITAL | Age: 78
End: 2023-01-02
Payer: MEDICARE

## 2023-01-02 NOTE — OUTREACH NOTE
[facility]    Oral Oncology Patient Outreach      Prescription Details  Consulting Provider:   Deshawn Black MD ([facility] [place] [facility])   Medication and Regimen:  Erleada 240 mg PO daily       An attempt was made by the Oral Oncology Clinic to contact this patient for a follow-up on their chemotherapy medication. The Oral Oncology Clinic can be reached at [phone].        Electronically signed 01/02/2023 13:10 CST by:  Saud Miller, PharmD  Specialty Pharmacist - Hematology & Oncology  [facility]  [phone]

## 2023-01-03 ENCOUNTER — SPECIALTY PHARMACY (OUTPATIENT)
Dept: ONCOLOGY | Facility: HOSPITAL | Age: 78
End: 2023-01-03
Payer: MEDICARE

## 2023-01-03 NOTE — PROGRESS NOTES
[facility]    Oral Oncology Patient Outreach      Prescription Details  Consulting Provider:   Deshawn Black MD ([facility] [place] [facility])   Medication and Regimen:  Erleada 240 mg PO daily       An attempt was made by the Oral Oncology Clinic to contact this patient for a follow-up on their chemotherapy medication. The Oral Oncology Clinic can be reached at [phone].     Attempted to contact patient for follow-up on 1/2/23 and 1/3/23, will defer patient out to 2/1/23 for next telephone consultation.       Electronically signed 01/03/2023 16:51 CST by:  Carolee Diaz, PharmD  Specialty Pharmacist - Hematology & Oncology  [facility]  [phone]

## 2023-01-04 ENCOUNTER — LAB (OUTPATIENT)
Dept: LAB | Facility: HOSPITAL | Age: 78
End: 2023-01-04
Payer: MEDICARE

## 2023-01-04 ENCOUNTER — OFFICE VISIT (OUTPATIENT)
Dept: ONCOLOGY | Facility: CLINIC | Age: 78
End: 2023-01-04
Payer: MEDICARE

## 2023-01-04 VITALS
RESPIRATION RATE: 18 BRPM | OXYGEN SATURATION: 93 % | HEART RATE: 86 BPM | BODY MASS INDEX: 29.86 KG/M2 | WEIGHT: 197 LBS | SYSTOLIC BLOOD PRESSURE: 138 MMHG | HEIGHT: 68 IN | DIASTOLIC BLOOD PRESSURE: 66 MMHG | TEMPERATURE: 97.7 F

## 2023-01-04 DIAGNOSIS — C61 PROSTATE CANCER: Primary | ICD-10-CM

## 2023-01-04 LAB
ALBUMIN SERPL-MCNC: 4.4 G/DL (ref 3.5–5.2)
ALBUMIN/GLOB SERPL: 1.4 G/DL
ALP SERPL-CCNC: 67 U/L (ref 39–117)
ALT SERPL W P-5'-P-CCNC: 20 U/L (ref 1–41)
ANION GAP SERPL CALCULATED.3IONS-SCNC: 14 MMOL/L (ref 5–15)
AST SERPL-CCNC: 27 U/L (ref 1–40)
BASOPHILS # BLD AUTO: 0.03 10*3/MM3 (ref 0–0.2)
BASOPHILS NFR BLD AUTO: 0.5 % (ref 0–1.5)
BILIRUB SERPL-MCNC: 0.5 MG/DL (ref 0–1.2)
BUN SERPL-MCNC: 15 MG/DL (ref 8–23)
BUN/CREAT SERPL: 24.2 (ref 7–25)
CALCIUM SPEC-SCNC: 9.7 MG/DL (ref 8.6–10.5)
CHLORIDE SERPL-SCNC: 95 MMOL/L (ref 98–107)
CO2 SERPL-SCNC: 26 MMOL/L (ref 22–29)
CREAT SERPL-MCNC: 0.62 MG/DL (ref 0.76–1.27)
DEPRECATED RDW RBC AUTO: 43.2 FL (ref 37–54)
EGFRCR SERPLBLD CKD-EPI 2021: 97.2 ML/MIN/1.73
EOSINOPHIL # BLD AUTO: 0.11 10*3/MM3 (ref 0–0.4)
EOSINOPHIL NFR BLD AUTO: 1.8 % (ref 0.3–6.2)
ERYTHROCYTE [DISTWIDTH] IN BLOOD BY AUTOMATED COUNT: 13.1 % (ref 12.3–15.4)
GLOBULIN UR ELPH-MCNC: 3.2 GM/DL
GLUCOSE SERPL-MCNC: 195 MG/DL (ref 65–99)
HCT VFR BLD AUTO: 40.2 % (ref 37.5–51)
HGB BLD-MCNC: 13.3 G/DL (ref 13–17.7)
HOLD SPECIMEN: NORMAL
LYMPHOCYTES # BLD AUTO: 0.88 10*3/MM3 (ref 0.7–3.1)
LYMPHOCYTES NFR BLD AUTO: 14.3 % (ref 19.6–45.3)
MCH RBC QN AUTO: 30 PG (ref 26.6–33)
MCHC RBC AUTO-ENTMCNC: 33.1 G/DL (ref 31.5–35.7)
MCV RBC AUTO: 90.7 FL (ref 79–97)
MONOCYTES # BLD AUTO: 0.48 10*3/MM3 (ref 0.1–0.9)
MONOCYTES NFR BLD AUTO: 7.8 % (ref 5–12)
NEUTROPHILS NFR BLD AUTO: 4.62 10*3/MM3 (ref 1.7–7)
NEUTROPHILS NFR BLD AUTO: 75.3 % (ref 42.7–76)
PLATELET # BLD AUTO: 124 10*3/MM3 (ref 140–450)
PMV BLD AUTO: 10.9 FL (ref 6–12)
POTASSIUM SERPL-SCNC: 3.9 MMOL/L (ref 3.5–5.2)
PROT SERPL-MCNC: 7.6 G/DL (ref 6–8.5)
RBC # BLD AUTO: 4.43 10*6/MM3 (ref 4.14–5.8)
SODIUM SERPL-SCNC: 135 MMOL/L (ref 136–145)
WBC NRBC COR # BLD: 6.14 10*3/MM3 (ref 3.4–10.8)

## 2023-01-04 PROCEDURE — 84403 ASSAY OF TOTAL TESTOSTERONE: CPT

## 2023-01-04 PROCEDURE — 1160F RVW MEDS BY RX/DR IN RCRD: CPT | Performed by: INTERNAL MEDICINE

## 2023-01-04 PROCEDURE — 99214 OFFICE O/P EST MOD 30 MIN: CPT | Performed by: INTERNAL MEDICINE

## 2023-01-04 PROCEDURE — 1126F AMNT PAIN NOTED NONE PRSNT: CPT | Performed by: INTERNAL MEDICINE

## 2023-01-04 PROCEDURE — 84153 ASSAY OF PSA TOTAL: CPT

## 2023-01-04 PROCEDURE — 36415 COLL VENOUS BLD VENIPUNCTURE: CPT

## 2023-01-04 PROCEDURE — 80053 COMPREHEN METABOLIC PANEL: CPT

## 2023-01-04 PROCEDURE — 85025 COMPLETE CBC W/AUTO DIFF WBC: CPT

## 2023-01-04 PROCEDURE — 1159F MED LIST DOCD IN RCRD: CPT | Performed by: INTERNAL MEDICINE

## 2023-01-05 LAB
PSA SERPL-MCNC: 0.02 NG/ML (ref 0–4)
TESTOST SERPL-MCNC: 28.2 NG/DL (ref 193–740)

## 2023-01-27 ENCOUNTER — SPECIALTY PHARMACY (OUTPATIENT)
Dept: ONCOLOGY | Facility: HOSPITAL | Age: 78
End: 2023-01-27
Payer: MEDICARE
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
## 2023-02-02 NOTE — PROGRESS NOTES
[facility] ONC [facility] HEMATOLOGY & ONCOLOGY [place]  [address]  [place] [address] [phone]    Patient Name: Simeno Tam  Encounter Date: 02/08/2023  YOB: 1945  Patient Number: [id number]      REASON FOR FOLLOW-UP: Simeon Tam is a pleasant 77 y.o.  male who is seen in follow-up for hormone refractory prostate cancer (HRPC).  He is seen C34D8 of apalutamide.  He is seen alone. History is obtained from patient.  History is considered reliable.        Oncology/Hematology History Overview Note   DIAGNOSTIC ABNORMALITIES:  He presented with rising PSA of 13 and was diagnosed with prostate cancer about 25 years ago.   Previous PSA was 22.26 on 10/05/2016, <0.13 on 07/17/2017, 7.91 on 10/17/2018, 19.29 on 03/19/2020, and 22.25 on 04/07/2020.   He was seen by Dr. Oneil 05/12/2020. Latest PSA was 22.25 ng/ml on 04/07/2020.  Previous PSA 22.26 on 10/05/2016.  Bone scan and CT scan of the abdomen and pelvis were negative for prostate cancer on 10/2016.  Started on Casodex and possibly leuprolide.  PSA  was down to <0.13 on 07/17/207.  Plan for apalutamide after staging scans.   CT abdomen and pelvis 06/11/2020. No evidence of metastatic disease in the abdomen or pelvis. Right renal pelvis and ureter urothelial thickening and hyperemia. Urinary bladder wall is thickened and there is adjacent inflammation. Recommend correlation with urinalysis and exam to exclude cystitis with right-sided pyelitis.  Cirrhosis.  Tiny 8 mm hypodense RIGHT inferior liver lesion on image 33 is too small to characterize.   Bone scan 06/11/2020. No evidence of bone metastases.        PREVIOUS INTERVENTIONS:  He was treated with adjuvant radiation at [facility] and androgen ablation with Casodex.  Lupron and Casodex 10/2016. He had 7 months of ADT therapy with Lupron.  Apalutamide 07/22/2020 through present.     Prostate cancer (HCC)   6/29/2020 -   Chemotherapy    OP PROSTATE Apalutamide     6/30/2020 Initial Diagnosis    Prostate cancer (CMS/HCC)     7/29/2020 -  Chemotherapy    OP LEUPROLIDE 45MG Q6M - ORDER EXPIRES 8/2/23         PAST MEDICAL HISTORY:  ALLERGIES:  Allergies   Allergen Reactions   • Sulfa Antibiotics Rash     CURRENT MEDICATIONS:  Outpatient Encounter Medications as of 2/8/2023   Medication Sig Dispense Refill   • Apalutamide 60 MG tablet Take 4 tablets by mouth Daily. Take with or without food. Do not crush or chew tablets. 120 tablet 11   • calcium carbonate (OS-JONNY) 600 MG tablet Take 600 mg by mouth Daily.     • diclofenac (VOLTAREN) 75 MG EC tablet Take 75 mg by mouth 2 (Two) Times a Day As Needed (shoulder pain).     • dutasteride (AVODART) 0.5 MG capsule Take 1 capsule by mouth Daily. 60 capsule 11   • JANUVIA 100 MG tablet Take 100 mg by mouth Daily.     • lisinopril-hydrochlorothiazide (PRINZIDE,ZESTORETIC) 20-12.5 MG per tablet Take 1 tablet by mouth Daily.     • Loperamide HCl (IMODIUM PO) Take 1 dose by mouth As Needed.     • rosuvastatin (CRESTOR) 20 MG tablet Take 10 mg by mouth Every Night.     • tolterodine LA (DETROL LA) 4 MG 24 hr capsule Take 1 capsule by mouth Daily. 30 capsule 11     No facility-administered encounter medications on file as of 2/8/2023.     ADULT ILLNESSES:  Patient Active Problem List   Diagnosis Code   • Hypertension I10   • Hyperlipidemia E78.5   • Diabetes mellitus (HCC) E11.9   • Cancer (HCC) C80.1   • Arrhythmia I49.9   • Non morbid obesity due to excess calories E66.09   • Prostate cancer ([facility]) C61   • Anterior urethral stricture N35.914     SURGERIES:  Past Surgical History:   Procedure Laterality Date   • CATARACT EXTRACTION     • CYSTOSCOPY RETROGRADE PYELOGRAM Bilateral 4/21/2021    Procedure: CYSTOSCOPY RETROGRADE PYELOGRAM, possible DIRECT VISION INTERNAL URETHROTOMY;  Surgeon: Matthew Oneil MD;  Location: [facility];  Service: Urology;  Laterality: Bilateral;   • CYSTOSCOPY URETHROTOMY  "VISUAL INTERNAL Bilateral 4/21/2021    Procedure: possible DIRECT VISION INTERNAL URETHROTOMY;  Surgeon: Matthew Oneil MD;  Location: [facility];  Service: Urology;  Laterality: Bilateral;   • EXCISION LESION      neck   • KIDNEY STONE SURGERY     • REPLACEMENT TOTAL KNEE Left    • TONSILLECTOMY       HEALTH MAINTENANCE ITEMS:  Health Maintenance Due   Topic Date Due   • URINE MICROALBUMIN  Never done   • Pneumococcal Vaccine 65+ (1 - PCV) Never done   • TDAP/TD VACCINES (1 - Tdap) Never done   • ZOSTER VACCINE (1 of 2) Never done   • Hepatitis B (1 of 3 - Risk 3-dose series) Never done   • HEPATITIS C SCREENING  Never done   • ANNUAL WELLNESS VISIT  Never done   • LIPID PANEL  Never done   • HEMOGLOBIN A1C  Never done   • DIABETIC EYE EXAM  Never done   • COVID-19 Vaccine (4 - Booster) 03/15/2021   • INFLUENZA VACCINE  Never done       <no information>  Last Completed Colonoscopy     This patient has no relevant Health Maintenance data.          There is no immunization history on file for this patient.  Last Completed Mammogram     This patient has no relevant Health Maintenance data.            FAMILY HISTORY:  Family History   Problem Relation Age of Onset   • Dementia Mother      SOCIAL HISTORY:  Social History     Socioeconomic History   • Marital status:    Tobacco Use   • Smoking status: Never   • Smokeless tobacco: Never   Vaping Use   • Vaping Use: Never used   Substance and Sexual Activity   • Alcohol use: No   • Drug use: No   • Sexual activity: Defer       REVIEW OF SYSTEMS:    Review of Systems   Constitutional: Negative for fatigue, fever and unexpected weight change.        \"I am feeling real good.\"   HENT: Negative for congestion, mouth sores and trouble swallowing.    Eyes: Negative for redness and visual disturbance.   Respiratory: Negative for cough, shortness of breath and wheezing.    Cardiovascular: Negative for chest pain and palpitations.   Gastrointestinal: Negative for abdominal " "pain, constipation, diarrhea, nausea and vomiting.   Endocrine: Negative for polydipsia and polyphagia.   Genitourinary: Negative for hematuria.        \"I use the catheter accordingly.\"   Musculoskeletal: Negative for gait problem and joint swelling.   Skin: Negative for pallor.   Allergic/Immunologic: Negative for food allergies.   Neurological: Negative for dizziness, speech difficulty and weakness.   Hematological: Negative for adenopathy. Does not bruise/bleed easily.   Psychiatric/Behavioral: Negative for agitation, confusion and hallucinations.       VITAL SIGNS: /64   Pulse 80   Temp 97.8 °F (36.6 °C)   Resp 18   Ht 172.7 cm (68\")   Wt 89.8 kg (198 lb)   SpO2 97%   BMI 30.11 kg/m²   Pain Score    02/08/23 1313   PainSc: 0-No pain       PHYSICAL EXAMINATION:     Physical Exam  Vitals reviewed.   Constitutional:       General: He is not in acute distress.  HENT:      Head: Normocephalic and atraumatic.   Eyes:      General: No scleral icterus.  Cardiovascular:      Rate and Rhythm: Normal rate.   Pulmonary:      Effort: No respiratory distress.      Breath sounds: No wheezing or rales.   Abdominal:      General: Bowel sounds are normal.      Palpations: Abdomen is soft.      Tenderness: There is no abdominal tenderness.   Musculoskeletal:         General: No swelling.      Cervical back: Neck supple.   Skin:     General: Skin is warm.      Coloration: Skin is not pale.   Neurological:      Mental Status: He is alert and oriented to person, place, and time.   Psychiatric:         Mood and Affect: Mood normal.         Behavior: Behavior normal.         Thought Content: Thought content normal.         Judgment: Judgment normal.         LABS    Lab Results - Last 18 Months   Lab Units 02/08/23  1233 01/04/23  1336 11/30/22  1348 10/25/22  1333 09/26/22  1318 08/31/22  1255 08/03/22  1359 06/30/22  1449 06/01/22  1345 02/09/22  1300 01/12/22  1336 12/08/21  1357 11/11/21  1339   HEMOGLOBIN g/dL 13.5 " 13.3 13.5 13.9 12.9* 13.5 13.3   < > 13.4   < > 13.7   < > 13.2   HEMATOCRIT % 40.7 40.2 41.5 41.0 36.9* 38.7 39.0   < > 39.1   < > 41.0   < > 39.4   MCV fL 92.1 90.7 92.2 91.5 89.8 88.8 90.3   < > 90.1   < > 90.3   < > 90.6   WBC 10*3/mm3 5.32 6.14 6.78 6.01 4.97 6.43 7.19   < > 6.74   < > 6.43   < > 6.43   RDW % 13.2 13.1 13.2 13.2 13.1 13.2 13.1   < > 13.0   < > 13.3   < > 13.2   MPV fL 10.5 10.9 10.5 10.9 10.9 10.7 10.6   < > 10.8   < > 10.7   < > 9.6   PLATELETS 10*3/mm3 117* 124* 113* 123* 120* 132* 124*   < > 134*   < > 141   < > 156   IMM GRAN % %  --   --   --   --   --   --  0.3  --  0.4  --  0.3  --  0.6*   NEUTROS ABS 10*3/mm3 3.69 4.62 4.97 4.33 3.68 4.55 5.50   < > 5.07   < > 4.66   < > 4.59   LYMPHS ABS 10*3/mm3 0.93 0.88 0.93 0.90 0.71 1.03 0.93   < > 0.92   < > 0.94   < > 0.98   MONOS ABS 10*3/mm3 0.54 0.48 0.63 0.58 0.43 0.62 0.57   < > 0.56   < > 0.63   < > 0.65   EOS ABS 10*3/mm3 0.11 0.11 0.18 0.14 0.10 0.17 0.14   < > 0.12   < > 0.16   < > 0.14   BASOS ABS 10*3/mm3 0.03 0.03 0.04 0.04 0.03 0.05 0.03   < > 0.04   < > 0.02   < > 0.03   IMMATURE GRANS (ABS) 10*3/mm3  --   --   --   --   --   --  0.02  --  0.03  --  0.02  --  0.04   NRBC /100 WBC  --   --   --   --   --   --  0.0  --  0.0  --  0.0  --  0.0    < > = values in this interval not displayed.       Lab Results - Last 18 Months   Lab Units 02/08/23  1233 01/04/23  1336 11/30/22  1348 10/25/22  1333 09/26/22  1318 08/31/22  1255 03/09/22  1334 02/09/22  1300 01/12/22  1336 12/08/21  1357 11/11/21  1339 10/11/21  1316 09/15/21  1320   GLUCOSE mg/dL 134* 195* 134* 153* 212* 146*   < > 175* 167* 218* 148* 225* 212*   SODIUM mmol/L 138 135* 136 138 135* 136   < > 137 137 137 136 136 135*   POTASSIUM mmol/L 3.9 3.9 4.1 3.7 3.9 3.9   < > 3.8 4.3 4.1 4.3 4.3 3.8   CO2 mmol/L 27.0 26.0 29.0 30.0* 26.0 29.0   < > 30.0* 32.0* 29.0 28.0 28.0 28.0   CHLORIDE mmol/L 99 95* 96* 97* 96* 97*   < > 97* 95* 96* 96* 96* 96*   ANION GAP mmol/L 12.0 14.0 11.0  "11.0 13.0 10.0   < > 10.0 10.0 12.0 12.0 12.0 11.0   CREATININE mg/dL 0.74* 0.62* 0.60* 0.67* 0.66* 0.73*   < > 0.66* 0.93 0.82 0.67* 0.75* 0.63*   BUN mg/dL 16 15 17 15 12 14   < > 13 20 14 12 12 13   BUN / CREAT RATIO  21.6 24.2 28.3* 22.4 18.2 19.2   < > 19.7 21.5 17.1 17.9 16.0 20.6   CALCIUM mg/dL 9.6 9.7 9.9 10.2 9.6 10.2   < > 9.3 10.0 10.1 10.0 10.2 9.5   EGFR IF NONAFRICN AM mL/min/1.73  --   --   --   --   --   --   --  117 79 91 115 101 123   ALK PHOS U/L 76 67 67 69 67 67   < > 64 74 76 62 65 70   TOTAL PROTEIN g/dL 7.3 7.6 7.3 7.8 7.0 7.7   < > 7.3 7.5 7.7 7.6 7.4 7.2   ALT (SGPT) U/L 17 20 20 18 15 17   < > 17 24 23 25 26 19   AST (SGOT) U/L 27 27 26 25 21 24   < > 22 32 32 36 31 23   BILIRUBIN mg/dL 0.3 0.5 0.4 0.3 0.4 0.4   < > 0.4 0.3 0.3 0.3 0.4 0.3   ALBUMIN g/dL 4.5 4.4 4.30 4.80 4.30 4.70   < > 4.40 4.60 4.50 4.20 4.40 4.00   GLOBULIN gm/dL 2.8 3.2 3.0 3.0 2.7 3.0   < > 2.9 2.9 3.2 3.4 3.0 3.2    < > = values in this interval not displayed.       No results for input(s): MSPIKE, KAPPALAMB, IGLFLC, URICACID, FREEKAPPAL, CEA, LDH, REFLABREPO in the last 87908 hours.    Lab Results - Last 18 Months   Lab Units 09/15/21  1320 08/18/21  1329   IRON mcg/dL  --  70   TIBC mcg/dL  --  328   IRON SATURATION %  --  21   FERRITIN ng/mL  --  383.40   TSH uIU/mL 3.010  --        Simeon Tam reports a pain score of 0.  G        ASSESSMENT:  1.   Prostate cancer, hormone refractory.   AJCC stage (TX, NX, M0)  Treatment status:Casodex and Lupron with PSA recurrence.  Treated with apalutamide and leuprolde (by Dr. Oneil).  2.   Performance status of 0.  3.   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 Under observation.  4.   Cirrhosis, etiology of thrombocytopenia and followed by PCP.  5.   Urethral stricture. Self catheterization as needed. \"It varies.\"             PLAN:  1.    Re:  Apalutamide tolerance.  \"I am fine.  I am getting my Lupron today.\"  Per pharmacy note 01/27/2023, patient " "tolerating apalutamide.   2.    Re:  Heme status.  WBC 5.32, hemoglobin 13.5 and platelet 117 from 124 from 113 from 123 from 120 from 132.  3.    Re:  Pre-office CMP.  GFR 92.2 from 97.2 from 98.2 ml/minute and glucose 134.  4.    Re:  Pre-office PSA pending from 0.024 from 0.019 from 0.020 from 0.016 from 0.015 from 0.018 (forgot my Lupron) from < 0.014 from < 0.014  from <0.014 from <0.014 from <0.01 from < 0.014 from < 0.014 from <0.014 from < 0.014 from < 0.014 from < 0.014 from < 0.014 from < 0.014 from < 0.014 from < 0.014 from < 0.014 from < 0.014 from < 0.014 from < 0.014 from 0.015 from 0.03 from 0.125 from 1.03 from 27.3. Testosterone pending from 28.2 from 25.1 from 34.1 from 18.1 from 527 from 580 from 443 from 235.  5.   eRx for Compazine 10 mg p.o. every 4 hours as needed for nausea/vomiting, 60, 2 refills if needed.    6.   eRx for C34D1 started on 02/01/2023 apalutamide 60 mg, take four tablets total dose 240 mg po daily, number, 120.  Take either with or without food.  Swallow tablets whole. TSH before apalutamide.  No grapefruit juice or grapefruit.  Observe for adverse effects like fractures, hypothyroidism, seizures, rash, arrhythmias, and fall.  7.   Continue ongoing management per primary care physician and other specialists.  8.   Plan of care discussed with patient.  Understanding expressed.  Patient agreeable to proceed.  9.   Lupron per Dr. Oneil given every 6 months.  10.  Questions were answered to his satisfaction. \"Yes.\"  11.  Advance Care Planning  ACP discussion was declined by the patient. Patient does not have an advance directive, information provided.  12.  Return to office 4 week with CMP, PSA, testosterone, and CBC with differential, same day        I have reviewed the assessment and plan and verified the accuracy of it. No changes to assessment and plan since the information was documented. Deshawn Black MD 02/08/23        I spent 31 total minutes, " face-to-face, caring for Simeon today.  Greater than 50% of this time involved counseling and/or coordination of care as documented within this note regarding the patient's illness(es), pros and cons of various treatment options, instructions and/or risk reduction.                       (Matthew Oneil MD)  Lucio Garcia MD

## 2023-02-07 NOTE — PROGRESS NOTES
Chief Complaint  Prostate cancer and stricture f/u     Subjective          Simeon Tam presents to [facility] [place]   I follow Mr. Tam for 2 chronic urologic illnesses as follows:  -Prostate cancer: He has castrate resistant adenocarcinoma the prostate.  Patient is on apalutamide and leuprolide.  He was previously treated with IMRT with Casodex.  In 2016 he had a increase in his PSA which apparently was treated with Lupron and Casodex at that time.  He is presently on apalutamide and leuprolide (started 08/2022).  Before we started this his PSA had increased to 22.  Conventional imaging was negative for metastatic disease of bone scan and CT of the abdomen pelvis most recent injection was a 45 mg injection given on 02/08/2023 (yesterday)  -Anterior urethra of stricture disease: Patient has anterior urethra stricture disease probably related to his prior history of radiation.  He currently does self-catheterization to dilate.    Current Outpatient Medications:   •  Apalutamide 60 MG tablet, Take 4 tablets by mouth Daily. Take with or without food. Do not crush or chew tablets., Disp: 120 tablet, Rfl: 11  •  calcium carbonate (OS-JONNY) 600 MG tablet, Take 600 mg by mouth Daily., Disp: , Rfl:   •  diclofenac (VOLTAREN) 75 MG EC tablet, Take 75 mg by mouth 2 (Two) Times a Day As Needed (shoulder pain)., Disp: , Rfl:   •  dutasteride (AVODART) 0.5 MG capsule, Take 1 capsule by mouth Daily., Disp: 60 capsule, Rfl: 11  •  JANUVIA 100 MG tablet, Take 100 mg by mouth Daily., Disp: , Rfl:   •  lisinopril-hydrochlorothiazide (PRINZIDE,ZESTORETIC) 20-12.5 MG per tablet, Take 1 tablet by mouth Daily., Disp: , Rfl:   •  Loperamide HCl (IMODIUM PO), Take 1 dose by mouth As Needed., Disp: , Rfl:   •  rosuvastatin (CRESTOR) 20 MG tablet, Take 10 mg by mouth Every Night., Disp: , Rfl:   •  tolterodine LA (DETROL LA) 4 MG 24 hr capsule, Take 1 capsule by mouth Daily., Disp: 30 capsule, Rfl: 11  No  "current facility-administered medications for this visit.  Past Medical History:   Diagnosis Date   • Arrhythmia    • Cancer (HCC)     prostate   • Diabetes mellitus (HCC)    • History of sepsis    • Hyperlipidemia    • Hypertension    • UTI (urinary tract infection)      Past Surgical History:   Procedure Laterality Date   • CATARACT EXTRACTION     • CYSTOSCOPY RETROGRADE PYELOGRAM Bilateral 4/21/2021    Procedure: CYSTOSCOPY RETROGRADE PYELOGRAM, possible DIRECT VISION INTERNAL URETHROTOMY;  Surgeon: Matthew Oneil MD;  Location: [facility];  Service: Urology;  Laterality: Bilateral;   • CYSTOSCOPY URETHROTOMY VISUAL INTERNAL Bilateral 4/21/2021    Procedure: possible DIRECT VISION INTERNAL URETHROTOMY;  Surgeon: Matthew Oneil MD;  Location: [facility];  Service: Urology;  Laterality: Bilateral;   • EXCISION LESION      neck   • KIDNEY STONE SURGERY     • REPLACEMENT TOTAL KNEE Left    • TONSILLECTOMY             Review of Systems  Review  of systems  Constitutional: Negative for chills and fever.   Gastrointestinal: Negative for abdominal pain, anal bleeding and blood in stool.   Genitourinary: Negative for flank pain and hematuria.      Objective   PHYSICAL EXAM  Vital Signs:   Temp 97.6 °F (36.4 °C)   Ht 175.3 cm (69\")   Wt 90.4 kg (199 lb 3.2 oz)   BMI 29.42 kg/m²     Physical Exam  Constitutional: Patient is without distress or deformity.  Vital signs are reviewed as above.    Neuro: No confusion; No disorientation; Alert and oriented  Pulmonary: No respiratory distress.   Skin: No pallor or diaphoresis        DATA  Result Review :              Results for orders placed or performed in visit on 02/09/23   POC Urinalysis Dipstick, Multipro    Specimen: Urine   Result Value Ref Range    Color Yellow Yellow, Straw, Dark Yellow, Viviana    Clarity, UA Clear Clear    Glucose, UA Negative Negative mg/dL    Bilirubin Negative Negative    Ketones, UA Negative Negative    Specific Gravity  1.005 1.005 - 1.030    " Blood, UA Small (A) Negative    pH, Urine 5.0 5.0 - 8.0    Protein, POC Negative Negative mg/dL    Urobilinogen, UA 0.2 E.U./dL Normal, 0.2 E.U./dL    Nitrite, UA Negative Negative    Leukocytes Moderate (2+) (A) Negative       Lab Results   Component Value Date    PSA 0.026 02/08/2023    PSA 0.024 01/04/2023    PSA 0.019 11/30/2022          ASSESSMENT AND PLAN          Problem List Items Addressed This Visit        Genitourinary and Reproductive     Anterior urethral stricture    Overview     Added automatically from request for surgery [id number]            Hematology and Neoplasia    Prostate cancer (HCC) - Primary    Relevant Orders    POC Urinalysis Dipstick, Multipro (Completed)   Both of these chronic Urologic conditions, which I have followed >1 year,  were evaluated and managed today as follows:     He does intermittent catheterization for his stricture.     PSA is <0.2 which is indicative of KELSY status. Would recommend we continue his leuprolide.       FOLLOW UP     No follow-ups on file.        (Please note that portions of this note were completed with a voice recognition program.)  Matthew Oneil MD  02/09/23  11:21 CST

## 2023-02-08 ENCOUNTER — LAB (OUTPATIENT)
Dept: LAB | Facility: HOSPITAL | Age: 78
End: 2023-02-08
Payer: MEDICARE

## 2023-02-08 ENCOUNTER — OFFICE VISIT (OUTPATIENT)
Dept: ONCOLOGY | Facility: CLINIC | Age: 78
End: 2023-02-08
Payer: MEDICARE

## 2023-02-08 ENCOUNTER — INFUSION (OUTPATIENT)
Dept: ONCOLOGY | Facility: HOSPITAL | Age: 78
End: 2023-02-08
Payer: MEDICARE

## 2023-02-08 VITALS
DIASTOLIC BLOOD PRESSURE: 64 MMHG | TEMPERATURE: 97.8 F | WEIGHT: 198 LBS | SYSTOLIC BLOOD PRESSURE: 130 MMHG | BODY MASS INDEX: 30.01 KG/M2 | HEART RATE: 80 BPM | OXYGEN SATURATION: 97 % | HEIGHT: 68 IN | RESPIRATION RATE: 18 BRPM

## 2023-02-08 VITALS
HEART RATE: 78 BPM | DIASTOLIC BLOOD PRESSURE: 70 MMHG | BODY MASS INDEX: 29.33 KG/M2 | OXYGEN SATURATION: 96 % | WEIGHT: 198 LBS | TEMPERATURE: 97.7 F | SYSTOLIC BLOOD PRESSURE: 117 MMHG | HEIGHT: 69 IN | RESPIRATION RATE: 18 BRPM

## 2023-02-08 DIAGNOSIS — C61 PROSTATE CANCER: Primary | ICD-10-CM

## 2023-02-08 LAB
ALBUMIN SERPL-MCNC: 4.5 G/DL (ref 3.5–5.2)
ALBUMIN/GLOB SERPL: 1.6 G/DL
ALP SERPL-CCNC: 76 U/L (ref 39–117)
ALT SERPL W P-5'-P-CCNC: 17 U/L (ref 1–41)
ANION GAP SERPL CALCULATED.3IONS-SCNC: 12 MMOL/L (ref 5–15)
AST SERPL-CCNC: 27 U/L (ref 1–40)
BASOPHILS # BLD AUTO: 0.03 10*3/MM3 (ref 0–0.2)
BASOPHILS NFR BLD AUTO: 0.6 % (ref 0–1.5)
BILIRUB SERPL-MCNC: 0.3 MG/DL (ref 0–1.2)
BUN SERPL-MCNC: 16 MG/DL (ref 8–23)
BUN/CREAT SERPL: 21.6 (ref 7–25)
CALCIUM SPEC-SCNC: 9.6 MG/DL (ref 8.6–10.5)
CHLORIDE SERPL-SCNC: 99 MMOL/L (ref 98–107)
CO2 SERPL-SCNC: 27 MMOL/L (ref 22–29)
CREAT SERPL-MCNC: 0.74 MG/DL (ref 0.76–1.27)
DEPRECATED RDW RBC AUTO: 44.7 FL (ref 37–54)
EGFRCR SERPLBLD CKD-EPI 2021: 92.2 ML/MIN/1.73
EOSINOPHIL # BLD AUTO: 0.11 10*3/MM3 (ref 0–0.4)
EOSINOPHIL NFR BLD AUTO: 2.1 % (ref 0.3–6.2)
ERYTHROCYTE [DISTWIDTH] IN BLOOD BY AUTOMATED COUNT: 13.2 % (ref 12.3–15.4)
GLOBULIN UR ELPH-MCNC: 2.8 GM/DL
GLUCOSE SERPL-MCNC: 134 MG/DL (ref 65–99)
HCT VFR BLD AUTO: 40.7 % (ref 37.5–51)
HGB BLD-MCNC: 13.5 G/DL (ref 13–17.7)
HOLD SPECIMEN: NORMAL
LYMPHOCYTES # BLD AUTO: 0.93 10*3/MM3 (ref 0.7–3.1)
LYMPHOCYTES NFR BLD AUTO: 17.5 % (ref 19.6–45.3)
MCH RBC QN AUTO: 30.5 PG (ref 26.6–33)
MCHC RBC AUTO-ENTMCNC: 33.2 G/DL (ref 31.5–35.7)
MCV RBC AUTO: 92.1 FL (ref 79–97)
MONOCYTES # BLD AUTO: 0.54 10*3/MM3 (ref 0.1–0.9)
MONOCYTES NFR BLD AUTO: 10.2 % (ref 5–12)
NEUTROPHILS NFR BLD AUTO: 3.69 10*3/MM3 (ref 1.7–7)
NEUTROPHILS NFR BLD AUTO: 69.2 % (ref 42.7–76)
PLATELET # BLD AUTO: 117 10*3/MM3 (ref 140–450)
PMV BLD AUTO: 10.5 FL (ref 6–12)
POTASSIUM SERPL-SCNC: 3.9 MMOL/L (ref 3.5–5.2)
PROT SERPL-MCNC: 7.3 G/DL (ref 6–8.5)
PSA SERPL-MCNC: 0.03 NG/ML (ref 0–4)
RBC # BLD AUTO: 4.42 10*6/MM3 (ref 4.14–5.8)
SODIUM SERPL-SCNC: 138 MMOL/L (ref 136–145)
TESTOST SERPL-MCNC: 25 NG/DL (ref 193–740)
WBC NRBC COR # BLD: 5.32 10*3/MM3 (ref 3.4–10.8)

## 2023-02-08 PROCEDURE — 99214 OFFICE O/P EST MOD 30 MIN: CPT | Performed by: INTERNAL MEDICINE

## 2023-02-08 PROCEDURE — 80053 COMPREHEN METABOLIC PANEL: CPT

## 2023-02-08 PROCEDURE — 36415 COLL VENOUS BLD VENIPUNCTURE: CPT

## 2023-02-08 PROCEDURE — 84403 ASSAY OF TOTAL TESTOSTERONE: CPT

## 2023-02-08 PROCEDURE — 85025 COMPLETE CBC W/AUTO DIFF WBC: CPT

## 2023-02-08 PROCEDURE — 25010000002 LEUPROLIDE 45 MG KIT: Performed by: UROLOGY

## 2023-02-08 PROCEDURE — 84153 ASSAY OF PSA TOTAL: CPT

## 2023-02-08 PROCEDURE — 96402 CHEMO HORMON ANTINEOPL SQ/IM: CPT

## 2023-02-08 RX ADMIN — LEUPROLIDE ACETATE 45 MG: KIT at 14:09

## 2023-02-09 ENCOUNTER — OFFICE VISIT (OUTPATIENT)
Dept: UROLOGY | Facility: CLINIC | Age: 78
End: 2023-02-09
Payer: MEDICARE

## 2023-02-09 VITALS — WEIGHT: 199.2 LBS | HEIGHT: 69 IN | TEMPERATURE: 97.6 F | BODY MASS INDEX: 29.51 KG/M2

## 2023-02-09 DIAGNOSIS — C61 PROSTATE CANCER: Primary | ICD-10-CM

## 2023-02-09 DIAGNOSIS — N35.914 ANTERIOR URETHRAL STRICTURE: ICD-10-CM

## 2023-02-09 LAB
BILIRUB BLD-MCNC: NEGATIVE MG/DL
CLARITY, POC: CLEAR
COLOR UR: YELLOW
GLUCOSE UR STRIP-MCNC: NEGATIVE MG/DL
KETONES UR QL: NEGATIVE
LEUKOCYTE EST, POC: ABNORMAL
NITRITE UR-MCNC: NEGATIVE MG/ML
PH UR: 5 [PH] (ref 5–8)
PROT UR STRIP-MCNC: NEGATIVE MG/DL
RBC # UR STRIP: ABNORMAL /UL
SP GR UR: 1 (ref 1–1.03)
UROBILINOGEN UR QL: ABNORMAL

## 2023-02-09 PROCEDURE — 99214 OFFICE O/P EST MOD 30 MIN: CPT | Performed by: UROLOGY

## 2023-02-09 PROCEDURE — 81003 URINALYSIS AUTO W/O SCOPE: CPT | Performed by: UROLOGY

## 2023-02-14 DIAGNOSIS — N31.9 NEUROGENIC BLADDER: ICD-10-CM

## 2023-02-15 RX ORDER — TOLTERODINE 4 MG/1
CAPSULE, EXTENDED RELEASE ORAL
Qty: 30 CAPSULE | Refills: 5 | Status: SHIPPED | OUTPATIENT
Start: 2023-02-15

## 2023-02-28 ENCOUNTER — SPECIALTY PHARMACY (OUTPATIENT)
Dept: ONCOLOGY | Facility: HOSPITAL | Age: 78
End: 2023-02-28
Payer: MEDICARE

## 2023-02-28 NOTE — PROGRESS NOTES
[facility]    Oral Oncology Patient Follow-Up      Consult Details  Consulting Provider:   Deshawn Blcak MD ([facility])   Medication and Regimen:  Erleada 240 mg PO daily    Diagnosis: Prostate cancer    PA/Financial Assistance Information NA     Prescription Review  Dosing & Interactions:   Reviewed, appropriate and no significant interaction noted at this time..   Current Specialty Pharmacy [facility] in [place], [place]     Intervention(s):                  Spoke with Essence (patient's wife) for his monthly routine telephone consultation follow-up regarding the oral oncology medication. She stated that he has been tolerating the Erleada well and has not had any obvious side effects from the medication. He has not missed any doses while on the medication. The dosing of Erleada has not changed recently. The patient still receives refills through [facility], and has not had any delays in getting those refills. Also, no new refills are needed at this time according to the patient. The patient also stated that there have been no changes to his maintenance medications and no new allergies that she is aware of.      Finally, the patient's wife stated he has not had any recent stays in the hospital and has not visited the ER in the last month due to the Erleada. I encouraged the patient to reach out anytime with any questions or concerns they might have regarding their oral chemotherapy.        Summary:    No needs expressed by the patient or otherwise identified. Will follow-up next month regarding the patient’s oral chemotherapy with a routine telephone consultation. The next routine follow-up will be scheduled approximately 28-30 days from today.          Ginny Barahona, Jill  02/28/2023 11:46 CST

## 2023-03-08 ENCOUNTER — OFFICE VISIT (OUTPATIENT)
Dept: ONCOLOGY | Facility: CLINIC | Age: 78
End: 2023-03-08
Payer: MEDICARE

## 2023-03-08 ENCOUNTER — LAB (OUTPATIENT)
Dept: LAB | Facility: HOSPITAL | Age: 78
End: 2023-03-08
Payer: MEDICARE

## 2023-03-08 VITALS
DIASTOLIC BLOOD PRESSURE: 76 MMHG | OXYGEN SATURATION: 96 % | WEIGHT: 200.9 LBS | HEART RATE: 99 BPM | BODY MASS INDEX: 29.76 KG/M2 | HEIGHT: 69 IN | RESPIRATION RATE: 18 BRPM | TEMPERATURE: 97.9 F | SYSTOLIC BLOOD PRESSURE: 126 MMHG

## 2023-03-08 DIAGNOSIS — C61 PROSTATE CANCER: Primary | ICD-10-CM

## 2023-03-08 LAB
ALBUMIN SERPL-MCNC: 4.4 G/DL (ref 3.5–5.2)
ALBUMIN/GLOB SERPL: 1.5 G/DL
ALP SERPL-CCNC: 73 U/L (ref 39–117)
ALT SERPL W P-5'-P-CCNC: 31 U/L (ref 1–41)
ANION GAP SERPL CALCULATED.3IONS-SCNC: 14 MMOL/L (ref 5–15)
AST SERPL-CCNC: 41 U/L (ref 1–40)
BASOPHILS # BLD AUTO: 0.04 10*3/MM3 (ref 0–0.2)
BASOPHILS NFR BLD AUTO: 0.5 % (ref 0–1.5)
BILIRUB SERPL-MCNC: 0.4 MG/DL (ref 0–1.2)
BUN SERPL-MCNC: 16 MG/DL (ref 8–23)
BUN/CREAT SERPL: 23.9 (ref 7–25)
CALCIUM SPEC-SCNC: 9.8 MG/DL (ref 8.6–10.5)
CHLORIDE SERPL-SCNC: 96 MMOL/L (ref 98–107)
CO2 SERPL-SCNC: 26 MMOL/L (ref 22–29)
CREAT SERPL-MCNC: 0.67 MG/DL (ref 0.76–1.27)
DEPRECATED RDW RBC AUTO: 44.5 FL (ref 37–54)
EGFRCR SERPLBLD CKD-EPI 2021: 95 ML/MIN/1.73
EOSINOPHIL # BLD AUTO: 0.16 10*3/MM3 (ref 0–0.4)
EOSINOPHIL NFR BLD AUTO: 2 % (ref 0.3–6.2)
ERYTHROCYTE [DISTWIDTH] IN BLOOD BY AUTOMATED COUNT: 13.3 % (ref 12.3–15.4)
GLOBULIN UR ELPH-MCNC: 3 GM/DL
GLUCOSE SERPL-MCNC: 197 MG/DL (ref 65–99)
HCT VFR BLD AUTO: 39.8 % (ref 37.5–51)
HGB BLD-MCNC: 13.2 G/DL (ref 13–17.7)
HOLD SPECIMEN: NORMAL
IMM GRANULOCYTES # BLD AUTO: 0.01 10*3/MM3 (ref 0–0.05)
IMM GRANULOCYTES NFR BLD AUTO: 0.1 % (ref 0–0.5)
LYMPHOCYTES # BLD AUTO: 1.04 10*3/MM3 (ref 0.7–3.1)
LYMPHOCYTES NFR BLD AUTO: 13 % (ref 19.6–45.3)
MCH RBC QN AUTO: 30.1 PG (ref 26.6–33)
MCHC RBC AUTO-ENTMCNC: 33.2 G/DL (ref 31.5–35.7)
MCV RBC AUTO: 90.7 FL (ref 79–97)
MONOCYTES # BLD AUTO: 0.66 10*3/MM3 (ref 0.1–0.9)
MONOCYTES NFR BLD AUTO: 8.2 % (ref 5–12)
NEUTROPHILS NFR BLD AUTO: 6.12 10*3/MM3 (ref 1.7–7)
NEUTROPHILS NFR BLD AUTO: 76.2 % (ref 42.7–76)
NRBC BLD AUTO-RTO: 0 /100 WBC (ref 0–0.2)
PLATELET # BLD AUTO: 135 10*3/MM3 (ref 140–450)
PMV BLD AUTO: 10.3 FL (ref 6–12)
POTASSIUM SERPL-SCNC: 3.9 MMOL/L (ref 3.5–5.2)
PROT SERPL-MCNC: 7.4 G/DL (ref 6–8.5)
RBC # BLD AUTO: 4.39 10*6/MM3 (ref 4.14–5.8)
SODIUM SERPL-SCNC: 136 MMOL/L (ref 136–145)
WBC NRBC COR # BLD: 8.03 10*3/MM3 (ref 3.4–10.8)

## 2023-03-08 PROCEDURE — 1160F RVW MEDS BY RX/DR IN RCRD: CPT | Performed by: INTERNAL MEDICINE

## 2023-03-08 PROCEDURE — 85025 COMPLETE CBC W/AUTO DIFF WBC: CPT

## 2023-03-08 PROCEDURE — 3074F SYST BP LT 130 MM HG: CPT | Performed by: INTERNAL MEDICINE

## 2023-03-08 PROCEDURE — 36415 COLL VENOUS BLD VENIPUNCTURE: CPT

## 2023-03-08 PROCEDURE — 99214 OFFICE O/P EST MOD 30 MIN: CPT | Performed by: INTERNAL MEDICINE

## 2023-03-08 PROCEDURE — 84403 ASSAY OF TOTAL TESTOSTERONE: CPT

## 2023-03-08 PROCEDURE — 1159F MED LIST DOCD IN RCRD: CPT | Performed by: INTERNAL MEDICINE

## 2023-03-08 PROCEDURE — 80053 COMPREHEN METABOLIC PANEL: CPT

## 2023-03-08 PROCEDURE — 84153 ASSAY OF PSA TOTAL: CPT

## 2023-03-08 PROCEDURE — 1126F AMNT PAIN NOTED NONE PRSNT: CPT | Performed by: INTERNAL MEDICINE

## 2023-03-08 PROCEDURE — 3078F DIAST BP <80 MM HG: CPT | Performed by: INTERNAL MEDICINE

## 2023-03-09 LAB
PSA SERPL-MCNC: 0.03 NG/ML (ref 0–4)
TESTOST SERPL-MCNC: 28.1 NG/DL (ref 193–740)

## 2023-03-22 NOTE — PROGRESS NOTES
[facility] HEMATOLOGY & ONCOLOGY [place]  [address]  [place] [address] [phone]    Patient Name: Simeon Tam  Encounter Date: 04/05/2023  YOB: 1945  Patient Number: [id number]      REASON FOR FOLLOW-UP: Simeon Tam is a pleasant 77 y.o.  male who is seen in follow-up for M0, hormone refractory prostate cancer (HRPC).  He is seen C36D8 of single agent apalutamide.  He is seen with Essence, spouse. History is obtained from patient.  He is a reliable historian.         Oncology/Hematology History Overview Note   DIAGNOSTIC ABNORMALITIES:  He presented with rising PSA of 13 and was diagnosed with prostate cancer about 25 years ago.   Previous PSA was 22.26 on 10/05/2016, <0.13 on 07/17/2017, 7.91 on 10/17/2018, 19.29 on 03/19/2020, and 22.25 on 04/07/2020.   He was seen by Dr. Oneil 05/12/2020. Latest PSA was 22.25 ng/ml on 04/07/2020.  Previous PSA 22.26 on 10/05/2016.  Bone scan and CT scan of the abdomen and pelvis were negative for prostate cancer on 10/2016.  Started on Casodex and possibly leuprolide.  PSA  was down to <0.13 on 07/17/207.  Plan for apalutamide after staging scans.   CT abdomen and pelvis 06/11/2020. No evidence of metastatic disease in the abdomen or pelvis. Right renal pelvis and ureter urothelial thickening and hyperemia. Urinary bladder wall is thickened and there is adjacent inflammation. Recommend correlation with urinalysis and exam to exclude cystitis with right-sided pyelitis.  Cirrhosis.  Tiny 8 mm hypodense RIGHT inferior liver lesion on image 33 is too small to characterize.   Bone scan 06/11/2020. No evidence of bone metastases.        PREVIOUS INTERVENTIONS:  He was treated with adjuvant radiation at [facility] and androgen ablation with Casodex.  Lupron and Casodex 10/2016. He had 7 months of ADT therapy with Lupron.  Apalutamide 07/22/2020 through present.     Prostate cancer    6/29/2020 -  Chemotherapy    OP PROSTATE Apalutamide     6/30/2020 Initial Diagnosis    Prostate cancer (CMS/[facility])     7/29/2020 -  Chemotherapy    OP LEUPROLIDE 45MG Q6M - ORDER EXPIRES 8/2/23         PAST MEDICAL HISTORY:  ALLERGIES:  Allergies   Allergen Reactions   • Sulfa Antibiotics Rash     CURRENT MEDICATIONS:  Outpatient Encounter Medications as of 4/5/2023   Medication Sig Dispense Refill   • Apalutamide 60 MG tablet Take 4 tablets by mouth Daily. Take with or without food. Do not crush or chew tablets. 120 tablet 11   • calcium carbonate (OS-JONNY) 600 MG tablet Take 1 tablet by mouth Daily.     • diclofenac (VOLTAREN) 75 MG EC tablet Take 1 tablet by mouth 2 (Two) Times a Day As Needed (shoulder pain).     • dutasteride (AVODART) 0.5 MG capsule Take 1 capsule by mouth Daily. 60 capsule 11   • JANUVIA 100 MG tablet Take 1 tablet by mouth Daily.     • lisinopril-hydrochlorothiazide (PRINZIDE,ZESTORETIC) 20-12.5 MG per tablet Take 1 tablet by mouth Daily.     • Loperamide HCl (IMODIUM PO) Take 1 dose by mouth As Needed (diarrhea).     • rosuvastatin (CRESTOR) 20 MG tablet Take 10 mg by mouth Every Night.     • tolterodine LA (DETROL LA) 4 MG 24 hr capsule TAKE (1) CAPSULE ONCE DAILY. 30 capsule 5     No facility-administered encounter medications on file as of 4/5/2023.     ADULT ILLNESSES:  Patient Active Problem List   Diagnosis Code   • Hypertension I10   • Hyperlipidemia E78.5   • Diabetes mellitus E11.9   • Cancer C80.1   • Arrhythmia I49.9   • Non morbid obesity due to excess calories E66.09   • Prostate cancer C61   • Anterior urethral stricture N35.914     SURGERIES:  Past Surgical History:   Procedure Laterality Date   • CATARACT EXTRACTION     • CYSTOSCOPY RETROGRADE PYELOGRAM Bilateral 4/21/2021    Procedure: CYSTOSCOPY RETROGRADE PYELOGRAM, possible DIRECT VISION INTERNAL URETHROTOMY;  Surgeon: Matthew Oneil MD;  Location: [facility];  Service: Urology;  Laterality: Bilateral;   • CYSTOSCOPY  "URETHROTOMY VISUAL INTERNAL Bilateral 4/21/2021    Procedure: possible DIRECT VISION INTERNAL URETHROTOMY;  Surgeon: Matthew Oneil MD;  Location: [facility];  Service: Urology;  Laterality: Bilateral;   • EXCISION LESION      neck   • KIDNEY STONE SURGERY     • REPLACEMENT TOTAL KNEE Left    • TONSILLECTOMY       HEALTH MAINTENANCE ITEMS:  Health Maintenance Due   Topic Date Due   • URINE MICROALBUMIN  Never done   • Pneumococcal Vaccine 65+ (1 - PCV) Never done   • TDAP/TD VACCINES (1 - Tdap) Never done   • ZOSTER VACCINE (1 of 2) Never done   • Hepatitis B (1 of 3 - Risk 3-dose series) Never done   • HEPATITIS C SCREENING  Never done   • ANNUAL WELLNESS VISIT  Never done   • LIPID PANEL  Never done   • HEMOGLOBIN A1C  Never done   • DIABETIC EYE EXAM  Never done   • COVID-19 Vaccine (4 - Booster) 03/15/2021       <no information>  Last Completed Colonoscopy     This patient has no relevant Health Maintenance data.          There is no immunization history on file for this patient.  Last Completed Mammogram     This patient has no relevant Health Maintenance data.            FAMILY HISTORY:  Family History   Problem Relation Age of Onset   • Dementia Mother      SOCIAL HISTORY:  Social History     Socioeconomic History   • Marital status:    Tobacco Use   • Smoking status: Never   • Smokeless tobacco: Never   Vaping Use   • Vaping Use: Never used   Substance and Sexual Activity   • Alcohol use: No   • Drug use: No   • Sexual activity: Defer       REVIEW OF SYSTEMS:    Review of Systems   Constitutional: Negative for chills, fatigue and fever.        \"I feel great.\"   HENT: Negative for congestion, mouth sores and trouble swallowing.    Eyes: Negative for redness and visual disturbance.   Respiratory: Negative for cough, shortness of breath and wheezing.    Cardiovascular: Negative for chest pain and leg swelling.   Gastrointestinal: Negative for abdominal pain, nausea and vomiting.   Endocrine: Negative " "for polydipsia and polyphagia.   Genitourinary: Negative for flank pain and hematuria.        \"Self catheter. \"It varies.\"   Musculoskeletal: Negative for gait problem and myalgias.   Skin: Positive for pallor.   Allergic/Immunologic: Negative for food allergies.   Neurological: Negative for dizziness, speech difficulty and weakness.   Hematological: Negative for adenopathy. Does not bruise/bleed easily.   Psychiatric/Behavioral: Negative for agitation, confusion and hallucinations.       VITAL SIGNS: /64   Pulse 78   Temp 98.7 °F (37.1 °C)   Resp 18   Ht 175.3 cm (69\")   Wt 91.6 kg (202 lb)   SpO2 92%   BMI 29.83 kg/m²  Gained 2 pounds.   Pain Score    04/05/23 1336   PainSc: 0-No pain       PHYSICAL EXAMINATION:     Physical Exam  Vitals reviewed.   Constitutional:       General: He is not in acute distress.  HENT:      Head: Normocephalic and atraumatic.   Eyes:      General: No scleral icterus.  Cardiovascular:      Rate and Rhythm: Normal rate.   Pulmonary:      Effort: No respiratory distress.      Breath sounds: No wheezing or rales.   Abdominal:      General: Bowel sounds are normal.      Palpations: Abdomen is soft.      Tenderness: There is no abdominal tenderness.   Musculoskeletal:         General: No swelling.      Cervical back: Neck supple.   Skin:     General: Skin is warm.      Coloration: Skin is pale.      Comments: ? Ecchymosis, right lower arm. Observe. \"Okay.\"   Neurological:      Mental Status: He is alert and oriented to person, place, and time.   Psychiatric:         Mood and Affect: Mood normal.         Behavior: Behavior normal.         Thought Content: Thought content normal.         Judgment: Judgment normal.         LABS    Lab Results - Last 18 Months   Lab Units 04/05/23  1326 03/08/23  1407 02/08/23  1233 01/04/23  1336 11/30/22  1348 10/25/22  1333 08/31/22  1255 08/03/22  1359 06/30/22  1449 06/01/22  1345 02/09/22  1300 01/12/22  1336 12/08/21  1357 11/11/21  1339 "   HEMOGLOBIN g/dL 12.8* 13.2 13.5 13.3 13.5 13.9   < > 13.3   < > 13.4   < > 13.7   < > 13.2   HEMATOCRIT % 39.0 39.8 40.7 40.2 41.5 41.0   < > 39.0   < > 39.1   < > 41.0   < > 39.4   MCV fL 91.5 90.7 92.1 90.7 92.2 91.5   < > 90.3   < > 90.1   < > 90.3   < > 90.6   WBC 10*3/mm3 5.94 8.03 5.32 6.14 6.78 6.01   < > 7.19   < > 6.74   < > 6.43   < > 6.43   RDW % 13.4 13.3 13.2 13.1 13.2 13.2   < > 13.1   < > 13.0   < > 13.3   < > 13.2   MPV fL 10.6 10.3 10.5 10.9 10.5 10.9   < > 10.6   < > 10.8   < > 10.7   < > 9.6   PLATELETS 10*3/mm3 108* 135* 117* 124* 113* 123*   < > 124*   < > 134*   < > 141   < > 156   IMM GRAN % % 0.2 0.1  --   --   --   --   --  0.3  --  0.4  --  0.3  --  0.6*   NEUTROS ABS 10*3/mm3 4.44 6.12 3.69 4.62 4.97 4.33   < > 5.50   < > 5.07   < > 4.66   < > 4.59   LYMPHS ABS 10*3/mm3 0.83 1.04 0.93 0.88 0.93 0.90   < > 0.93   < > 0.92   < > 0.94   < > 0.98   MONOS ABS 10*3/mm3 0.53 0.66 0.54 0.48 0.63 0.58   < > 0.57   < > 0.56   < > 0.63   < > 0.65   EOS ABS 10*3/mm3 0.10 0.16 0.11 0.11 0.18 0.14   < > 0.14   < > 0.12   < > 0.16   < > 0.14   BASOS ABS 10*3/mm3 0.03 0.04 0.03 0.03 0.04 0.04   < > 0.03   < > 0.04   < > 0.02   < > 0.03   IMMATURE GRANS (ABS) 10*3/mm3 0.01 0.01  --   --   --   --   --  0.02  --  0.03  --  0.02  --  0.04   NRBC /100 WBC 0.0 0.0  --   --   --   --   --  0.0  --  0.0  --  0.0  --  0.0    < > = values in this interval not displayed.       Lab Results - Last 18 Months   Lab Units 04/05/23  1326 03/08/23  1407 02/08/23  1233 01/04/23  1336 11/30/22  1348 10/25/22  1333 03/09/22  1334 02/09/22  1300 01/12/22  1336 12/08/21  1357 11/11/21  1339 10/11/21  1316   GLUCOSE mg/dL 127* 197* 134* 195* 134* 153*   < > 175* 167* 218* 148* 225*   SODIUM mmol/L 133* 136 138 135* 136 138   < > 137 137 137 136 136   POTASSIUM mmol/L 3.9 3.9 3.9 3.9 4.1 3.7   < > 3.8 4.3 4.1 4.3 4.3   CO2 mmol/L 26.0 26.0 27.0 26.0 29.0 30.0*   < > 30.0* 32.0* 29.0 28.0 28.0   CHLORIDE mmol/L 97* 96* 99 95*  "96* 97*   < > 97* 95* 96* 96* 96*   ANION GAP mmol/L 10.0 14.0 12.0 14.0 11.0 11.0   < > 10.0 10.0 12.0 12.0 12.0   CREATININE mg/dL 0.64* 0.67* 0.74* 0.62* 0.60* 0.67*   < > 0.66* 0.93 0.82 0.67* 0.75*   BUN mg/dL 17 16 16 15 17 15   < > 13 20 14 12 12   BUN / CREAT RATIO  26.6* 23.9 21.6 24.2 28.3* 22.4   < > 19.7 21.5 17.1 17.9 16.0   CALCIUM mg/dL 9.6 9.8 9.6 9.7 9.9 10.2   < > 9.3 10.0 10.1 10.0 10.2   EGFR IF NONAFRICN AM mL/min/1.73  --   --   --   --   --   --   --  117 79 91 115 101   ALK PHOS U/L 69 73 76 67 67 69   < > 64 74 76 62 65   TOTAL PROTEIN g/dL 7.2 7.4 7.3 7.6 7.3 7.8   < > 7.3 7.5 7.7 7.6 7.4   ALT (SGPT) U/L 26 31 17 20 20 18   < > 17 24 23 25 26   AST (SGOT) U/L 36 41* 27 27 26 25   < > 22 32 32 36 31   BILIRUBIN mg/dL 0.4 0.4 0.3 0.5 0.4 0.3   < > 0.4 0.3 0.3 0.3 0.4   ALBUMIN g/dL 4.3 4.4 4.5 4.4 4.30 4.80   < > 4.40 4.60 4.50 4.20 4.40   GLOBULIN gm/dL 2.9 3.0 2.8 3.2 3.0 3.0   < > 2.9 2.9 3.2 3.4 3.0    < > = values in this interval not displayed.       No results for input(s): MSPIKE, KAPPALAMB, IGLFLC, URICACID, FREEKAPPAL, CEA, LDH, REFLABREPO in the last 74496 hours.    No results for input(s): IRON, TIBC, LABIRON, FERRITIN, E3CEBKP, TSH, FOLATE in the last 37084 hours.    Invalid input(s): VITB12    Simeon Tam reports a pain score of 0.         ASSESSMENT:  1.   Prostate cancer, hormone refractory.   AJCC stage (TX, NX, M0)  Treatment status:Casodex and Lupron with PSA recurrence.  Taking with apalutamide and leuprolde (by Dr. Oneil).  2.   Performance status of 0.  3.   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 On observation.  4.   Cirrhosis, etiology of thrombocytopenia and followed by PCP.  5.   Urethral stricture. Self catheterization as needed. \"According to what I need.\"             PLAN:  1.    Re:  Apalutamide tolerance.  \"Fine.\"   2.    Re:  Heme status.  WBC 5.94, hemoglobin 12.8 and platelet 108 from 135 from 117 from 124 from 113.  3. " "   Re:  Pre-office CMP.  AST 36 from 41(on Crestor), GFR 96.3 from 95 ml/minute and glucose 127.  4.    Re:  Pre-office PSA pending from 0.031 from 0.026 from 0.024 from 0.019 from 0.020 from 0.016 from 0.015 from 0.018 (forgot my Lupron) from < 0.014 from < 0.014  from <0.014 from <0.014 from <0.01 from < 0.014 from < 0.014 from <0.014 from < 0.014 from < 0.014 from < 0.014 from < 0.014 from < 0.014 from < 0.014 from < 0.014 from < 0.014 from < 0.014 from < 0.014 from < 0.014 from 0.015 from 0.03 from 0.125 from 1.03 from 27.3. Testosterone pending from 28.1 from 25 from 28.2 from 25.1 from 34.1 from 18.1 from 527 from 580 from 443 from 235.  5.   eRx for Compazine 10 mg p.o. every 4 hours as needed for nausea/vomiting, 60, 2 refills if needed.    6.   eRx for C36D1 started on 03/29/2023 apalutamide 60 mg, take four tablets total dose 240 mg po daily, number, 120.  Take either with or without food.  Swallow tablets whole. TSH before apalutamide.  No grapefruit juice or grapefruit.  Monitor for adverse effects like rash, hypothyroidism, arrhythmias, fall, fractures or seizures.  7.   Continue ongoing management per primary care physician and other specialists.  8.   Plan of care discussed with patient and his spouse.  Understanding expressed.  Patient agreeable to proceed.  9.   Lupron per Dr. Oneil given every 6 months.  10.  Questions were answered to his satisfaction. \"Yea.\"  11.  Advance Care Planning  ACP discussion was declined by the patient. Patient does not have an advance directive, information provided.  12.  Return to office 4 week with CMP, PSA, testosterone, and CBC with differential, same day           I have reviewed the assessment and plan and verified the accuracy of it. No changes to assessment and plan since the information was documented. Deshawn Black MD 04/05/23         I spent 31 total minutes, face-to-face, caring for Simeon today.  Greater than 50% of this time involved " counseling and/or coordination of care as documented within this note regarding the patient's illness(es), pros and cons of various treatment options, instructions and/or risk reduction.                         (Matthew Oneil MD)  Lucio Garcia MD

## 2023-03-23 ENCOUNTER — SPECIALTY PHARMACY (OUTPATIENT)
Dept: ONCOLOGY | Facility: HOSPITAL | Age: 78
End: 2023-03-23
Payer: MEDICARE

## 2023-03-23 NOTE — PROGRESS NOTES
Specialty Pharmacy Refill Coordination Note      Spoke with Essence (spouse) for Mr. Tam's monthly telephone follow-up regarding the oral oncology medication. She stated that he has been tolerating the Erleada well and has not had any obvious side effects from the medication. He has not missed any doses of the medication in the last month. He still receives refills through [facility] , and has not had any delays in getting those refills. Also, no new refills are needed at this time. SHe also stated that there has been no changes to his maintenance medications and no new drug allergies that he is aware of. She stated he has not had any recent stays in the hospital and has not visited the ER in the last month due to the Erleada . I encouraged her to reach out anytime with any questions or concerns they might have regarding their oral chemotherapy medication.     No needs expressed by the patient or otherwise identified. Will follow-up next month regarding the patient’s oral chemotherapy with a routine telephone consultation.     Refill Questions    Flowsheet Row Most Recent Value   Changes to allergies? No   Changes to medications? No   New conditions since last clinic visit No   Unplanned office visit, urgent care, ED, or hospital admission in the last 4 weeks  No   How does patient/caregiver feel medication is working? Very good   Financial problems or insurance changes  No   Since the previous refill, were any specialty medication doses or scheduled injections missed or delayed?  No   Does this patient require a clinical escalation to a pharmacist? No                     Follow-up: 28-30 day(s)     Dori Mathis, Pharmacy Technician  Specialty Pharmacy Technician

## 2023-04-05 ENCOUNTER — LAB (OUTPATIENT)
Dept: LAB | Facility: HOSPITAL | Age: 78
End: 2023-04-05
Payer: MEDICARE

## 2023-04-05 ENCOUNTER — OFFICE VISIT (OUTPATIENT)
Dept: ONCOLOGY | Facility: CLINIC | Age: 78
End: 2023-04-05
Payer: MEDICARE

## 2023-04-05 VITALS
DIASTOLIC BLOOD PRESSURE: 64 MMHG | HEIGHT: 69 IN | WEIGHT: 202 LBS | TEMPERATURE: 98.7 F | SYSTOLIC BLOOD PRESSURE: 128 MMHG | RESPIRATION RATE: 18 BRPM | HEART RATE: 78 BPM | BODY MASS INDEX: 29.92 KG/M2 | OXYGEN SATURATION: 92 %

## 2023-04-05 DIAGNOSIS — C61 PROSTATE CANCER: Primary | ICD-10-CM

## 2023-04-05 LAB
ALBUMIN SERPL-MCNC: 4.3 G/DL (ref 3.5–5.2)
ALBUMIN/GLOB SERPL: 1.5 G/DL
ALP SERPL-CCNC: 69 U/L (ref 39–117)
ALT SERPL W P-5'-P-CCNC: 26 U/L (ref 1–41)
ANION GAP SERPL CALCULATED.3IONS-SCNC: 10 MMOL/L (ref 5–15)
AST SERPL-CCNC: 36 U/L (ref 1–40)
BASOPHILS # BLD AUTO: 0.03 10*3/MM3 (ref 0–0.2)
BASOPHILS NFR BLD AUTO: 0.5 % (ref 0–1.5)
BILIRUB SERPL-MCNC: 0.4 MG/DL (ref 0–1.2)
BUN SERPL-MCNC: 17 MG/DL (ref 8–23)
BUN/CREAT SERPL: 26.6 (ref 7–25)
CALCIUM SPEC-SCNC: 9.6 MG/DL (ref 8.6–10.5)
CHLORIDE SERPL-SCNC: 97 MMOL/L (ref 98–107)
CO2 SERPL-SCNC: 26 MMOL/L (ref 22–29)
CREAT SERPL-MCNC: 0.64 MG/DL (ref 0.76–1.27)
DEPRECATED RDW RBC AUTO: 45.1 FL (ref 37–54)
EGFRCR SERPLBLD CKD-EPI 2021: 96.3 ML/MIN/1.73
EOSINOPHIL # BLD AUTO: 0.1 10*3/MM3 (ref 0–0.4)
EOSINOPHIL NFR BLD AUTO: 1.7 % (ref 0.3–6.2)
ERYTHROCYTE [DISTWIDTH] IN BLOOD BY AUTOMATED COUNT: 13.4 % (ref 12.3–15.4)
GLOBULIN UR ELPH-MCNC: 2.9 GM/DL
GLUCOSE SERPL-MCNC: 127 MG/DL (ref 65–99)
HCT VFR BLD AUTO: 39 % (ref 37.5–51)
HGB BLD-MCNC: 12.8 G/DL (ref 13–17.7)
HOLD SPECIMEN: NORMAL
IMM GRANULOCYTES # BLD AUTO: 0.01 10*3/MM3 (ref 0–0.05)
IMM GRANULOCYTES NFR BLD AUTO: 0.2 % (ref 0–0.5)
LYMPHOCYTES # BLD AUTO: 0.83 10*3/MM3 (ref 0.7–3.1)
LYMPHOCYTES NFR BLD AUTO: 14 % (ref 19.6–45.3)
MCH RBC QN AUTO: 30 PG (ref 26.6–33)
MCHC RBC AUTO-ENTMCNC: 32.8 G/DL (ref 31.5–35.7)
MCV RBC AUTO: 91.5 FL (ref 79–97)
MONOCYTES # BLD AUTO: 0.53 10*3/MM3 (ref 0.1–0.9)
MONOCYTES NFR BLD AUTO: 8.9 % (ref 5–12)
NEUTROPHILS NFR BLD AUTO: 4.44 10*3/MM3 (ref 1.7–7)
NEUTROPHILS NFR BLD AUTO: 74.7 % (ref 42.7–76)
NRBC BLD AUTO-RTO: 0 /100 WBC (ref 0–0.2)
PLATELET # BLD AUTO: 108 10*3/MM3 (ref 140–450)
PMV BLD AUTO: 10.6 FL (ref 6–12)
POTASSIUM SERPL-SCNC: 3.9 MMOL/L (ref 3.5–5.2)
PROT SERPL-MCNC: 7.2 G/DL (ref 6–8.5)
RBC # BLD AUTO: 4.26 10*6/MM3 (ref 4.14–5.8)
SODIUM SERPL-SCNC: 133 MMOL/L (ref 136–145)
WBC NRBC COR # BLD: 5.94 10*3/MM3 (ref 3.4–10.8)

## 2023-04-05 PROCEDURE — 99214 OFFICE O/P EST MOD 30 MIN: CPT | Performed by: INTERNAL MEDICINE

## 2023-04-05 PROCEDURE — 84153 ASSAY OF PSA TOTAL: CPT

## 2023-04-05 PROCEDURE — 84403 ASSAY OF TOTAL TESTOSTERONE: CPT

## 2023-04-05 PROCEDURE — 80053 COMPREHEN METABOLIC PANEL: CPT

## 2023-04-05 PROCEDURE — 3074F SYST BP LT 130 MM HG: CPT | Performed by: INTERNAL MEDICINE

## 2023-04-05 PROCEDURE — 36415 COLL VENOUS BLD VENIPUNCTURE: CPT

## 2023-04-05 PROCEDURE — 85025 COMPLETE CBC W/AUTO DIFF WBC: CPT

## 2023-04-05 PROCEDURE — 1126F AMNT PAIN NOTED NONE PRSNT: CPT | Performed by: INTERNAL MEDICINE

## 2023-04-05 PROCEDURE — 1159F MED LIST DOCD IN RCRD: CPT | Performed by: INTERNAL MEDICINE

## 2023-04-05 PROCEDURE — 3078F DIAST BP <80 MM HG: CPT | Performed by: INTERNAL MEDICINE

## 2023-04-05 NOTE — LETTER
April 5, 2023       No Recipients    Patient: Simeon Tam   YOB: 1945   Date of Visit: 4/5/2023       Dear Dr. Teixeira Recipients:    Thank you for referring Simeon Tam to me for evaluation. Below are the relevant portions of my assessment and plan of care.    If you have questions, please do not hesitate to call me. I look forward to following Simeon along with you.         Sincerely,        Deshawn Black MD        CC:   No Recipients    Deshawn Black MD  04/05/23 1411  Signed  [facility] HEMATOLOGY & ONCOLOGY [address]  [facility] [address]  [phone]    Patient Name: Simeon Tam  Encounter Date: 04/05/2023  YOB: 1945  Patient Number: [id number]      REASON FOR FOLLOW-UP: Simeon Tam is a pleasant 77 y.o.  male who is seen in follow-up for M0, hormone refractory prostate cancer (HRPC).  He is seen C36D8 of single agent apalutamide.  He is seen with Essence, tracy. History is obtained from patient.  He is a reliable historian.         Oncology/Hematology History Overview Note   DIAGNOSTIC ABNORMALITIES:  He presented with rising PSA of 13 and was diagnosed with prostate cancer about 25 years ago.   Previous PSA was 22.26 on 10/05/2016, <0.13 on 07/17/2017, 7.91 on 10/17/2018, 19.29 on 03/19/2020, and 22.25 on 04/07/2020.   He was seen by Dr. Oneil 05/12/2020. Latest PSA was 22.25 ng/ml on 04/07/2020.  Previous PSA 22.26 on 10/05/2016.  Bone scan and CT scan of the abdomen and pelvis were negative for prostate cancer on 10/2016.  Started on Casodex and possibly leuprolide.  PSA  was down to <0.13 on 07/17/207.  Plan for apalutamide after staging scans.   CT abdomen and pelvis 06/11/2020. No evidence of metastatic disease in the abdomen or pelvis. Right renal pelvis and ureter urothelial thickening and hyperemia. Urinary bladder wall is thickened and there is adjacent inflammation. Recommend  correlation with urinalysis and exam to exclude cystitis with right-sided pyelitis.  Cirrhosis.  Tiny 8 mm hypodense RIGHT inferior liver lesion on image 33 is too small to characterize.   Bone scan 06/11/2020. No evidence of bone metastases.        PREVIOUS INTERVENTIONS:  He was treated with adjuvant radiation at [place] and androgen ablation with Casodex.  Lupron and Casodex 10/2016. He had 7 months of ADT therapy with Lupron.  Apalutamide 07/22/2020 through present.     Prostate cancer   6/29/2020 -  Chemotherapy    OP PROSTATE Apalutamide     6/30/2020 Initial Diagnosis    Prostate cancer (CMS/HCC)     7/29/2020 -  Chemotherapy    OP LEUPROLIDE 45MG Q6M - ORDER EXPIRES 8/2/23         PAST MEDICAL HISTORY:  ALLERGIES:  Allergies   Allergen Reactions   • Sulfa Antibiotics Rash     CURRENT MEDICATIONS:  Outpatient Encounter Medications as of 4/5/2023   Medication Sig Dispense Refill   • Apalutamide 60 MG tablet Take 4 tablets by mouth Daily. Take with or without food. Do not crush or chew tablets. 120 tablet 11   • calcium carbonate (OS-JONNY) 600 MG tablet Take 1 tablet by mouth Daily.     • diclofenac (VOLTAREN) 75 MG EC tablet Take 1 tablet by mouth 2 (Two) Times a Day As Needed (shoulder pain).     • dutasteride (AVODART) 0.5 MG capsule Take 1 capsule by mouth Daily. 60 capsule 11   • JANUVIA 100 MG tablet Take 1 tablet by mouth Daily.     • lisinopril-hydrochlorothiazide (PRINZIDE,ZESTORETIC) 20-12.5 MG per tablet Take 1 tablet by mouth Daily.     • Loperamide HCl (IMODIUM PO) Take 1 dose by mouth As Needed (diarrhea).     • rosuvastatin (CRESTOR) 20 MG tablet Take 10 mg by mouth Every Night.     • tolterodine LA (DETROL LA) 4 MG 24 hr capsule TAKE (1) CAPSULE ONCE DAILY. 30 capsule 5     No facility-administered encounter medications on file as of 4/5/2023.     ADULT ILLNESSES:  Patient Active Problem List   Diagnosis Code   • Hypertension I10   • Hyperlipidemia E78.5   • Diabetes mellitus E11.9   • Cancer  C80.1   • Arrhythmia I49.9   • Non morbid obesity due to excess calories E66.09   • Prostate cancer C61   • Anterior urethral stricture N35.914     SURGERIES:  Past Surgical History:   Procedure Laterality Date   • CATARACT EXTRACTION     • CYSTOSCOPY RETROGRADE PYELOGRAM Bilateral 4/21/2021    Procedure: CYSTOSCOPY RETROGRADE PYELOGRAM, possible DIRECT VISION INTERNAL URETHROTOMY;  Surgeon: Matthew Oneil MD;  Location: [facility] OR;  Service: Urology;  Laterality: Bilateral;   • CYSTOSCOPY URETHROTOMY VISUAL INTERNAL Bilateral 4/21/2021    Procedure: possible DIRECT VISION INTERNAL URETHROTOMY;  Surgeon: Matthew Oneil MD;  Location:  [facility] OR;  Service: Urology;  Laterality: Bilateral;   • EXCISION LESION      neck   • KIDNEY STONE SURGERY     • REPLACEMENT TOTAL KNEE Left    • TONSILLECTOMY       HEALTH MAINTENANCE ITEMS:  Health Maintenance Due   Topic Date Due   • URINE MICROALBUMIN  Never done   • Pneumococcal Vaccine 65+ (1 - PCV) Never done   • TDAP/TD VACCINES (1 - Tdap) Never done   • ZOSTER VACCINE (1 of 2) Never done   • Hepatitis B (1 of 3 - Risk 3-dose series) Never done   • HEPATITIS C SCREENING  Never done   • ANNUAL WELLNESS VISIT  Never done   • LIPID PANEL  Never done   • HEMOGLOBIN A1C  Never done   • DIABETIC EYE EXAM  Never done   • COVID-19 Vaccine (4 - Booster) 03/15/2021       <no information>  Last Completed Colonoscopy     This patient has no relevant Health Maintenance data.          There is no immunization history on file for this patient.  Last Completed Mammogram     This patient has no relevant Health Maintenance data.            FAMILY HISTORY:  Family History   Problem Relation Age of Onset   • Dementia Mother      SOCIAL HISTORY:  Social History     Socioeconomic History   • Marital status:    Tobacco Use   • Smoking status: Never   • Smokeless tobacco: Never   Vaping Use   • Vaping Use: Never used   Substance and Sexual Activity   • Alcohol use: No   • Drug use: No   •  "Sexual activity: Defer       REVIEW OF SYSTEMS:    Review of Systems   Constitutional: Negative for chills, fatigue and fever.        \"I feel great.\"   HENT: Negative for congestion, mouth sores and trouble swallowing.    Eyes: Negative for redness and visual disturbance.   Respiratory: Negative for cough, shortness of breath and wheezing.    Cardiovascular: Negative for chest pain and leg swelling.   Gastrointestinal: Negative for abdominal pain, nausea and vomiting.   Endocrine: Negative for polydipsia and polyphagia.   Genitourinary: Negative for flank pain and hematuria.        \"Self catheter. \"It varies.\"   Musculoskeletal: Negative for gait problem and myalgias.   Skin: Positive for pallor.   Allergic/Immunologic: Negative for food allergies.   Neurological: Negative for dizziness, speech difficulty and weakness.   Hematological: Negative for adenopathy. Does not bruise/bleed easily.   Psychiatric/Behavioral: Negative for agitation, confusion and hallucinations.       VITAL SIGNS: /64   Pulse 78   Temp 98.7 °F (37.1 °C)   Resp 18   Ht 175.3 cm (69\")   Wt 91.6 kg (202 lb)   SpO2 92%   BMI 29.83 kg/m²  Gained 2 pounds.   Pain Score    04/05/23 1336   PainSc: 0-No pain       PHYSICAL EXAMINATION:     Physical Exam  Vitals reviewed.   Constitutional:       General: He is not in acute distress.  HENT:      Head: Normocephalic and atraumatic.   Eyes:      General: No scleral icterus.  Cardiovascular:      Rate and Rhythm: Normal rate.   Pulmonary:      Effort: No respiratory distress.      Breath sounds: No wheezing or rales.   Abdominal:      General: Bowel sounds are normal.      Palpations: Abdomen is soft.      Tenderness: There is no abdominal tenderness.   Musculoskeletal:         General: No swelling.      Cervical back: Neck supple.   Skin:     General: Skin is warm.      Coloration: Skin is pale.      Comments: ? Ecchymosis, right lower arm. Observe. \"Okay.\"   Neurological:      Mental Status: " He is alert and oriented to person, place, and time.   Psychiatric:         Mood and Affect: Mood normal.         Behavior: Behavior normal.         Thought Content: Thought content normal.         Judgment: Judgment normal.         LABS    Lab Results - Last 18 Months   Lab Units 04/05/23  1326 03/08/23  1407 02/08/23  1233 01/04/23  1336 11/30/22  1348 10/25/22  1333 08/31/22  1255 08/03/22  1359 06/30/22  1449 06/01/22  1345 02/09/22  1300 01/12/22  1336 12/08/21  1357 11/11/21  1339   HEMOGLOBIN g/dL 12.8* 13.2 13.5 13.3 13.5 13.9   < > 13.3   < > 13.4   < > 13.7   < > 13.2   HEMATOCRIT % 39.0 39.8 40.7 40.2 41.5 41.0   < > 39.0   < > 39.1   < > 41.0   < > 39.4   MCV fL 91.5 90.7 92.1 90.7 92.2 91.5   < > 90.3   < > 90.1   < > 90.3   < > 90.6   WBC 10*3/mm3 5.94 8.03 5.32 6.14 6.78 6.01   < > 7.19   < > 6.74   < > 6.43   < > 6.43   RDW % 13.4 13.3 13.2 13.1 13.2 13.2   < > 13.1   < > 13.0   < > 13.3   < > 13.2   MPV fL 10.6 10.3 10.5 10.9 10.5 10.9   < > 10.6   < > 10.8   < > 10.7   < > 9.6   PLATELETS 10*3/mm3 108* 135* 117* 124* 113* 123*   < > 124*   < > 134*   < > 141   < > 156   IMM GRAN % % 0.2 0.1  --   --   --   --   --  0.3  --  0.4  --  0.3  --  0.6*   NEUTROS ABS 10*3/mm3 4.44 6.12 3.69 4.62 4.97 4.33   < > 5.50   < > 5.07   < > 4.66   < > 4.59   LYMPHS ABS 10*3/mm3 0.83 1.04 0.93 0.88 0.93 0.90   < > 0.93   < > 0.92   < > 0.94   < > 0.98   MONOS ABS 10*3/mm3 0.53 0.66 0.54 0.48 0.63 0.58   < > 0.57   < > 0.56   < > 0.63   < > 0.65   EOS ABS 10*3/mm3 0.10 0.16 0.11 0.11 0.18 0.14   < > 0.14   < > 0.12   < > 0.16   < > 0.14   BASOS ABS 10*3/mm3 0.03 0.04 0.03 0.03 0.04 0.04   < > 0.03   < > 0.04   < > 0.02   < > 0.03   IMMATURE GRANS (ABS) 10*3/mm3 0.01 0.01  --   --   --   --   --  0.02  --  0.03  --  0.02  --  0.04   NRBC /100 WBC 0.0 0.0  --   --   --   --   --  0.0  --  0.0  --  0.0  --  0.0    < > = values in this interval not displayed.       Lab Results - Last 18 Months   Lab Units  04/05/23  1326 03/08/23  1407 02/08/23  1233 01/04/23  1336 11/30/22  1348 10/25/22  1333 03/09/22  1334 02/09/22  1300 01/12/22  1336 12/08/21  1357 11/11/21  1339 10/11/21  1316   GLUCOSE mg/dL 127* 197* 134* 195* 134* 153*   < > 175* 167* 218* 148* 225*   SODIUM mmol/L 133* 136 138 135* 136 138   < > 137 137 137 136 136   POTASSIUM mmol/L 3.9 3.9 3.9 3.9 4.1 3.7   < > 3.8 4.3 4.1 4.3 4.3   CO2 mmol/L 26.0 26.0 27.0 26.0 29.0 30.0*   < > 30.0* 32.0* 29.0 28.0 28.0   CHLORIDE mmol/L 97* 96* 99 95* 96* 97*   < > 97* 95* 96* 96* 96*   ANION GAP mmol/L 10.0 14.0 12.0 14.0 11.0 11.0   < > 10.0 10.0 12.0 12.0 12.0   CREATININE mg/dL 0.64* 0.67* 0.74* 0.62* 0.60* 0.67*   < > 0.66* 0.93 0.82 0.67* 0.75*   BUN mg/dL 17 16 16 15 17 15   < > 13 20 14 12 12   BUN / CREAT RATIO  26.6* 23.9 21.6 24.2 28.3* 22.4   < > 19.7 21.5 17.1 17.9 16.0   CALCIUM mg/dL 9.6 9.8 9.6 9.7 9.9 10.2   < > 9.3 10.0 10.1 10.0 10.2   EGFR IF NONAFRICN AM mL/min/1.73  --   --   --   --   --   --   --  117 79 91 115 101   ALK PHOS U/L 69 73 76 67 67 69   < > 64 74 76 62 65   TOTAL PROTEIN g/dL 7.2 7.4 7.3 7.6 7.3 7.8   < > 7.3 7.5 7.7 7.6 7.4   ALT (SGPT) U/L 26 31 17 20 20 18   < > 17 24 23 25 26   AST (SGOT) U/L 36 41* 27 27 26 25   < > 22 32 32 36 31   BILIRUBIN mg/dL 0.4 0.4 0.3 0.5 0.4 0.3   < > 0.4 0.3 0.3 0.3 0.4   ALBUMIN g/dL 4.3 4.4 4.5 4.4 4.30 4.80   < > 4.40 4.60 4.50 4.20 4.40   GLOBULIN gm/dL 2.9 3.0 2.8 3.2 3.0 3.0   < > 2.9 2.9 3.2 3.4 3.0    < > = values in this interval not displayed.       No results for input(s): MSPIKE, KAPPALAMB, IGLFLC, URICACID, FREEKAPPAL, CEA, LDH, REFLABREPO in the last 26608 hours.    No results for input(s): IRON, TIBC, LABIRON, FERRITIN, A4ZYQAJ, TSH, FOLATE in the last 63029 hours.    Invalid input(s): VITB12    Simeon Tam reports a pain score of 0.         ASSESSMENT:  1.   Prostate cancer, hormone refractory.   AJCC stage ([place], NX, M0)  Treatment status:Casodex and Lupron with PSA recurrence.  " Taking with apalutamide and leuprolde (by Dr. Oneil).  2.   Performance status of 0.  3.   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 On observation.  4.   Cirrhosis, etiology of thrombocytopenia and followed by PCP.  5.   Urethral stricture. Self catheterization as needed. \"According to what I need.\"             PLAN:  1.    Re:  Apalutamide tolerance.  \"Fine.\"   2.    Re:  Heme status.  WBC 5.94, hemoglobin 12.8 and platelet 108 from 135 from 117 from 124 from 113.  3.    Re:  Pre-office CMP.  AST 36 from 41(on Crestor), GFR 96.3 from 95 ml/minute and glucose 127.  4.    Re:  Pre-office PSA pending from 0.031 from 0.026 from 0.024 from 0.019 from 0.020 from 0.016 from 0.015 from 0.018 (forgot my Lupron) from < 0.014 from < 0.014  from <0.014 from <0.014 from <0.01 from < 0.014 from < 0.014 from <0.014 from < 0.014 from < 0.014 from < 0.014 from < 0.014 from < 0.014 from < 0.014 from < 0.014 from < 0.014 from < 0.014 from < 0.014 from < 0.014 from 0.015 from 0.03 from 0.125 from 1.03 from 27.3. Testosterone pending from 28.1 from 25 from 28.2 from 25.1 from 34.1 from 18.1 from 527 from 580 from 443 from 235.  5.   eRx for Compazine 10 mg p.o. every 4 hours as needed for nausea/vomiting, 60, 2 refills if needed.    6.   eRx for C36D1 started on 03/29/2023 apalutamide 60 mg, take four tablets total dose 240 mg po daily, number, 120.  Take either with or without food.  Swallow tablets whole. TSH before apalutamide.  No grapefruit juice or grapefruit.  Monitor for adverse effects like rash, hypothyroidism, arrhythmias, fall, fractures or seizures.  7.   Continue ongoing management per primary care physician and other specialists.  8.   Plan of care discussed with patient and his spouse.  Understanding expressed.  Patient agreeable to proceed.  9.   Lupron per Dr. Oneil given every 6 months.  10.  Questions were answered to his satisfaction. \"Yea.\"  11.  Advance Care " Planning  ACP discussion was declined by the patient. Patient does not have an advance directive, information provided.  12.  Return to office 4 week with CMP, PSA, testosterone, and CBC with differential, same day           I have reviewed the assessment and plan and verified the accuracy of it. No changes to assessment and plan since the information was documented. Deshawn Black MD 04/05/23         I spent 31 total minutes, face-to-face, caring for Simeon today.  Greater than 50% of this time involved counseling and/or coordination of care as documented within this note regarding the patient's illness(es), pros and cons of various treatment options, instructions and/or risk reduction.                         (Matthew Oneil MD)  Lucio Garcia MD

## 2023-04-06 LAB
PSA SERPL-MCNC: 0.03 NG/ML (ref 0–4)
TESTOST SERPL-MCNC: 23.4 NG/DL (ref 193–740)

## 2023-04-14 DIAGNOSIS — N31.9 NEUROGENIC BLADDER: ICD-10-CM

## 2023-04-14 RX ORDER — DUTASTERIDE 0.5 MG/1
CAPSULE, LIQUID FILLED ORAL
Qty: 90 CAPSULE | Refills: 0 | Status: SHIPPED | OUTPATIENT
Start: 2023-04-14

## 2023-04-14 NOTE — PROGRESS NOTES
[facility]AT[facility]COL[facility] [place]  [address]  [place] [address] [phone]    Patient Name: Simeon Tam  Encounter Date: 05/03/2023  YOB: 1945  Patient Number: [id number]      REASON FOR FOLLOW-UP: Simeon Tam is a pleasant 77 y.o.  male who is seen in follow-up for M0, hormone refractory prostate cancer (HRPC).  He is seen C37D7 of single agent apalutamide.  He is seen alone. History is obtained from patient.  History is considered reliable.         Oncology/Hematology History Overview Note   DIAGNOSTIC ABNORMALITIES:  He presented with rising PSA of 13 and was diagnosed with prostate cancer about 25 years ago.   Previous PSA was 22.26 on 10/05/2016, <0.13 on 07/17/2017, 7.91 on 10/17/2018, 19.29 on 03/19/2020, and 22.25 on 04/07/2020.   He was seen by Dr. Oneil 05/12/2020. Latest PSA was 22.25 ng/ml on 04/07/2020.  Previous PSA 22.26 on 10/05/2016.  Bone scan and CT scan of the abdomen and pelvis were negative for prostate cancer on 10/2016.  Started on Casodex and possibly leuprolide.  PSA  was down to <0.13 on 07/17/207.  Plan for apalutamide after staging scans.   CT abdomen and pelvis 06/11/2020. No evidence of metastatic disease in the abdomen or pelvis. Right renal pelvis and ureter urothelial thickening and hyperemia. Urinary bladder wall is thickened and there is adjacent inflammation. Recommend correlation with urinalysis and exam to exclude cystitis with right-sided pyelitis.  Cirrhosis.  Tiny 8 mm hypodense RIGHT inferior liver lesion on image 33 is too small to characterize.   Bone scan 06/11/2020. No evidence of bone metastases.        PREVIOUS INTERVENTIONS:  He was treated with adjuvant radiation at [place] and androgen ablation with Casodex.  Lupron and Casodex 10/2016. He had 7 months of ADT therapy with Lupron.  Apalutamide 07/22/2020 through present.     Prostate cancer   6/29/2020 -   Chemotherapy    OP PROSTATE Apalutamide     6/30/2020 Initial Diagnosis    Prostate cancer (CMS/[facility])     7/29/2020 -  Chemotherapy    OP LEUPROLIDE 45MG Q6M - ORDER EXPIRES 8/2/23         PAST MEDICAL HISTORY:  ALLERGIES:  Allergies   Allergen Reactions   • Sulfa Antibiotics Rash     CURRENT MEDICATIONS:  Outpatient Encounter Medications as of 5/3/2023   Medication Sig Dispense Refill   • Apalutamide 60 MG tablet Take 4 tablets by mouth Daily. Take with or without food. Do not crush or chew tablets. 120 tablet 11   • calcium carbonate (OS-JONNY) 600 MG tablet Take 1 tablet by mouth Daily.     • diclofenac (VOLTAREN) 75 MG EC tablet Take 1 tablet by mouth 2 (Two) Times a Day As Needed (shoulder pain).     • dutasteride (AVODART) 0.5 MG capsule TAKE (1) CAPSULE ONCE DAILY. 90 capsule 0   • JANUVIA 100 MG tablet Take 1 tablet by mouth Daily.     • lisinopril-hydrochlorothiazide (PRINZIDE,ZESTORETIC) 20-12.5 MG per tablet Take 1 tablet by mouth Daily.     • Loperamide HCl (IMODIUM PO) Take 1 dose by mouth As Needed (diarrhea).     • rosuvastatin (CRESTOR) 20 MG tablet Take 10 mg by mouth Every Night.     • tolterodine LA (DETROL LA) 4 MG 24 hr capsule TAKE (1) CAPSULE ONCE DAILY. 30 capsule 5   • [DISCONTINUED] dutasteride (AVODART) 0.5 MG capsule Take 1 capsule by mouth Daily. 60 capsule 11     No facility-administered encounter medications on file as of 5/3/2023.     ADULT ILLNESSES:  Patient Active Problem List   Diagnosis Code   • Hypertension I10   • Hyperlipidemia E78.5   • Diabetes mellitus E11.9   • Cancer C80.1   • Arrhythmia I49.9   • Non morbid obesity due to excess calories E66.09   • Prostate cancer C61   • Anterior urethral stricture N35.914     SURGERIES:  Past Surgical History:   Procedure Laterality Date   • CATARACT EXTRACTION     • CYSTOSCOPY RETROGRADE PYELOGRAM Bilateral 4/21/2021    Procedure: CYSTOSCOPY RETROGRADE PYELOGRAM, possible DIRECT VISION INTERNAL URETHROTOMY;  Surgeon: Matthew Oneil,  "MD;  Location: [facility] OR;  Service: Urology;  Laterality: Bilateral;   • CYSTOSCOPY URETHROTOMY VISUAL INTERNAL Bilateral 4/21/2021    Procedure: possible DIRECT VISION INTERNAL URETHROTOMY;  Surgeon: Matthew Oneil MD;  Location: [facility];  Service: Urology;  Laterality: Bilateral;   • EXCISION LESION      neck   • KIDNEY STONE SURGERY     • REPLACEMENT TOTAL KNEE Left    • TONSILLECTOMY       HEALTH MAINTENANCE ITEMS:  Health Maintenance Due   Topic Date Due   • URINE MICROALBUMIN  Never done   • Pneumococcal Vaccine 65+ (1 - PCV) Never done   • TDAP/TD VACCINES (1 - Tdap) Never done   • ZOSTER VACCINE (1 of 2) Never done   • Hepatitis B (1 of 3 - Risk 3-dose series) Never done   • HEPATITIS C SCREENING  Never done   • ANNUAL WELLNESS VISIT  Never done   • LIPID PANEL  Never done   • HEMOGLOBIN A1C  Never done   • DIABETIC EYE EXAM  Never done   • COVID-19 Vaccine (4 - Booster for Moderna series) 03/15/2021       <no information>  Last Completed Colonoscopy     This patient has no relevant Health Maintenance data.          There is no immunization history on file for this patient.  Last Completed Mammogram     This patient has no relevant Health Maintenance data.            FAMILY HISTORY:  Family History   Problem Relation Age of Onset   • Dementia Mother      SOCIAL HISTORY:  Social History     Socioeconomic History   • Marital status:    Tobacco Use   • Smoking status: Never   • Smokeless tobacco: Never   Vaping Use   • Vaping Use: Never used   Substance and Sexual Activity   • Alcohol use: No   • Drug use: No   • Sexual activity: Defer       REVIEW OF SYSTEMS:    Review of Systems   Constitutional: Negative for chills, fatigue and fever.        \"I feel good.\"   HENT: Negative for congestion, mouth sores and trouble swallowing.    Eyes: Negative for redness and visual disturbance.   Respiratory: Negative for cough and wheezing.    Cardiovascular: Negative for chest pain and leg swelling. " "  Gastrointestinal: Negative for abdominal pain, constipation, diarrhea, nausea and vomiting.   Endocrine: Negative for polydipsia and polyphagia.   Genitourinary: Negative for flank pain and hematuria.        \"Self catheter.\"   Musculoskeletal: Negative for gait problem and myalgias.   Skin: Negative for pallor.   Allergic/Immunologic: Negative for food allergies.   Neurological: Negative for dizziness, speech difficulty and weakness.   Hematological: Negative for adenopathy. Does not bruise/bleed easily.   Psychiatric/Behavioral: Negative for agitation, confusion and hallucinations.       VITAL SIGNS: /62   Pulse 94   Temp 97.8 °F (36.6 °C)   Resp 18   Ht 175.3 cm (69\")   Wt 91.4 kg (201 lb 6.4 oz)   SpO2 96%   BMI 29.74 kg/m²   Pain Score    05/03/23 1428   PainSc: 0-No pain       PHYSICAL EXAMINATION:     Physical Exam  Vitals reviewed.   Constitutional:       General: He is not in acute distress.  HENT:      Head: Normocephalic and atraumatic.   Eyes:      General: No scleral icterus.  Cardiovascular:      Rate and Rhythm: Normal rate.   Pulmonary:      Effort: No respiratory distress.      Breath sounds: No wheezing or rales.   Abdominal:      General: Bowel sounds are normal.      Palpations: Abdomen is soft.      Tenderness: There is no abdominal tenderness.   Musculoskeletal:         General: No swelling.      Cervical back: Neck supple.   Skin:     General: Skin is warm.      Coloration: Skin is not pale.   Neurological:      Mental Status: He is alert and oriented to person, place, and time.   Psychiatric:         Mood and Affect: Mood normal.         Behavior: Behavior normal.         Thought Content: Thought content normal.         Judgment: Judgment normal.         LABS    Lab Results - Last 18 Months   Lab Units 05/03/23  1341 04/05/23  1326 03/08/23  1407 02/08/23  1233 01/04/23  1336 11/30/22  1348 08/31/22  1255 08/03/22  1359 06/30/22  1449 06/01/22  1345 02/09/22  1300 01/12/22  1336 " 12/08/21  1357 11/11/21  1339   HEMOGLOBIN g/dL 13.6 12.8* 13.2 13.5 13.3 13.5   < > 13.3   < > 13.4   < > 13.7   < > 13.2   HEMATOCRIT % 40.5 39.0 39.8 40.7 40.2 41.5   < > 39.0   < > 39.1   < > 41.0   < > 39.4   MCV fL 91.2 91.5 90.7 92.1 90.7 92.2   < > 90.3   < > 90.1   < > 90.3   < > 90.6   WBC 10*3/mm3 6.07 5.94 8.03 5.32 6.14 6.78   < > 7.19   < > 6.74   < > 6.43   < > 6.43   RDW % 13.0 13.4 13.3 13.2 13.1 13.2   < > 13.1   < > 13.0   < > 13.3   < > 13.2   MPV fL 11.0 10.6 10.3 10.5 10.9 10.5   < > 10.6   < > 10.8   < > 10.7   < > 9.6   PLATELETS 10*3/mm3 118* 108* 135* 117* 124* 113*   < > 124*   < > 134*   < > 141   < > 156   IMM GRAN % %  --  0.2 0.1  --   --   --   --  0.3  --  0.4  --  0.3  --  0.6*   NEUTROS ABS 10*3/mm3 4.74 4.44 6.12 3.69 4.62 4.97   < > 5.50   < > 5.07   < > 4.66   < > 4.59   LYMPHS ABS 10*3/mm3 0.76 0.83 1.04 0.93 0.88 0.93   < > 0.93   < > 0.92   < > 0.94   < > 0.98   MONOS ABS 10*3/mm3 0.43 0.53 0.66 0.54 0.48 0.63   < > 0.57   < > 0.56   < > 0.63   < > 0.65   EOS ABS 10*3/mm3 0.08 0.10 0.16 0.11 0.11 0.18   < > 0.14   < > 0.12   < > 0.16   < > 0.14   BASOS ABS 10*3/mm3 0.03 0.03 0.04 0.03 0.03 0.04   < > 0.03   < > 0.04   < > 0.02   < > 0.03   IMMATURE GRANS (ABS) 10*3/mm3  --  0.01 0.01  --   --   --   --  0.02  --  0.03  --  0.02  --  0.04   NRBC /100 WBC  --  0.0 0.0  --   --   --   --  0.0  --  0.0  --  0.0  --  0.0    < > = values in this interval not displayed.       Lab Results - Last 18 Months   Lab Units 05/03/23  1341 04/05/23  1326 03/08/23  1407 02/08/23  1233 01/04/23  1336 11/30/22  1348 03/09/22  1334 02/09/22  1300 01/12/22  1336 12/08/21  1357 11/11/21  1339   GLUCOSE mg/dL 227* 127* 197* 134* 195* 134*   < > 175* 167* 218* 148*   SODIUM mmol/L 133* 133* 136 138 135* 136   < > 137 137 137 136   POTASSIUM mmol/L 4.2 3.9 3.9 3.9 3.9 4.1   < > 3.8 4.3 4.1 4.3   CO2 mmol/L 26.0 26.0 26.0 27.0 26.0 29.0   < > 30.0* 32.0* 29.0 28.0   CHLORIDE mmol/L 94* 97* 96* 99 95*  "96*   < > 97* 95* 96* 96*   ANION GAP mmol/L 13.0 10.0 14.0 12.0 14.0 11.0   < > 10.0 10.0 12.0 12.0   CREATININE mg/dL 0.64* 0.64* 0.67* 0.74* 0.62* 0.60*   < > 0.66* 0.93 0.82 0.67*   BUN mg/dL 14 17 16 16 15 17   < > 13 20 14 12   BUN / CREAT RATIO  21.9 26.6* 23.9 21.6 24.2 28.3*   < > 19.7 21.5 17.1 17.9   CALCIUM mg/dL 9.9 9.6 9.8 9.6 9.7 9.9   < > 9.3 10.0 10.1 10.0   EGFR IF NONAFRICN AM mL/min/1.73  --   --   --   --   --   --   --  117 79 91 115   ALK PHOS U/L 71 69 73 76 67 67   < > 64 74 76 62   TOTAL PROTEIN g/dL 7.3 7.2 7.4 7.3 7.6 7.3   < > 7.3 7.5 7.7 7.6   ALT (SGPT) U/L 17 26 31 17 20 20   < > 17 24 23 25   AST (SGOT) U/L 23 36 41* 27 27 26   < > 22 32 32 36   BILIRUBIN mg/dL 0.5 0.4 0.4 0.3 0.5 0.4   < > 0.4 0.3 0.3 0.3   ALBUMIN g/dL 4.5 4.3 4.4 4.5 4.4 4.30   < > 4.40 4.60 4.50 4.20   GLOBULIN gm/dL 2.8 2.9 3.0 2.8 3.2 3.0   < > 2.9 2.9 3.2 3.4    < > = values in this interval not displayed.       No results for input(s): MSPIKE, KAPPALAMB, IGLFLC, URICACID, FREEKAPPAL, CEA, LDH, REFLABREPO in the last 89485 hours.    No results for input(s): IRON, TIBC, LABIRON, FERRITIN, D8TPQHR, TSH, FOLATE in the last 88446 hours.    Invalid input(s): VITB12    Simeon Tam reports a pain score of 0.          ASSESSMENT:  1.   Prostate cancer, hormone refractory.   AJCC stage ([place], NX, M0)  Treatment status:Casodex and Lupron with PSA recurrence.  Taking with apalutamide and leuprolde (by Dr. Oneil).  2.   Performance status of 0.  3.   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  Under observation.  4.   Cirrhosis, etiology of thrombocytopenia and followed by PCP.  5.   Urethral stricture. Self catheterization as needed. \"It varies.\"             PLAN:  1.    Re: Tolerance to apalutamide.  \"It's fine.\"   2.    Re:  Heme status.  WBC 6.07, hemoglobin 13.6 and platelet 116 from 108 from 135.  3.    Re:  Pre-office CMP.  AST 23 from 36 from 41(on Crestor), GFR 96.3 ml/minute and " "glucose 227.  4.    Re:  Pre-office PSA pending from 0.031 from 0.031 from 0.026 from 0.024 from 0.019 from 0.020 from 0.016 from 0.015 from 0.018 (forgot my Lupron) from < 0.014 from < 0.014  from <0.014 from <0.014 from <0.01 from < 0.014 from < 0.014 from <0.014 from < 0.014 from < 0.014 from < 0.014 from < 0.014 from < 0.014 from < 0.014 from < 0.014 from < 0.014 from < 0.014 from < 0.014 from < 0.014 from 0.015 from 0.03 from 0.125 from 1.03 from 27.3. Testosterone pending from 23.4 from 28.1 from 25 from 28.2 from 25.1 from 34.1 from 18.1 from 527 from 580 from 443 from 235.  5.   eRx for Compazine 10 mg p.o. every 4 hours as needed for nausea/vomiting, 60, 2 refills if needed.    6.   eRx for C37D1 started on 4/26/2023 apalutamide 60 mg, take four tablets total dose 240 mg po daily, number, 120.  Take either with or without food.  Swallow tablets whole. TSH before apalutamide.  No grapefruit juice or grapefruit.  Monitor for adverse effects like rash, hypothyroidism, arrhythmias, fall, fractures or seizures.  7.   Continue ongoing management per primary care physician and other specialists.  8.   Plan of care discussed with patient.  Understanding expressed.  Patient agreeable to proceed.  9.   Lupron per Dr. Oneil given every 6 months.  10.  Questions were answered to his satisfaction. \"That is great.\"  11.  Advance Care Planning  ACP discussion was declined by the patient. Patient does not have an advance directive, information provided.  12.  Return to office 5/26/2023 with CMP, PSA, testosterone, and CBC with differential, same day           I have reviewed the assessment and plan and verified the accuracy of it. No changes to assessment and plan since the information was documented. Deshawn Black MD 05/03/23       I spent 33 total minutes, face-to-face, caring for Simeon today.  Greater than 50% of this time involved counseling and/or coordination of care as documented within this note regarding " the patient's illness(es), pros and cons of various treatment options, instructions and/or risk reduction.                       (Matthew Oneil MD)  Lucio Garcia MD

## 2023-04-19 ENCOUNTER — SPECIALTY PHARMACY (OUTPATIENT)
Dept: ONCOLOGY | Facility: HOSPITAL | Age: 78
End: 2023-04-19
Payer: MEDICARE

## 2023-04-19 NOTE — PROGRESS NOTES
Specialty Pharmacy Refill Coordination Note     Patient Outreach  An attempt was made by the [facility] to contact this patient for a follow-up on their chemotherapy medication. [facility] can be reached at [phone].      Dori Mathis, Pharmacy Technician  Specialty Pharmacy Technician

## 2023-04-20 ENCOUNTER — SPECIALTY PHARMACY (OUTPATIENT)
Dept: ONCOLOGY | Facility: HOSPITAL | Age: 78
End: 2023-04-20
Payer: MEDICARE

## 2023-04-20 NOTE — PROGRESS NOTES
Specialty Pharmacy Refill Coordination Note     Patient Outreach  A second attempt was made by the [facility] to contact this patient for a follow-up on their chemotherapy medication. [facility] can be reached at [phone].      Dori Mathis, Pharmacy Technician  Specialty Pharmacy Technician

## 2023-05-03 ENCOUNTER — OFFICE VISIT (OUTPATIENT)
Dept: ONCOLOGY | Facility: CLINIC | Age: 78
End: 2023-05-03
Payer: MEDICARE

## 2023-05-03 ENCOUNTER — LAB (OUTPATIENT)
Dept: LAB | Facility: HOSPITAL | Age: 78
End: 2023-05-03
Payer: MEDICARE

## 2023-05-03 VITALS
WEIGHT: 201.4 LBS | HEART RATE: 94 BPM | HEIGHT: 69 IN | SYSTOLIC BLOOD PRESSURE: 118 MMHG | RESPIRATION RATE: 18 BRPM | OXYGEN SATURATION: 96 % | TEMPERATURE: 97.8 F | DIASTOLIC BLOOD PRESSURE: 62 MMHG | BODY MASS INDEX: 29.83 KG/M2

## 2023-05-03 DIAGNOSIS — C61 PROSTATE CANCER: Primary | ICD-10-CM

## 2023-05-03 LAB
ALBUMIN SERPL-MCNC: 4.5 G/DL (ref 3.5–5.2)
ALBUMIN/GLOB SERPL: 1.6 G/DL
ALP SERPL-CCNC: 71 U/L (ref 39–117)
ALT SERPL W P-5'-P-CCNC: 17 U/L (ref 1–41)
ANION GAP SERPL CALCULATED.3IONS-SCNC: 13 MMOL/L (ref 5–15)
AST SERPL-CCNC: 23 U/L (ref 1–40)
BASOPHILS # BLD AUTO: 0.03 10*3/MM3 (ref 0–0.2)
BASOPHILS NFR BLD AUTO: 0.5 % (ref 0–1.5)
BILIRUB SERPL-MCNC: 0.5 MG/DL (ref 0–1.2)
BUN SERPL-MCNC: 14 MG/DL (ref 8–23)
BUN/CREAT SERPL: 21.9 (ref 7–25)
CALCIUM SPEC-SCNC: 9.9 MG/DL (ref 8.6–10.5)
CHLORIDE SERPL-SCNC: 94 MMOL/L (ref 98–107)
CO2 SERPL-SCNC: 26 MMOL/L (ref 22–29)
CREAT SERPL-MCNC: 0.64 MG/DL (ref 0.76–1.27)
DEPRECATED RDW RBC AUTO: 44 FL (ref 37–54)
EGFRCR SERPLBLD CKD-EPI 2021: 96.3 ML/MIN/1.73
EOSINOPHIL # BLD AUTO: 0.08 10*3/MM3 (ref 0–0.4)
EOSINOPHIL NFR BLD AUTO: 1.3 % (ref 0.3–6.2)
ERYTHROCYTE [DISTWIDTH] IN BLOOD BY AUTOMATED COUNT: 13 % (ref 12.3–15.4)
GLOBULIN UR ELPH-MCNC: 2.8 GM/DL
GLUCOSE SERPL-MCNC: 227 MG/DL (ref 65–99)
HCT VFR BLD AUTO: 40.5 % (ref 37.5–51)
HGB BLD-MCNC: 13.6 G/DL (ref 13–17.7)
HOLD SPECIMEN: NORMAL
LYMPHOCYTES # BLD AUTO: 0.76 10*3/MM3 (ref 0.7–3.1)
LYMPHOCYTES NFR BLD AUTO: 12.5 % (ref 19.6–45.3)
MCH RBC QN AUTO: 30.6 PG (ref 26.6–33)
MCHC RBC AUTO-ENTMCNC: 33.6 G/DL (ref 31.5–35.7)
MCV RBC AUTO: 91.2 FL (ref 79–97)
MONOCYTES # BLD AUTO: 0.43 10*3/MM3 (ref 0.1–0.9)
MONOCYTES NFR BLD AUTO: 7.1 % (ref 5–12)
NEUTROPHILS NFR BLD AUTO: 4.74 10*3/MM3 (ref 1.7–7)
NEUTROPHILS NFR BLD AUTO: 78.1 % (ref 42.7–76)
PLATELET # BLD AUTO: 118 10*3/MM3 (ref 140–450)
PMV BLD AUTO: 11 FL (ref 6–12)
POTASSIUM SERPL-SCNC: 4.2 MMOL/L (ref 3.5–5.2)
PROT SERPL-MCNC: 7.3 G/DL (ref 6–8.5)
RBC # BLD AUTO: 4.44 10*6/MM3 (ref 4.14–5.8)
SODIUM SERPL-SCNC: 133 MMOL/L (ref 136–145)
WBC NRBC COR # BLD: 6.07 10*3/MM3 (ref 3.4–10.8)

## 2023-05-03 PROCEDURE — 85025 COMPLETE CBC W/AUTO DIFF WBC: CPT

## 2023-05-03 PROCEDURE — 80053 COMPREHEN METABOLIC PANEL: CPT

## 2023-05-03 PROCEDURE — 84153 ASSAY OF PSA TOTAL: CPT

## 2023-05-03 PROCEDURE — 36415 COLL VENOUS BLD VENIPUNCTURE: CPT

## 2023-05-03 PROCEDURE — 84403 ASSAY OF TOTAL TESTOSTERONE: CPT

## 2023-05-03 NOTE — LETTER
May 3, 2023       No Recipients    Patient: Simeon Tam   YOB: 1945   Date of Visit: 5/3/2023       Dear Dr. Teixeira Recipients:    Thank you for referring Simeon Tam to me for evaluation. Below are the relevant portions of my assessment and plan of care.    If you have questions, please do not hesitate to call me. I look forward to following Simeon along with you.         Sincerely,        Deshawn Black MD        CC:   No Recipients    Deshawn Black MD  05/03/23 1459  Signed  [facility] [address]  [facility] [address]  [phone]    Patient Name: Simeon Tam  Encounter Date: 05/03/2023  YOB: 1945  Patient Number: [id number]      REASON FOR FOLLOW-UP: Simeon Tam is a pleasant 77 y.o.  male who is seen in follow-up for M0, hormone refractory prostate cancer (HRPC).  He is seen C37D7 of single agent apalutamide.  He is seen alone. History is obtained from patient.  History is considered reliable.         Oncology/Hematology History Overview Note   DIAGNOSTIC ABNORMALITIES:  He presented with rising PSA of 13 and was diagnosed with prostate cancer about 25 years ago.   Previous PSA was 22.26 on 10/05/2016, <0.13 on 07/17/2017, 7.91 on 10/17/2018, 19.29 on 03/19/2020, and 22.25 on 04/07/2020.   He was seen by Dr. Oneil 05/12/2020. Latest PSA was 22.25 ng/ml on 04/07/2020.  Previous PSA 22.26 on 10/05/2016.  Bone scan and CT scan of the abdomen and pelvis were negative for prostate cancer on 10/2016.  Started on Casodex and possibly leuprolide.  PSA  was down to <0.13 on 07/17/207.  Plan for apalutamide after staging scans.   CT abdomen and pelvis 06/11/2020. No evidence of metastatic disease in the abdomen or pelvis. Right renal pelvis and ureter urothelial thickening and hyperemia. Urinary bladder wall is thickened and there is adjacent inflammation. Recommend correlation  with urinalysis and exam to exclude cystitis with right-sided pyelitis.  Cirrhosis.  Tiny 8 mm hypodense RIGHT inferior liver lesion on image 33 is too small to characterize.   Bone scan 06/11/2020. No evidence of bone metastases.        PREVIOUS INTERVENTIONS:  He was treated with adjuvant radiation at [facility] and androgen ablation with Casodex.  Lupron and Casodex 10/2016. He had 7 months of ADT therapy with Lupron.  Apalutamide 07/22/2020 through present.     Prostate cancer   6/29/2020 -  Chemotherapy    OP PROSTATE Apalutamide     6/30/2020 Initial Diagnosis    Prostate cancer (CMS/HCC)     7/29/2020 -  Chemotherapy    OP LEUPROLIDE 45MG Q6M - ORDER EXPIRES 8/2/23         PAST MEDICAL HISTORY:  ALLERGIES:  Allergies   Allergen Reactions   • Sulfa Antibiotics Rash     CURRENT MEDICATIONS:  Outpatient Encounter Medications as of 5/3/2023   Medication Sig Dispense Refill   • Apalutamide 60 MG tablet Take 4 tablets by mouth Daily. Take with or without food. Do not crush or chew tablets. 120 tablet 11   • calcium carbonate (OS-JONNY) 600 MG tablet Take 1 tablet by mouth Daily.     • diclofenac (VOLTAREN) 75 MG EC tablet Take 1 tablet by mouth 2 (Two) Times a Day As Needed (shoulder pain).     • dutasteride (AVODART) 0.5 MG capsule TAKE (1) CAPSULE ONCE DAILY. 90 capsule 0   • JANUVIA 100 MG tablet Take 1 tablet by mouth Daily.     • lisinopril-hydrochlorothiazide (PRINZIDE,ZESTORETIC) 20-12.5 MG per tablet Take 1 tablet by mouth Daily.     • Loperamide HCl (IMODIUM PO) Take 1 dose by mouth As Needed (diarrhea).     • rosuvastatin (CRESTOR) 20 MG tablet Take 10 mg by mouth Every Night.     • tolterodine LA (DETROL LA) 4 MG 24 hr capsule TAKE (1) CAPSULE ONCE DAILY. 30 capsule 5   • [DISCONTINUED] dutasteride (AVODART) 0.5 MG capsule Take 1 capsule by mouth Daily. 60 capsule 11     No facility-administered encounter medications on file as of 5/3/2023.     ADULT ILLNESSES:  Patient Active Problem List   Diagnosis Code    • Hypertension I10   • Hyperlipidemia E78.5   • Diabetes mellitus E11.9   • Cancer C80.1   • Arrhythmia I49.9   • Non morbid obesity due to excess calories E66.09   • Prostate cancer C61   • Anterior urethral stricture N35.914     SURGERIES:  Past Surgical History:   Procedure Laterality Date   • CATARACT EXTRACTION     • CYSTOSCOPY RETROGRADE PYELOGRAM Bilateral 4/21/2021    Procedure: CYSTOSCOPY RETROGRADE PYELOGRAM, possible DIRECT VISION INTERNAL URETHROTOMY;  Surgeon: Matthew Oneil MD;  Location:  [facility] OR;  Service: Urology;  Laterality: Bilateral;   • CYSTOSCOPY URETHROTOMY VISUAL INTERNAL Bilateral 4/21/2021    Procedure: possible DIRECT VISION INTERNAL URETHROTOMY;  Surgeon: Matthew Oneil MD;  Location:  [facility] OR;  Service: Urology;  Laterality: Bilateral;   • EXCISION LESION      neck   • KIDNEY STONE SURGERY     • REPLACEMENT TOTAL KNEE Left    • TONSILLECTOMY       HEALTH MAINTENANCE ITEMS:  Health Maintenance Due   Topic Date Due   • URINE MICROALBUMIN  Never done   • Pneumococcal Vaccine 65+ (1 - PCV) Never done   • TDAP/TD VACCINES (1 - Tdap) Never done   • ZOSTER VACCINE (1 of 2) Never done   • Hepatitis B (1 of 3 - Risk 3-dose series) Never done   • HEPATITIS C SCREENING  Never done   • ANNUAL WELLNESS VISIT  Never done   • LIPID PANEL  Never done   • HEMOGLOBIN A1C  Never done   • DIABETIC EYE EXAM  Never done   • COVID-19 Vaccine (4 - Booster for Moderna series) 03/15/2021       <no information>  Last Completed Colonoscopy     This patient has no relevant Health Maintenance data.          There is no immunization history on file for this patient.  Last Completed Mammogram     This patient has no relevant Health Maintenance data.            FAMILY HISTORY:  Family History   Problem Relation Age of Onset   • Dementia Mother      SOCIAL HISTORY:  Social History     Socioeconomic History   • Marital status:    Tobacco Use   • Smoking status: Never   • Smokeless tobacco: Never   Vaping  "Use   • Vaping Use: Never used   Substance and Sexual Activity   • Alcohol use: No   • Drug use: No   • Sexual activity: Defer       REVIEW OF SYSTEMS:    Review of Systems   Constitutional: Negative for chills, fatigue and fever.        \"I feel good.\"   HENT: Negative for congestion, mouth sores and trouble swallowing.    Eyes: Negative for redness and visual disturbance.   Respiratory: Negative for cough and wheezing.    Cardiovascular: Negative for chest pain and leg swelling.   Gastrointestinal: Negative for abdominal pain, constipation, diarrhea, nausea and vomiting.   Endocrine: Negative for polydipsia and polyphagia.   Genitourinary: Negative for flank pain and hematuria.        \"Self catheter.\"   Musculoskeletal: Negative for gait problem and myalgias.   Skin: Negative for pallor.   Allergic/Immunologic: Negative for food allergies.   Neurological: Negative for dizziness, speech difficulty and weakness.   Hematological: Negative for adenopathy. Does not bruise/bleed easily.   Psychiatric/Behavioral: Negative for agitation, confusion and hallucinations.       VITAL SIGNS: /62   Pulse 94   Temp 97.8 °F (36.6 °C)   Resp 18   Ht 175.3 cm (69\")   Wt 91.4 kg (201 lb 6.4 oz)   SpO2 96%   BMI 29.74 kg/m²   Pain Score    05/03/23 1428   PainSc: 0-No pain       PHYSICAL EXAMINATION:     Physical Exam  Vitals reviewed.   Constitutional:       General: He is not in acute distress.  HENT:      Head: Normocephalic and atraumatic.   Eyes:      General: No scleral icterus.  Cardiovascular:      Rate and Rhythm: Normal rate.   Pulmonary:      Effort: No respiratory distress.      Breath sounds: No wheezing or rales.   Abdominal:      General: Bowel sounds are normal.      Palpations: Abdomen is soft.      Tenderness: There is no abdominal tenderness.   Musculoskeletal:         General: No swelling.      Cervical back: Neck supple.   Skin:     General: Skin is warm.      Coloration: Skin is not pale. "   Neurological:      Mental Status: He is alert and oriented to person, place, and time.   Psychiatric:         Mood and Affect: Mood normal.         Behavior: Behavior normal.         Thought Content: Thought content normal.         Judgment: Judgment normal.         LABS    Lab Results - Last 18 Months   Lab Units 05/03/23  1341 04/05/23  1326 03/08/23  1407 02/08/23  1233 01/04/23  1336 11/30/22  1348 08/31/22  1255 08/03/22  1359 06/30/22  1449 06/01/22  1345 02/09/22  1300 01/12/22  1336 12/08/21  1357 11/11/21  1339   HEMOGLOBIN g/dL 13.6 12.8* 13.2 13.5 13.3 13.5   < > 13.3   < > 13.4   < > 13.7   < > 13.2   HEMATOCRIT % 40.5 39.0 39.8 40.7 40.2 41.5   < > 39.0   < > 39.1   < > 41.0   < > 39.4   MCV fL 91.2 91.5 90.7 92.1 90.7 92.2   < > 90.3   < > 90.1   < > 90.3   < > 90.6   WBC 10*3/mm3 6.07 5.94 8.03 5.32 6.14 6.78   < > 7.19   < > 6.74   < > 6.43   < > 6.43   RDW % 13.0 13.4 13.3 13.2 13.1 13.2   < > 13.1   < > 13.0   < > 13.3   < > 13.2   MPV fL 11.0 10.6 10.3 10.5 10.9 10.5   < > 10.6   < > 10.8   < > 10.7   < > 9.6   PLATELETS 10*3/mm3 118* 108* 135* 117* 124* 113*   < > 124*   < > 134*   < > 141   < > 156   IMM GRAN % %  --  0.2 0.1  --   --   --   --  0.3  --  0.4  --  0.3  --  0.6*   NEUTROS ABS 10*3/mm3 4.74 4.44 6.12 3.69 4.62 4.97   < > 5.50   < > 5.07   < > 4.66   < > 4.59   LYMPHS ABS 10*3/mm3 0.76 0.83 1.04 0.93 0.88 0.93   < > 0.93   < > 0.92   < > 0.94   < > 0.98   MONOS ABS 10*3/mm3 0.43 0.53 0.66 0.54 0.48 0.63   < > 0.57   < > 0.56   < > 0.63   < > 0.65   EOS ABS 10*3/mm3 0.08 0.10 0.16 0.11 0.11 0.18   < > 0.14   < > 0.12   < > 0.16   < > 0.14   BASOS ABS 10*3/mm3 0.03 0.03 0.04 0.03 0.03 0.04   < > 0.03   < > 0.04   < > 0.02   < > 0.03   IMMATURE GRANS (ABS) 10*3/mm3  --  0.01 0.01  --   --   --   --  0.02  --  0.03  --  0.02  --  0.04   NRBC /100 WBC  --  0.0 0.0  --   --   --   --  0.0  --  0.0  --  0.0  --  0.0    < > = values in this interval not displayed.       Lab Results - Last  18 Months   Lab Units 05/03/23  1341 04/05/23  1326 03/08/23  1407 02/08/23  1233 01/04/23  1336 11/30/22  1348 03/09/22  1334 02/09/22  1300 01/12/22  1336 12/08/21  1357 11/11/21  1339   GLUCOSE mg/dL 227* 127* 197* 134* 195* 134*   < > 175* 167* 218* 148*   SODIUM mmol/L 133* 133* 136 138 135* 136   < > 137 137 137 136   POTASSIUM mmol/L 4.2 3.9 3.9 3.9 3.9 4.1   < > 3.8 4.3 4.1 4.3   CO2 mmol/L 26.0 26.0 26.0 27.0 26.0 29.0   < > 30.0* 32.0* 29.0 28.0   CHLORIDE mmol/L 94* 97* 96* 99 95* 96*   < > 97* 95* 96* 96*   ANION GAP mmol/L 13.0 10.0 14.0 12.0 14.0 11.0   < > 10.0 10.0 12.0 12.0   CREATININE mg/dL 0.64* 0.64* 0.67* 0.74* 0.62* 0.60*   < > 0.66* 0.93 0.82 0.67*   BUN mg/dL 14 17 16 16 15 17   < > 13 20 14 12   BUN / CREAT RATIO  21.9 26.6* 23.9 21.6 24.2 28.3*   < > 19.7 21.5 17.1 17.9   CALCIUM mg/dL 9.9 9.6 9.8 9.6 9.7 9.9   < > 9.3 10.0 10.1 10.0   EGFR IF NONAFRICN AM mL/min/1.73  --   --   --   --   --   --   --  117 79 91 115   ALK PHOS U/L 71 69 73 76 67 67   < > 64 74 76 62   TOTAL PROTEIN g/dL 7.3 7.2 7.4 7.3 7.6 7.3   < > 7.3 7.5 7.7 7.6   ALT (SGPT) U/L 17 26 31 17 20 20   < > 17 24 23 25   AST (SGOT) U/L 23 36 41* 27 27 26   < > 22 32 32 36   BILIRUBIN mg/dL 0.5 0.4 0.4 0.3 0.5 0.4   < > 0.4 0.3 0.3 0.3   ALBUMIN g/dL 4.5 4.3 4.4 4.5 4.4 4.30   < > 4.40 4.60 4.50 4.20   GLOBULIN gm/dL 2.8 2.9 3.0 2.8 3.2 3.0   < > 2.9 2.9 3.2 3.4    < > = values in this interval not displayed.       No results for input(s): MSPIKE, KAPPALAMB, IGLFLC, URICACID, FREEKAPPAL, CEA, LDH, REFLABREPO in the last 49653 hours.    No results for input(s): IRON, TIBC, LABIRON, FERRITIN, M8OWGOY, TSH, FOLATE in the last 12348 hours.    Invalid input(s): VITB12    Simeon Tam reports a pain score of 0.          ASSESSMENT:  1.   Prostate cancer, hormone refractory.   AJCC stage (TX, NX, M0)  Treatment status:Casodex and Lupron with PSA recurrence.  Taking with apalutamide and leuprolde (by Dr. Oneil).  2.    "Performance status of 0.  3.   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  Under observation.  4.   Cirrhosis, etiology of thrombocytopenia and followed by PCP.  5.   Urethral stricture. Self catheterization as needed. \"It varies.\"             PLAN:  1.    Re: Tolerance to apalutamide.  \"It's fine.\"   2.    Re:  Heme status.  WBC 6.07, hemoglobin 13.6 and platelet 116 from 108 from 135.  3.    Re:  Pre-office CMP.  AST 23 from 36 from 41(on Crestor), GFR 96.3 ml/minute and glucose 227.  4.    Re:  Pre-office PSA pending from 0.031 from 0.031 from 0.026 from 0.024 from 0.019 from 0.020 from 0.016 from 0.015 from 0.018 (forgot my Lupron) from < 0.014 from < 0.014  from <0.014 from <0.014 from <0.01 from < 0.014 from < 0.014 from <0.014 from < 0.014 from < 0.014 from < 0.014 from < 0.014 from < 0.014 from < 0.014 from < 0.014 from < 0.014 from < 0.014 from < 0.014 from < 0.014 from 0.015 from 0.03 from 0.125 from 1.03 from 27.3. Testosterone pending from 23.4 from 28.1 from 25 from 28.2 from 25.1 from 34.1 from 18.1 from 527 from 580 from 443 from 235.  5.   eRx for Compazine 10 mg p.o. every 4 hours as needed for nausea/vomiting, 60, 2 refills if needed.    6.   eRx for C37D1 started on 4/26/2023 apalutamide 60 mg, take four tablets total dose 240 mg po daily, number, 120.  Take either with or without food.  Swallow tablets whole. TSH before apalutamide.  No grapefruit juice or grapefruit.  Monitor for adverse effects like rash, hypothyroidism, arrhythmias, fall, fractures or seizures.  7.   Continue ongoing management per primary care physician and other specialists.  8.   Plan of care discussed with patient.  Understanding expressed.  Patient agreeable to proceed.  9.   Lupron per Dr. Oneil given every 6 months.  10.  Questions were answered to his satisfaction. \"That is great.\"  11.  Advance Care Planning  ACP discussion was declined by the patient. Patient does not have an " advance directive, information provided.  12.  Return to office 5/26/2023 with CMP, PSA, testosterone, and CBC with differential, same day           I have reviewed the assessment and plan and verified the accuracy of it. No changes to assessment and plan since the information was documented. Deshawn Black MD 05/03/23       I spent 33 total minutes, face-to-face, caring for Simeon today.  Greater than 50% of this time involved counseling and/or coordination of care as documented within this note regarding the patient's illness(es), pros and cons of various treatment options, instructions and/or risk reduction.                       (Matthew Oneil MD)  Lucio Garcia MD

## 2023-05-04 LAB
PSA SERPL-MCNC: 0.03 NG/ML (ref 0–4)
TESTOST SERPL-MCNC: 35.7 NG/DL (ref 193–740)

## 2023-05-15 NOTE — PROGRESS NOTES
[facility] HEMATOLOGY & ONCOLOGY [place]  [address]  [facility] [address] [phone]    Patient Name: Simeon Tam  Encounter Date: 05/26/2023  YOB: 1945  Patient Number: [id number]      REASON FOR FOLLOW-UP: Simeon Tam is a pleasant 77 y.o.  male who is seen in follow-up for non metastatic, hormone refractory prostate cancer (HRPC).  He is seen C38D3 of single agent apalutamide.  He is seen alone. History is obtained from patient.  History is considered accurate.        Oncology/Hematology History Overview Note   DIAGNOSTIC ABNORMALITIES:  He presented with rising PSA of 13 and was diagnosed with prostate cancer about 25 years ago.   Previous PSA was 22.26 on 10/05/2016, <0.13 on 07/17/2017, 7.91 on 10/17/2018, 19.29 on 03/19/2020, and 22.25 on 04/07/2020.   He was seen by Dr. Oneil 05/12/2020. Latest PSA was 22.25 ng/ml on 04/07/2020.  Previous PSA 22.26 on 10/05/2016.  Bone scan and CT scan of the abdomen and pelvis were negative for prostate cancer on 10/2016.  Started on Casodex and possibly leuprolide.  PSA  was down to <0.13 on 07/17/207.  Plan for apalutamide after staging scans.   CT abdomen and pelvis 06/11/2020. No evidence of metastatic disease in the abdomen or pelvis. Right renal pelvis and ureter urothelial thickening and hyperemia. Urinary bladder wall is thickened and there is adjacent inflammation. Recommend correlation with urinalysis and exam to exclude cystitis with right-sided pyelitis.  Cirrhosis.  Tiny 8 mm hypodense RIGHT inferior liver lesion on image 33 is too small to characterize.   Bone scan 06/11/2020. No evidence of bone metastases.        PREVIOUS INTERVENTIONS:  He was treated with adjuvant radiation at [place] and androgen ablation with Casodex.  Lupron and Casodex 10/2016. He had 7 months of ADT therapy with Lupron.  Apalutamide 07/22/2020 through present.       Prostate cancer    6/29/2020 -  Chemotherapy    OP PROSTATE Apalutamide       6/30/2020 Initial Diagnosis    Prostate cancer (CMS/[facility])       7/29/2020 -  Chemotherapy    OP LEUPROLIDE 45MG Q6M - ORDER EXPIRES 8/2/23           PAST MEDICAL HISTORY:  ALLERGIES:  Allergies   Allergen Reactions    Sulfa Antibiotics Rash     CURRENT MEDICATIONS:  Outpatient Encounter Medications as of 5/26/2023   Medication Sig Dispense Refill    Apalutamide 60 MG tablet Take 4 tablets by mouth Daily. Take with or without food. Do not crush or chew tablets. 120 tablet 11    calcium carbonate (OS-JONNY) 600 MG tablet Take 1 tablet by mouth Daily.      diclofenac (VOLTAREN) 75 MG EC tablet Take 1 tablet by mouth 2 (Two) Times a Day As Needed (shoulder pain).      dutasteride (AVODART) 0.5 MG capsule TAKE (1) CAPSULE ONCE DAILY. 90 capsule 0    JANUVIA 100 MG tablet Take 1 tablet by mouth Daily.      lisinopril-hydrochlorothiazide (PRINZIDE,ZESTORETIC) 20-12.5 MG per tablet Take 1 tablet by mouth Daily.      Loperamide HCl (IMODIUM PO) Take 1 dose by mouth As Needed (diarrhea).      rosuvastatin (CRESTOR) 20 MG tablet Take 10 mg by mouth Every Night.      tolterodine LA (DETROL LA) 4 MG 24 hr capsule TAKE (1) CAPSULE ONCE DAILY. 30 capsule 5     No facility-administered encounter medications on file as of 5/26/2023.     ADULT ILLNESSES:  Patient Active Problem List   Diagnosis Code    Hypertension I10    Hyperlipidemia E78.5    Diabetes mellitus E11.9    Cancer C80.1    Arrhythmia I49.9    Non morbid obesity due to excess calories E66.09    Prostate cancer C61    Anterior urethral stricture N35.914     SURGERIES:  Past Surgical History:   Procedure Laterality Date    CATARACT EXTRACTION      CYSTOSCOPY RETROGRADE PYELOGRAM Bilateral 4/21/2021    Procedure: CYSTOSCOPY RETROGRADE PYELOGRAM, possible DIRECT VISION INTERNAL URETHROTOMY;  Surgeon: Matthew Oneil MD;  Location: [facility];  Service: Urology;  Laterality: Bilateral;    CYSTOSCOPY URETHROTOMY  "VISUAL INTERNAL Bilateral 4/21/2021    Procedure: possible DIRECT VISION INTERNAL URETHROTOMY;  Surgeon: Matthew Oneil MD;  Location: [facility] OR;  Service: Urology;  Laterality: Bilateral;    EXCISION LESION      neck    KIDNEY STONE SURGERY      REPLACEMENT TOTAL KNEE Left     TONSILLECTOMY       HEALTH MAINTENANCE ITEMS:  Health Maintenance Due   Topic Date Due    URINE MICROALBUMIN  Never done    Pneumococcal Vaccine 65+ (1 - PCV) Never done    TDAP/TD VACCINES (1 - Tdap) Never done    ZOSTER VACCINE (1 of 2) Never done    Hepatitis B (1 of 3 - Risk 3-dose series) Never done    HEPATITIS C SCREENING  Never done    ANNUAL WELLNESS VISIT  Never done    LIPID PANEL  Never done    HEMOGLOBIN A1C  Never done    DIABETIC EYE EXAM  Never done    COVID-19 Vaccine (4 - Booster for Moderna series) 03/15/2021       <no information>  Last Completed Colonoscopy       This patient has no relevant Health Maintenance data.            There is no immunization history on file for this patient.  Last Completed Mammogram       This patient has no relevant Health Maintenance data.              FAMILY HISTORY:  Family History   Problem Relation Age of Onset    Dementia Mother      SOCIAL HISTORY:  Social History     Socioeconomic History    Marital status:    Tobacco Use    Smoking status: Never    Smokeless tobacco: Never   Vaping Use    Vaping Use: Never used   Substance and Sexual Activity    Alcohol use: No    Drug use: No    Sexual activity: Defer       REVIEW OF SYSTEMS:    Review of Systems   Constitutional:  Negative for fatigue, fever and unexpected weight change.        \"I feel good.\"   HENT:  Negative for congestion, mouth sores and trouble swallowing.    Eyes:  Negative for redness and visual disturbance.   Respiratory:  Negative for cough, shortness of breath and wheezing.    Cardiovascular:  Negative for chest pain and leg swelling.   Gastrointestinal:  Negative for abdominal pain, constipation, diarrhea, " "nausea and vomiting.   Endocrine: Negative for polydipsia and polyphagia.   Genitourinary:  Negative for flank pain and hematuria.   Musculoskeletal:  Negative for gait problem and myalgias.   Skin:  Negative for pallor.   Allergic/Immunologic: Negative for food allergies.   Neurological:  Negative for dizziness, speech difficulty and weakness.   Hematological:  Negative for adenopathy. Does not bruise/bleed easily.   Psychiatric/Behavioral:  Negative for agitation, confusion and hallucinations.        VITAL SIGNS: /62   Pulse 88   Temp 97.5 °F (36.4 °C)   Resp 18   Ht 175.3 cm (69\")   Wt 87.5 kg (193 lb)   SpO2 94%   BMI 28.50 kg/m²  Lost 8 pounds. \"Warmer weather.\"  Pain Score    05/26/23 1057   PainSc: 0-No pain       PHYSICAL EXAMINATION:     Physical Exam  Vitals reviewed.   Constitutional:       General: He is not in acute distress.  HENT:      Head: Normocephalic and atraumatic.   Eyes:      General: No scleral icterus.  Cardiovascular:      Rate and Rhythm: Normal rate.   Pulmonary:      Effort: No respiratory distress.      Breath sounds: No wheezing or rales.   Abdominal:      General: Bowel sounds are normal.      Palpations: Abdomen is soft.      Tenderness: There is no abdominal tenderness.   Genitourinary:     Comments: Self catheter as needed.  Musculoskeletal:         General: No swelling.      Cervical back: Neck supple.   Skin:     General: Skin is warm.      Coloration: Skin is not pale.   Neurological:      Mental Status: He is alert and oriented to person, place, and time.   Psychiatric:         Mood and Affect: Mood normal.         Behavior: Behavior normal.         Thought Content: Thought content normal.         Judgment: Judgment normal.       LABS    Lab Results - Last 18 Months   Lab Units 05/03/23  1341 04/05/23  1326 03/08/23  1407 02/08/23  1233 01/04/23  1336 11/30/22  1348 08/31/22  1255 08/03/22  1359 06/30/22  1449 06/01/22  1345 02/09/22  1300 01/12/22  1336 "   HEMOGLOBIN g/dL 13.6 12.8* 13.2 13.5 13.3 13.5   < > 13.3   < > 13.4   < > 13.7   HEMATOCRIT % 40.5 39.0 39.8 40.7 40.2 41.5   < > 39.0   < > 39.1   < > 41.0   MCV fL 91.2 91.5 90.7 92.1 90.7 92.2   < > 90.3   < > 90.1   < > 90.3   WBC 10*3/mm3 6.07 5.94 8.03 5.32 6.14 6.78   < > 7.19   < > 6.74   < > 6.43   RDW % 13.0 13.4 13.3 13.2 13.1 13.2   < > 13.1   < > 13.0   < > 13.3   MPV fL 11.0 10.6 10.3 10.5 10.9 10.5   < > 10.6   < > 10.8   < > 10.7   PLATELETS 10*3/mm3 118* 108* 135* 117* 124* 113*   < > 124*   < > 134*   < > 141   IMM GRAN % %  --  0.2 0.1  --   --   --   --  0.3  --  0.4  --  0.3   NEUTROS ABS 10*3/mm3 4.74 4.44 6.12 3.69 4.62 4.97   < > 5.50   < > 5.07   < > 4.66   LYMPHS ABS 10*3/mm3 0.76 0.83 1.04 0.93 0.88 0.93   < > 0.93   < > 0.92   < > 0.94   MONOS ABS 10*3/mm3 0.43 0.53 0.66 0.54 0.48 0.63   < > 0.57   < > 0.56   < > 0.63   EOS ABS 10*3/mm3 0.08 0.10 0.16 0.11 0.11 0.18   < > 0.14   < > 0.12   < > 0.16   BASOS ABS 10*3/mm3 0.03 0.03 0.04 0.03 0.03 0.04   < > 0.03   < > 0.04   < > 0.02   IMMATURE GRANS (ABS) 10*3/mm3  --  0.01 0.01  --   --   --   --  0.02  --  0.03  --  0.02   NRBC /100 WBC  --  0.0 0.0  --   --   --   --  0.0  --  0.0  --  0.0    < > = values in this interval not displayed.       Lab Results - Last 18 Months   Lab Units 05/26/23  1014 05/03/23  1341 04/05/23  1326 03/08/23  1407 02/08/23  1233 01/04/23  1336 03/09/22  1334 02/09/22  1300 01/12/22  1336 12/08/21  1357   GLUCOSE mg/dL 230* 227* 127* 197* 134* 195*   < > 175* 167* 218*   SODIUM mmol/L 134* 133* 133* 136 138 135*   < > 137 137 137   POTASSIUM mmol/L 4.1 4.2 3.9 3.9 3.9 3.9   < > 3.8 4.3 4.1   CO2 mmol/L 21.0* 26.0 26.0 26.0 27.0 26.0   < > 30.0* 32.0* 29.0   CHLORIDE mmol/L 96* 94* 97* 96* 99 95*   < > 97* 95* 96*   ANION GAP mmol/L 17.0* 13.0 10.0 14.0 12.0 14.0   < > 10.0 10.0 12.0   CREATININE mg/dL 0.60* 0.64* 0.64* 0.67* 0.74* 0.62*   < > 0.66* 0.93 0.82   BUN mg/dL 16 14 17 16 16 15   < > 13 20 14   BUN  "/ CREAT RATIO  26.7* 21.9 26.6* 23.9 21.6 24.2   < > 19.7 21.5 17.1   CALCIUM mg/dL 9.7 9.9 9.6 9.8 9.6 9.7   < > 9.3 10.0 10.1   EGFR IF NONAFRICN AM mL/min/1.73  --   --   --   --   --   --   --  117 79 91   ALK PHOS U/L 65 71 69 73 76 67   < > 64 74 76   TOTAL PROTEIN g/dL 7.4 7.3 7.2 7.4 7.3 7.6   < > 7.3 7.5 7.7   ALT (SGPT) U/L 20 17 26 31 17 20   < > 17 24 23   AST (SGOT) U/L 28 23 36 41* 27 27   < > 22 32 32   BILIRUBIN mg/dL 0.4 0.5 0.4 0.4 0.3 0.5   < > 0.4 0.3 0.3   ALBUMIN g/dL 4.1 4.5 4.3 4.4 4.5 4.4   < > 4.40 4.60 4.50   GLOBULIN gm/dL 3.3 2.8 2.9 3.0 2.8 3.2   < > 2.9 2.9 3.2    < > = values in this interval not displayed.       No results for input(s): MSPIKE, KAPPALAMB, IGLFLC, URICACID, FREEKAPPAL, CEA, LDH, REFLABREPO in the last 79405 hours.    No results for input(s): IRON, TIBC, LABIRON, FERRITIN, I3KKZSI, TSH, FOLATE in the last 74906 hours.    Invalid input(s): VITB12    Simeon Tam reports a pain score of 0.          ASSESSMENT:  1.   Prostate cancer, hormone refractory.   AJCC stage (TX, NX, M0)  Treatment status:Casodex and Lupron with PSA recurrence. Therapy with apalutamide and leuprolde (by Dr. Oneil).  2.   Good performance status of 0.  3.   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  On observation.  4.   Cirrhosis, etiology of thrombocytopenia and followed by PCP.  5.   Urethral stricture. Self catheterization as needed. \"When needed.\"             PLAN:  1.    Re:  Apalutamide tolerance.  \"Doing fine.\"   2.    Re:  Heme status.  Platelet pending from 116 from 108 from 135.  3.    Re:  Pre-office CMP.  AST 28 from 23 from 36 from 41(on Crestor), GFR 98.2 ml/minute and glucose 230. \"Essence need to quit making strawberry pies and cakes.\"  4.    Re:  Pre-office PSA pending from 0.029 from 0.031 from 0.031 from 0.026 from 0.024 from 0.019 from 0.020 from 0.016 from 0.015 from 0.018 (forgot my Lupron) from < 0.014 from < 0.014  from <0.014 from " "<0.014 from <0.01 from < 0.014 from < 0.014 from <0.014 from < 0.014 from < 0.014 from < 0.014 from < 0.014 from < 0.014 from < 0.014 from < 0.014 from < 0.014 from < 0.014 from < 0.014 from < 0.014 from 0.015 from 0.03 from 0.125 from 1.03 from 27.3. Testosterone pending from 35.7 from 23.4 from 28.1 from 25 from 28.2 from 25.1 from 34.1 from 18.1 from 527 from 580 from 443 from 235.  5.   eRx for Compazine 10 mg p.o. every 4 hours as needed for nausea/vomiting, 60, 2 refills if needed.    6.   eRx for C38D1 started on 5/24/2023 apalutamide 60 mg, take four tablets total dose 240 mg po daily, number, 120.  Take either with or without food.  Swallow tablets whole. TSH before apalutamide.  No grapefruit juice or grapefruit.  Observe for potential seizures, rash, hypothyroidism, fall, fractures and arrthymias.  7.   Continue ongoing management per primary care physician and other specialists.  8.   Plan of care discussed with patient.  Understanding expressed.  Patient agreeable to proceed.  9.   Lupron per Dr. Oneil given every 6 months.  10.  Questions were answered to his satisfaction. \"Sure.\"   11.  Advance Care Planning  ACP discussion was declined by the patient. Patient does not have an advance directive, information provided.  12.  Return to office 6 weeks with CMP, PSA, testosterone, and CBC with differential, same day. \"Will be in [place] the entire June.\"           I have reviewed the assessment and plan and verified the accuracy of it. No changes to assessment and plan since the information was documented. Deshawn Black MD 05/26/23          I spent 32 total minutes, face-to-face, caring for Simeon today.  Greater than 50% of this time involved counseling and/or coordination of care as documented within this note regarding the patient's illness(es), pros and cons of various treatment options, instructions and/or risk reduction.                       (Matthew Oneil MD)  Lucio Garcia MD  "

## 2023-05-17 ENCOUNTER — SPECIALTY PHARMACY (OUTPATIENT)
Dept: ONCOLOGY | Facility: HOSPITAL | Age: 78
End: 2023-05-17
Payer: MEDICARE

## 2023-05-17 NOTE — PROGRESS NOTES
Specialty Pharmacy Refill Coordination Note     Patient Outreach  An attempt was made by the [facility] to contact this patient for a follow-up on their chemotherapy medication. The [facility] can be reached at [phone].        Dori Mathis, Pharmacy Technician  Specialty Pharmacy Technician

## 2023-05-19 ENCOUNTER — SPECIALTY PHARMACY (OUTPATIENT)
Dept: ONCOLOGY | Facility: HOSPITAL | Age: 78
End: 2023-05-19
Payer: MEDICARE

## 2023-05-19 NOTE — PROGRESS NOTES
"[facility] Pharmacy Services    Oral Oncology Patient Follow-Up      Consult Details  Consulting Provider:   Deshawn Black MD ([facility])   Medication and Regimen:  Erleada 240 mg PO daily      Prescription Review  Dosing & Interactions:   Reviewed, appropriate and no significant interaction noted at this time.   Current Specialty Pharmacy [facility] - [address] [phone] [facility] [phone] FX        Intervention(s):                  Spoke with Essence for patient's monthly routine telephone consultation follow-up regarding the oral oncology medication. She stated that he has been tolerating the Erleada really well and has not had any obvious side effects from the medication - \"we are all good here.\" He has not missed any doses of the medication in the last month. The patient still receives refills through [facility], and has not had any delays in getting those refills - \"they are always so good about getting it here on time.\" Also, no new refills are needed at this time according to the patient. The patient also has no changes to his maintenance medications and no new drug allergies that she is aware of. She stated that the patient does have a cough as of lately, but patient is treating it \"with just over the counter stuff.\" No upper respiratory tract infections or cough is associated with Erleada  Finally, the patient has not had any recent stays in the hospital and has not visited the ER in the last month due to the Erleada. I encouraged the patient to reach out anytime with any questions or concerns they might have regarding their oral chemotherapy medication.     Summary:    No needs expressed by the patient or otherwise identified. Will follow-up next month regarding the patient's oral chemotherapy with a routine telephone consultation. The next routine follow-up will be scheduled approximately 28-30 days from today.       Electronically " signed 05/19/2023 15:25 CDT by:  Carolee Diaz, PharmD  Specialty Pharmacist - Hematology & Oncology  [facility]  [phone]

## 2023-05-26 ENCOUNTER — TELEPHONE (OUTPATIENT)
Dept: ONCOLOGY | Facility: CLINIC | Age: 78
End: 2023-05-26

## 2023-05-26 ENCOUNTER — LAB (OUTPATIENT)
Dept: LAB | Facility: HOSPITAL | Age: 78
End: 2023-05-26
Payer: MEDICARE

## 2023-05-26 ENCOUNTER — OFFICE VISIT (OUTPATIENT)
Dept: ONCOLOGY | Facility: CLINIC | Age: 78
End: 2023-05-26
Payer: MEDICARE

## 2023-05-26 VITALS
HEIGHT: 69 IN | WEIGHT: 193 LBS | SYSTOLIC BLOOD PRESSURE: 128 MMHG | BODY MASS INDEX: 28.58 KG/M2 | TEMPERATURE: 97.5 F | RESPIRATION RATE: 18 BRPM | DIASTOLIC BLOOD PRESSURE: 62 MMHG | OXYGEN SATURATION: 94 % | HEART RATE: 88 BPM

## 2023-05-26 DIAGNOSIS — C61 PROSTATE CANCER: ICD-10-CM

## 2023-05-26 DIAGNOSIS — C61 PROSTATE CANCER: Primary | ICD-10-CM

## 2023-05-26 LAB
ALBUMIN SERPL-MCNC: 4.1 G/DL (ref 3.5–5.2)
ALBUMIN/GLOB SERPL: 1.2 G/DL
ALP SERPL-CCNC: 65 U/L (ref 39–117)
ALT SERPL W P-5'-P-CCNC: 20 U/L (ref 1–41)
ANION GAP SERPL CALCULATED.3IONS-SCNC: 17 MMOL/L (ref 5–15)
AST SERPL-CCNC: 28 U/L (ref 1–40)
BASOPHILS # BLD AUTO: 0.03 10*3/MM3 (ref 0–0.2)
BASOPHILS NFR BLD AUTO: 0.4 % (ref 0–1.5)
BILIRUB SERPL-MCNC: 0.4 MG/DL (ref 0–1.2)
BUN SERPL-MCNC: 16 MG/DL (ref 8–23)
BUN/CREAT SERPL: 26.7 (ref 7–25)
CALCIUM SPEC-SCNC: 9.7 MG/DL (ref 8.6–10.5)
CHLORIDE SERPL-SCNC: 96 MMOL/L (ref 98–107)
CO2 SERPL-SCNC: 21 MMOL/L (ref 22–29)
CREAT SERPL-MCNC: 0.6 MG/DL (ref 0.76–1.27)
DEPRECATED RDW RBC AUTO: 43.8 FL (ref 37–54)
EGFRCR SERPLBLD CKD-EPI 2021: 98.2 ML/MIN/1.73
EOSINOPHIL # BLD AUTO: 0.1 10*3/MM3 (ref 0–0.4)
EOSINOPHIL NFR BLD AUTO: 1.3 % (ref 0.3–6.2)
ERYTHROCYTE [DISTWIDTH] IN BLOOD BY AUTOMATED COUNT: 13 % (ref 12.3–15.4)
GLOBULIN UR ELPH-MCNC: 3.3 GM/DL
GLUCOSE SERPL-MCNC: 230 MG/DL (ref 65–99)
HCT VFR BLD AUTO: 41.1 % (ref 37.5–51)
HGB BLD-MCNC: 13.5 G/DL (ref 13–17.7)
IMM GRANULOCYTES # BLD AUTO: 0.03 10*3/MM3 (ref 0–0.05)
IMM GRANULOCYTES NFR BLD AUTO: 0.4 % (ref 0–0.5)
LYMPHOCYTES # BLD AUTO: 1.44 10*3/MM3 (ref 0.7–3.1)
LYMPHOCYTES NFR BLD AUTO: 18.3 % (ref 19.6–45.3)
MCH RBC QN AUTO: 30.3 PG (ref 26.6–33)
MCHC RBC AUTO-ENTMCNC: 32.8 G/DL (ref 31.5–35.7)
MCV RBC AUTO: 92.2 FL (ref 79–97)
MONOCYTES # BLD AUTO: 0.87 10*3/MM3 (ref 0.1–0.9)
MONOCYTES NFR BLD AUTO: 11.1 % (ref 5–12)
NEUTROPHILS NFR BLD AUTO: 5.38 10*3/MM3 (ref 1.7–7)
NEUTROPHILS NFR BLD AUTO: 68.5 % (ref 42.7–76)
NRBC BLD AUTO-RTO: 0 /100 WBC (ref 0–0.2)
PLATELET # BLD AUTO: 144 10*3/MM3 (ref 140–450)
PMV BLD AUTO: 10.3 FL (ref 6–12)
POTASSIUM SERPL-SCNC: 4.1 MMOL/L (ref 3.5–5.2)
PROT SERPL-MCNC: 7.4 G/DL (ref 6–8.5)
PSA SERPL-MCNC: 0.04 NG/ML (ref 0–4)
RBC # BLD AUTO: 4.46 10*6/MM3 (ref 4.14–5.8)
SODIUM SERPL-SCNC: 134 MMOL/L (ref 136–145)
TESTOST SERPL-MCNC: 11.8 NG/DL (ref 193–740)
WBC NRBC COR # BLD: 7.85 10*3/MM3 (ref 3.4–10.8)

## 2023-05-26 PROCEDURE — 36415 COLL VENOUS BLD VENIPUNCTURE: CPT

## 2023-05-26 PROCEDURE — 84403 ASSAY OF TOTAL TESTOSTERONE: CPT

## 2023-05-26 PROCEDURE — 84153 ASSAY OF PSA TOTAL: CPT

## 2023-05-26 PROCEDURE — 85025 COMPLETE CBC W/AUTO DIFF WBC: CPT

## 2023-05-26 PROCEDURE — 80053 COMPREHEN METABOLIC PANEL: CPT

## 2023-05-26 NOTE — TELEPHONE ENCOUNTER
Attempted to contact patient to come back for repeat lab draw.  His blood clotted earlier and results were not able to be processed.  I did not get an answer, but I did leave message to call back and see when we could get labs repeated.        OKAY FOR HUB TO LET PATIENT KNOW

## 2023-06-16 ENCOUNTER — SPECIALTY PHARMACY (OUTPATIENT)
Dept: ONCOLOGY | Facility: HOSPITAL | Age: 78
End: 2023-06-16
Payer: MEDICARE

## 2023-06-16 NOTE — PROGRESS NOTES
Specialty Pharmacy Refill Coordination Note     Patient Outreach  An attempt was made by the [facility] to contact this patient for a follow-up on their chemotherapy medication. The [facility] can be reached at [phone].      Dori Mathis, Pharmacy Technician  Specialty Pharmacy Technician

## 2023-07-25 NOTE — PROGRESS NOTES
[facility] ON[facility] HEMATOLOGY & ONCOLOGY  [address]  [facility] [address] [phone]    Patient Name: Simeon Tam  Encounter Date: 08/09/2023  YOB: 1945  Patient Number: [id number]      REASON FOR FOLLOW-UP: Simeon Tam is a pleasant 78 y.o.  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  He is seen C40D15 of single agent apalutamide.  He is seen with spouse, Essence. History is obtained from patient.  History is considered accurate.       Oncology/Hematology History Overview Note   DIAGNOSTIC ABNORMALITIES:  He presented with rising PSA of 13 and was diagnosed with prostate cancer about 25 years ago.   Previous PSA was 22.26 on 10/05/2016, <0.13 on 07/17/2017, 7.91 on 10/17/2018, 19.29 on 03/19/2020, and 22.25 on 04/07/2020.   He was seen by Dr. Oneil 05/12/2020. Latest PSA was 22.25 ng/ml on 04/07/2020.  Previous PSA 22.26 on 10/05/2016.  Bone scan and CT scan of the abdomen and pelvis were negative for prostate cancer on 10/2016.  Started on Casodex and possibly leuprolide.  PSA  was down to <0.13 on 07/17/207.  Plan for apalutamide after staging scans.   CT abdomen and pelvis 06/11/2020. No evidence of metastatic disease in the abdomen or pelvis. Right renal pelvis and ureter urothelial thickening and hyperemia. Urinary bladder wall is thickened and there is adjacent inflammation. Recommend correlation with urinalysis and exam to exclude cystitis with right-sided pyelitis.  Cirrhosis.  Tiny 8 mm hypodense RIGHT inferior liver lesion on image 33 is too small to characterize.   Bone scan 06/11/2020. No evidence of bone metastases.        PREVIOUS INTERVENTIONS:  He was treated with adjuvant radiation at [place] and androgen ablation with Casodex.  Lupron and Casodex 10/2016. He had 7 months of ADT therapy with Lupron.  Apalutamide 07/22/2020 through present.     Prostate cancer    6/29/2020 -  Chemotherapy    OP PROSTATE Apalutamide       6/30/2020 Initial Diagnosis    Prostate cancer (CMS/[facility])     7/29/2020 -  Chemotherapy    OP LEUPROLIDE 45MG Q6M - ORDER EXPIRES 7/18/24           PAST MEDICAL HISTORY:  ALLERGIES:  Allergies   Allergen Reactions    Sulfa Antibiotics Rash     CURRENT MEDICATIONS:  Outpatient Encounter Medications as of 8/9/2023   Medication Sig Dispense Refill    Apalutamide 60 MG tablet Take 4 tablets by mouth Daily. Take with or without food. Do not crush or chew tablets. 120 tablet 11    calcium carbonate (OS-JONNY) 600 MG tablet Take 1 tablet by mouth Daily.      diclofenac (VOLTAREN) 75 MG EC tablet Take 1 tablet by mouth 2 (Two) Times a Day As Needed (shoulder pain).      dutasteride (AVODART) 0.5 MG capsule TAKE (1) CAPSULE ONCE DAILY. 90 capsule 0    JANUVIA 100 MG tablet Take 1 tablet by mouth Daily.      lisinopril-hydrochlorothiazide (PRINZIDE,ZESTORETIC) 20-12.5 MG per tablet Take 1 tablet by mouth Daily.      Loperamide HCl (IMODIUM PO) Take 1 dose by mouth As Needed (diarrhea).      rosuvastatin (CRESTOR) 20 MG tablet Take 0.5 tablets by mouth Every Night.      tolterodine LA (DETROL LA) 4 MG 24 hr capsule TAKE (1) CAPSULE ONCE DAILY. 30 capsule 0    [DISCONTINUED] tolterodine LA (DETROL LA) 4 MG 24 hr capsule TAKE (1) CAPSULE ONCE DAILY. 30 capsule 5     No facility-administered encounter medications on file as of 8/9/2023.     ADULT ILLNESSES:  Patient Active Problem List   Diagnosis Code    Hypertension I10    Hyperlipidemia E78.5    Diabetes mellitus E11.9    Cancer C80.1    Arrhythmia I49.9    Non morbid obesity due to excess calories E66.09    Prostate cancer C61    Anterior urethral stricture N35.914     SURGERIES:  Past Surgical History:   Procedure Laterality Date    CATARACT EXTRACTION      CYSTOSCOPY RETROGRADE PYELOGRAM Bilateral 4/21/2021    Procedure: CYSTOSCOPY RETROGRADE PYELOGRAM, possible DIRECT VISION INTERNAL URETHROTOMY;  Surgeon: Clarice  "Matthew RUTHERFORD MD;  Location: [facility] OR;  Service: Urology;  Laterality: Bilateral;    CYSTOSCOPY URETHROTOMY VISUAL INTERNAL Bilateral 4/21/2021    Procedure: possible DIRECT VISION INTERNAL URETHROTOMY;  Surgeon: Matthew Oneil MD;  Location: [facility];  Service: Urology;  Laterality: Bilateral;    EXCISION LESION      neck    KIDNEY STONE SURGERY      REPLACEMENT TOTAL KNEE Left     TONSILLECTOMY       HEALTH MAINTENANCE ITEMS:  Health Maintenance Due   Topic Date Due    URINE MICROALBUMIN  Never done    Pneumococcal Vaccine 65+ (1 - PCV) Never done    TDAP/TD VACCINES (1 - Tdap) Never done    ZOSTER VACCINE (1 of 2) Never done    Hepatitis B (1 of 3 - Risk 3-dose series) Never done    ANNUAL WELLNESS VISIT  Never done    LIPID PANEL  Never done    HEMOGLOBIN A1C  Never done    DIABETIC EYE EXAM  Never done    COVID-19 Vaccine (4 - Moderna series) 03/15/2021       <no information>  Last Completed Colonoscopy       This patient has no relevant Health Maintenance data.            There is no immunization history on file for this patient.  Last Completed Mammogram       This patient has no relevant Health Maintenance data.              FAMILY HISTORY:  Family History   Problem Relation Age of Onset    Dementia Mother      SOCIAL HISTORY:  Social History     Socioeconomic History    Marital status:    Tobacco Use    Smoking status: Never    Smokeless tobacco: Never   Vaping Use    Vaping Use: Never used   Substance and Sexual Activity    Alcohol use: No    Drug use: No    Sexual activity: Defer       REVIEW OF SYSTEMS:    Review of Systems   Constitutional:  Negative for fatigue, fever and unexpected weight change.        \"I feel good.\"   HENT:  Negative for congestion, mouth sores and trouble swallowing.    Eyes:  Negative for redness and visual disturbance.   Respiratory:  Negative for cough, shortness of breath and wheezing.    Cardiovascular:  Negative for chest pain and leg swelling.   Gastrointestinal:  " "Negative for abdominal pain, constipation, diarrhea, nausea and vomiting.   Endocrine: Negative for polydipsia and polyphagia.   Genitourinary:  Negative for flank pain and hematuria.        Self catheter.   Musculoskeletal:  Negative for gait problem and joint swelling.   Skin:  Negative for pallor.   Allergic/Immunologic: Negative for food allergies.   Neurological:  Negative for dizziness, speech difficulty and weakness.   Hematological:  Negative for adenopathy. Does not bruise/bleed easily.   Psychiatric/Behavioral:  Negative for agitation, confusion and hallucinations.          VITAL SIGNS: /68   Pulse 90   Temp 97.2 øF (36.2 øC)   Resp 18   Ht 175.3 cm (69\")   Wt 88.5 kg (195 lb)   SpO2 94%   BMI 28.80 kg/mý Loss 2 pounds.   Pain Score    08/09/23 1046   PainSc: 0-No pain       PHYSICAL EXAMINATION:     Physical Exam  Vitals reviewed.   Constitutional:       General: He is not in acute distress.  HENT:      Head: Normocephalic and atraumatic.   Eyes:      General: No scleral icterus.  Cardiovascular:      Rate and Rhythm: Normal rate.   Pulmonary:      Effort: No respiratory distress.      Breath sounds: No wheezing or rales.   Abdominal:      General: Bowel sounds are normal.      Palpations: Abdomen is soft.      Tenderness: There is no abdominal tenderness.   Musculoskeletal:         General: No swelling.      Cervical back: Neck supple.   Skin:     General: Skin is warm.      Coloration: Skin is not pale.   Neurological:      Mental Status: He is alert and oriented to person, place, and time.   Psychiatric:         Mood and Affect: Mood normal.         Behavior: Behavior normal.         Thought Content: Thought content normal.         Judgment: Judgment normal.       LABS    Lab Results - Last 18 Months   Lab Units 07/07/23  1036 05/26/23  1338 05/03/23  1341 04/05/23  1326 03/08/23  1407 02/08/23  1233 08/31/22  1255 08/03/22  1359 06/30/22  1449 06/01/22  1345   HEMOGLOBIN g/dL 13.4 13.5 " 13.6 12.8* 13.2 13.5   < > 13.3   < > 13.4   HEMATOCRIT % 40.4 41.1 40.5 39.0 39.8 40.7   < > 39.0   < > 39.1   MCV fL 89.2 92.2 91.2 91.5 90.7 92.1   < > 90.3   < > 90.1   WBC 10*3/mm3 5.60 7.85 6.07 5.94 8.03 5.32   < > 7.19   < > 6.74   RDW % 13.1 13.0 13.0 13.4 13.3 13.2   < > 13.1   < > 13.0   MPV fL 10.2 10.3 11.0 10.6 10.3 10.5   < > 10.6   < > 10.8   PLATELETS 10*3/mm3 146 144 118* 108* 135* 117*   < > 124*   < > 134*   IMM GRAN % % 0.4 0.4  --  0.2 0.1  --   --  0.3  --  0.4   NEUTROS ABS 10*3/mm3 4.08 5.38 4.74 4.44 6.12 3.69   < > 5.50   < > 5.07   LYMPHS ABS 10*3/mm3 0.89 1.44 0.76 0.83 1.04 0.93   < > 0.93   < > 0.92   MONOS ABS 10*3/mm3 0.47 0.87 0.43 0.53 0.66 0.54   < > 0.57   < > 0.56   EOS ABS 10*3/mm3 0.10 0.10 0.08 0.10 0.16 0.11   < > 0.14   < > 0.12   BASOS ABS 10*3/mm3 0.04 0.03 0.03 0.03 0.04 0.03   < > 0.03   < > 0.04   IMMATURE GRANS (ABS) 10*3/mm3 0.02 0.03  --  0.01 0.01  --   --  0.02  --  0.03   NRBC /100 WBC 0.0 0.0  --  0.0 0.0  --   --  0.0  --  0.0    < > = values in this interval not displayed.       Lab Results - Last 18 Months   Lab Units 07/07/23  1036 05/26/23  1014 05/03/23  1341 04/05/23  1326 03/08/23  1407 02/08/23  1233 03/09/22  1334 02/09/22  1300   GLUCOSE mg/dL 194* 230* 227* 127* 197* 134*   < > 175*   SODIUM mmol/L 131* 134* 133* 133* 136 138   < > 137   POTASSIUM mmol/L 4.4 4.1 4.2 3.9 3.9 3.9   < > 3.8   CO2 mmol/L 24.0 21.0* 26.0 26.0 26.0 27.0   < > 30.0*   CHLORIDE mmol/L 93* 96* 94* 97* 96* 99   < > 97*   ANION GAP mmol/L 14.0 17.0* 13.0 10.0 14.0 12.0   < > 10.0   CREATININE mg/dL 0.66* 0.60* 0.64* 0.64* 0.67* 0.74*   < > 0.66*   BUN mg/dL 12 16 14 17 16 16   < > 13   BUN / CREAT RATIO  18.2 26.7* 21.9 26.6* 23.9 21.6   < > 19.7   CALCIUM mg/dL 10.2 9.7 9.9 9.6 9.8 9.6   < > 9.3   EGFR IF NONAFRICN AM mL/min/1.73  --   --   --   --   --   --   --  117   ALK PHOS U/L 72 65 71 69 73 76   < > 64   TOTAL PROTEIN g/dL 7.6 7.4 7.3 7.2 7.4 7.3   < > 7.3   ALT (SGPT)  "U/L 18 20 17 26 31 17   < > 17   AST (SGOT) U/L 24 28 23 36 41* 27   < > 22   BILIRUBIN mg/dL 0.4 0.4 0.5 0.4 0.4 0.3   < > 0.4   ALBUMIN g/dL 4.6 4.1 4.5 4.3 4.4 4.5   < > 4.40   GLOBULIN gm/dL 3.0 3.3 2.8 2.9 3.0 2.8   < > 2.9    < > = values in this interval not displayed.       No results for input(s): MSPIKE, KAPPALAMB, IGLFLC, URICACID, FREEKAPPAL, CEA, LDH, REFLABREPO in the last 94206 hours.    No results for input(s): IRON, TIBC, LABIRON, FERRITIN, [id number], TSH, FOLATE in the last 30280 hours.    Invalid input(s): VITB12    Simeon Tam reports a pain score of 0.       ASSESSMENT:  1.   Prostate cancer, hormone refractory.   AJCC stage (TX, NX, M0)  Treatment status:Casodex and leuprolide with PSA recurrence. Therapy with apalutamide and leuprolde (by Dr. Oneil).  2.   Performance status of 0.  3.   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  Under observation.  4.   Cirrhosis, etiology of thrombocytopenia and followed by PCP.  5.   Urethral stricture. Self catheterization as needed. \"It depends.  As needed.\"             PLAN:  1.    Re: Tolerance to apalutamide.  \"It's doing fine.\"   2.    Re:  Heme status.  Hemoglobin 13.7, WBC 8.2 and platelet 122 from 146 from 144 from 118 from 108.  3.    Re:  Pre-office CMP.  AST pending, GFR pending ml/minute and glucose pending.  4.    Re:  Pre-office PSA pending from 0.048 from 0.038 from 0.029 from 0.031 from 0.031 from 0.026 from 0.024 from 0.019 from 0.020 from 0.016 from 0.015 from 0.018 (forgot my Lupron) from < 0.014 from < 0.014  from <0.014 from <0.014 from <0.01 from < 0.014 from < 0.014 from <0.014 from < 0.014 from < 0.014 from < 0.014 from < 0.014 from < 0.014 from < 0.014 from < 0.014 from < 0.014 from < 0.014 from < 0.014 from < 0.014 from 0.015 from 0.03 from 0.125 from 1.03 from 27.3. Testosterone pending from 35.2 from 11.8 from 35.7 from 23.4 from 28.1 from 25 from 28.2 from 25.1 from 34.1 from 18.1 from 527 " "from 580 from 443 from 235.  Latest leuprolide was given on 2/8/2023 and scheduled 8/9/2023.   5.   eRx for Compazine 10 mg p.o. every 4 hours as needed for nausea/vomiting, 60, 2 refills if needed.    6.   eRx for C40D1 started on 7/19/2023 apalutamide 60 mg, take four tablets total dose 240 mg po daily, number, 120.  Take either with or without food.  Swallow tablets whole. TSH before apalutamide.  No grapefruit juice or grapefruit.  Observe for arrhythmias, falls, seizures, fractures, hypothyroidism and rash.  Continue apalutamide since PSA is < 2.  7.   Continue ongoing management per primary care physician and other specialists.  8.   Plan of care discussed with patient and spouse.  Understanding expressed.  Patient agreeable to proceed.  9.   Lupron per Dr. Oneil given every 6 months.  10.  Questions were answered to his satisfaction. \"Yes.\"   11.  Advance Care Planning  ACP discussion was declined by the patient. Patient does not have an advance directive, information provided.  12.  Return to office 4 weeks with CMP, PSA, testosterone, and CBC with differential, same day.           I have reviewed the assessment and plan and verified the accuracy of it. No changes to assessment and plan since the information was documented. Deshawn Black MD 08/09/23         I spent 31 total minutes, face-to-face, caring for Simeon mcdonald.  Greater than 50% of this time involved counseling and/or coordination of care as documented within this note regarding the patient's illness(es), pros and cons of various treatment options, instructions and/or risk reduction.                          (Matthew Oneil MD)  Lucio Garcia MD    "

## 2023-07-31 NOTE — PROGRESS NOTES
Chief Complaint  Prostate Cancer    Subjective          Simeon Tam presents to [facility] UROLOGY   Patient has following urologic issues that I follow:    Prostate cancer  Patient is currently on apalutamide and leuprolide for nonmetastatic castrate resistant adenocarcinoma prostate.  Negative conventional imaging in 06/2020 for bone scan and 04/2021 for CT abdomen pelvis.  Patient denies any possible systemic symptoms of prostate cancer such as weight loss, lower extremity edema, or skeletal pain that could be worrisome for systemic disease.  Urethral stricture disease  ` This is probably secondary to his history of radiation combined with prostate cancer.  He manages this with self catheterization for dilatation.  He is having no difficulty with the catheterizations.  Neurogenic bladder  He currently catheterizes himself empty the bladder 4-6 times daily.  He voids very little on his own.  Etiology of hypocontractile bladder is somewhat unclear.  Certainly prostate cancer and treatment thereof play a role in this.              Current Outpatient Medications:     Apalutamide 60 MG tablet, Take 4 tablets by mouth Daily. Take with or without food. Do not crush or chew tablets., Disp: 120 tablet, Rfl: 11    calcium carbonate (OS-JONNY) 600 MG tablet, Take 1 tablet by mouth Daily., Disp: , Rfl:     diclofenac (VOLTAREN) 75 MG EC tablet, Take 1 tablet by mouth 2 (Two) Times a Day As Needed (shoulder pain)., Disp: , Rfl:     dutasteride (AVODART) 0.5 MG capsule, TAKE (1) CAPSULE ONCE DAILY., Disp: 90 capsule, Rfl: 0    JANUVIA 100 MG tablet, Take 1 tablet by mouth Daily., Disp: , Rfl:     lisinopril-hydrochlorothiazide (PRINZIDE,ZESTORETIC) 20-12.5 MG per tablet, Take 1 tablet by mouth Daily., Disp: , Rfl:     Loperamide HCl (IMODIUM PO), Take 1 dose by mouth As Needed (diarrhea)., Disp: , Rfl:     rosuvastatin (CRESTOR) 20 MG tablet, Take 0.5 tablets by mouth Every Night., Disp: , Rfl:     tolterodine  "LA (DETROL LA) 4 MG 24 hr capsule, TAKE (1) CAPSULE ONCE DAILY., Disp: 30 capsule, Rfl: 0  Past Medical History:   Diagnosis Date    Arrhythmia     Cancer     prostate    Diabetes mellitus     History of sepsis     Hyperlipidemia     Hypertension     UTI (urinary tract infection)      Past Surgical History:   Procedure Laterality Date    CATARACT EXTRACTION      CYSTOSCOPY RETROGRADE PYELOGRAM Bilateral 4/21/2021    Procedure: CYSTOSCOPY RETROGRADE PYELOGRAM, possible DIRECT VISION INTERNAL URETHROTOMY;  Surgeon: Matthew Oneil MD;  Location:  [facility] OR;  Service: Urology;  Laterality: Bilateral;    CYSTOSCOPY URETHROTOMY VISUAL INTERNAL Bilateral 4/21/2021    Procedure: possible DIRECT VISION INTERNAL URETHROTOMY;  Surgeon: Matthew Oneil MD;  Location:  [facility] OR;  Service: Urology;  Laterality: Bilateral;    EXCISION LESION      neck    KIDNEY STONE SURGERY      REPLACEMENT TOTAL KNEE Left     TONSILLECTOMY             Review of Systems      Objective   PHYSICAL EXAM  Vital Signs:   Temp 97.7 øF (36.5 øC)   Ht 172.7 cm (68\")   Wt 88.6 kg (195 lb 6.4 oz)   BMI 29.71 kg/mý     Physical Exam      DATA  Result Review :              Results for orders placed or performed in visit on 08/10/23   POC Urinalysis Dipstick, Multipro    Specimen: Urine   Result Value Ref Range    Color Yellow Yellow, Straw, Dark Yellow, Viviana    Clarity, UA Clear Clear    Glucose, UA Negative Negative mg/dL    Bilirubin Negative Negative    Ketones, UA Negative Negative    Specific Gravity  1.010 1.005 - 1.030    Blood, UA Trace (A) Negative    pH, Urine 7.0 5.0 - 8.0    Protein, POC Negative Negative mg/dL    Urobilinogen, UA 0.2 E.U./dL Normal, 0.2 E.U./dL    Nitrite, UA Negative Negative    Leukocytes Negative Negative                        ASSESSMENT AND PLAN          Problem List Items Addressed This Visit          Genitourinary and Reproductive     Anterior urethral stricture    Overview     Added automatically from request for " surgery [id number]            Hematology and Neoplasia    Prostate cancer - Primary    Relevant Orders    POC Urinalysis Dipstick, Multipro (Completed)     Other Visit Diagnoses       Neurogenic bladder            Caths multiple times daily including 2-3 x per night.  He gets moderate volumes at that time.  PSA is <0.2 which is indicative of KELSY status.  We will continue his leuprolide while Dr. Black continues the apalutamide.  As before we will continue intermittent catheterization is due a function of emptying his neurogenic bladder and dilating his panurethral strictures        FOLLOW UP     No follow-ups on file.        (Please note that portions of this note were completed with a voice recognition program.)  Matthew Oneil MD  08/10/23  14:16 CDT

## 2023-08-08 DIAGNOSIS — N31.9 NEUROGENIC BLADDER: ICD-10-CM

## 2023-08-08 RX ORDER — TOLTERODINE 4 MG/1
CAPSULE, EXTENDED RELEASE ORAL
Qty: 30 CAPSULE | Refills: 0 | Status: SHIPPED | OUTPATIENT
Start: 2023-08-08

## 2023-08-09 ENCOUNTER — INFUSION (OUTPATIENT)
Dept: ONCOLOGY | Facility: HOSPITAL | Age: 78
End: 2023-08-09
Payer: MEDICARE

## 2023-08-09 ENCOUNTER — OFFICE VISIT (OUTPATIENT)
Dept: ONCOLOGY | Facility: CLINIC | Age: 78
End: 2023-08-09
Payer: MEDICARE

## 2023-08-09 ENCOUNTER — SPECIALTY PHARMACY (OUTPATIENT)
Dept: ONCOLOGY | Facility: HOSPITAL | Age: 78
End: 2023-08-09
Payer: MEDICARE

## 2023-08-09 ENCOUNTER — LAB (OUTPATIENT)
Dept: LAB | Facility: HOSPITAL | Age: 78
End: 2023-08-09
Payer: MEDICARE

## 2023-08-09 VITALS
HEART RATE: 88 BPM | WEIGHT: 195 LBS | OXYGEN SATURATION: 96 % | TEMPERATURE: 97.4 F | HEIGHT: 69 IN | RESPIRATION RATE: 18 BRPM | BODY MASS INDEX: 28.88 KG/M2 | SYSTOLIC BLOOD PRESSURE: 122 MMHG | DIASTOLIC BLOOD PRESSURE: 64 MMHG

## 2023-08-09 VITALS
DIASTOLIC BLOOD PRESSURE: 68 MMHG | BODY MASS INDEX: 28.88 KG/M2 | WEIGHT: 195 LBS | HEIGHT: 69 IN | TEMPERATURE: 97.2 F | OXYGEN SATURATION: 94 % | HEART RATE: 90 BPM | RESPIRATION RATE: 18 BRPM | SYSTOLIC BLOOD PRESSURE: 120 MMHG

## 2023-08-09 DIAGNOSIS — C61 PROSTATE CANCER: Primary | ICD-10-CM

## 2023-08-09 LAB
ALBUMIN SERPL-MCNC: 4.7 G/DL (ref 3.5–5.2)
ALBUMIN/GLOB SERPL: 1.6 G/DL
ALP SERPL-CCNC: 66 U/L (ref 39–117)
ALT SERPL W P-5'-P-CCNC: 16 U/L (ref 1–41)
ANION GAP SERPL CALCULATED.3IONS-SCNC: 12 MMOL/L (ref 5–15)
AST SERPL-CCNC: 22 U/L (ref 1–40)
BASOPHILS # BLD AUTO: 0.03 10*3/MM3 (ref 0–0.2)
BASOPHILS NFR BLD AUTO: 0.4 % (ref 0–1.5)
BILIRUB SERPL-MCNC: 0.5 MG/DL (ref 0–1.2)
BUN SERPL-MCNC: 14 MG/DL (ref 8–23)
BUN/CREAT SERPL: 21.5 (ref 7–25)
CALCIUM SPEC-SCNC: 9.9 MG/DL (ref 8.6–10.5)
CHLORIDE SERPL-SCNC: 93 MMOL/L (ref 98–107)
CO2 SERPL-SCNC: 27 MMOL/L (ref 22–29)
CREAT SERPL-MCNC: 0.65 MG/DL (ref 0.76–1.27)
DEPRECATED RDW RBC AUTO: 44.2 FL (ref 37–54)
EGFRCR SERPLBLD CKD-EPI 2021: 95.3 ML/MIN/1.73
EOSINOPHIL # BLD AUTO: 0.09 10*3/MM3 (ref 0–0.4)
EOSINOPHIL NFR BLD AUTO: 1.1 % (ref 0.3–6.2)
ERYTHROCYTE [DISTWIDTH] IN BLOOD BY AUTOMATED COUNT: 13.4 % (ref 12.3–15.4)
GLOBULIN UR ELPH-MCNC: 3 GM/DL
GLUCOSE SERPL-MCNC: 149 MG/DL (ref 65–99)
HCT VFR BLD AUTO: 41.7 % (ref 37.5–51)
HGB BLD-MCNC: 13.7 G/DL (ref 13–17.7)
HOLD SPECIMEN: NORMAL
LYMPHOCYTES # BLD AUTO: 0.88 10*3/MM3 (ref 0.7–3.1)
LYMPHOCYTES NFR BLD AUTO: 10.7 % (ref 19.6–45.3)
MCH RBC QN AUTO: 29.6 PG (ref 26.6–33)
MCHC RBC AUTO-ENTMCNC: 32.9 G/DL (ref 31.5–35.7)
MCV RBC AUTO: 90.1 FL (ref 79–97)
MONOCYTES # BLD AUTO: 0.58 10*3/MM3 (ref 0.1–0.9)
MONOCYTES NFR BLD AUTO: 7.1 % (ref 5–12)
NEUTROPHILS NFR BLD AUTO: 6.62 10*3/MM3 (ref 1.7–7)
NEUTROPHILS NFR BLD AUTO: 80.5 % (ref 42.7–76)
PLATELET # BLD AUTO: 122 10*3/MM3 (ref 140–450)
PMV BLD AUTO: 10.6 FL (ref 6–12)
POTASSIUM SERPL-SCNC: 4.3 MMOL/L (ref 3.5–5.2)
PROT SERPL-MCNC: 7.7 G/DL (ref 6–8.5)
PSA SERPL-MCNC: 0.05 NG/ML (ref 0–4)
RBC # BLD AUTO: 4.63 10*6/MM3 (ref 4.14–5.8)
SODIUM SERPL-SCNC: 132 MMOL/L (ref 136–145)
TESTOST SERPL-MCNC: 25.1 NG/DL (ref 193–740)
WBC NRBC COR # BLD: 8.22 10*3/MM3 (ref 3.4–10.8)

## 2023-08-09 PROCEDURE — 84153 ASSAY OF PSA TOTAL: CPT

## 2023-08-09 PROCEDURE — 36415 COLL VENOUS BLD VENIPUNCTURE: CPT

## 2023-08-09 PROCEDURE — 96402 CHEMO HORMON ANTINEOPL SQ/IM: CPT

## 2023-08-09 PROCEDURE — 85025 COMPLETE CBC W/AUTO DIFF WBC: CPT

## 2023-08-09 PROCEDURE — 80053 COMPREHEN METABOLIC PANEL: CPT

## 2023-08-09 PROCEDURE — 25010000002 LEUPROLIDE 45 MG KIT: Performed by: UROLOGY

## 2023-08-09 PROCEDURE — 84403 ASSAY OF TOTAL TESTOSTERONE: CPT

## 2023-08-09 RX ADMIN — LEUPROLIDE ACETATE 45 MG: KIT at 11:37

## 2023-08-09 NOTE — PROGRESS NOTES
Specialty Pharmacy Refill Coordination Note     Patient Outreach  An attempt was made by the [facility] to contact this patient for a follow-up on their chemotherapy medication. [facility] can be reached at [phone].         Dori Mathis, Pharmacy Technician  Specialty Pharmacy Technician

## 2023-08-10 ENCOUNTER — OFFICE VISIT (OUTPATIENT)
Dept: UROLOGY | Facility: CLINIC | Age: 78
End: 2023-08-10
Payer: MEDICARE

## 2023-08-10 ENCOUNTER — SPECIALTY PHARMACY (OUTPATIENT)
Dept: ONCOLOGY | Facility: HOSPITAL | Age: 78
End: 2023-08-10
Payer: MEDICARE

## 2023-08-10 VITALS — HEIGHT: 68 IN | WEIGHT: 195.4 LBS | TEMPERATURE: 97.7 F | BODY MASS INDEX: 29.61 KG/M2

## 2023-08-10 DIAGNOSIS — N31.9 NEUROGENIC BLADDER: ICD-10-CM

## 2023-08-10 DIAGNOSIS — N35.914 ANTERIOR URETHRAL STRICTURE: ICD-10-CM

## 2023-08-10 DIAGNOSIS — C61 PROSTATE CANCER: Primary | ICD-10-CM

## 2023-08-10 LAB
BILIRUB BLD-MCNC: NEGATIVE MG/DL
CLARITY, POC: CLEAR
COLOR UR: YELLOW
GLUCOSE UR STRIP-MCNC: NEGATIVE MG/DL
KETONES UR QL: NEGATIVE
LEUKOCYTE EST, POC: NEGATIVE
NITRITE UR-MCNC: NEGATIVE MG/ML
PH UR: 7 [PH] (ref 5–8)
PROT UR STRIP-MCNC: NEGATIVE MG/DL
RBC # UR STRIP: ABNORMAL /UL
SP GR UR: 1.01 (ref 1–1.03)
UROBILINOGEN UR QL: ABNORMAL

## 2023-08-10 PROCEDURE — 1160F RVW MEDS BY RX/DR IN RCRD: CPT | Performed by: UROLOGY

## 2023-08-10 PROCEDURE — 1159F MED LIST DOCD IN RCRD: CPT | Performed by: UROLOGY

## 2023-08-10 PROCEDURE — 81003 URINALYSIS AUTO W/O SCOPE: CPT | Performed by: UROLOGY

## 2023-08-10 PROCEDURE — 99214 OFFICE O/P EST MOD 30 MIN: CPT | Performed by: UROLOGY

## 2023-08-10 NOTE — PROGRESS NOTES
Specialty Pharmacy Refill Coordination Note     Patient Outreach  A second attempt was made by the [facility] to contact this patient for a follow-up on their chemotherapy medication. [facility] can be reached at [phone].         Dori Mathis, Pharmacy Technician  Specialty Pharmacy Technician

## 2023-08-22 NOTE — PROGRESS NOTES
[facility] ON[facility] GROUP HEMATOLOGY & ONCOLOGY  [address]  [place] [address] [phone]    Patient Name: Simeon Tam  Encounter Date: 09/06/2023  YOB: 1945  Patient Number: [id number]      REASON FOR FOLLOW-UP: Simeon Tam is a pleasant 78 y.o.  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  He is seen C41D22 of single agent apalutamide.  He is seen with Essence, spouse. History is obtained from patient.  History is considered reliable.         Oncology/Hematology History Overview Note   DIAGNOSTIC ABNORMALITIES:  He presented with rising PSA of 13 and was diagnosed with prostate cancer about 25 years ago.   Previous PSA was 22.26 on 10/05/2016, <0.13 on 07/17/2017, 7.91 on 10/17/2018, 19.29 on 03/19/2020, and 22.25 on 04/07/2020.   He was seen by Dr. Oneil 05/12/2020. Latest PSA was 22.25 ng/ml on 04/07/2020.  Previous PSA 22.26 on 10/05/2016.  Bone scan and CT scan of the abdomen and pelvis were negative for prostate cancer on 10/2016.  Started on Casodex and possibly leuprolide.  PSA  was down to <0.13 on 07/17/207.  Plan for apalutamide after staging scans.   CT abdomen and pelvis 06/11/2020. No evidence of metastatic disease in the abdomen or pelvis. Right renal pelvis and ureter urothelial thickening and hyperemia. Urinary bladder wall is thickened and there is adjacent inflammation. Recommend correlation with urinalysis and exam to exclude cystitis with right-sided pyelitis.  Cirrhosis.  Tiny 8 mm hypodense RIGHT inferior liver lesion on image 33 is too small to characterize.   Bone scan 06/11/2020. No evidence of bone metastases.        PREVIOUS INTERVENTIONS:  He was treated with adjuvant radiation at [facility] and androgen ablation with Casodex.  Lupron and Casodex 10/2016. He had 7 months of ADT therapy with Lupron.  Apalutamide 07/22/2020 through present.     Prostate  cancer   6/29/2020 -  Chemotherapy    OP PROSTATE Apalutamide       6/30/2020 Initial Diagnosis    Prostate cancer (CMS/formerly [facility])     7/29/2020 -  Chemotherapy    OP LEUPROLIDE 45MG Q6M - ORDER EXPIRES 7/18/24           PAST MEDICAL HISTORY:  ALLERGIES:  Allergies   Allergen Reactions    Sulfa Antibiotics Rash     CURRENT MEDICATIONS:  Outpatient Encounter Medications as of 9/6/2023   Medication Sig Dispense Refill    Apalutamide 60 MG tablet Take 4 tablets by mouth Daily. Take with or without food. Do not crush or chew tablets. 120 tablet 11    calcium carbonate (OS-JONNY) 600 MG tablet Take 1 tablet by mouth Daily.      diclofenac (VOLTAREN) 75 MG EC tablet Take 1 tablet by mouth 2 (Two) Times a Day As Needed (shoulder pain).      dutasteride (AVODART) 0.5 MG capsule TAKE (1) CAPSULE ONCE DAILY. 90 capsule 0    JANUVIA 100 MG tablet Take 1 tablet by mouth Daily.      lisinopril-hydrochlorothiazide (PRINZIDE,ZESTORETIC) 20-12.5 MG per tablet Take 1 tablet by mouth Daily.      Loperamide HCl (IMODIUM PO) Take 1 dose by mouth As Needed (diarrhea).      rosuvastatin (CRESTOR) 20 MG tablet Take 0.5 tablets by mouth Every Night.      tolterodine LA (DETROL LA) 4 MG 24 hr capsule TAKE (1) CAPSULE ONCE DAILY. 30 capsule 0     No facility-administered encounter medications on file as of 9/6/2023.     ADULT ILLNESSES:  Patient Active Problem List   Diagnosis Code    Hypertension I10    Hyperlipidemia E78.5    Diabetes mellitus E11.9    Cancer C80.1    Arrhythmia I49.9    Non morbid obesity due to excess calories E66.09    Prostate cancer C61    Anterior urethral stricture N35.914     SURGERIES:  Past Surgical History:   Procedure Laterality Date    CATARACT EXTRACTION      CYSTOSCOPY RETROGRADE PYELOGRAM Bilateral 4/21/2021    Procedure: CYSTOSCOPY RETROGRADE PYELOGRAM, possible DIRECT VISION INTERNAL URETHROTOMY;  Surgeon: Matthew Oneil MD;  Location: [facility];  Service: Urology;  Laterality: Bilateral;    CYSTOSCOPY  "URETHROTOMY VISUAL INTERNAL Bilateral 4/21/2021    Procedure: possible DIRECT VISION INTERNAL URETHROTOMY;  Surgeon: Matthew Oneil MD;  Location: [facility] OR;  Service: Urology;  Laterality: Bilateral;    EXCISION LESION      neck    KIDNEY STONE SURGERY      REPLACEMENT TOTAL KNEE Left     TONSILLECTOMY       HEALTH MAINTENANCE ITEMS:  Health Maintenance Due   Topic Date Due    URINE MICROALBUMIN  Never done    BMI FOLLOWUP  Never done    Pneumococcal Vaccine 65+ (1 - PCV) Never done    TDAP/TD VACCINES (1 - Tdap) Never done    ZOSTER VACCINE (1 of 2) Never done    Hepatitis B (1 of 3 - Risk 3-dose series) Never done    ANNUAL WELLNESS VISIT  Never done    LIPID PANEL  Never done    HEMOGLOBIN A1C  Never done    DIABETIC EYE EXAM  Never done    COVID-19 Vaccine (4 - Moderna series) 03/15/2021       <no information>  Last Completed Colonoscopy       This patient has no relevant Health Maintenance data.            There is no immunization history on file for this patient.  Last Completed Mammogram       This patient has no relevant Health Maintenance data.              FAMILY HISTORY:  Family History   Problem Relation Age of Onset    Dementia Mother      SOCIAL HISTORY:  Social History     Socioeconomic History    Marital status:    Tobacco Use    Smoking status: Never    Smokeless tobacco: Never   Vaping Use    Vaping Use: Never used   Substance and Sexual Activity    Alcohol use: No    Drug use: No    Sexual activity: Defer       REVIEW OF SYSTEMS:      Review of Systems   Constitutional:  Negative for fatigue, fever and unexpected weight change.        \"I feel real good.\"   HENT:  Negative for congestion and trouble swallowing.    Eyes:  Negative for redness and visual disturbance.   Respiratory:  Negative for shortness of breath and wheezing.    Cardiovascular:  Negative for chest pain and palpitations.   Gastrointestinal:  Negative for abdominal pain, nausea and vomiting.   Endocrine: Negative for " "polydipsia and polyphagia.   Genitourinary:  Negative for flank pain and hematuria.   Musculoskeletal:  Negative for gait problem and joint swelling.   Skin:  Negative for pallor.   Allergic/Immunologic: Negative for food allergies.   Neurological:  Negative for dizziness, speech difficulty and weakness.   Hematological:  Negative for adenopathy. Does not bruise/bleed easily.   Psychiatric/Behavioral:  Negative for agitation, confusion and hallucinations.          VITAL SIGNS: /68   Pulse 83   Temp 97.4 °F (36.3 °C)   Resp 18   Ht 172.7 cm (68\")   Wt 88.9 kg (196 lb)   SpO2 95%   BMI 29.80 kg/m²  Gained 1 pound.   G    PHYSICAL EXAMINATION:     Physical Exam  Vitals reviewed.   Constitutional:       General: He is not in acute distress.  HENT:      Head: Normocephalic and atraumatic.   Eyes:      General: No scleral icterus.  Cardiovascular:      Rate and Rhythm: Normal rate.   Pulmonary:      Effort: No respiratory distress.      Breath sounds: No wheezing or rales.   Abdominal:      General: Bowel sounds are normal.      Palpations: Abdomen is soft.      Tenderness: There is no abdominal tenderness.   Musculoskeletal:         General: No swelling.      Cervical back: Neck supple.   Skin:     General: Skin is warm.      Coloration: Skin is not pale.   Neurological:      Mental Status: He is alert and oriented to person, place, and time.   Psychiatric:         Mood and Affect: Mood normal.         Behavior: Behavior normal.         Thought Content: Thought content normal.         Judgment: Judgment normal.       LABS    Lab Results - Last 18 Months   Lab Units 09/06/23  1036 08/09/23  1036 07/07/23  1036 05/26/23  1338 05/03/23  1341 04/05/23  1326 03/08/23  1407 08/31/22  1255 08/03/22  1359 06/30/22  1449 06/01/22  1345   HEMOGLOBIN g/dL 13.2 13.7 13.4 13.5 13.6 12.8* 13.2   < > 13.3   < > 13.4   HEMATOCRIT % 40.2 41.7 40.4 41.1 40.5 39.0 39.8   < > 39.0   < > 39.1   MCV fL 92.0 90.1 89.2 92.2 91.2 " 91.5 90.7   < > 90.3   < > 90.1   WBC 10*3/mm3 5.37 8.22 5.60 7.85 6.07 5.94 8.03   < > 7.19   < > 6.74   RDW % 13.4 13.4 13.1 13.0 13.0 13.4 13.3   < > 13.1   < > 13.0   MPV fL 10.7 10.6 10.2 10.3 11.0 10.6 10.3   < > 10.6   < > 10.8   PLATELETS 10*3/mm3 112* 122* 146 144 118* 108* 135*   < > 124*   < > 134*   IMM GRAN % %  --   --  0.4 0.4  --  0.2 0.1  --  0.3  --  0.4   NEUTROS ABS 10*3/mm3 3.84 6.62 4.08 5.38 4.74 4.44 6.12   < > 5.50   < > 5.07   LYMPHS ABS 10*3/mm3 0.89 0.88 0.89 1.44 0.76 0.83 1.04   < > 0.93   < > 0.92   MONOS ABS 10*3/mm3 0.48 0.58 0.47 0.87 0.43 0.53 0.66   < > 0.57   < > 0.56   EOS ABS 10*3/mm3 0.10 0.09 0.10 0.10 0.08 0.10 0.16   < > 0.14   < > 0.12   BASOS ABS 10*3/mm3 0.03 0.03 0.04 0.03 0.03 0.03 0.04   < > 0.03   < > 0.04   IMMATURE GRANS (ABS) 10*3/mm3  --   --  0.02 0.03  --  0.01 0.01  --  0.02  --  0.03   NRBC /100 WBC  --   --  0.0 0.0  --  0.0 0.0  --  0.0  --  0.0    < > = values in this interval not displayed.       Lab Results - Last 18 Months   Lab Units 08/09/23  1036 07/07/23  1036 05/26/23  1014 05/03/23  1341 04/05/23  1326 03/08/23  1407   GLUCOSE mg/dL 149* 194* 230* 227* 127* 197*   SODIUM mmol/L 132* 131* 134* 133* 133* 136   POTASSIUM mmol/L 4.3 4.4 4.1 4.2 3.9 3.9   CO2 mmol/L 27.0 24.0 21.0* 26.0 26.0 26.0   CHLORIDE mmol/L 93* 93* 96* 94* 97* 96*   ANION GAP mmol/L 12.0 14.0 17.0* 13.0 10.0 14.0   CREATININE mg/dL 0.65* 0.66* 0.60* 0.64* 0.64* 0.67*   BUN mg/dL 14 12 16 14 17 16   BUN / CREAT RATIO  21.5 18.2 26.7* 21.9 26.6* 23.9   CALCIUM mg/dL 9.9 10.2 9.7 9.9 9.6 9.8   ALK PHOS U/L 66 72 65 71 69 73   TOTAL PROTEIN g/dL 7.7 7.6 7.4 7.3 7.2 7.4   ALT (SGPT) U/L 16 18 20 17 26 31   AST (SGOT) U/L 22 24 28 23 36 41*   BILIRUBIN mg/dL 0.5 0.4 0.4 0.5 0.4 0.4   ALBUMIN g/dL 4.7 4.6 4.1 4.5 4.3 4.4   GLOBULIN gm/dL 3.0 3.0 3.3 2.8 2.9 3.0       No results for input(s): MSPIKE, KAPPALAMB, IGLFLC, URICACID, FREEKAPPAL, CEA, LDH, REFLABREPO in the last 13170  "hours.    No results for input(s): IRON, TIBC, LABIRON, FERRITIN, C0CFIYX, TSH, FOLATE in the last 34218 hours.    Invalid input(s): VITB12    Simeon Tam reports a pain score of 0.          ASSESSMENT:  1.   Prostate cancer, hormone refractory.   AJCC stage (TX, NX, M0)  Treatment status:Casodex and leuprolide with PSA recurrence. Therapy with apalutamide and leuprolde (by Dr. Oneil).  2.   Performance status of 0.  3.   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  Under observation.  4.   Cirrhosis, etiology of thrombocytopenia and followed by PCP.  5.   Urethral stricture. Self catheterization as needed. \"As needed basis.\"             PLAN:  1.    Re: Tolerance to apalutamide.  \"Doing good.\"  Seen by Dr. Oneil 8/10/2023. PSA < 0.2. KELSY status. Note from pharmacy 8/10/2023, no answer.  2.    Re:  Heme status.  Hemoglobin 13.2, WBC 5.3 and platelet 112 from 122 from 146 from 144 from 118 from 108.  3.    Re:  Pre-office CMP.  AST pending from 22, GFR pending from 95.3 ml/minute and glucose 149.  4.    Re:  Pre-office PSA pending from 0.047 from 0.048 from 0.038 from 0.029 from 0.031 from 0.031 from 0.026 from 0.024 from 0.019 from 0.020 from 0.016 from 0.015 from 0.018 (forgot my Lupron) from < 0.014 from < 0.014  from <0.014 from <0.014 from <0.01 from < 0.014 from < 0.014 from <0.014 from < 0.014 from < 0.014 from < 0.014 from < 0.014 from < 0.014 from < 0.014 from < 0.014 from < 0.014 from < 0.014 from < 0.014 from < 0.014 from 0.015 from 0.03 from 0.125 from 1.03 from 27.3. Testosterone pending from 25.1 from 35.2 from 11.8 from 35.7 from 23.4 from 28.1 from 25 from 28.2 from 25.1 from 34.1 from 18.1 from 527 from 580 from 443 from 235.  Latest leuprolide was given on 8/9/2023.   5.   eRx for Compazine 10 mg p.o. every 4 hours as needed for nausea/vomiting, 60, 2 refills if needed.    6.   eRx for C41D1 started on 8/16/2023 apalutamide 60 mg, take four tablets total dose " "240 mg po daily, number, 120.  Take either with or without food.  Swallow tablets whole. TSH before apalutamide.  No grapefruit juice or grapefruit.  Monitor for falls, arrhythmias, seizures, fractures, hypothyroidism and rash.  Continue apalutamide since PSA is < 2.  7.   Continue ongoing management per primary care physician and other specialists.  8.   Plan of care discussed with patient and spouse, Essence.  Understanding expressed.  Patient is agreeable to proceed.  9.   Lupron per Dr. Oneil given every 6 months.  10.  Questions were answered to his satisfaction. \"Yes.\"   11.  Advance Care Planning  ACP discussion was declined by the patient. Patient does not have an advance directive, information provided.  12.  Return to office 4 weeks with CMP, PSA, testosterone, and CBC with differential, same day.         I have reviewed the assessment and plan and verified the accuracy of it. No changes to assessment and plan since the information was documented. Deshawn Black MD 09/06/23       I spent 32 total minutes, face-to-face, caring for Simeon mcdonald. Greater than 50% of this time involved counseling and/or coordination of care as documented within this note.                          (Matthew Oneil MD)  Lucio Garcia MD    "

## 2023-09-06 ENCOUNTER — OFFICE VISIT (OUTPATIENT)
Dept: ONCOLOGY | Facility: CLINIC | Age: 78
End: 2023-09-06
Payer: MEDICARE

## 2023-09-06 ENCOUNTER — LAB (OUTPATIENT)
Dept: LAB | Facility: HOSPITAL | Age: 78
End: 2023-09-06
Payer: MEDICARE

## 2023-09-06 VITALS
RESPIRATION RATE: 18 BRPM | BODY MASS INDEX: 29.7 KG/M2 | TEMPERATURE: 97.4 F | SYSTOLIC BLOOD PRESSURE: 124 MMHG | WEIGHT: 196 LBS | HEART RATE: 83 BPM | DIASTOLIC BLOOD PRESSURE: 68 MMHG | HEIGHT: 68 IN | OXYGEN SATURATION: 95 %

## 2023-09-06 DIAGNOSIS — C61 PROSTATE CANCER: Primary | ICD-10-CM

## 2023-09-06 LAB
ALBUMIN SERPL-MCNC: 4.5 G/DL (ref 3.5–5.2)
ALBUMIN/GLOB SERPL: 1.6 G/DL
ALP SERPL-CCNC: 64 U/L (ref 39–117)
ALT SERPL W P-5'-P-CCNC: 17 U/L (ref 1–41)
ANION GAP SERPL CALCULATED.3IONS-SCNC: 12 MMOL/L (ref 5–15)
AST SERPL-CCNC: 24 U/L (ref 1–40)
BASOPHILS # BLD AUTO: 0.03 10*3/MM3 (ref 0–0.2)
BASOPHILS NFR BLD AUTO: 0.6 % (ref 0–1.5)
BILIRUB SERPL-MCNC: 0.5 MG/DL (ref 0–1.2)
BUN SERPL-MCNC: 16 MG/DL (ref 8–23)
BUN/CREAT SERPL: 24.6 (ref 7–25)
CALCIUM SPEC-SCNC: 9.4 MG/DL (ref 8.6–10.5)
CHLORIDE SERPL-SCNC: 96 MMOL/L (ref 98–107)
CO2 SERPL-SCNC: 28 MMOL/L (ref 22–29)
CREAT SERPL-MCNC: 0.65 MG/DL (ref 0.76–1.27)
DEPRECATED RDW RBC AUTO: 45.3 FL (ref 37–54)
EGFRCR SERPLBLD CKD-EPI 2021: 95.3 ML/MIN/1.73
EOSINOPHIL # BLD AUTO: 0.1 10*3/MM3 (ref 0–0.4)
EOSINOPHIL NFR BLD AUTO: 1.9 % (ref 0.3–6.2)
ERYTHROCYTE [DISTWIDTH] IN BLOOD BY AUTOMATED COUNT: 13.4 % (ref 12.3–15.4)
GLOBULIN UR ELPH-MCNC: 2.9 GM/DL
GLUCOSE SERPL-MCNC: 164 MG/DL (ref 65–99)
HCT VFR BLD AUTO: 40.2 % (ref 37.5–51)
HGB BLD-MCNC: 13.2 G/DL (ref 13–17.7)
HOLD SPECIMEN: NORMAL
LYMPHOCYTES # BLD AUTO: 0.89 10*3/MM3 (ref 0.7–3.1)
LYMPHOCYTES NFR BLD AUTO: 16.6 % (ref 19.6–45.3)
MCH RBC QN AUTO: 30.2 PG (ref 26.6–33)
MCHC RBC AUTO-ENTMCNC: 32.8 G/DL (ref 31.5–35.7)
MCV RBC AUTO: 92 FL (ref 79–97)
MONOCYTES # BLD AUTO: 0.48 10*3/MM3 (ref 0.1–0.9)
MONOCYTES NFR BLD AUTO: 8.9 % (ref 5–12)
NEUTROPHILS NFR BLD AUTO: 3.84 10*3/MM3 (ref 1.7–7)
NEUTROPHILS NFR BLD AUTO: 71.4 % (ref 42.7–76)
PLATELET # BLD AUTO: 112 10*3/MM3 (ref 140–450)
PMV BLD AUTO: 10.7 FL (ref 6–12)
POTASSIUM SERPL-SCNC: 4.5 MMOL/L (ref 3.5–5.2)
PROT SERPL-MCNC: 7.4 G/DL (ref 6–8.5)
PSA SERPL-MCNC: 0.05 NG/ML (ref 0–4)
RBC # BLD AUTO: 4.37 10*6/MM3 (ref 4.14–5.8)
SODIUM SERPL-SCNC: 136 MMOL/L (ref 136–145)
TESTOST SERPL-MCNC: 26.6 NG/DL (ref 193–740)
WBC NRBC COR # BLD: 5.37 10*3/MM3 (ref 3.4–10.8)

## 2023-09-06 PROCEDURE — 84403 ASSAY OF TOTAL TESTOSTERONE: CPT

## 2023-09-06 PROCEDURE — 80053 COMPREHEN METABOLIC PANEL: CPT

## 2023-09-06 PROCEDURE — 36415 COLL VENOUS BLD VENIPUNCTURE: CPT

## 2023-09-06 PROCEDURE — 85025 COMPLETE CBC W/AUTO DIFF WBC: CPT

## 2023-09-06 PROCEDURE — 84153 ASSAY OF PSA TOTAL: CPT

## 2023-09-07 ENCOUNTER — SPECIALTY PHARMACY (OUTPATIENT)
Dept: ONCOLOGY | Facility: HOSPITAL | Age: 78
End: 2023-09-07
Payer: MEDICARE

## 2023-09-07 NOTE — PROGRESS NOTES
"Specialty Pharmacy Refill Coordination Note       Spoke with Essence (spouse) for Mr. Tam's monthly telephone follow-up regarding the oral oncology medication. She stated that he has been tolerating the Erleada well and has not had any obvious side effects from the medication. \"He is doing really good, he saw Dr. Black yesterday and everything is looking good\" He has not missed any doses of the medication in the last month. He still receives refills through [facility] , and has not had any delays in getting those refills. She states that there have been no changes to his maintenance medications and no new drug allergies that she is aware of. \"His primary doctor did increase his Crestor from 10mg daily to 20mg daily, but that is the only change to his medications\"  She stated he has not had any recent stays in the hospital and has not visited the ER in the last month due to the Erleada . I encouraged her to reach out anytime with any questions or concerns they might have regarding his oral chemotherapy medication.     No needs expressed by the patient. Will follow-up next month regarding the patient's oral chemotherapy with a routine telephone consultation.     Refill Questions      Flowsheet Row Most Recent Value   Changes to allergies? No   Changes to medications? Yes  [Patient's crestor was increased to 20mg daily.]   New conditions since last clinic visit No   Unplanned office visit, urgent care, ED, or hospital admission in the last 4 weeks  No   How does patient/caregiver feel medication is working? Very good   Financial problems or insurance changes  No   Since the previous refill, were any specialty medication doses or scheduled injections missed or delayed?  No   Does this patient require a clinical escalation to a pharmacist? No                       Follow-up: 28-30 day(s)     Dori Mathis, Pharmacy Technician  Specialty Pharmacy Technician        "

## 2023-09-13 DIAGNOSIS — N31.9 NEUROGENIC BLADDER: ICD-10-CM

## 2023-09-13 RX ORDER — TOLTERODINE 4 MG/1
CAPSULE, EXTENDED RELEASE ORAL
Qty: 30 CAPSULE | Refills: 0 | Status: SHIPPED | OUTPATIENT
Start: 2023-09-13

## 2023-09-18 NOTE — PROGRESS NOTES
[facility] ONC [facility]ATOLOGY & ONCOLOGY  [address]  [facility] [address] [phone]    Patient Name: Simeon Tam  Encounter Date: 10/04/2023  YOB: 1945  Patient Number: [id number]      REASON FOR FOLLOW-UP:Simeon Tam is a pleasant 78 y.o.  male who is seen in follow-up for hormone refractory prostate cancer (HRPC), non metastatic stage.  He is seen C42D22 of single agent apalutamide.  He is seen with spouse, Essence. History is obtained from patient.  He is a reliable historian.         Oncology/Hematology History Overview Note   DIAGNOSTIC ABNORMALITIES:  He presented with rising PSA of 13 and was diagnosed with prostate cancer about 25 years ago.   Previous PSA was 22.26 on 10/05/2016, <0.13 on 07/17/2017, 7.91 on 10/17/2018, 19.29 on 03/19/2020, and 22.25 on 04/07/2020.   He was seen by Dr. Oneil 05/12/2020. Latest PSA was 22.25 ng/ml on 04/07/2020.  Previous PSA 22.26 on 10/05/2016.  Bone scan and CT scan of the abdomen and pelvis were negative for prostate cancer on 10/2016.  Started on Casodex and possibly leuprolide.  PSA  was down to <0.13 on 07/17/207.  Plan for apalutamide after staging scans.   CT abdomen and pelvis 06/11/2020. No evidence of metastatic disease in the abdomen or pelvis. Right renal pelvis and ureter urothelial thickening and hyperemia. Urinary bladder wall is thickened and there is adjacent inflammation. Recommend correlation with urinalysis and exam to exclude cystitis with right-sided pyelitis.  Cirrhosis.  Tiny 8 mm hypodense RIGHT inferior liver lesion on image 33 is too small to characterize.   Bone scan 06/11/2020. No evidence of bone metastases.        PREVIOUS INTERVENTIONS:  He was treated with adjuvant radiation at [place] and androgen ablation with Casodex.  Lupron and Casodex 10/2016. He had 7 months of ADT therapy with Lupron.  Apalutamide 07/22/2020 through present.     Prostate  cancer   6/29/2020 -  Chemotherapy    OP PROSTATE Apalutamide       6/30/2020 Initial Diagnosis    Prostate cancer (CMS/[facility])     7/29/2020 -  Chemotherapy    OP LEUPROLIDE 45MG Q6M - ORDER EXPIRES 7/18/24       10/5/2022 - 10/5/2022 Chemotherapy    OP PROSTATE Apalutamide           PAST MEDICAL HISTORY:  ALLERGIES:  Allergies   Allergen Reactions    Sulfa Antibiotics Rash     CURRENT MEDICATIONS:  Outpatient Encounter Medications as of 10/4/2023   Medication Sig Dispense Refill    Apalutamide 60 MG tablet Take 4 tablets by mouth Daily. Take with or without food. Do not crush or chew tablets. 120 tablet 11    Apalutamide 60 MG tablet Take 4 tablets by mouth Daily. Take with or without food. Do not crush or chew tablets. 120 tablet 11    calcium carbonate (OS-JONNY) 600 MG tablet Take 1 tablet by mouth Daily.      diclofenac (VOLTAREN) 75 MG EC tablet Take 1 tablet by mouth 2 (Two) Times a Day As Needed (shoulder pain). (Patient not taking: Reported on 10/4/2023)      dutasteride (AVODART) 0.5 MG capsule TAKE (1) CAPSULE ONCE DAILY. 90 capsule 0    JANUVIA 100 MG tablet Take 1 tablet by mouth Daily.      lisinopril-hydrochlorothiazide (PRINZIDE,ZESTORETIC) 20-12.5 MG per tablet Take 1 tablet by mouth Daily.      Loperamide HCl (IMODIUM PO) Take 1 dose by mouth As Needed (diarrhea).      rosuvastatin (CRESTOR) 20 MG tablet Take 1 tablet by mouth Every Night.      tolterodine LA (DETROL LA) 4 MG 24 hr capsule TAKE (1) CAPSULE ONCE DAILY. 30 capsule 0     No facility-administered encounter medications on file as of 10/4/2023.     ADULT ILLNESSES:  Patient Active Problem List   Diagnosis Code    Hypertension I10    Hyperlipidemia E78.5    Diabetes mellitus E11.9    Cancer C80.1    Arrhythmia I49.9    Non morbid obesity due to excess calories E66.09    Prostate cancer C61    Anterior urethral stricture N35.914     SURGERIES:  Past Surgical History:   Procedure Laterality Date    CATARACT EXTRACTION      CYSTOSCOPY RETROGRADE  "PYELOGRAM Bilateral 4/21/2021    Procedure: CYSTOSCOPY RETROGRADE PYELOGRAM, possible DIRECT VISION INTERNAL URETHROTOMY;  Surgeon: Matthew Oneil MD;  Location: [facility] OR;  Service: Urology;  Laterality: Bilateral;    CYSTOSCOPY URETHROTOMY VISUAL INTERNAL Bilateral 4/21/2021    Procedure: possible DIRECT VISION INTERNAL URETHROTOMY;  Surgeon: Matthew Oneil MD;  Location:  [facility] OR;  Service: Urology;  Laterality: Bilateral;    EXCISION LESION      neck    KIDNEY STONE SURGERY      REPLACEMENT TOTAL KNEE Left     TONSILLECTOMY       HEALTH MAINTENANCE ITEMS:  Health Maintenance Due   Topic Date Due    URINE MICROALBUMIN  Never done    BMI FOLLOWUP  Never done    Pneumococcal Vaccine 65+ (1 - PCV) Never done    TDAP/TD VACCINES (1 - Tdap) Never done    ZOSTER VACCINE (1 of 2) Never done    Hepatitis B (1 of 3 - Risk 3-dose series) Never done    ANNUAL WELLNESS VISIT  Never done    LIPID PANEL  Never done    HEMOGLOBIN A1C  Never done    DIABETIC EYE EXAM  Never done    INFLUENZA VACCINE  Never done    COVID-19 Vaccine (4 - 2023-24 season) 09/01/2023       <no information>  Last Completed Colonoscopy       This patient has no relevant Health Maintenance data.            There is no immunization history on file for this patient.  Last Completed Mammogram       This patient has no relevant Health Maintenance data.              FAMILY HISTORY:  Family History   Problem Relation Age of Onset    Dementia Mother      SOCIAL HISTORY:  Social History     Socioeconomic History    Marital status:    Tobacco Use    Smoking status: Never    Smokeless tobacco: Never   Vaping Use    Vaping Use: Never used   Substance and Sexual Activity    Alcohol use: No    Drug use: No    Sexual activity: Defer       REVIEW OF SYSTEMS:    Review of Systems   Constitutional:  Negative for fatigue, fever and unexpected weight change.        \"I feel good.\"   HENT:  Negative for congestion and trouble swallowing.    Eyes:  Negative for " "redness and visual disturbance.   Respiratory:  Negative for cough and shortness of breath.    Cardiovascular:  Negative for chest pain and leg swelling.   Gastrointestinal:  Negative for abdominal pain, constipation, diarrhea, nausea and vomiting.   Endocrine: Negative for polydipsia and polyphagia.   Genitourinary:  Negative for flank pain and hematuria.   Musculoskeletal:  Negative for gait problem and joint swelling.   Skin:  Negative for pallor.   Allergic/Immunologic: Negative for food allergies.   Neurological:  Negative for dizziness, speech difficulty and weakness.   Hematological:  Negative for adenopathy. Does not bruise/bleed easily.   Psychiatric/Behavioral:  Negative for agitation, confusion and hallucinations.          VITAL SIGNS: /64   Pulse 83   Temp 97 °F (36.1 °C)   Resp 18   Ht 172.7 cm (68\")   Wt 87.1 kg (192 lb)   SpO2 93%   BMI 29.19 kg/m²  Lost 4 pounds. \"No pizza yet.\"  Pain Score    10/04/23 1052   PainSc: 0-No pain       PHYSICAL EXAMINATION:     Physical Exam  Vitals reviewed.   Constitutional:       General: He is not in acute distress.  HENT:      Head: Normocephalic and atraumatic.   Eyes:      General: No scleral icterus.  Cardiovascular:      Rate and Rhythm: Normal rate.   Pulmonary:      Effort: No respiratory distress.      Breath sounds: No wheezing or rales.   Abdominal:      General: Bowel sounds are normal.      Palpations: Abdomen is soft.      Tenderness: There is no abdominal tenderness.   Musculoskeletal:         General: No swelling.      Cervical back: Neck supple.   Skin:     General: Skin is warm.      Coloration: Skin is not pale.   Neurological:      Mental Status: He is alert and oriented to person, place, and time.   Psychiatric:         Mood and Affect: Mood normal.         Behavior: Behavior normal.         Thought Content: Thought content normal.         Judgment: Judgment normal.       LABS    Lab Results - Last 18 Months   Lab Units " 10/04/23  1014 09/06/23  1036 08/09/23  1036 07/07/23  1036 05/26/23  1338 05/03/23  1341 04/05/23  1326 03/08/23  1407 08/31/22  1255 08/03/22  1359 06/30/22  1449 06/01/22  1345   HEMOGLOBIN g/dL 13.0 13.2 13.7 13.4 13.5 13.6 12.8* 13.2   < > 13.3   < > 13.4   HEMATOCRIT % 39.7 40.2 41.7 40.4 41.1 40.5 39.0 39.8   < > 39.0   < > 39.1   MCV fL 90.4 92.0 90.1 89.2 92.2 91.2 91.5 90.7   < > 90.3   < > 90.1   WBC 10*3/mm3 5.22 5.37 8.22 5.60 7.85 6.07 5.94 8.03   < > 7.19   < > 6.74   RDW % 13.3 13.4 13.4 13.1 13.0 13.0 13.4 13.3   < > 13.1   < > 13.0   MPV fL 10.1 10.7 10.6 10.2 10.3 11.0 10.6 10.3   < > 10.6   < > 10.8   PLATELETS 10*3/mm3 131* 112* 122* 146 144 118* 108* 135*   < > 124*   < > 134*   IMM GRAN % %  --   --   --  0.4 0.4  --  0.2 0.1  --  0.3  --  0.4   NEUTROS ABS 10*3/mm3 3.75 3.84 6.62 4.08 5.38 4.74 4.44 6.12   < > 5.50   < > 5.07   LYMPHS ABS 10*3/mm3 0.83 0.89 0.88 0.89 1.44 0.76 0.83 1.04   < > 0.93   < > 0.92   MONOS ABS 10*3/mm3 0.45 0.48 0.58 0.47 0.87 0.43 0.53 0.66   < > 0.57   < > 0.56   EOS ABS 10*3/mm3 0.15 0.10 0.09 0.10 0.10 0.08 0.10 0.16   < > 0.14   < > 0.12   BASOS ABS 10*3/mm3 0.03 0.03 0.03 0.04 0.03 0.03 0.03 0.04   < > 0.03   < > 0.04   IMMATURE GRANS (ABS) 10*3/mm3  --   --   --  0.02 0.03  --  0.01 0.01  --  0.02  --  0.03   NRBC /100 WBC  --   --   --  0.0 0.0  --  0.0 0.0  --  0.0  --  0.0    < > = values in this interval not displayed.       Lab Results - Last 18 Months   Lab Units 10/04/23  1014 09/06/23  1036 08/09/23  1036 07/07/23  1036 05/26/23  1014 05/03/23  1341   GLUCOSE mg/dL 172* 164* 149* 194* 230* 227*   SODIUM mmol/L 136 136 132* 131* 134* 133*   POTASSIUM mmol/L 4.0 4.5 4.3 4.4 4.1 4.2   CO2 mmol/L 27.0 28.0 27.0 24.0 21.0* 26.0   CHLORIDE mmol/L 96* 96* 93* 93* 96* 94*   ANION GAP mmol/L 13.0 12.0 12.0 14.0 17.0* 13.0   CREATININE mg/dL 0.64* 0.65* 0.65* 0.66* 0.60* 0.64*   BUN mg/dL 12 16 14 12 16 14   BUN / CREAT RATIO  18.8 24.6 21.5 18.2 26.7* 21.9  "  CALCIUM mg/dL 9.9 9.4 9.9 10.2 9.7 9.9   ALK PHOS U/L 58 64 66 72 65 71   TOTAL PROTEIN g/dL 7.3 7.4 7.7 7.6 7.4 7.3   ALT (SGPT) U/L 13 17 16 18 20 17   AST (SGOT) U/L 20 24 22 24 28 23   BILIRUBIN mg/dL 0.5 0.5 0.5 0.4 0.4 0.5   ALBUMIN g/dL 4.2 4.5 4.7 4.6 4.1 4.5   GLOBULIN gm/dL 3.1 2.9 3.0 3.0 3.3 2.8       No results for input(s): MSPIKE, KAPPALAMB, IGLFLC, URICACID, FREEKAPPAL, CEA, LDH, REFLABREPO in the last 05284 hours.    No results for input(s): IRON, TIBC, LABIRON, FERRITIN, C7VSIPE, TSH, FOLATE in the last 25719 hours.    Invalid input(s): VITB12    Simeon Tam reports a pain score of 0.          ASSESSMENT:  1.   Prostate cancer, hormone refractory.   AJCC stage (TX, NX, M0)  Treatment status:Casodex and leuprolide with PSA recurrence. Therapy with apalutamide and leuprolde (by Dr. Oneil).  2.   Performance status of 0.  3.   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  Under observation.  4.   Cirrhosis, etiology of thrombocytopenia and followed by PCP.  5.   Urethral stricture. Self catheterization as indicated. \"It varies.\"             PLAN:  1.    Re: Tolerance to apalutamide.  \"Doing fine.\" Note from pharmacy on 9/7/2023.  Doing well with apalutamide.  2.    Re:  Heme status.  Hemoglobin 13, WBC 5.2 and platelet 131 from 112 from 122 from 146 from 144.  3.    Re:  Pre-office CMP.  AST 20 from 24 from 22, GFR 95.7 from 95.3 from 95.3 ml/minute and glucose 172 from 164 from 149.  4.    Re:  Pre-office PSA pending from 0.050 from 0.047 from 0.048 from 0.038 from 0.029 from 0.031 from 0.031 from 0.026 from 0.024 from 0.019 from 0.020 from 0.016 from 0.015 from 0.018 (forgot my Lupron) from < 0.014 from < 0.014  from <0.014 from <0.014 from <0.01 from < 0.014 from < 0.014 from <0.014 from < 0.014 from < 0.014 from < 0.014 from < 0.014 from < 0.014 from < 0.014 from < 0.014 from < 0.014 from < 0.014 from < 0.014 from < 0.014 from 0.015 from 0.03 from 0.125 " "from 1.03 from 27.3. Testosterone pending from 25.1 from 35.2 from 11.8 from 35.7 from 23.4 from 28.1 from 25 from 28.2 from 25.1 from 34.1 from 18.1 from 527 from 580 from 443 from 235.  Latest leuprolide was given on 8/9/2023.   5.   eRx for Compazine 10 mg p.o. every 4 hours as needed for nausea/vomiting, 60, 2 refills if needed.    6.   eRx for C42D1 started on 9/13/2023 apalutamide 60 mg, take four tablets total dose 240 mg po daily, number, 120.  Take either with or without food.  Swallow tablets whole. TSH before apalutamide.  No grapefruit juice or grapefruit.  Monitor for seizures, falls, arrhythmias, fractures, rash or hypothyroidism.  Continue apalutamide since PSA is < 2.  7.   Continue ongoing management per primary care physician and other specialists.  8.   Plan of care discussed with patient and his spouse, Essence.  Understanding expressed.  Patient is agreeable to proceed.  9.   Lupron per Dr. Oneil given every 6 months.  10.  Questions were answered to his satisfaction. \"Yes.\"   11.  Advance Care Planning  ACP discussion was declined by the patient. Patient does not have an advance directive, information provided.  12.  Return to office 4 weeks with CMP, PSA, testosterone, and CBC with differential, same day.          I have reviewed the assessment and plan and verified the accuracy of it. No changes to assessment and plan since the information was documented. Deshawn Black MD 10/04/23        I spent 33 total minutes, face-to-face, caring for Simeon mcdonald. Greater than 50% of this time involved counseling and/or coordination of care as documented within this note.                          (Matthew Oneil MD)  Lucio Garcia MD      "

## 2023-09-20 ENCOUNTER — SPECIALTY PHARMACY (OUTPATIENT)
Dept: ONCOLOGY | Facility: HOSPITAL | Age: 78
End: 2023-09-20
Payer: MEDICARE

## 2023-09-20 DIAGNOSIS — C61 PROSTATE CANCER: Primary | ICD-10-CM

## 2023-10-04 ENCOUNTER — LAB (OUTPATIENT)
Dept: LAB | Facility: HOSPITAL | Age: 78
End: 2023-10-04
Payer: MEDICARE

## 2023-10-04 ENCOUNTER — SPECIALTY PHARMACY (OUTPATIENT)
Dept: ONCOLOGY | Facility: HOSPITAL | Age: 78
End: 2023-10-04
Payer: MEDICARE

## 2023-10-04 ENCOUNTER — OFFICE VISIT (OUTPATIENT)
Dept: ONCOLOGY | Facility: CLINIC | Age: 78
End: 2023-10-04
Payer: MEDICARE

## 2023-10-04 VITALS
HEART RATE: 83 BPM | WEIGHT: 192 LBS | BODY MASS INDEX: 29.1 KG/M2 | DIASTOLIC BLOOD PRESSURE: 64 MMHG | OXYGEN SATURATION: 93 % | SYSTOLIC BLOOD PRESSURE: 118 MMHG | HEIGHT: 68 IN | TEMPERATURE: 97 F | RESPIRATION RATE: 18 BRPM

## 2023-10-04 DIAGNOSIS — C61 PROSTATE CANCER: Primary | ICD-10-CM

## 2023-10-04 LAB
ALBUMIN SERPL-MCNC: 4.2 G/DL (ref 3.5–5.2)
ALBUMIN/GLOB SERPL: 1.4 G/DL
ALP SERPL-CCNC: 58 U/L (ref 39–117)
ALT SERPL W P-5'-P-CCNC: 13 U/L (ref 1–41)
ANION GAP SERPL CALCULATED.3IONS-SCNC: 13 MMOL/L (ref 5–15)
AST SERPL-CCNC: 20 U/L (ref 1–40)
BASOPHILS # BLD AUTO: 0.03 10*3/MM3 (ref 0–0.2)
BASOPHILS NFR BLD AUTO: 0.6 % (ref 0–1.5)
BILIRUB SERPL-MCNC: 0.5 MG/DL (ref 0–1.2)
BUN SERPL-MCNC: 12 MG/DL (ref 8–23)
BUN/CREAT SERPL: 18.8 (ref 7–25)
CALCIUM SPEC-SCNC: 9.9 MG/DL (ref 8.6–10.5)
CHLORIDE SERPL-SCNC: 96 MMOL/L (ref 98–107)
CO2 SERPL-SCNC: 27 MMOL/L (ref 22–29)
CREAT SERPL-MCNC: 0.64 MG/DL (ref 0.76–1.27)
DEPRECATED RDW RBC AUTO: 44.2 FL (ref 37–54)
EGFRCR SERPLBLD CKD-EPI 2021: 95.7 ML/MIN/1.73
EOSINOPHIL # BLD AUTO: 0.15 10*3/MM3 (ref 0–0.4)
EOSINOPHIL NFR BLD AUTO: 2.9 % (ref 0.3–6.2)
ERYTHROCYTE [DISTWIDTH] IN BLOOD BY AUTOMATED COUNT: 13.3 % (ref 12.3–15.4)
GLOBULIN UR ELPH-MCNC: 3.1 GM/DL
GLUCOSE SERPL-MCNC: 172 MG/DL (ref 65–99)
HCT VFR BLD AUTO: 39.7 % (ref 37.5–51)
HGB BLD-MCNC: 13 G/DL (ref 13–17.7)
HOLD SPECIMEN: NORMAL
LYMPHOCYTES # BLD AUTO: 0.83 10*3/MM3 (ref 0.7–3.1)
LYMPHOCYTES NFR BLD AUTO: 15.9 % (ref 19.6–45.3)
MCH RBC QN AUTO: 29.6 PG (ref 26.6–33)
MCHC RBC AUTO-ENTMCNC: 32.7 G/DL (ref 31.5–35.7)
MCV RBC AUTO: 90.4 FL (ref 79–97)
MONOCYTES # BLD AUTO: 0.45 10*3/MM3 (ref 0.1–0.9)
MONOCYTES NFR BLD AUTO: 8.6 % (ref 5–12)
NEUTROPHILS NFR BLD AUTO: 3.75 10*3/MM3 (ref 1.7–7)
NEUTROPHILS NFR BLD AUTO: 71.8 % (ref 42.7–76)
PLATELET # BLD AUTO: 131 10*3/MM3 (ref 140–450)
PMV BLD AUTO: 10.1 FL (ref 6–12)
POTASSIUM SERPL-SCNC: 4 MMOL/L (ref 3.5–5.2)
PROT SERPL-MCNC: 7.3 G/DL (ref 6–8.5)
PSA SERPL-MCNC: 0.05 NG/ML (ref 0–4)
RBC # BLD AUTO: 4.39 10*6/MM3 (ref 4.14–5.8)
SODIUM SERPL-SCNC: 136 MMOL/L (ref 136–145)
TESTOST SERPL-MCNC: 26.4 NG/DL (ref 193–740)
WBC NRBC COR # BLD: 5.22 10*3/MM3 (ref 3.4–10.8)

## 2023-10-04 PROCEDURE — 85025 COMPLETE CBC W/AUTO DIFF WBC: CPT

## 2023-10-04 PROCEDURE — 84153 ASSAY OF PSA TOTAL: CPT

## 2023-10-04 PROCEDURE — 36415 COLL VENOUS BLD VENIPUNCTURE: CPT

## 2023-10-04 PROCEDURE — 80053 COMPREHEN METABOLIC PANEL: CPT

## 2023-10-04 PROCEDURE — 84403 ASSAY OF TOTAL TESTOSTERONE: CPT

## 2023-10-04 NOTE — PROGRESS NOTES
Specialty Pharmacy Patient Management Program  Oncology 6-Month Clinical Assessment       Simeon Tam is a 78 y.o. male with Prostate Cancer seen today to assess adherence and side effects.    Consulting Provider: Deshawn Black MD ([facility])  Regimen: Apalutamide 60 mg tabs, 4 tabs daily      Oncology/Hematology History Overview Note   DIAGNOSTIC ABNORMALITIES:  He presented with rising PSA of 13 and was diagnosed with prostate cancer about 25 years ago.   Previous PSA was 22.26 on 10/05/2016, <0.13 on 07/17/2017, 7.91 on 10/17/2018, 19.29 on 03/19/2020, and 22.25 on 04/07/2020.   He was seen by Dr. Oneil 05/12/2020. Latest PSA was 22.25 ng/ml on 04/07/2020.  Previous PSA 22.26 on 10/05/2016.  Bone scan and CT scan of the abdomen and pelvis were negative for prostate cancer on 10/2016.  Started on Casodex and possibly leuprolide.  PSA  was down to <0.13 on 07/17/207.  Plan for apalutamide after staging scans.   CT abdomen and pelvis 06/11/2020. No evidence of metastatic disease in the abdomen or pelvis. Right renal pelvis and ureter urothelial thickening and hyperemia. Urinary bladder wall is thickened and there is adjacent inflammation. Recommend correlation with urinalysis and exam to exclude cystitis with right-sided pyelitis.  Cirrhosis.  Tiny 8 mm hypodense RIGHT inferior liver lesion on image 33 is too small to characterize.   Bone scan 06/11/2020. No evidence of bone metastases.        PREVIOUS INTERVENTIONS:  He was treated with adjuvant radiation at [place] and androgen ablation with Casodex.  Lupron and Casodex 10/2016. He had 7 months of ADT therapy with Lupron.  Apalutamide 07/22/2020 through present.     Prostate cancer   6/29/2020 -  Chemotherapy    OP PROSTATE Apalutamide       6/30/2020 Initial Diagnosis    Prostate cancer (CMS/HCC)     7/29/2020 -  Chemotherapy    OP LEUPROLIDE 45MG Q6M - ORDER EXPIRES 7/18/24       10/5/2022 - 10/5/2022 Chemotherapy    OP PROSTATE  Apalutamide           Medication Assessment:  Medication Assessment  Administration: Patient is taking every day at the same time .   Patient can self administer medications: Yes  Medication Follow-Up Plan: Next clinical assessment  Lab Review: The labs listed below have been reviewed.  Provider aware    Lab Results   Component Value Date    GLUCOSE 172 (H) 10/04/2023    CALCIUM 9.9 10/04/2023     10/04/2023    K 4.0 10/04/2023    CO2 27.0 10/04/2023    CL 96 (L) 10/04/2023    BUN 12 10/04/2023    CREATININE 0.64 (L) 10/04/2023    EGFRIFNONA 117 02/09/2022    BCR 18.8 10/04/2023    ANIONGAP 13.0 10/04/2023     Lab Results   Component Value Date    WBC 5.22 10/04/2023    RBC 4.39 10/04/2023    HGB 13.0 10/04/2023    HCT 39.7 10/04/2023    MCV 90.4 10/04/2023    MCH 29.6 10/04/2023    MCHC 32.7 10/04/2023    RDW 13.3 10/04/2023    RDWSD 44.2 10/04/2023    MPV 10.1 10/04/2023     (L) 10/04/2023    NEUTRORELPCT 71.8 10/04/2023    LYMPHORELPCT 15.9 (L) 10/04/2023    MONORELPCT 8.6 10/04/2023    EOSRELPCT 2.9 10/04/2023    BASORELPCT 0.6 10/04/2023    AUTOIGPER 0.4 07/07/2023    NEUTROABS 3.75 10/04/2023    LYMPHSABS 0.83 10/04/2023    MONOSABS 0.45 10/04/2023    EOSABS 0.15 10/04/2023    BASOSABS 0.03 10/04/2023    AUTOIGNUM 0.02 07/07/2023    NRBC 0.0 07/07/2023     Drug-drug interactions  Completed medication reconciliation today to assess for drug interactions. Patient denies starting or stopping any medications.    Current medications include: Apalutamide, Loperamide HCl, SITagliptin, calcium carbonate, diclofenac, dutasteride, lisinopril-hydrochlorothiazide, rosuvastatin, and tolterodine LA  Assessed medication list for interactions, no significant drug interactions noted.   Advised patient to call the clinic if any new medications are started so we can assess for drug-drug interactions.    Allergies  Known allergies and reactions were discussed with the patient. The patient's chart has been reviewed  "for allergy information and updated as necessary.   Allergies   Allergen Reactions    Sulfa Antibiotics Rash        Hospitalizations and Urgent Care Visits Since Last Assessment:  Unplanned hospitalizations or inpatient admissions: None   ED Visits: None  Urgent Office Visits: none    GDMT Assessment:  appropriate    Adherence Assessment:  Spoke with Simeon and his wife Essence for his monthly routine telephone consultation follow-up regarding the oral oncology medication. He stated that he has been tolerating the Erleada really well and has not had any obvious side effects from the medication. He has not missed any doses of the medication in the last month. Essence states that he has only missed \"one dose in five years.\" The patient still receives refills through [facility], and has not had any delays in getting those refills. Also, no new refills are needed at this time according to the patient. The patient also stated that there has been no changes to his maintenance medications excluding a dose increase of rosuvastatin and no new drug allergies that he is aware of.   Finally, the patient stated he has not had any recent stays in the hospital and has not visited the ER in the last month due to the Erleada. I encouraged the patient to reach out anytime with any questions or concerns they might have regarding their oral chemotherapy medication.    Financial Assessment:  Medication availability/affordability: Patient has had no issues obtaining medication from pharmacy.    Pertinent Notes from Discussion with Patient:  No needs expressed by the patient or otherwise identified. Will follow-up next month regarding the patient's oral chemotherapy with a routine telephone consultation. The next routine follow-up will be scheduled approximately 28-30 days from today.    All questions addressed and patient had no additional concerns. Patient has pharmacy contact information.    Kirk Han, Jill  10/4/2023  10:53 CDT   "

## 2023-10-11 DIAGNOSIS — N31.9 NEUROGENIC BLADDER: ICD-10-CM

## 2023-10-11 RX ORDER — DUTASTERIDE 0.5 MG/1
CAPSULE, LIQUID FILLED ORAL
Qty: 90 CAPSULE | Refills: 0 | Status: SHIPPED | OUTPATIENT
Start: 2023-10-11

## 2023-10-11 RX ORDER — TOLTERODINE 4 MG/1
CAPSULE, EXTENDED RELEASE ORAL
Qty: 30 CAPSULE | Refills: 0 | Status: SHIPPED | OUTPATIENT
Start: 2023-10-11

## 2023-10-13 NOTE — PROGRESS NOTES
[facility] GROUP HEMATOLOGY & ONCOLOGY  [address]  [facility] [address] [phone]    Patient Name: Simeon Tam  Encounter Date: 11/01/2023  YOB: 1945  Patient Number: [id number]      REASON FOR FOLLOW-UP: Simeon Tam is a pleasant 78 y.o.  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  He is seen C43D22 of single agent apalutamide.  He is seen with his spouse, Essence. History is obtained from patient.  He is a reliable historian.          Oncology/Hematology History Overview Note   DIAGNOSTIC ABNORMALITIES:  He presented with rising PSA of 13 and was diagnosed with prostate cancer about 25 years ago.   Previous PSA was 22.26 on 10/05/2016, <0.13 on 07/17/2017, 7.91 on 10/17/2018, 19.29 on 03/19/2020, and 22.25 on 04/07/2020.   He was seen by Dr. Oneil 05/12/2020. Latest PSA was 22.25 ng/ml on 04/07/2020.  Previous PSA 22.26 on 10/05/2016.  Bone scan and CT scan of the abdomen and pelvis were negative for prostate cancer on 10/2016.  Started on Casodex and possibly leuprolide.  PSA  was down to <0.13 on 07/17/207.  Plan for apalutamide after staging scans.   CT abdomen and pelvis 06/11/2020. No evidence of metastatic disease in the abdomen or pelvis. Right renal pelvis and ureter urothelial thickening and hyperemia. Urinary bladder wall is thickened and there is adjacent inflammation. Recommend correlation with urinalysis and exam to exclude cystitis with right-sided pyelitis.  Cirrhosis.  Tiny 8 mm hypodense RIGHT inferior liver lesion on image 33 is too small to characterize.   Bone scan 06/11/2020. No evidence of bone metastases.        PREVIOUS INTERVENTIONS:  He was treated with adjuvant radiation at [place] and androgen ablation with Casodex.  Lupron and Casodex 10/2016. He had 7 months of ADT therapy with Lupron.  Apalutamide 07/22/2020 through present.     Prostate  cancer   6/29/2020 -  Chemotherapy    OP PROSTATE Apalutamide     6/30/2020 Initial Diagnosis    Prostate cancer (CMS/[facility])     7/29/2020 -  Chemotherapy    OP LEUPROLIDE 45MG Q6M - ORDER EXPIRES 7/18/24     10/5/2022 - 10/5/2022 Chemotherapy    OP PROSTATE Apalutamide         PAST MEDICAL HISTORY:  ALLERGIES:  Allergies   Allergen Reactions    Sulfa Antibiotics Rash     CURRENT MEDICATIONS:  Outpatient Encounter Medications as of 11/1/2023   Medication Sig Dispense Refill    Apalutamide 60 MG tablet Take 4 tablets by mouth Daily. Take with or without food. Do not crush or chew tablets. 120 tablet 11    Apalutamide 60 MG tablet Take 4 tablets by mouth Daily. Take with or without food. Do not crush or chew tablets. 120 tablet 11    calcium carbonate (OS-JONNY) 600 MG tablet Take 1 tablet by mouth Daily.      diclofenac (VOLTAREN) 75 MG EC tablet Take 1 tablet by mouth 2 (Two) Times a Day As Needed (shoulder pain).      dutasteride (AVODART) 0.5 MG capsule TAKE (1) CAPSULE ONCE DAILY. 90 capsule 0    JANUVIA 100 MG tablet Take 1 tablet by mouth Daily.      lisinopril-hydrochlorothiazide (PRINZIDE,ZESTORETIC) 20-12.5 MG per tablet Take 1 tablet by mouth Daily.      Loperamide HCl (IMODIUM PO) Take 1 dose by mouth As Needed (diarrhea).      rosuvastatin (CRESTOR) 20 MG tablet Take 1 tablet by mouth Every Night.      tolterodine LA (DETROL LA) 4 MG 24 hr capsule TAKE (1) CAPSULE ONCE DAILY. 30 capsule 0     No facility-administered encounter medications on file as of 11/1/2023.     ADULT ILLNESSES:  Patient Active Problem List   Diagnosis Code    Hypertension I10    Hyperlipidemia E78.5    Diabetes mellitus E11.9    Cancer C80.1    Arrhythmia I49.9    Non morbid obesity due to excess calories E66.09    Prostate cancer C61    Anterior urethral stricture N35.914     SURGERIES:  Past Surgical History:   Procedure Laterality Date    CATARACT EXTRACTION      CYSTOSCOPY RETROGRADE PYELOGRAM Bilateral 4/21/2021    Procedure:  "CYSTOSCOPY RETROGRADE PYELOGRAM, possible DIRECT VISION INTERNAL URETHROTOMY;  Surgeon: Matthew Oneil MD;  Location: [facility] OR;  Service: Urology;  Laterality: Bilateral;    CYSTOSCOPY URETHROTOMY VISUAL INTERNAL Bilateral 4/21/2021    Procedure: possible DIRECT VISION INTERNAL URETHROTOMY;  Surgeon: Matthew Oneil MD;  Location:  [facility] OR;  Service: Urology;  Laterality: Bilateral;    EXCISION LESION      neck    KIDNEY STONE SURGERY      REPLACEMENT TOTAL KNEE Left     TONSILLECTOMY       HEALTH MAINTENANCE ITEMS:  Health Maintenance Due   Topic Date Due    URINE MICROALBUMIN  Never done    Pneumococcal Vaccine 65+ (1 - PCV) Never done    TDAP/TD VACCINES (1 - Tdap) Never done    ZOSTER VACCINE (1 of 2) Never done    Hepatitis B (1 of 3 - Risk 3-dose series) Never done    ANNUAL WELLNESS VISIT  Never done    LIPID PANEL  Never done    HEMOGLOBIN A1C  Never done    DIABETIC EYE EXAM  Never done    BMI FOLLOWUP  04/27/2022    INFLUENZA VACCINE  Never done    COVID-19 Vaccine (4 - 2023-24 season) 09/01/2023       <no information>  Last Completed Colonoscopy       This patient has no relevant Health Maintenance data.            There is no immunization history on file for this patient.  Last Completed Mammogram       This patient has no relevant Health Maintenance data.              FAMILY HISTORY:  Family History   Problem Relation Age of Onset    Dementia Mother      SOCIAL HISTORY:  Social History     Socioeconomic History    Marital status:    Tobacco Use    Smoking status: Never    Smokeless tobacco: Never   Vaping Use    Vaping Use: Never used   Substance and Sexual Activity    Alcohol use: No    Drug use: No    Sexual activity: Defer       REVIEW OF SYSTEMS:    Review of Systems   Constitutional:  Negative for fatigue and fever.        \"I feel good.\"   HENT:  Negative for congestion and mouth sores.    Eyes:  Negative for discharge and redness.   Respiratory:  Negative for cough and shortness of " "breath.    Cardiovascular:  Negative for chest pain and leg swelling.   Gastrointestinal:  Negative for abdominal pain, nausea and vomiting.   Endocrine: Negative for polydipsia and polyphagia.   Genitourinary:  Positive for difficulty urinating. Negative for hematuria.   Musculoskeletal:  Negative for gait problem and joint swelling.   Skin:  Negative for pallor.   Allergic/Immunologic: Negative for food allergies.   Neurological:  Negative for dizziness, speech difficulty and weakness.   Hematological:  Negative for adenopathy. Does not bruise/bleed easily.   Psychiatric/Behavioral:  Negative for agitation, confusion and hallucinations.        VITAL SIGNS: /68   Pulse 93   Temp 97.9 °F (36.6 °C)   Resp 18   Ht 172.7 cm (68\")   Wt 87.1 kg (192 lb)   SpO2 94%   BMI 29.19 kg/m²   Pain Score    11/01/23 1313   PainSc: 0-No pain       PHYSICAL EXAMINATION:     Physical Exam  Vitals reviewed.   Constitutional:       General: He is not in acute distress.  HENT:      Head: Normocephalic and atraumatic.   Eyes:      General: No scleral icterus.  Cardiovascular:      Rate and Rhythm: Normal rate.   Pulmonary:      Effort: No respiratory distress.      Breath sounds: No wheezing or rales.   Abdominal:      General: Bowel sounds are normal.      Palpations: Abdomen is soft.      Tenderness: There is no abdominal tenderness.   Musculoskeletal:         General: No swelling.      Cervical back: Neck supple.   Skin:     General: Skin is warm.      Coloration: Skin is not pale.   Neurological:      Mental Status: He is alert and oriented to person, place, and time.   Psychiatric:         Mood and Affect: Mood normal.         Behavior: Behavior normal.         Thought Content: Thought content normal.         Judgment: Judgment normal.         LABS    Lab Results - Last 18 Months   Lab Units 11/01/23  1237 10/04/23  1014 09/06/23  1036 08/09/23  1036 07/07/23  1036 05/26/23  1338 05/03/23  1341 04/05/23  1326 " 03/08/23  1407 08/31/22  1255 08/03/22  1359   HEMOGLOBIN g/dL 13.0 13.0 13.2 13.7 13.4 13.5   < > 12.8* 13.2   < > 13.3   HEMATOCRIT % 38.9 39.7 40.2 41.7 40.4 41.1   < > 39.0 39.8   < > 39.0   MCV fL 88.2 90.4 92.0 90.1 89.2 92.2   < > 91.5 90.7   < > 90.3   WBC 10*3/mm3 5.98 5.22 5.37 8.22 5.60 7.85   < > 5.94 8.03   < > 7.19   RDW % 13.1 13.3 13.4 13.4 13.1 13.0   < > 13.4 13.3   < > 13.1   MPV fL 10.0 10.1 10.7 10.6 10.2 10.3   < > 10.6 10.3   < > 10.6   PLATELETS 10*3/mm3 146 131* 112* 122* 146 144   < > 108* 135*   < > 124*   IMM GRAN % % 0.5  --   --   --  0.4 0.4  --  0.2 0.1  --  0.3   NEUTROS ABS 10*3/mm3 4.66 3.75 3.84 6.62 4.08 5.38   < > 4.44 6.12   < > 5.50   LYMPHS ABS 10*3/mm3 0.83 0.83 0.89 0.88 0.89 1.44   < > 0.83 1.04   < > 0.93   MONOS ABS 10*3/mm3 0.36 0.45 0.48 0.58 0.47 0.87   < > 0.53 0.66   < > 0.57   EOS ABS 10*3/mm3 0.06 0.15 0.10 0.09 0.10 0.10   < > 0.10 0.16   < > 0.14   BASOS ABS 10*3/mm3 0.04 0.03 0.03 0.03 0.04 0.03   < > 0.03 0.04   < > 0.03   IMMATURE GRANS (ABS) 10*3/mm3 0.03  --   --   --  0.02 0.03  --  0.01 0.01  --  0.02   NRBC /100 WBC 0.0  --   --   --  0.0 0.0  --  0.0 0.0  --  0.0    < > = values in this interval not displayed.       Lab Results - Last 18 Months   Lab Units 11/01/23  1237 10/04/23  1014 09/06/23  1036 08/09/23  1036 07/07/23  1036 05/26/23  1014   GLUCOSE mg/dL 204* 172* 164* 149* 194* 230*   SODIUM mmol/L 135* 136 136 132* 131* 134*   POTASSIUM mmol/L 4.0 4.0 4.5 4.3 4.4 4.1   CO2 mmol/L 27.0 27.0 28.0 27.0 24.0 21.0*   CHLORIDE mmol/L 96* 96* 96* 93* 93* 96*   ANION GAP mmol/L 12.0 13.0 12.0 12.0 14.0 17.0*   CREATININE mg/dL 0.65* 0.64* 0.65* 0.65* 0.66* 0.60*   BUN mg/dL 15 12 16 14 12 16   BUN / CREAT RATIO  23.1 18.8 24.6 21.5 18.2 26.7*   CALCIUM mg/dL 9.5 9.9 9.4 9.9 10.2 9.7   ALK PHOS U/L 57 58 64 66 72 65   TOTAL PROTEIN g/dL 7.2 7.3 7.4 7.7 7.6 7.4   ALT (SGPT) U/L 19 13 17 16 18 20   AST (SGOT) U/L 23 20 24 22 24 28   BILIRUBIN mg/dL 0.4 0.5  "0.5 0.5 0.4 0.4   ALBUMIN g/dL 4.1 4.2 4.5 4.7 4.6 4.1   GLOBULIN gm/dL 3.1 3.1 2.9 3.0 3.0 3.3       No results for input(s): \"MSPIKE\", \"KAPPALAMB\", \"IGLFLC\", \"URICACID\", \"FREEKAPPAL\", \"CEA\", \"LDH\", \"REFLABREPO\" in the last 02239 hours.    No results for input(s): \"IRON\", \"TIBC\", \"LABIRON\", \"FERRITIN\", \"R5PHOHC\", \"TSH\", \"FOLATE\" in the last 13844 hours.    Invalid input(s): \"VITB12\"    Simeon Tam reports a pain score of 0.              ASSESSMENT:  1.   Prostate cancer, hormone refractory.   AJCC stage (TX, NX, M0)  Treatment status:Casodex and leuprolide with PSA recurrence. Being treated with apalutamide and leuprolde (by Dr. Oneil).  2.   Performance status of 0.  3.   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  On observation.  4.   Cirrhosis, etiology of thrombocytopenia and followed by PCP.  5.   Urethral stricture. Self catheterization as indicated. \"It depends\"             PLAN:  1.    Re: Tolerance to apalutamide.  \"It's doing fine.\"   2.    Re:  Heme status.  Hemoglobin 13, WBC 5.98 and platelet 146 from 131 from 112 from 122 from 146 from 144.  3.    Re:  Pre-office CMP.  GFR 95.3 ml/minute and glucose 204.  4.    Re:  Pre-office PSA pending from 0.053 from 0.050 from 0.047 from 0.048 from 0.038 from 0.029 from 0.031 from 0.031 from 0.026 from 0.024 from 0.019 from 0.020 from 0.016 from 0.015 from 0.018 (forgot my Lupron) from < 0.014 from < 0.014  from <0.014 from <0.014 from <0.01 from < 0.014 from < 0.014 from <0.014 from < 0.014 from < 0.014 from < 0.014 from < 0.014 from < 0.014 from < 0.014 from < 0.014 from < 0.014 from < 0.014 from < 0.014 from < 0.014 from 0.015 from 0.03 from 0.125 from 1.03 from 27.3. Testosterone pending from 26.4 from 26.6 from 25.1 from 35.2 from 11.8 from 35.7 from 23.4 from 28.1 from 25 from 28.2 from 25.1 from 34.1 from 18.1 from 527 from 580 from 443 from 235.  Latest leuprolide was administered  on 8/9/2023.   5.   eRx " "for Compazine 10 mg p.o. every 4 hours as needed for nausea/vomiting, 60, 2 refills if needed.    6.   eRx for C43D1 started on 10/11/2023 apalutamide 60 mg, take four tablets total dose 240 mg po daily, number, 120.  Take either with or without food.  Swallow tablets whole. TSH before apalutamide.  No grapefruit juice or grapefruit.  Monitor for rash, falls, seizures, arrhythmias, fractures, or hypothyroidism.  Continue apalutamide since PSA is < 2.  7.   Continue ongoing management per primary care physician and other specialists.  8.   Plan of care discussed with patient and Essence, spouse.  Understanding expressed.  Patient is agreeable to proceed.  9.   Leuprolide per Dr. Oneil given every 6 months.  10.  Questions were answered to his satisfaction. \"I do.\"   11.  Advance Care Planning  ACP discussion was declined by the patient. Patient does not have an advance directive, information provided.  12.  Return to office 4 weeks with CMP, PSA, testosterone, and CBC with differential, same day.            I have reviewed the assessment and plan and verified the accuracy of it. No changes to assessment and plan since the information was documented. Deshawn Black MD 11/01/23         I spent 31 total minutes, face-to-face, caring for Simeon mcdonald. Greater than 50% of this time involved counseling and/or coordination of care as documented within this note.                          (Matthew Oneil MD)  Lucio Garcia MD    "

## 2023-11-01 ENCOUNTER — OFFICE VISIT (OUTPATIENT)
Dept: ONCOLOGY | Facility: CLINIC | Age: 78
End: 2023-11-01
Payer: MEDICARE

## 2023-11-01 ENCOUNTER — LAB (OUTPATIENT)
Dept: LAB | Facility: HOSPITAL | Age: 78
End: 2023-11-01
Payer: MEDICARE

## 2023-11-01 VITALS
DIASTOLIC BLOOD PRESSURE: 68 MMHG | SYSTOLIC BLOOD PRESSURE: 122 MMHG | TEMPERATURE: 97.9 F | HEIGHT: 68 IN | RESPIRATION RATE: 18 BRPM | HEART RATE: 93 BPM | BODY MASS INDEX: 29.1 KG/M2 | OXYGEN SATURATION: 94 % | WEIGHT: 192 LBS

## 2023-11-01 DIAGNOSIS — C61 PROSTATE CANCER: Primary | ICD-10-CM

## 2023-11-01 LAB
ALBUMIN SERPL-MCNC: 4.1 G/DL (ref 3.5–5.2)
ALBUMIN/GLOB SERPL: 1.3 G/DL
ALP SERPL-CCNC: 57 U/L (ref 39–117)
ALT SERPL W P-5'-P-CCNC: 19 U/L (ref 1–41)
ANION GAP SERPL CALCULATED.3IONS-SCNC: 12 MMOL/L (ref 5–15)
AST SERPL-CCNC: 23 U/L (ref 1–40)
BASOPHILS # BLD AUTO: 0.04 10*3/MM3 (ref 0–0.2)
BASOPHILS NFR BLD AUTO: 0.7 % (ref 0–1.5)
BILIRUB SERPL-MCNC: 0.4 MG/DL (ref 0–1.2)
BUN SERPL-MCNC: 15 MG/DL (ref 8–23)
BUN/CREAT SERPL: 23.1 (ref 7–25)
CALCIUM SPEC-SCNC: 9.5 MG/DL (ref 8.6–10.5)
CHLORIDE SERPL-SCNC: 96 MMOL/L (ref 98–107)
CO2 SERPL-SCNC: 27 MMOL/L (ref 22–29)
CREAT SERPL-MCNC: 0.65 MG/DL (ref 0.76–1.27)
DEPRECATED RDW RBC AUTO: 42.5 FL (ref 37–54)
EGFRCR SERPLBLD CKD-EPI 2021: 95.3 ML/MIN/1.73
EOSINOPHIL # BLD AUTO: 0.06 10*3/MM3 (ref 0–0.4)
EOSINOPHIL NFR BLD AUTO: 1 % (ref 0.3–6.2)
ERYTHROCYTE [DISTWIDTH] IN BLOOD BY AUTOMATED COUNT: 13.1 % (ref 12.3–15.4)
GLOBULIN UR ELPH-MCNC: 3.1 GM/DL
GLUCOSE SERPL-MCNC: 204 MG/DL (ref 65–99)
HCT VFR BLD AUTO: 38.9 % (ref 37.5–51)
HGB BLD-MCNC: 13 G/DL (ref 13–17.7)
HOLD SPECIMEN: NORMAL
IMM GRANULOCYTES # BLD AUTO: 0.03 10*3/MM3 (ref 0–0.05)
IMM GRANULOCYTES NFR BLD AUTO: 0.5 % (ref 0–0.5)
LYMPHOCYTES # BLD AUTO: 0.83 10*3/MM3 (ref 0.7–3.1)
LYMPHOCYTES NFR BLD AUTO: 13.9 % (ref 19.6–45.3)
MCH RBC QN AUTO: 29.5 PG (ref 26.6–33)
MCHC RBC AUTO-ENTMCNC: 33.4 G/DL (ref 31.5–35.7)
MCV RBC AUTO: 88.2 FL (ref 79–97)
MONOCYTES # BLD AUTO: 0.36 10*3/MM3 (ref 0.1–0.9)
MONOCYTES NFR BLD AUTO: 6 % (ref 5–12)
NEUTROPHILS NFR BLD AUTO: 4.66 10*3/MM3 (ref 1.7–7)
NEUTROPHILS NFR BLD AUTO: 77.9 % (ref 42.7–76)
NRBC BLD AUTO-RTO: 0 /100 WBC (ref 0–0.2)
PLATELET # BLD AUTO: 146 10*3/MM3 (ref 140–450)
PMV BLD AUTO: 10 FL (ref 6–12)
POTASSIUM SERPL-SCNC: 4 MMOL/L (ref 3.5–5.2)
PROT SERPL-MCNC: 7.2 G/DL (ref 6–8.5)
PSA SERPL-MCNC: 0.07 NG/ML (ref 0–4)
RBC # BLD AUTO: 4.41 10*6/MM3 (ref 4.14–5.8)
SODIUM SERPL-SCNC: 135 MMOL/L (ref 136–145)
TESTOST SERPL-MCNC: 36.1 NG/DL (ref 193–740)
WBC NRBC COR # BLD: 5.98 10*3/MM3 (ref 3.4–10.8)

## 2023-11-01 PROCEDURE — 84153 ASSAY OF PSA TOTAL: CPT

## 2023-11-01 PROCEDURE — 80053 COMPREHEN METABOLIC PANEL: CPT

## 2023-11-01 PROCEDURE — 85025 COMPLETE CBC W/AUTO DIFF WBC: CPT

## 2023-11-01 PROCEDURE — 84403 ASSAY OF TOTAL TESTOSTERONE: CPT

## 2023-11-01 PROCEDURE — 36415 COLL VENOUS BLD VENIPUNCTURE: CPT

## 2023-11-07 ENCOUNTER — SPECIALTY PHARMACY (OUTPATIENT)
Dept: ONCOLOGY | Facility: HOSPITAL | Age: 78
End: 2023-11-07
Payer: MEDICARE

## 2023-11-07 NOTE — PROGRESS NOTES
"Specialty Pharmacy Refill Coordination Note       Spoke with Essence (spouse) for Mr. Tam's monthly telephone follow-up regarding the oral oncology medication. She stated that he has been tolerating the Erleada well and has not had any obvious side effects from the medication. \"He's doing good, just the same\" He has not missed any doses of the medication in the last month. He still receives refills through [facility] , and has not had any delays in getting those refills. She states that there have been no changes to his maintenance medications and no new drug allergies that she is aware of. She stated he has not had any recent stays in the hospital and has not visited the ER in the last month due to the Erleada .     I encouraged her to reach out anytime with any questions or concerns they might have regarding his oral chemotherapy medication.     No needs expressed by the patient. Will follow-up next month regarding the patient's oral chemotherapy with a routine telephone consultation.     Refill Questions      Flowsheet Row Most Recent Value   Changes to allergies? No   Changes to medications? No   New conditions since last clinic visit No   Unplanned office visit, urgent care, ED, or hospital admission in the last 4 weeks  No   How does patient/caregiver feel medication is working? Good   Financial problems or insurance changes  No   Since the previous refill, were any specialty medication doses or scheduled injections missed or delayed?  No   Does this patient require a clinical escalation to a pharmacist? No                       Follow-up: 28-30 day(s)     Dori Mathis, Pharmacy Technician  Specialty Pharmacy Technician        "

## 2023-11-09 NOTE — PROGRESS NOTES
[facility] GROUP HEMATOLOGY & ONCOLOGY  [address]  [place] [address] [phone]    Patient Name: Simeon Tam  Encounter Date: 11/29/2023  YOB: 1945  Patient Number: [id number]      REASON FOR FOLLOW-UP: Simeon Tam is a pleasant 78 y.o.  male who is seen in follow-up for hormone refractory prostate cancer (HRPC), non metastatic.  He is seen C44D22 of single agent apalutamide.  He is seen alone. History is obtained from patient.  History is considered reliable.       Oncology/Hematology History Overview Note   DIAGNOSTIC ABNORMALITIES:  He presented with rising PSA of 13 and was diagnosed with prostate cancer about 25 years ago.   Previous PSA was 22.26 on 10/05/2016, <0.13 on 07/17/2017, 7.91 on 10/17/2018, 19.29 on 03/19/2020, and 22.25 on 04/07/2020.   He was seen by Dr. Oneil 05/12/2020. Latest PSA was 22.25 ng/ml on 04/07/2020.  Previous PSA 22.26 on 10/05/2016.  Bone scan and CT scan of the abdomen and pelvis were negative for prostate cancer on 10/2016.  Started on Casodex and possibly leuprolide.  PSA  was down to <0.13 on 07/17/207.  Plan for apalutamide after staging scans.   CT abdomen and pelvis 06/11/2020. No evidence of metastatic disease in the abdomen or pelvis. Right renal pelvis and ureter urothelial thickening and hyperemia. Urinary bladder wall is thickened and there is adjacent inflammation. Recommend correlation with urinalysis and exam to exclude cystitis with right-sided pyelitis.  Cirrhosis.  Tiny 8 mm hypodense RIGHT inferior liver lesion on image 33 is too small to characterize.   Bone scan 06/11/2020. No evidence of bone metastases.        PREVIOUS INTERVENTIONS:  He was treated with adjuvant radiation at [place] and androgen ablation with Casodex.  Lupron and Casodex 10/2016. He had 7 months of ADT therapy with Lupron.  Apalutamide 07/22/2020 through present.     Prostate cancer    6/29/2020 -  Chemotherapy    OP PROSTATE Apalutamide     6/30/2020 Initial Diagnosis    Prostate cancer (CMS/[facility])     7/29/2020 -  Chemotherapy    OP LEUPROLIDE 45MG Q6M - ORDER EXPIRES 7/18/24     10/5/2022 - 10/5/2022 Chemotherapy    OP PROSTATE Apalutamide         PAST MEDICAL HISTORY:  ALLERGIES:  Allergies   Allergen Reactions    Sulfa Antibiotics Rash     CURRENT MEDICATIONS:  Outpatient Encounter Medications as of 11/29/2023   Medication Sig Dispense Refill    Apalutamide 60 MG tablet Take 4 tablets by mouth Daily. Take with or without food. Do not crush or chew tablets. 120 tablet 11    Apalutamide 60 MG tablet Take 4 tablets by mouth Daily. Take with or without food. Do not crush or chew tablets. 120 tablet 11    calcium carbonate (OS-JONNY) 600 MG tablet Take 1 tablet by mouth Daily.      diclofenac (VOLTAREN) 75 MG EC tablet Take 1 tablet by mouth 2 (Two) Times a Day As Needed (shoulder pain).      dutasteride (AVODART) 0.5 MG capsule TAKE (1) CAPSULE ONCE DAILY. 90 capsule 0    JANUVIA 100 MG tablet Take 1 tablet by mouth Daily.      lisinopril-hydrochlorothiazide (PRINZIDE,ZESTORETIC) 20-12.5 MG per tablet Take 1 tablet by mouth Daily.      Loperamide HCl (IMODIUM PO) Take 1 dose by mouth As Needed (diarrhea).      rosuvastatin (CRESTOR) 20 MG tablet Take 1 tablet by mouth Every Night.      tolterodine LA (DETROL LA) 4 MG 24 hr capsule TAKE (1) CAPSULE ONCE DAILY. 30 capsule 0    [DISCONTINUED] tolterodine LA (DETROL LA) 4 MG 24 hr capsule TAKE (1) CAPSULE ONCE DAILY. 30 capsule 0    [DISCONTINUED] tolterodine LA (DETROL LA) 4 MG 24 hr capsule TAKE (1) CAPSULE ONCE DAILY. 30 capsule 0     No facility-administered encounter medications on file as of 11/29/2023.     ADULT ILLNESSES:  Patient Active Problem List   Diagnosis Code    Hypertension I10    Hyperlipidemia E78.5    Diabetes mellitus E11.9    Cancer C80.1    Arrhythmia I49.9    Non morbid obesity due to excess calories E66.09    Prostate cancer  C61    Anterior urethral stricture N35.914     SURGERIES:  Past Surgical History:   Procedure Laterality Date    CATARACT EXTRACTION      CYSTOSCOPY RETROGRADE PYELOGRAM Bilateral 4/21/2021    Procedure: CYSTOSCOPY RETROGRADE PYELOGRAM, possible DIRECT VISION INTERNAL URETHROTOMY;  Surgeon: Matthew Oneil MD;  Location: [facility];  Service: Urology;  Laterality: Bilateral;    CYSTOSCOPY URETHROTOMY VISUAL INTERNAL Bilateral 4/21/2021    Procedure: possible DIRECT VISION INTERNAL URETHROTOMY;  Surgeon: Matthew Oneil MD;  Location: [facility] OR;  Service: Urology;  Laterality: Bilateral;    EXCISION LESION      neck    KIDNEY STONE SURGERY      REPLACEMENT TOTAL KNEE Left     TONSILLECTOMY       HEALTH MAINTENANCE ITEMS:  Health Maintenance Due   Topic Date Due    URINE MICROALBUMIN  Never done    Pneumococcal Vaccine 65+ (1 - PCV) Never done    TDAP/TD VACCINES (1 - Tdap) Never done    ZOSTER VACCINE (1 of 2) Never done    Hepatitis B (1 of 3 - Risk 3-dose series) Never done    ANNUAL WELLNESS VISIT  Never done    LIPID PANEL  Never done    HEMOGLOBIN A1C  Never done    DIABETIC EYE EXAM  Never done    BMI FOLLOWUP  04/27/2022    INFLUENZA VACCINE  Never done    COVID-19 Vaccine (4 - 2023-24 season) 09/01/2023       <no information>  Last Completed Colonoscopy       This patient has no relevant Health Maintenance data.            There is no immunization history on file for this patient.  Last Completed Mammogram       This patient has no relevant Health Maintenance data.              FAMILY HISTORY:  Family History   Problem Relation Age of Onset    Dementia Mother      SOCIAL HISTORY:  Social History     Socioeconomic History    Marital status:    Tobacco Use    Smoking status: Never    Smokeless tobacco: Never   Vaping Use    Vaping Use: Never used   Substance and Sexual Activity    Alcohol use: No    Drug use: No    Sexual activity: Defer       REVIEW OF SYSTEMS:    Review of Systems   Constitutional:   "Negative for fatigue, fever and unexpected weight change.        \"Feeling good.\"   HENT:  Negative for congestion and nosebleeds.    Eyes:  Negative for discharge and redness.   Respiratory:  Negative for shortness of breath and wheezing.    Cardiovascular:  Negative for chest pain and leg swelling.   Gastrointestinal:  Negative for abdominal pain, constipation, diarrhea, nausea and vomiting.   Endocrine: Negative for polydipsia and polyphagia.   Genitourinary:  Negative for hematuria.        \"Self catheter.\"   Musculoskeletal:  Negative for gait problem and joint swelling.   Skin:  Negative for pallor.   Allergic/Immunologic: Negative for food allergies.   Neurological:  Negative for dizziness, facial asymmetry and speech difficulty.   Hematological:  Negative for adenopathy. Does not bruise/bleed easily.   Psychiatric/Behavioral:  Negative for agitation, confusion and hallucinations.        VITAL SIGNS: /64   Pulse 65   Temp 97.9 °F (36.6 °C)   Resp 18   Ht 172.7 cm (68\")   Wt 87.7 kg (193 lb 6.4 oz)   SpO2 93%   BMI 29.41 kg/m²  Gained 1 pound.   Pain Score    11/29/23 1306   PainSc: 0-No pain       PHYSICAL EXAMINATION:     Physical Exam  Vitals reviewed.   Constitutional:       General: He is not in acute distress.  HENT:      Head: Normocephalic and atraumatic.   Eyes:      General: No scleral icterus.  Cardiovascular:      Rate and Rhythm: Normal rate.   Pulmonary:      Effort: No respiratory distress.      Breath sounds: No wheezing or rales.   Abdominal:      General: Bowel sounds are normal.      Palpations: Abdomen is soft.      Tenderness: There is no abdominal tenderness.   Musculoskeletal:         General: No swelling.      Cervical back: Neck supple.   Skin:     General: Skin is warm.      Coloration: Skin is not pale.   Neurological:      Mental Status: He is alert and oriented to person, place, and time.   Psychiatric:         Mood and Affect: Mood normal.         Behavior: Behavior " normal.         Thought Content: Thought content normal.         Judgment: Judgment normal.         LABS    Lab Results - Last 18 Months   Lab Units 11/29/23  1215 11/01/23  1237 10/04/23  1014 09/06/23  1036 08/09/23  1036 07/07/23  1036 05/26/23  1338 05/03/23  1341 04/05/23  1326 03/08/23  1407 08/31/22  1255 08/03/22  1359   HEMOGLOBIN g/dL 13.3 13.0 13.0 13.2 13.7 13.4 13.5   < > 12.8* 13.2   < > 13.3   HEMATOCRIT % 39.3 38.9 39.7 40.2 41.7 40.4 41.1   < > 39.0 39.8   < > 39.0   MCV fL 88.9 88.2 90.4 92.0 90.1 89.2 92.2   < > 91.5 90.7   < > 90.3   WBC 10*3/mm3 9.04 5.98 5.22 5.37 8.22 5.60 7.85   < > 5.94 8.03   < > 7.19   RDW % 13.0 13.1 13.3 13.4 13.4 13.1 13.0   < > 13.4 13.3   < > 13.1   MPV fL 10.8 10.0 10.1 10.7 10.6 10.2 10.3   < > 10.6 10.3   < > 10.6   PLATELETS 10*3/mm3 132* 146 131* 112* 122* 146 144   < > 108* 135*   < > 124*   IMM GRAN % %  --  0.5  --   --   --  0.4 0.4  --  0.2 0.1  --  0.3   NEUTROS ABS 10*3/mm3 7.08* 4.66 3.75 3.84 6.62 4.08 5.38   < > 4.44 6.12   < > 5.50   LYMPHS ABS 10*3/mm3 0.94 0.83 0.83 0.89 0.88 0.89 1.44   < > 0.83 1.04   < > 0.93   MONOS ABS 10*3/mm3 0.90 0.36 0.45 0.48 0.58 0.47 0.87   < > 0.53 0.66   < > 0.57   EOS ABS 10*3/mm3 0.04 0.06 0.15 0.10 0.09 0.10 0.10   < > 0.10 0.16   < > 0.14   BASOS ABS 10*3/mm3 0.05 0.04 0.03 0.03 0.03 0.04 0.03   < > 0.03 0.04   < > 0.03   IMMATURE GRANS (ABS) 10*3/mm3  --  0.03  --   --   --  0.02 0.03  --  0.01 0.01  --  0.02   NRBC /100 WBC  --  0.0  --   --   --  0.0 0.0  --  0.0 0.0  --  0.0    < > = values in this interval not displayed.       Lab Results - Last 18 Months   Lab Units 11/29/23  1215 11/01/23  1237 10/04/23  1014 09/06/23  1036 08/09/23  1036 07/07/23  1036   GLUCOSE mg/dL 170* 204* 172* 164* 149* 194*   SODIUM mmol/L 133* 135* 136 136 132* 131*   POTASSIUM mmol/L 3.8 4.0 4.0 4.5 4.3 4.4   CO2 mmol/L 28.0 27.0 27.0 28.0 27.0 24.0   CHLORIDE mmol/L 93* 96* 96* 96* 93* 93*   ANION GAP mmol/L 12.0 12.0 13.0 12.0  "12.0 14.0   CREATININE mg/dL 0.67* 0.65* 0.64* 0.65* 0.65* 0.66*   BUN mg/dL 15 15 12 16 14 12   BUN / CREAT RATIO  22.4 23.1 18.8 24.6 21.5 18.2   CALCIUM mg/dL 9.5 9.5 9.9 9.4 9.9 10.2   ALK PHOS U/L 64 57 58 64 66 72   TOTAL PROTEIN g/dL 7.6 7.2 7.3 7.4 7.7 7.6   ALT (SGPT) U/L 13 19 13 17 16 18   AST (SGOT) U/L 19 23 20 24 22 24   BILIRUBIN mg/dL 0.8 0.4 0.5 0.5 0.5 0.4   ALBUMIN g/dL 4.1 4.1 4.2 4.5 4.7 4.6   GLOBULIN gm/dL 3.5 3.1 3.1 2.9 3.0 3.0       No results for input(s): \"MSPIKE\", \"KAPPALAMB\", \"IGLFLC\", \"URICACID\", \"FREEKAPPAL\", \"CEA\", \"LDH\", \"REFLABREPO\" in the last 17591 hours.    No results for input(s): \"IRON\", \"TIBC\", \"LABIRON\", \"FERRITIN\", \"K9XIXVI\", \"TSH\", \"FOLATE\" in the last 78498 hours.    Invalid input(s): \"VITB12\"    Simeon Tam reports a pain score of 0.          ASSESSMENT:  1.   Prostate cancer, hormone refractory.   AJCC stage (TX, NX, M0)  Treatment status:Casodex and leuprolide with PSA recurrence. Being treated with apalutamide and leuprolde (by Dr. Oneil).  2.   Performance status of 0.  3.   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  Under observation.  4.   Cirrhosis, etiology of thrombocytopenia and followed by PCP.  5.   Urethral stricture. Self catheterization as indicated. \"It varies. As needed.\"             PLAN:  1.    Re: Tolerance to apalutamide.  \"Doing fine.\"   2.    Re:  Heme status.  Hemoglobin 13.3, WBC 9.04 and platelet 132 from 146 from 131 from 112 from 122 from 146 from 144.  3.    Re:  Pre-office CMP.  GFR 94.4 ml/minute and glucose 170.  4.    Re:  Pre-office PSA pending from 0.071 from 0.053 from 0.050 from 0.047 from 0.048 from 0.038 from 0.029 from 0.031 from 0.031 from 0.026 from 0.024 from 0.019 from 0.020 from 0.016 from 0.015 from 0.018 (forgot my Lupron) from < 0.014 from < 0.014  from <0.014 from <0.014 from <0.01 from < 0.014 from < 0.014 from <0.014 from < 0.014 from < 0.014 from < 0.014 from < 0.014 " "from < 0.014 from < 0.014 from < 0.014 from < 0.014 from < 0.014 from < 0.014 from < 0.014 from 0.015 from 0.03 from 0.125 from 1.03 from 27.3. Testosterone pending from 36.1 from 26.4 from 26.6 from 25.1 from 35.2 from 11.8 from 35.7 from 23.4 from 28.1 from 25 from 28.2 from 25.1 from 34.1 from 18.1 from 527 from 580 from 443 from 235.  Most recent leuprolide was administered  on 8/9/2023.   5.   eRx for Compazine 10 mg p.o. every 4 hours as needed for nausea/vomiting, 60, 2 refills if needed.    6.   eRx for C44D1 started on 11/8/2023 apalutamide 60 mg, take four tablets total dose 240 mg po daily, number, 120.  Take either with or without food.  Swallow tablets whole. TSH before apalutamide.  No grapefruit juice or grapefruit.  Monitor for seizures, rash, falls, arrhythmias, hypothyroidism or fractures.  Continue apalutamide since PSA is < 2.  7.   Continue ongoing management per primary care physician and other specialists.  8.   Plan of care discussed with patient.  Understanding expressed.  Patient is agreeable to proceed.  9.   Leuprolide per Dr. Oneil given every 6 months.  10.  Questions were answered to his satisfaction. \"Yea.\"   11.  Advance Care Planning  ACP discussion was declined by the patient. Patient does not have an advance directive, information provided.  12.  Return to office 3 weeks with CMP, PSA, testosterone, and CBC with differential, same day.           I have reviewed the assessment and plan and verified the accuracy of it. No changes to assessment and plan since the information was documented. Deshawn Black MD 11/29/23          I spent 30 total minutes, face-to-face, caring for Simeon today. Greater than 50% of this time involved counseling and/or coordination of care as documented within this note.                          (Matthew Oneil MD)  Lucio Garcia MD    "

## 2023-11-10 DIAGNOSIS — N31.9 NEUROGENIC BLADDER: ICD-10-CM

## 2023-11-10 RX ORDER — TOLTERODINE 4 MG/1
CAPSULE, EXTENDED RELEASE ORAL
Qty: 30 CAPSULE | Refills: 0 | Status: SHIPPED | OUTPATIENT
Start: 2023-11-10

## 2023-11-20 DIAGNOSIS — N31.9 NEUROGENIC BLADDER: ICD-10-CM

## 2023-11-20 RX ORDER — TOLTERODINE 4 MG/1
CAPSULE, EXTENDED RELEASE ORAL
Qty: 30 CAPSULE | Refills: 0 | Status: SHIPPED | OUTPATIENT
Start: 2023-11-20

## 2023-11-29 ENCOUNTER — OFFICE VISIT (OUTPATIENT)
Dept: ONCOLOGY | Facility: CLINIC | Age: 78
End: 2023-11-29
Payer: MEDICARE

## 2023-11-29 ENCOUNTER — LAB (OUTPATIENT)
Dept: LAB | Facility: HOSPITAL | Age: 78
End: 2023-11-29
Payer: MEDICARE

## 2023-11-29 VITALS
DIASTOLIC BLOOD PRESSURE: 64 MMHG | HEIGHT: 68 IN | HEART RATE: 65 BPM | SYSTOLIC BLOOD PRESSURE: 128 MMHG | BODY MASS INDEX: 29.31 KG/M2 | OXYGEN SATURATION: 93 % | TEMPERATURE: 97.9 F | RESPIRATION RATE: 18 BRPM | WEIGHT: 193.4 LBS

## 2023-11-29 DIAGNOSIS — C61 PROSTATE CANCER: Primary | ICD-10-CM

## 2023-11-29 DIAGNOSIS — C61 PROSTATE CANCER: ICD-10-CM

## 2023-11-29 LAB
ALBUMIN SERPL-MCNC: 4.1 G/DL (ref 3.5–5.2)
ALBUMIN/GLOB SERPL: 1.2 G/DL
ALP SERPL-CCNC: 64 U/L (ref 39–117)
ALT SERPL W P-5'-P-CCNC: 13 U/L (ref 1–41)
ANION GAP SERPL CALCULATED.3IONS-SCNC: 12 MMOL/L (ref 5–15)
AST SERPL-CCNC: 19 U/L (ref 1–40)
BASOPHILS # BLD AUTO: 0.05 10*3/MM3 (ref 0–0.2)
BASOPHILS NFR BLD AUTO: 0.6 % (ref 0–1.5)
BILIRUB SERPL-MCNC: 0.8 MG/DL (ref 0–1.2)
BUN SERPL-MCNC: 15 MG/DL (ref 8–23)
BUN/CREAT SERPL: 22.4 (ref 7–25)
CALCIUM SPEC-SCNC: 9.5 MG/DL (ref 8.6–10.5)
CHLORIDE SERPL-SCNC: 93 MMOL/L (ref 98–107)
CO2 SERPL-SCNC: 28 MMOL/L (ref 22–29)
CREAT SERPL-MCNC: 0.67 MG/DL (ref 0.76–1.27)
DEPRECATED RDW RBC AUTO: 42.6 FL (ref 37–54)
EGFRCR SERPLBLD CKD-EPI 2021: 94.4 ML/MIN/1.73
EOSINOPHIL # BLD AUTO: 0.04 10*3/MM3 (ref 0–0.4)
EOSINOPHIL NFR BLD AUTO: 0.4 % (ref 0.3–6.2)
ERYTHROCYTE [DISTWIDTH] IN BLOOD BY AUTOMATED COUNT: 13 % (ref 12.3–15.4)
GLOBULIN UR ELPH-MCNC: 3.5 GM/DL
GLUCOSE SERPL-MCNC: 170 MG/DL (ref 65–99)
HCT VFR BLD AUTO: 39.3 % (ref 37.5–51)
HGB BLD-MCNC: 13.3 G/DL (ref 13–17.7)
LYMPHOCYTES # BLD AUTO: 0.94 10*3/MM3 (ref 0.7–3.1)
LYMPHOCYTES NFR BLD AUTO: 10.4 % (ref 19.6–45.3)
MCH RBC QN AUTO: 30.1 PG (ref 26.6–33)
MCHC RBC AUTO-ENTMCNC: 33.8 G/DL (ref 31.5–35.7)
MCV RBC AUTO: 88.9 FL (ref 79–97)
MONOCYTES # BLD AUTO: 0.9 10*3/MM3 (ref 0.1–0.9)
MONOCYTES NFR BLD AUTO: 10 % (ref 5–12)
NEUTROPHILS NFR BLD AUTO: 7.08 10*3/MM3 (ref 1.7–7)
NEUTROPHILS NFR BLD AUTO: 78.3 % (ref 42.7–76)
PLATELET # BLD AUTO: 132 10*3/MM3 (ref 140–450)
PMV BLD AUTO: 10.8 FL (ref 6–12)
POTASSIUM SERPL-SCNC: 3.8 MMOL/L (ref 3.5–5.2)
PROT SERPL-MCNC: 7.6 G/DL (ref 6–8.5)
PSA SERPL-MCNC: 0.06 NG/ML (ref 0–4)
RBC # BLD AUTO: 4.42 10*6/MM3 (ref 4.14–5.8)
SODIUM SERPL-SCNC: 133 MMOL/L (ref 136–145)
TESTOST SERPL-MCNC: 44.9 NG/DL (ref 193–740)
WBC NRBC COR # BLD AUTO: 9.04 10*3/MM3 (ref 3.4–10.8)

## 2023-11-29 PROCEDURE — 36415 COLL VENOUS BLD VENIPUNCTURE: CPT

## 2023-11-29 PROCEDURE — 85025 COMPLETE CBC W/AUTO DIFF WBC: CPT

## 2023-11-29 PROCEDURE — 84403 ASSAY OF TOTAL TESTOSTERONE: CPT

## 2023-11-29 PROCEDURE — 84153 ASSAY OF PSA TOTAL: CPT

## 2023-11-29 PROCEDURE — 80053 COMPREHEN METABOLIC PANEL: CPT

## 2023-12-05 ENCOUNTER — SPECIALTY PHARMACY (OUTPATIENT)
Dept: ONCOLOGY | Facility: HOSPITAL | Age: 78
End: 2023-12-05
Payer: MEDICARE

## 2023-12-05 NOTE — PROGRESS NOTES
"Specialty Pharmacy Refill Coordination Note       Spoke with Essence (spouse) for Mr. Tam's monthly telephone follow-up regarding the oral oncology medication. She stated that he has been tolerating the Erleada well and has not had any obvious side effects from the medication. \"He's doing good as far as I know, having no problems at all\" He has not missed any doses of the medication in the last month. He still receives refills through [facility] , and has not had any delays in getting those refills. She states that there have been no changes to his maintenance medications and no new drug allergies that she is aware of. She stated he has not had any recent stays in the hospital and has not visited the ER in the last month due to the Erleada .     I encouraged her to reach out anytime with any questions or concerns they might have regarding his oral chemotherapy medication.     No needs expressed by the patient. Will follow-up next month regarding the patient's oral chemotherapy with a routine telephone consultation.     Refill Questions      Flowsheet Row Most Recent Value   Changes to allergies? No   Changes to medications? No   New conditions since last clinic visit No   Unplanned office visit, urgent care, ED, or hospital admission in the last 4 weeks  No   How does patient/caregiver feel medication is working? Good   Financial problems or insurance changes  No   Since the previous refill, were any specialty medication doses or scheduled injections missed or delayed?  No   Does this patient require a clinical escalation to a pharmacist? No                       Follow-up: 28-30 day(s)     Dori Mathis, Pharmacy Technician  Specialty Pharmacy Technician        "

## 2023-12-12 NOTE — PROGRESS NOTES
[facility] HEMATOLOGY & ONCOLOGY  [address]  [place] [address] [phone]    Patient Name: Simeon Tam  Encounter Date: 12/20/2023  YOB: 1945  Patient Number: [id number]      REASON FOR FOLLOW-UP: Simeon Tam is a pleasant 78 y.o.  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  He is seen C45D15 of single agent apalutamide.  He is seen with spouse, Essence. History is obtained from patient.  He is a reliable historian.          Oncology/Hematology History Overview Note   DIAGNOSTIC ABNORMALITIES:  He presented with rising PSA of 13 and was diagnosed with prostate cancer about 25 years ago.   Previous PSA was 22.26 on 10/05/2016, <0.13 on 07/17/2017, 7.91 on 10/17/2018, 19.29 on 03/19/2020, and 22.25 on 04/07/2020.   He was seen by Dr. Oneil 05/12/2020. Latest PSA was 22.25 ng/ml on 04/07/2020.  Previous PSA 22.26 on 10/05/2016.  Bone scan and CT scan of the abdomen and pelvis were negative for prostate cancer on 10/2016.  Started on Casodex and possibly leuprolide.  PSA  was down to <0.13 on 07/17/207.  Plan for apalutamide after staging scans.   CT abdomen and pelvis 06/11/2020. No evidence of metastatic disease in the abdomen or pelvis. Right renal pelvis and ureter urothelial thickening and hyperemia. Urinary bladder wall is thickened and there is adjacent inflammation. Recommend correlation with urinalysis and exam to exclude cystitis with right-sided pyelitis.  Cirrhosis.  Tiny 8 mm hypodense RIGHT inferior liver lesion on image 33 is too small to characterize.   Bone scan 06/11/2020. No evidence of bone metastases.        PREVIOUS INTERVENTIONS:  He was treated with adjuvant radiation at [place] and androgen ablation with Casodex.  Lupron and Casodex 10/2016. He had 7 months of ADT therapy with Lupron.  Apalutamide 07/22/2020 through present.     Prostate cancer    6/29/2020 -  Chemotherapy    OP PROSTATE Apalutamide     6/30/2020 Initial Diagnosis    Prostate cancer (CMS/[facility])     7/29/2020 -  Chemotherapy    OP LEUPROLIDE 45MG Q6M - ORDER EXPIRES 7/18/24     10/5/2022 - 10/5/2022 Chemotherapy    OP PROSTATE Apalutamide         PAST MEDICAL HISTORY:  ALLERGIES:  Allergies   Allergen Reactions    Sulfa Antibiotics Rash     CURRENT MEDICATIONS:  Outpatient Encounter Medications as of 12/20/2023   Medication Sig Dispense Refill    Apalutamide 60 MG tablet Take 4 tablets by mouth Daily. Take with or without food. Do not crush or chew tablets. 120 tablet 11    Apalutamide 60 MG tablet Take 4 tablets by mouth Daily. Take with or without food. Do not crush or chew tablets. 120 tablet 11    calcium carbonate (OS-JONNY) 600 MG tablet Take 1 tablet by mouth Daily.      diclofenac (VOLTAREN) 75 MG EC tablet Take 1 tablet by mouth 2 (Two) Times a Day As Needed (shoulder pain).      dutasteride (AVODART) 0.5 MG capsule TAKE (1) CAPSULE ONCE DAILY. 90 capsule 0    JANUVIA 100 MG tablet Take 1 tablet by mouth Daily.      lisinopril-hydrochlorothiazide (PRINZIDE,ZESTORETIC) 20-12.5 MG per tablet Take 1 tablet by mouth Daily.      Loperamide HCl (IMODIUM PO) Take 1 dose by mouth As Needed (diarrhea).      Multiple Vitamins-Minerals (ICAPS AREDS 2 PO) Take  by mouth.      rosuvastatin (CRESTOR) 20 MG tablet Take 1 tablet by mouth Every Night.      tolterodine LA (DETROL LA) 4 MG 24 hr capsule TAKE (1) CAPSULE ONCE DAILY. 30 capsule 0     No facility-administered encounter medications on file as of 12/20/2023.     ADULT ILLNESSES:  Patient Active Problem List   Diagnosis Code    Hypertension I10    Hyperlipidemia E78.5    Diabetes mellitus E11.9    Cancer C80.1    Arrhythmia I49.9    Non morbid obesity due to excess calories E66.09    Prostate cancer C61    Anterior urethral stricture N35.914     SURGERIES:  Past Surgical History:   Procedure Laterality Date    CATARACT EXTRACTION       "CYSTOSCOPY RETROGRADE PYELOGRAM Bilateral 4/21/2021    Procedure: CYSTOSCOPY RETROGRADE PYELOGRAM, possible DIRECT VISION INTERNAL URETHROTOMY;  Surgeon: Matthew Oneil MD;  Location: [facility] OR;  Service: Urology;  Laterality: Bilateral;    CYSTOSCOPY URETHROTOMY VISUAL INTERNAL Bilateral 4/21/2021    Procedure: possible DIRECT VISION INTERNAL URETHROTOMY;  Surgeon: Matthew Oneil MD;  Location:  [facility] OR;  Service: Urology;  Laterality: Bilateral;    EXCISION LESION      neck    KIDNEY STONE SURGERY      REPLACEMENT TOTAL KNEE Left     TONSILLECTOMY       HEALTH MAINTENANCE ITEMS:  Health Maintenance Due   Topic Date Due    URINE MICROALBUMIN  Never done    Pneumococcal Vaccine 65+ (1 - PCV) Never done    TDAP/TD VACCINES (1 - Tdap) Never done    ZOSTER VACCINE (1 of 2) Never done    Hepatitis B (1 of 3 - Risk 3-dose series) Never done    ANNUAL WELLNESS VISIT  Never done    LIPID PANEL  Never done    HEMOGLOBIN A1C  Never done    DIABETIC EYE EXAM  Never done    BMI FOLLOWUP  04/27/2022    INFLUENZA VACCINE  Never done    COVID-19 Vaccine (4 - 2023-24 season) 09/01/2023       <no information>  Last Completed Colonoscopy       This patient has no relevant Health Maintenance data.            There is no immunization history on file for this patient.  Last Completed Mammogram       This patient has no relevant Health Maintenance data.              FAMILY HISTORY:  Family History   Problem Relation Age of Onset    Dementia Mother      SOCIAL HISTORY:  Social History     Socioeconomic History    Marital status:    Tobacco Use    Smoking status: Never    Smokeless tobacco: Never   Vaping Use    Vaping Use: Never used   Substance and Sexual Activity    Alcohol use: No    Drug use: No    Sexual activity: Defer       REVIEW OF SYSTEMS:    Review of Systems   Constitutional:  Negative for fatigue and unexpected weight change.        \"I feel good.\"   HENT:  Negative for congestion.    Eyes:  Negative for redness " "and visual disturbance.   Respiratory:  Negative for cough and shortness of breath.    Cardiovascular:  Negative for chest pain and palpitations.   Gastrointestinal:  Negative for abdominal pain, nausea and vomiting.   Endocrine: Negative for polydipsia and polyphagia.   Genitourinary:  Negative for flank pain and hematuria.   Musculoskeletal:  Negative for gait problem and myalgias.   Skin:  Negative for pallor.   Allergic/Immunologic: Negative for food allergies.   Neurological:  Negative for dizziness, speech difficulty and weakness.   Hematological:  Negative for adenopathy. Does not bruise/bleed easily.   Psychiatric/Behavioral:  Negative for agitation, confusion and hallucinations.        VITAL SIGNS: /70   Pulse 84   Temp 97.9 °F (36.6 °C)   Resp 18   Ht 172.7 cm (68\")   Wt 86.4 kg (190 lb 8 oz)   SpO2 93%   BMI 28.97 kg/m²  Lost 3 pounds. \"Before pizza.\"  Pain Score    12/20/23 1121   PainSc: 0-No pain       PHYSICAL EXAMINATION:     Physical Exam  Vitals reviewed.   Constitutional:       General: He is not in acute distress.  HENT:      Head: Normocephalic and atraumatic.   Eyes:      General: No scleral icterus.  Cardiovascular:      Rate and Rhythm: Normal rate.   Pulmonary:      Effort: No respiratory distress.      Breath sounds: No wheezing or rales.   Abdominal:      General: Bowel sounds are normal.      Palpations: Abdomen is soft.   Musculoskeletal:         General: No swelling.      Cervical back: Neck supple.   Skin:     Coloration: Skin is not pale.   Neurological:      Mental Status: He is alert and oriented to person, place, and time.   Psychiatric:         Mood and Affect: Mood normal.         Behavior: Behavior normal.         Thought Content: Thought content normal.         Judgment: Judgment normal.         LABS    Lab Results - Last 18 Months   Lab Units 12/20/23  1102 11/29/23  1215 11/01/23  1237 10/04/23  1014 09/06/23  1036 08/09/23  1036 07/07/23  1036 05/26/23  1338 " 05/03/23  1341 04/05/23  1326 03/08/23  1407 08/31/22  1255 08/03/22  1359   HEMOGLOBIN g/dL 13.0 13.3 13.0 13.0 13.2 13.7 13.4 13.5   < > 12.8* 13.2   < > 13.3   HEMATOCRIT % 40.3 39.3 38.9 39.7 40.2 41.7 40.4 41.1   < > 39.0 39.8   < > 39.0   MCV fL 91.2 88.9 88.2 90.4 92.0 90.1 89.2 92.2   < > 91.5 90.7   < > 90.3   WBC 10*3/mm3 6.37 9.04 5.98 5.22 5.37 8.22 5.60 7.85   < > 5.94 8.03   < > 7.19   RDW % 13.3 13.0 13.1 13.3 13.4 13.4 13.1 13.0   < > 13.4 13.3   < > 13.1   MPV fL 10.6 10.8 10.0 10.1 10.7 10.6 10.2 10.3   < > 10.6 10.3   < > 10.6   PLATELETS 10*3/mm3 116* 132* 146 131* 112* 122* 146 144   < > 108* 135*   < > 124*   IMM GRAN % %  --   --  0.5  --   --   --  0.4 0.4  --  0.2 0.1  --  0.3   NEUTROS ABS 10*3/mm3 4.80 7.08* 4.66 3.75 3.84 6.62 4.08 5.38   < > 4.44 6.12   < > 5.50   LYMPHS ABS 10*3/mm3 0.95 0.94 0.83 0.83 0.89 0.88 0.89 1.44   < > 0.83 1.04   < > 0.93   MONOS ABS 10*3/mm3 0.51 0.90 0.36 0.45 0.48 0.58 0.47 0.87   < > 0.53 0.66   < > 0.57   EOS ABS 10*3/mm3 0.07 0.04 0.06 0.15 0.10 0.09 0.10 0.10   < > 0.10 0.16   < > 0.14   BASOS ABS 10*3/mm3 0.03 0.05 0.04 0.03 0.03 0.03 0.04 0.03   < > 0.03 0.04   < > 0.03   IMMATURE GRANS (ABS) 10*3/mm3  --   --  0.03  --   --   --  0.02 0.03  --  0.01 0.01  --  0.02   NRBC /100 WBC  --   --  0.0  --   --   --  0.0 0.0  --  0.0 0.0  --  0.0    < > = values in this interval not displayed.       Lab Results - Last 18 Months   Lab Units 11/29/23  1215 11/01/23  1237 10/04/23  1014 09/06/23  1036 08/09/23  1036 07/07/23  1036   GLUCOSE mg/dL 170* 204* 172* 164* 149* 194*   SODIUM mmol/L 133* 135* 136 136 132* 131*   POTASSIUM mmol/L 3.8 4.0 4.0 4.5 4.3 4.4   CO2 mmol/L 28.0 27.0 27.0 28.0 27.0 24.0   CHLORIDE mmol/L 93* 96* 96* 96* 93* 93*   ANION GAP mmol/L 12.0 12.0 13.0 12.0 12.0 14.0   CREATININE mg/dL 0.67* 0.65* 0.64* 0.65* 0.65* 0.66*   BUN mg/dL 15 15 12 16 14 12   BUN / CREAT RATIO  22.4 23.1 18.8 24.6 21.5 18.2   CALCIUM mg/dL 9.5 9.5 9.9 9.4 9.9  "10.2   ALK PHOS U/L 64 57 58 64 66 72   TOTAL PROTEIN g/dL 7.6 7.2 7.3 7.4 7.7 7.6   ALT (SGPT) U/L 13 19 13 17 16 18   AST (SGOT) U/L 19 23 20 24 22 24   BILIRUBIN mg/dL 0.8 0.4 0.5 0.5 0.5 0.4   ALBUMIN g/dL 4.1 4.1 4.2 4.5 4.7 4.6   GLOBULIN gm/dL 3.5 3.1 3.1 2.9 3.0 3.0       No results for input(s): \"MSPIKE\", \"KAPPALAMB\", \"IGLFLC\", \"URICACID\", \"FREEKAPPAL\", \"CEA\", \"LDH\", \"REFLABREPO\" in the last 69996 hours.    No results for input(s): \"IRON\", \"TIBC\", \"LABIRON\", \"FERRITIN\", \"I0RJDZM\", \"TSH\", \"FOLATE\" in the last 20748 hours.    Invalid input(s): \"VITB12\"    Simeon Tam reports a pain score of 0.         ASSESSMENT:  1.   Prostate cancer, hormone refractory.   AJCC stage (TX, NX, M0)  Treatment status:Casodex and leuprolide with PSA recurrence. On therapy with apalutamide and leuprolde (by Dr. Oneil).  2.   Performance status of 0.  3.   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  Under observation.  4.   Cirrhosis, etiology of thrombocytopenia and followed by PCP.  5.   Urethral stricture. Self catheterization as indicated. \"Probably 6 a day.\"             PLAN:  1.    Re: Tolerance to apalutamide.  \"They are doing fine.\"   2.    Re:  Heme status.  Hemoglobin 13, WBC 6.3 and platelet 116 from 132 from 146 from 131 from 112 from 122.  3.    Re:  Pre-office CMP.  GFR 98.6 ml/minute and glucose 180.  4.    Re:  Pre-office PSA pending from 0.056 from 0.071 from 0.053 from 0.050 from 0.047 from 0.048 from 0.038 from 0.029 from 0.031 from 0.031 from 0.026 from 0.024 from 0.019 from 0.020 from 0.016 from 0.015 from 0.018 (forgot my Lupron) from < 0.014 from < 0.014  from <0.014 from <0.014 from <0.01 from < 0.014 from < 0.014 from <0.014 from < 0.014 from < 0.014 from < 0.014 from < 0.014 from < 0.014 from < 0.014 from < 0.014 from < 0.014 from < 0.014 from < 0.014 from < 0.014 from 0.015 from 0.03 from 0.125 from 1.03 from 27.3. Testosterone pending from 44.9 from 36.1 " "from 26.4 from 26.6 from 25.1 from 35.2 from 11.8 from 35.7 from 23.4 from 28.1 from 25 from 28.2 from 25.1 from 34.1 from 18.1 from 527 from 580 from 443 from 235.  Most recent leuprolide was administered on 8/9/2023.   5.   eRx for Compazine 10 mg p.o. every 4 hours as needed for nausea/vomiting, 60, 2 refills if needed.    6.   eRx for C45D1 started on 12/6/2023 apalutamide 60 mg, take four tablets total dose 240 mg po daily, number, 120.  Take either with or without food.  Swallow tablets whole. TSH before apalutamide.  No grapefruit juice or grapefruit.  Observe for rash, falling, seizures, arrhythmias, hypothyroidism or fractures.  Continue apalutamide since PSA is less than 2.  7.   Continue ongoing management per primary care physician and other specialists.  8.   Plan of care discussed with patient and spouse.  Understanding expressed.  He is agreeable to proceed.  9.   Leuprolide per Dr. Oneil given every 6 months.  10.  Questions were answered to his satisfaction. \"Yes.\"   11.  Advance Care Planning  ACP discussion was declined by the patient. Patient does not have an advance directive, information provided.  12.  Return to office 5 weeks with CMP, PSA, testosterone, and CBC with differential, same day.        I have reviewed the assessment and plan and verified the accuracy of it. No changes to assessment and plan since the information was documented. Deshawn Black MD 12/20/23             I spent 31 total minutes, face-to-face, caring for Simeon mcdonald. Greater than 50% of this time involved counseling and/or coordination of care as documented within this note.                          (Matthew Oneil MD)  Lucio Garcia MD    "

## 2023-12-20 ENCOUNTER — LAB (OUTPATIENT)
Dept: LAB | Facility: HOSPITAL | Age: 78
End: 2023-12-20
Payer: MEDICARE

## 2023-12-20 ENCOUNTER — OFFICE VISIT (OUTPATIENT)
Dept: ONCOLOGY | Facility: CLINIC | Age: 78
End: 2023-12-20
Payer: MEDICARE

## 2023-12-20 ENCOUNTER — TELEPHONE (OUTPATIENT)
Dept: ONCOLOGY | Facility: CLINIC | Age: 78
End: 2023-12-20

## 2023-12-20 VITALS
TEMPERATURE: 97.9 F | HEIGHT: 68 IN | SYSTOLIC BLOOD PRESSURE: 120 MMHG | OXYGEN SATURATION: 93 % | RESPIRATION RATE: 18 BRPM | HEART RATE: 84 BPM | WEIGHT: 190.5 LBS | DIASTOLIC BLOOD PRESSURE: 70 MMHG | BODY MASS INDEX: 28.87 KG/M2

## 2023-12-20 DIAGNOSIS — C61 PROSTATE CANCER: Primary | ICD-10-CM

## 2023-12-20 LAB
ALBUMIN SERPL-MCNC: 4.2 G/DL (ref 3.5–5.2)
ALBUMIN/GLOB SERPL: 1.3 G/DL
ALP SERPL-CCNC: 65 U/L (ref 39–117)
ALT SERPL W P-5'-P-CCNC: 18 U/L (ref 1–41)
ANION GAP SERPL CALCULATED.3IONS-SCNC: 10 MMOL/L (ref 5–15)
AST SERPL-CCNC: 26 U/L (ref 1–40)
BASOPHILS # BLD AUTO: 0.03 10*3/MM3 (ref 0–0.2)
BASOPHILS NFR BLD AUTO: 0.5 % (ref 0–1.5)
BILIRUB SERPL-MCNC: 0.6 MG/DL (ref 0–1.2)
BUN SERPL-MCNC: 14 MG/DL (ref 8–23)
BUN/CREAT SERPL: 24.1 (ref 7–25)
CALCIUM SPEC-SCNC: 9 MG/DL (ref 8.6–10.5)
CHLORIDE SERPL-SCNC: 96 MMOL/L (ref 98–107)
CO2 SERPL-SCNC: 27 MMOL/L (ref 22–29)
CREAT SERPL-MCNC: 0.58 MG/DL (ref 0.76–1.27)
DEPRECATED RDW RBC AUTO: 44.7 FL (ref 37–54)
EGFRCR SERPLBLD CKD-EPI 2021: 98.6 ML/MIN/1.73
EOSINOPHIL # BLD AUTO: 0.07 10*3/MM3 (ref 0–0.4)
EOSINOPHIL NFR BLD AUTO: 1.1 % (ref 0.3–6.2)
ERYTHROCYTE [DISTWIDTH] IN BLOOD BY AUTOMATED COUNT: 13.3 % (ref 12.3–15.4)
GLOBULIN UR ELPH-MCNC: 3.2 GM/DL
GLUCOSE SERPL-MCNC: 180 MG/DL (ref 65–99)
HCT VFR BLD AUTO: 40.3 % (ref 37.5–51)
HGB BLD-MCNC: 13 G/DL (ref 13–17.7)
HOLD SPECIMEN: NORMAL
LYMPHOCYTES # BLD AUTO: 0.95 10*3/MM3 (ref 0.7–3.1)
LYMPHOCYTES NFR BLD AUTO: 14.9 % (ref 19.6–45.3)
MCH RBC QN AUTO: 29.4 PG (ref 26.6–33)
MCHC RBC AUTO-ENTMCNC: 32.3 G/DL (ref 31.5–35.7)
MCV RBC AUTO: 91.2 FL (ref 79–97)
MONOCYTES # BLD AUTO: 0.51 10*3/MM3 (ref 0.1–0.9)
MONOCYTES NFR BLD AUTO: 8 % (ref 5–12)
NEUTROPHILS NFR BLD AUTO: 4.8 10*3/MM3 (ref 1.7–7)
NEUTROPHILS NFR BLD AUTO: 75.3 % (ref 42.7–76)
PLATELET # BLD AUTO: 116 10*3/MM3 (ref 140–450)
PMV BLD AUTO: 10.6 FL (ref 6–12)
POTASSIUM SERPL-SCNC: 4 MMOL/L (ref 3.5–5.2)
PROT SERPL-MCNC: 7.4 G/DL (ref 6–8.5)
PSA SERPL-MCNC: 0.06 NG/ML (ref 0–4)
RBC # BLD AUTO: 4.42 10*6/MM3 (ref 4.14–5.8)
SODIUM SERPL-SCNC: 133 MMOL/L (ref 136–145)
TESTOST SERPL-MCNC: 30.4 NG/DL (ref 193–740)
WBC NRBC COR # BLD AUTO: 6.37 10*3/MM3 (ref 3.4–10.8)

## 2023-12-20 PROCEDURE — 85025 COMPLETE CBC W/AUTO DIFF WBC: CPT

## 2023-12-20 PROCEDURE — 84403 ASSAY OF TOTAL TESTOSTERONE: CPT

## 2023-12-20 PROCEDURE — 84153 ASSAY OF PSA TOTAL: CPT

## 2023-12-20 PROCEDURE — 80053 COMPREHEN METABOLIC PANEL: CPT

## 2023-12-20 PROCEDURE — 36415 COLL VENOUS BLD VENIPUNCTURE: CPT

## 2023-12-20 NOTE — TELEPHONE ENCOUNTER
----- Message from Deshawn Black MD sent at 12/20/2023 12:28 PM CST -----  Notify patient, glucose 183.

## 2024-01-03 ENCOUNTER — SPECIALTY PHARMACY (OUTPATIENT)
Dept: ONCOLOGY | Facility: HOSPITAL | Age: 79
End: 2024-01-03
Payer: MEDICARE

## 2024-01-03 NOTE — PROGRESS NOTES
"Specialty Pharmacy Refill Coordination Note       Spoke with Essence (spouse) for Mr. Tam's monthly telephone follow-up regarding the oral oncology medication. She stated that he has been tolerating the Erleada well and has not had any obvious side effects from the medication. \"He's doing pretty good\" She states that the patient was started on Preservision Adrens 2 tablets daily but there are no other changes to the patient's medications at this time that she is aware of. He has not missed any doses of the medication in the last month. He still receives refills through [employer] , and has not had any delays in getting those refills. She stated he has not had any recent stays in the hospital and has not visited the ER in the last month due to the Erleada .     I encouraged her to reach out anytime with any questions or concerns they might have regarding his oral chemotherapy medication.     No needs expressed by the patient. Will follow-up next month regarding the patient's oral chemotherapy with a routine telephone consultation.     Refill Questions      Flowsheet Row Most Recent Value   Changes to allergies? No   Changes to medications? Yes  [Patient started taking vitamin for his eyes.]   New conditions or infections since last clinic visit No   Unplanned office visit, urgent care, ED, or hospital admission in the last 4 weeks  No   How does patient/caregiver feel medication is working? Good   Financial problems or insurance changes  No   Since the previous refill, were any specialty medication doses or scheduled injections missed or delayed?  No   Does this patient require a clinical escalation to a pharmacist? No            Delivery Questions      Flowsheet Row Most Recent Value   Delivery method Other (Comment)   Delivery address verified with patient/caregiver? No   Delivery address Other (Comment)   Number of medications in delivery 0   Medication(s) being filled and delivered No medications found.   Doses left " of specialty medications Patient fills at external pharmacy ([facility])   Copay verified? No   Copay amount Patient fills at external pharmacy ([facility])   Copay form of payment Pay at pickup                   Follow-up: 28-30 day(s)     Dori Mathis, Pharmacy Technician  Specialty Pharmacy Technician

## 2024-01-05 DIAGNOSIS — N31.9 NEUROGENIC BLADDER: ICD-10-CM

## 2024-01-05 RX ORDER — DUTASTERIDE 0.5 MG/1
CAPSULE, LIQUID FILLED ORAL
Qty: 90 CAPSULE | Refills: 0 | Status: SHIPPED | OUTPATIENT
Start: 2024-01-05

## 2024-01-05 RX ORDER — TOLTERODINE 4 MG/1
CAPSULE, EXTENDED RELEASE ORAL
Qty: 30 CAPSULE | Refills: 0 | Status: SHIPPED | OUTPATIENT
Start: 2024-01-05

## 2024-01-16 NOTE — PROGRESS NOTES
[facility]ATOLOGY & ONCOLOGY  [address]  [facility] [address] [phone]    Patient Name: Simeon Tam  Encounter Date: 01/24/2024  YOB: 1945  Patient Number: [id number]      REASON FOR FOLLOW-UP:Simeon Tam is a pleasant 78 y.o.  male who is seen in follow-up for an metastatic hormone refractory prostate cancer (HRPC).  He is seen C46D22 of single agent apalutamide.  Patient is seen with spouse, Essence. History is obtained from patient.  History is considered reliable.            Oncology/Hematology History Overview Note   DIAGNOSTIC ABNORMALITIES:  He presented with rising PSA of 13 and was diagnosed with prostate cancer about 25 years ago.   Previous PSA was 22.26 on 10/05/2016, <0.13 on 07/17/2017, 7.91 on 10/17/2018, 19.29 on 03/19/2020, and 22.25 on 04/07/2020.   He was seen by Dr. Oneil 05/12/2020. Latest PSA was 22.25 ng/ml on 04/07/2020.  Previous PSA 22.26 on 10/05/2016.  Bone scan and CT scan of the abdomen and pelvis were negative for prostate cancer on 10/2016.  Started on Casodex and possibly leuprolide.  PSA  was down to <0.13 on 07/17/207.  Plan for apalutamide after staging scans.   CT abdomen and pelvis 06/11/2020. No evidence of metastatic disease in the abdomen or pelvis. Right renal pelvis and ureter urothelial thickening and hyperemia. Urinary bladder wall is thickened and there is adjacent inflammation. Recommend correlation with urinalysis and exam to exclude cystitis with right-sided pyelitis.  Cirrhosis.  Tiny 8 mm hypodense RIGHT inferior liver lesion on image 33 is too small to characterize.   Bone scan 06/11/2020. No evidence of bone metastases.        PREVIOUS INTERVENTIONS:  He was treated with adjuvant radiation at [place] and androgen ablation with Casodex.  Lupron and Casodex 10/2016. He had 7 months of ADT therapy with Lupron.  Apalutamide 07/22/2020 through present.      Prostate cancer   6/29/2020 -  Chemotherapy    OP PROSTATE Apalutamide     6/30/2020 Initial Diagnosis    Prostate cancer (CMS/[facility])     7/29/2020 -  Chemotherapy    OP LEUPROLIDE 45MG Q6M - ORDER EXPIRES 7/18/24     10/5/2022 - 10/5/2022 Chemotherapy    OP PROSTATE Apalutamide         PAST MEDICAL HISTORY:  ALLERGIES:  Allergies   Allergen Reactions    Sulfa Antibiotics Rash     CURRENT MEDICATIONS:  Outpatient Encounter Medications as of 1/24/2024   Medication Sig Dispense Refill    Apalutamide 60 MG tablet Take 4 tablets by mouth Daily. Take with or without food. Do not crush or chew tablets. 120 tablet 11    Apalutamide 60 MG tablet Take 4 tablets by mouth Daily. Take with or without food. Do not crush or chew tablets. 120 tablet 11    calcium carbonate (OS-JONNY) 600 MG tablet Take 1 tablet by mouth Daily.      diclofenac (VOLTAREN) 75 MG EC tablet Take 1 tablet by mouth 2 (Two) Times a Day As Needed (shoulder pain).      dutasteride (AVODART) 0.5 MG capsule TAKE (1) CAPSULE ONCE DAILY. 90 capsule 0    JANUVIA 100 MG tablet Take 1 tablet by mouth Daily.      lisinopril-hydrochlorothiazide (PRINZIDE,ZESTORETIC) 20-12.5 MG per tablet Take 1 tablet by mouth Daily.      Loperamide HCl (IMODIUM PO) Take 1 dose by mouth As Needed (diarrhea).      Multiple Vitamins-Minerals (ICAPS AREDS 2 PO) Take  by mouth.      rosuvastatin (CRESTOR) 20 MG tablet Take 1 tablet by mouth Every Night.      tolterodine LA (DETROL LA) 4 MG 24 hr capsule TAKE (1) CAPSULE ONCE DAILY. 30 capsule 0     No facility-administered encounter medications on file as of 1/24/2024.     ADULT ILLNESSES:  Patient Active Problem List   Diagnosis Code    Hypertension I10    Hyperlipidemia E78.5    Diabetes mellitus E11.9    Cancer C80.1    Arrhythmia I49.9    Non morbid obesity due to excess calories E66.09    Prostate cancer C61    Anterior urethral stricture N35.914     SURGERIES:  Past Surgical History:   Procedure Laterality Date    CATARACT  EXTRACTION      CYSTOSCOPY RETROGRADE PYELOGRAM Bilateral 4/21/2021    Procedure: CYSTOSCOPY RETROGRADE PYELOGRAM, possible DIRECT VISION INTERNAL URETHROTOMY;  Surgeon: Matthew Oneil MD;  Location: [facility] OR;  Service: Urology;  Laterality: Bilateral;    CYSTOSCOPY URETHROTOMY VISUAL INTERNAL Bilateral 4/21/2021    Procedure: possible DIRECT VISION INTERNAL URETHROTOMY;  Surgeon: Matthew Oneil MD;  Location:  [facility] OR;  Service: Urology;  Laterality: Bilateral;    EXCISION LESION      neck    KIDNEY STONE SURGERY      REPLACEMENT TOTAL KNEE Left     TONSILLECTOMY       HEALTH MAINTENANCE ITEMS:  Health Maintenance Due   Topic Date Due    URINE MICROALBUMIN  Never done    Pneumococcal Vaccine 65+ (1 of 2 - PCV) Never done    TDAP/TD VACCINES (1 - Tdap) Never done    ZOSTER VACCINE (1 of 2) Never done    Hepatitis B (1 of 3 - Risk 3-dose series) Never done    ANNUAL WELLNESS VISIT  Never done    LIPID PANEL  Never done    HEMOGLOBIN A1C  Never done    DIABETIC EYE EXAM  Never done    BMI FOLLOWUP  04/27/2022    INFLUENZA VACCINE  Never done    COVID-19 Vaccine (4 - 2023-24 season) 09/01/2023       <no information>  Last Completed Colonoscopy       This patient has no relevant Health Maintenance data.            There is no immunization history on file for this patient.  Last Completed Mammogram       This patient has no relevant Health Maintenance data.              FAMILY HISTORY:  Family History   Problem Relation Age of Onset    Dementia Mother      SOCIAL HISTORY:  Social History     Socioeconomic History    Marital status:    Tobacco Use    Smoking status: Never    Smokeless tobacco: Never   Vaping Use    Vaping Use: Never used   Substance and Sexual Activity    Alcohol use: No    Drug use: No    Sexual activity: Defer       REVIEW OF SYSTEMS:    Review of Systems   Constitutional:  Negative for fatigue, fever and unexpected weight change.   HENT:  Negative for congestion and mouth sores.    Eyes:  " Negative for redness and visual disturbance.   Respiratory:  Negative for shortness of breath and wheezing.    Cardiovascular:  Negative for chest pain and palpitations.   Gastrointestinal:  Negative for abdominal pain, nausea and vomiting.   Endocrine: Negative for polydipsia and polyphagia.   Genitourinary:  Negative for difficulty urinating and dysuria.   Musculoskeletal:  Negative for gait problem and joint swelling.   Skin:  Negative for pallor.   Allergic/Immunologic: Negative for food allergies.   Neurological:  Negative for dizziness and speech difficulty.   Hematological:  Negative for adenopathy. Does not bruise/bleed easily.   Psychiatric/Behavioral:  Negative for agitation and confusion.        VITAL SIGNS: /64   Pulse 85   Temp 98 °F (36.7 °C)   Resp 18   Ht 172.7 cm (68\")   Wt 86.8 kg (191 lb 6.4 oz)   SpO2 98%   BMI 29.10 kg/m²  Gained 1 pound.   Pain Score    01/24/24 1128   PainSc: 0-No pain       PHYSICAL EXAMINATION:     Physical Exam  Vitals reviewed.   Constitutional:       General: He is not in acute distress.  HENT:      Head: Normocephalic and atraumatic.   Eyes:      General: No scleral icterus.  Cardiovascular:      Rate and Rhythm: Normal rate.   Pulmonary:      Effort: No respiratory distress.      Breath sounds: No wheezing or rales.   Abdominal:      General: Bowel sounds are normal.      Palpations: Abdomen is soft.      Tenderness: There is no abdominal tenderness.   Musculoskeletal:         General: No swelling.      Cervical back: Neck supple.   Skin:     General: Skin is warm.      Coloration: Skin is not pale.   Neurological:      Mental Status: He is alert and oriented to person, place, and time.   Psychiatric:         Mood and Affect: Mood normal.         Behavior: Behavior normal.         Thought Content: Thought content normal.         Judgment: Judgment normal.         LABS    Lab Results - Last 18 Months   Lab Units 01/24/24  1052 12/20/23  1102 11/29/23  1215 " 11/01/23  1237 10/04/23  1014 09/06/23  1036 08/09/23  1036 07/07/23  1036 05/26/23  1338 05/03/23  1341 04/05/23  1326 03/08/23  1407 08/31/22  1255 08/03/22  1359   HEMOGLOBIN g/dL 13.3 13.0 13.3 13.0 13.0 13.2   < > 13.4 13.5   < > 12.8* 13.2   < > 13.3   HEMATOCRIT % 40.2 40.3 39.3 38.9 39.7 40.2   < > 40.4 41.1   < > 39.0 39.8   < > 39.0   MCV fL 90.7 91.2 88.9 88.2 90.4 92.0   < > 89.2 92.2   < > 91.5 90.7   < > 90.3   WBC 10*3/mm3 6.16 6.37 9.04 5.98 5.22 5.37   < > 5.60 7.85   < > 5.94 8.03   < > 7.19   RDW % 13.4 13.3 13.0 13.1 13.3 13.4   < > 13.1 13.0   < > 13.4 13.3   < > 13.1   MPV fL 10.7 10.6 10.8 10.0 10.1 10.7   < > 10.2 10.3   < > 10.6 10.3   < > 10.6   PLATELETS 10*3/mm3 118* 116* 132* 146 131* 112*   < > 146 144   < > 108* 135*   < > 124*   IMM GRAN % %  --   --   --  0.5  --   --   --  0.4 0.4  --  0.2 0.1  --  0.3   NEUTROS ABS 10*3/mm3 4.64 4.80 7.08* 4.66 3.75 3.84   < > 4.08 5.38   < > 4.44 6.12   < > 5.50   LYMPHS ABS 10*3/mm3 0.88 0.95 0.94 0.83 0.83 0.89   < > 0.89 1.44   < > 0.83 1.04   < > 0.93   MONOS ABS 10*3/mm3 0.50 0.51 0.90 0.36 0.45 0.48   < > 0.47 0.87   < > 0.53 0.66   < > 0.57   EOS ABS 10*3/mm3 0.08 0.07 0.04 0.06 0.15 0.10   < > 0.10 0.10   < > 0.10 0.16   < > 0.14   BASOS ABS 10*3/mm3 0.04 0.03 0.05 0.04 0.03 0.03   < > 0.04 0.03   < > 0.03 0.04   < > 0.03   IMMATURE GRANS (ABS) 10*3/mm3  --   --   --  0.03  --   --   --  0.02 0.03  --  0.01 0.01  --  0.02   NRBC /100 WBC  --   --   --  0.0  --   --   --  0.0 0.0  --  0.0 0.0  --  0.0    < > = values in this interval not displayed.       Lab Results - Last 18 Months   Lab Units 12/20/23  1102 11/29/23  1215 11/01/23  1237 10/04/23  1014 09/06/23  1036 08/09/23  1036   GLUCOSE mg/dL 180* 170* 204* 172* 164* 149*   SODIUM mmol/L 133* 133* 135* 136 136 132*   POTASSIUM mmol/L 4.0 3.8 4.0 4.0 4.5 4.3   CO2 mmol/L 27.0 28.0 27.0 27.0 28.0 27.0   CHLORIDE mmol/L 96* 93* 96* 96* 96* 93*   ANION GAP mmol/L 10.0 12.0 12.0 13.0 12.0  "12.0   CREATININE mg/dL 0.58* 0.67* 0.65* 0.64* 0.65* 0.65*   BUN mg/dL 14 15 15 12 16 14   BUN / CREAT RATIO  24.1 22.4 23.1 18.8 24.6 21.5   CALCIUM mg/dL 9.0 9.5 9.5 9.9 9.4 9.9   ALK PHOS U/L 65 64 57 58 64 66   TOTAL PROTEIN g/dL 7.4 7.6 7.2 7.3 7.4 7.7   ALT (SGPT) U/L 18 13 19 13 17 16   AST (SGOT) U/L 26 19 23 20 24 22   BILIRUBIN mg/dL 0.6 0.8 0.4 0.5 0.5 0.5   ALBUMIN g/dL 4.2 4.1 4.1 4.2 4.5 4.7   GLOBULIN gm/dL 3.2 3.5 3.1 3.1 2.9 3.0       No results for input(s): \"MSPIKE\", \"KAPPALAMB\", \"IGLFLC\", \"URICACID\", \"FREEKAPPAL\", \"CEA\", \"LDH\", \"REFLABREPO\" in the last 45826 hours.    No results for input(s): \"IRON\", \"TIBC\", \"LABIRON\", \"FERRITIN\", \"N3NBCSG\", \"TSH\", \"FOLATE\" in the last 35024 hours.    Invalid input(s): \"VITB12\"    Simeon Tam reports a pain score of 0.          ASSESSMENT:  1.   Prostate cancer, hormone refractory.   AJCC stage (TX, NX, M0)  Treatment status:Casodex and leuprolide with PSA recurrence. On therapy with apalutamide and leuprolde (by Dr. Oneil).  2.   Performance status of 0.  3.   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  On observation.  4.   Cirrhosis, etiology of thrombocytopenia and followed by PCP.  5.   Urethral stricture. Self catheterization as indicated. \"Around 6 a day.\"             PLAN:  1.    Re:  Apalutamide tolerance.  \"Fine.\"   2.    Re:  Heme status.  Hemoglobin 13.3, WBC 6.16 and platelet 118 from 116 from 132 from 146 from 131 from 112 from 122.  3.    Re:  Pre-office CMP.  GFR 97.1 ml/minute and glucose 165.  4.    Re:  Pre-office PSA pending from 0.064 from 0.056 from 0.071 from 0.053 from 0.050 from 0.047 from 0.048 from 0.038 from 0.029 from 0.031 from 0.031 from 0.026 from 0.024 from 0.019 from 0.020 from 0.016 from 0.015 from 0.018 (forgot my Lupron) from < 0.014 from < 0.014  from <0.014 from <0.014 from <0.01 from < 0.014 from < 0.014 from <0.014 from < 0.014 from < 0.014 from < 0.014 from < 0.014 from < " "0.014 from < 0.014 from < 0.014 from < 0.014 from < 0.014 from < 0.014 from < 0.014 from 0.015 from 0.03 from 0.125 from 1.03 from 27.3. Testosterone pending from 30.4 from 44.9 from 36.1 from 26.4 from 26.6 from 25.1 from 35.2 from 11.8 from 35.7 from 23.4 from 28.1 from 25 from 28.2 from 25.1 from 34.1 from 18.1 from 527 from 580 from 443 from 235.  Most recent leuprolide was given on 8/9/2023.   5.   eRx for prochlorperazine 10 mg p.o. every 4 hours as needed for nausea/vomiting, 60, 2 refills if needed.    6.   eRx for C46D1 started on 1/3/2024 apalutamide 60 mg, take four tablets total dose 240 mg po daily, number, 120.  Take either with or without food.  Swallow tablets whole. TSH before apalutamide.  No grapefruit juice or grapefruit.  Observe for arrhythmias, falls, seizures, rash, hypothyroidism or fractures.  Continue apalutamide and monitor PSA.  7.   Continue ongoing management per primary care physician and other specialists.  8.   Plan of care discussed with patient and Essence, spouse.  Understanding expressed.  He is agreeable to proceed.  9.   Leuprolide per Dr. Oneil given every 6 months.  10.  Questions were answered to his satisfaction. \"Yes.\"   11.  Advance Care Planning   ACP discussion was declined by the patient. Patient does not have an advance directive, information provided.   12.  Return to office 4 weeks with CMP, PSA, testosterone, and CBC with differential, same day.           I have reviewed the assessment and plan and verified the accuracy of it. No changes to assessment and plan since the information was documented. Deshawn Black MD 01/24/24         I spent 32 total minutes, face-to-face, caring for Simeon today. Greater than 50% of this time involved counseling and/or coordination of care as documented within this note.                          (Matthew Oneil MD)  Lucio Garcia MD    "

## 2024-01-24 ENCOUNTER — LAB (OUTPATIENT)
Dept: LAB | Facility: HOSPITAL | Age: 79
End: 2024-01-24
Payer: MEDICARE

## 2024-01-24 ENCOUNTER — OFFICE VISIT (OUTPATIENT)
Dept: ONCOLOGY | Facility: CLINIC | Age: 79
End: 2024-01-24
Payer: MEDICARE

## 2024-01-24 VITALS
SYSTOLIC BLOOD PRESSURE: 122 MMHG | BODY MASS INDEX: 29.01 KG/M2 | DIASTOLIC BLOOD PRESSURE: 64 MMHG | TEMPERATURE: 98 F | RESPIRATION RATE: 18 BRPM | OXYGEN SATURATION: 98 % | HEIGHT: 68 IN | HEART RATE: 85 BPM | WEIGHT: 191.4 LBS

## 2024-01-24 DIAGNOSIS — C61 PROSTATE CANCER: Primary | ICD-10-CM

## 2024-01-24 LAB
ALBUMIN SERPL-MCNC: 4.3 G/DL (ref 3.5–5.2)
ALBUMIN/GLOB SERPL: 1.4 G/DL
ALP SERPL-CCNC: 69 U/L (ref 39–117)
ALT SERPL W P-5'-P-CCNC: 16 U/L (ref 1–41)
ANION GAP SERPL CALCULATED.3IONS-SCNC: 11 MMOL/L (ref 5–15)
AST SERPL-CCNC: 23 U/L (ref 1–40)
BASOPHILS # BLD AUTO: 0.04 10*3/MM3 (ref 0–0.2)
BASOPHILS NFR BLD AUTO: 0.6 % (ref 0–1.5)
BILIRUB SERPL-MCNC: 0.5 MG/DL (ref 0–1.2)
BUN SERPL-MCNC: 14 MG/DL (ref 8–23)
BUN/CREAT SERPL: 23 (ref 7–25)
CALCIUM SPEC-SCNC: 9.5 MG/DL (ref 8.6–10.5)
CHLORIDE SERPL-SCNC: 96 MMOL/L (ref 98–107)
CO2 SERPL-SCNC: 26 MMOL/L (ref 22–29)
CREAT SERPL-MCNC: 0.61 MG/DL (ref 0.76–1.27)
DEPRECATED RDW RBC AUTO: 45 FL (ref 37–54)
EGFRCR SERPLBLD CKD-EPI 2021: 97.1 ML/MIN/1.73
EOSINOPHIL # BLD AUTO: 0.08 10*3/MM3 (ref 0–0.4)
EOSINOPHIL NFR BLD AUTO: 1.3 % (ref 0.3–6.2)
ERYTHROCYTE [DISTWIDTH] IN BLOOD BY AUTOMATED COUNT: 13.4 % (ref 12.3–15.4)
GLOBULIN UR ELPH-MCNC: 3.1 GM/DL
GLUCOSE SERPL-MCNC: 165 MG/DL (ref 65–99)
HCT VFR BLD AUTO: 40.2 % (ref 37.5–51)
HGB BLD-MCNC: 13.3 G/DL (ref 13–17.7)
HOLD SPECIMEN: NORMAL
LYMPHOCYTES # BLD AUTO: 0.88 10*3/MM3 (ref 0.7–3.1)
LYMPHOCYTES NFR BLD AUTO: 14.3 % (ref 19.6–45.3)
MCH RBC QN AUTO: 30 PG (ref 26.6–33)
MCHC RBC AUTO-ENTMCNC: 33.1 G/DL (ref 31.5–35.7)
MCV RBC AUTO: 90.7 FL (ref 79–97)
MONOCYTES # BLD AUTO: 0.5 10*3/MM3 (ref 0.1–0.9)
MONOCYTES NFR BLD AUTO: 8.1 % (ref 5–12)
NEUTROPHILS NFR BLD AUTO: 4.64 10*3/MM3 (ref 1.7–7)
NEUTROPHILS NFR BLD AUTO: 75.4 % (ref 42.7–76)
PLATELET # BLD AUTO: 118 10*3/MM3 (ref 140–450)
PMV BLD AUTO: 10.7 FL (ref 6–12)
POTASSIUM SERPL-SCNC: 4.2 MMOL/L (ref 3.5–5.2)
PROT SERPL-MCNC: 7.4 G/DL (ref 6–8.5)
PSA SERPL-MCNC: 0.05 NG/ML (ref 0–4)
RBC # BLD AUTO: 4.43 10*6/MM3 (ref 4.14–5.8)
SODIUM SERPL-SCNC: 133 MMOL/L (ref 136–145)
TESTOST SERPL-MCNC: 31.1 NG/DL (ref 193–740)
WBC NRBC COR # BLD AUTO: 6.16 10*3/MM3 (ref 3.4–10.8)

## 2024-01-24 PROCEDURE — 80053 COMPREHEN METABOLIC PANEL: CPT

## 2024-01-24 PROCEDURE — 36415 COLL VENOUS BLD VENIPUNCTURE: CPT

## 2024-01-24 PROCEDURE — 85025 COMPLETE CBC W/AUTO DIFF WBC: CPT

## 2024-01-24 PROCEDURE — 84153 ASSAY OF PSA TOTAL: CPT

## 2024-01-24 PROCEDURE — 84403 ASSAY OF TOTAL TESTOSTERONE: CPT

## 2024-02-06 ENCOUNTER — INFUSION (OUTPATIENT)
Dept: ONCOLOGY | Facility: HOSPITAL | Age: 79
End: 2024-02-06
Payer: MEDICARE

## 2024-02-06 VITALS
SYSTOLIC BLOOD PRESSURE: 137 MMHG | TEMPERATURE: 97 F | OXYGEN SATURATION: 97 % | HEIGHT: 68 IN | DIASTOLIC BLOOD PRESSURE: 70 MMHG | BODY MASS INDEX: 29.25 KG/M2 | WEIGHT: 193 LBS | HEART RATE: 88 BPM | RESPIRATION RATE: 16 BRPM

## 2024-02-06 DIAGNOSIS — C61 PROSTATE CANCER: Primary | ICD-10-CM

## 2024-02-06 PROCEDURE — 96402 CHEMO HORMON ANTINEOPL SQ/IM: CPT

## 2024-02-06 PROCEDURE — 25010000002 LEUPROLIDE 45 MG KIT: Performed by: UROLOGY

## 2024-02-06 RX ADMIN — LEUPROLIDE ACETATE 45 MG: KIT at 13:08

## 2024-02-08 ENCOUNTER — SPECIALTY PHARMACY (OUTPATIENT)
Dept: ONCOLOGY | Facility: HOSPITAL | Age: 79
End: 2024-02-08
Payer: MEDICARE

## 2024-02-08 NOTE — PROGRESS NOTES
"Specialty Pharmacy Refill Coordination Note       Spoke with Essence (Spouse) for Mr. Tam's monthly telephone follow-up regarding the oral oncology medication. She stated that he has been tolerating the Erleada well and has not had any obvious side effects from the medication. \"He's doing wonderful, a little trouble getting refills but he hasn't missed any doses or anything\" He has not missed any doses of the medication in the last month. He still receives refills through [facility] , and has not had any delays in getting those refills. She states that there have been no changes to his maintenance medications and no new drug allergies that she is aware of. She stated he has not had any recent stays in the hospital and has not visited the ER in the last month due to the Erleada .     I encouraged her to reach out anytime with any questions or concerns they might have regarding his oral chemotherapy medication.     No needs expressed by the patient. Will follow-up next month regarding the patient's oral chemotherapy with a routine telephone consultation.     Refill Questions      Flowsheet Row Most Recent Value   Changes to allergies? No   Changes to medications? No   New conditions or infections since last clinic visit No   Unplanned office visit, urgent care, ED, or hospital admission in the last 4 weeks  No   How does patient/caregiver feel medication is working? Excellent   Financial problems or insurance changes  No   Since the previous refill, were any specialty medication doses or scheduled injections missed or delayed?  No   Does this patient require a clinical escalation to a pharmacist? No                       Follow-up: 28-30 day(s)     Dori Mathis, Pharmacy Technician  Specialty Pharmacy Technician        "

## 2024-02-09 NOTE — PROGRESS NOTES
[facility] GROUP HEMATOLOGY & ONCOLOGY  [address]  [place] [address] [phone]    Patient Name: Simeon Tam  Encounter Date: 02/21/2024  YOB: 1945  Patient Number: [id number]      REASON FOR FOLLOW-UP: Simeon Tam is a pleasant 78 y.o.  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  He is seen C47D22 of single agent apalutamide.  Patient is seen with his spouse, Essence. History is obtained from patient.  Patient is a reliable historian.            Oncology/Hematology History Overview Note   DIAGNOSTIC ABNORMALITIES:  He presented with rising PSA of 13 and was diagnosed with prostate cancer about 25 years ago.   Previous PSA was 22.26 on 10/05/2016, <0.13 on 07/17/2017, 7.91 on 10/17/2018, 19.29 on 03/19/2020, and 22.25 on 04/07/2020.   He was seen by Dr. Oneil 05/12/2020. Latest PSA was 22.25 ng/ml on 04/07/2020.  Previous PSA 22.26 on 10/05/2016.  Bone scan and CT scan of the abdomen and pelvis were negative for prostate cancer on 10/2016.  Started on Casodex and possibly leuprolide.  PSA  was down to <0.13 on 07/17/207.  Plan for apalutamide after staging scans.   CT abdomen and pelvis 06/11/2020. No evidence of metastatic disease in the abdomen or pelvis. Right renal pelvis and ureter urothelial thickening and hyperemia. Urinary bladder wall is thickened and there is adjacent inflammation. Recommend correlation with urinalysis and exam to exclude cystitis with right-sided pyelitis.  Cirrhosis.  Tiny 8 mm hypodense RIGHT inferior liver lesion on image 33 is too small to characterize.   Bone scan 06/11/2020. No evidence of bone metastases.        PREVIOUS INTERVENTIONS:  He was treated with adjuvant radiation at [place] and androgen ablation with Casodex.  Lupron and Casodex 10/2016. He had 7 months of ADT therapy with Lupron.  Apalutamide 07/22/2020 through present.      Prostate cancer   6/29/2020 -  Chemotherapy    OP PROSTATE Apalutamide     6/30/2020 Initial Diagnosis    Prostate cancer (CMS/HCC)     7/29/2020 -  Chemotherapy    OP LEUPROLIDE 45MG Q6M - ORDER EXPIRES 7/18/24     10/5/2022 - 10/5/2022 Chemotherapy    OP PROSTATE Apalutamide         PAST MEDICAL HISTORY:  ALLERGIES:  Allergies   Allergen Reactions    Sulfa Antibiotics Rash     CURRENT MEDICATIONS:  Outpatient Encounter Medications as of 2/21/2024   Medication Sig Dispense Refill    Apalutamide 60 MG tablet Take 4 tablets by mouth Daily. Take with or without food. Do not crush or chew tablets. 120 tablet 11    Apalutamide 60 MG tablet Take 4 tablets by mouth Daily. Take with or without food. Do not crush or chew tablets. 120 tablet 11    calcium carbonate (OS-JONNY) 600 MG tablet Take 1 tablet by mouth Daily.      diclofenac (VOLTAREN) 75 MG EC tablet Take 1 tablet by mouth 2 (Two) Times a Day As Needed (shoulder pain).      dutasteride (AVODART) 0.5 MG capsule Take 1 capsule by mouth Daily. 90 capsule 0    JANUVIA 100 MG tablet Take 1 tablet by mouth Daily.      lisinopril-hydrochlorothiazide (PRINZIDE,ZESTORETIC) 20-12.5 MG per tablet Take 1 tablet by mouth Daily.      Loperamide HCl (IMODIUM PO) Take 1 dose by mouth As Needed (diarrhea).      Multiple Vitamins-Minerals (ICAPS AREDS 2 PO) Take  by mouth.      rosuvastatin (CRESTOR) 20 MG tablet Take 1 tablet by mouth Every Night.      tolterodine LA (DETROL LA) 4 MG 24 hr capsule T1T PO QD 30 capsule 0    [DISCONTINUED] dutasteride (AVODART) 0.5 MG capsule TAKE (1) CAPSULE ONCE DAILY. 90 capsule 0    [DISCONTINUED] tolterodine LA (DETROL LA) 4 MG 24 hr capsule TAKE (1) CAPSULE ONCE DAILY. 30 capsule 0     No facility-administered encounter medications on file as of 2/21/2024.     ADULT ILLNESSES:  Patient Active Problem List   Diagnosis Code    Hypertension I10    Hyperlipidemia E78.5    Diabetes mellitus E11.9    Cancer C80.1    Arrhythmia I49.9    Non morbid  obesity due to excess calories E66.09    Prostate cancer C61    Anterior urethral stricture N35.914     SURGERIES:  Past Surgical History:   Procedure Laterality Date    CATARACT EXTRACTION      CYSTOSCOPY RETROGRADE PYELOGRAM Bilateral 4/21/2021    Procedure: CYSTOSCOPY RETROGRADE PYELOGRAM, possible DIRECT VISION INTERNAL URETHROTOMY;  Surgeon: Matthew Oneil MD;  Location: [facility] OR;  Service: Urology;  Laterality: Bilateral;    CYSTOSCOPY URETHROTOMY VISUAL INTERNAL Bilateral 4/21/2021    Procedure: possible DIRECT VISION INTERNAL URETHROTOMY;  Surgeon: Matthew Oneil MD;  Location: [facility] OR;  Service: Urology;  Laterality: Bilateral;    EXCISION LESION      neck    KIDNEY STONE SURGERY      REPLACEMENT TOTAL KNEE Left     TONSILLECTOMY       HEALTH MAINTENANCE ITEMS:  Health Maintenance Due   Topic Date Due    URINE MICROALBUMIN  Never done    Pneumococcal Vaccine 65+ (1 of 2 - PCV) Never done    TDAP/TD VACCINES (1 - Tdap) Never done    ZOSTER VACCINE (1 of 2) Never done    Hepatitis B (1 of 3 - Risk 3-dose series) Never done    RSV Vaccine - Adults (1 - 1-dose 60+ series) Never done    ANNUAL WELLNESS VISIT  Never done    LIPID PANEL  Never done    HEMOGLOBIN A1C  Never done    DIABETIC EYE EXAM  Never done    BMI FOLLOWUP  04/27/2022    INFLUENZA VACCINE  Never done    COVID-19 Vaccine (4 - 2023-24 season) 09/01/2023       <no information>  Last Completed Colonoscopy       This patient has no relevant Health Maintenance data.            There is no immunization history on file for this patient.  Last Completed Mammogram       This patient has no relevant Health Maintenance data.              FAMILY HISTORY:  Family History   Problem Relation Age of Onset    Dementia Mother      SOCIAL HISTORY:  Social History     Socioeconomic History    Marital status:    Tobacco Use    Smoking status: Never    Smokeless tobacco: Never   Vaping Use    Vaping Use: Never used   Substance and Sexual Activity     "Alcohol use: No    Drug use: No    Sexual activity: Defer       REVIEW OF SYSTEMS:    Review of Systems   Constitutional:  Negative for fatigue and fever.        \"I am good.\"   HENT:  Negative for congestion and hearing loss.    Eyes:  Negative for redness and visual disturbance.   Respiratory:  Negative for cough and shortness of breath.    Cardiovascular:  Negative for chest pain and leg swelling.   Gastrointestinal:  Positive for diarrhea. Negative for constipation, nausea and vomiting.        He reports chronic diarrhea. He will contact Dr. Garcia's office. \"More than 5 years.  Okay.\"   Endocrine: Negative for polydipsia and polyphagia.   Genitourinary:  Negative for hematuria.        \"Catheter as needed.\"   Musculoskeletal:  Negative for gait problem and myalgias.   Skin:  Negative for pallor.   Allergic/Immunologic: Negative for food allergies.   Neurological:  Negative for dizziness, speech difficulty and weakness.   Hematological:  Negative for adenopathy. Does not bruise/bleed easily.   Psychiatric/Behavioral:  Negative for agitation, confusion and hallucinations.        VITAL SIGNS: /70   Pulse 88   Temp 97.4 °F (36.3 °C)   Resp 18   Ht 172.7 cm (68\")   Wt 85.3 kg (188 lb)   SpO2 92%   BMI 28.59 kg/m²  Lost 3 pounds.   Pain Score    02/21/24 1127   PainSc: 0-No pain       PHYSICAL EXAMINATION:     Physical Exam  Vitals reviewed.   Constitutional:       General: He is not in acute distress.  HENT:      Head: Normocephalic and atraumatic.   Eyes:      General: No scleral icterus.  Cardiovascular:      Rate and Rhythm: Normal rate.   Pulmonary:      Effort: No respiratory distress.      Breath sounds: No wheezing.   Abdominal:      General: Bowel sounds are normal.      Palpations: Abdomen is soft.      Tenderness: There is no abdominal tenderness.   Musculoskeletal:         General: No swelling.      Cervical back: Neck supple.   Skin:     General: Skin is warm.      Coloration: Skin is pale. "   Neurological:      Mental Status: He is alert and oriented to person, place, and time.   Psychiatric:         Mood and Affect: Mood normal.         Behavior: Behavior normal.         Thought Content: Thought content normal.         Judgment: Judgment normal.         LABS    Lab Results - Last 18 Months   Lab Units 02/21/24  1041 01/24/24  1052 12/20/23  1102 11/29/23  1215 11/01/23  1237 10/04/23  1014 08/09/23  1036 07/07/23  1036 05/26/23  1338 05/03/23  1341 04/05/23  1326 03/08/23  1407   HEMOGLOBIN g/dL 12.9* 13.3 13.0 13.3 13.0 13.0   < > 13.4 13.5   < > 12.8* 13.2   HEMATOCRIT % 38.8 40.2 40.3 39.3 38.9 39.7   < > 40.4 41.1   < > 39.0 39.8   MCV fL 89.6 90.7 91.2 88.9 88.2 90.4   < > 89.2 92.2   < > 91.5 90.7   WBC 10*3/mm3 6.81 6.16 6.37 9.04 5.98 5.22   < > 5.60 7.85   < > 5.94 8.03   RDW % 13.0 13.4 13.3 13.0 13.1 13.3   < > 13.1 13.0   < > 13.4 13.3   MPV fL 10.4 10.7 10.6 10.8 10.0 10.1   < > 10.2 10.3   < > 10.6 10.3   PLATELETS 10*3/mm3 166 118* 116* 132* 146 131*   < > 146 144   < > 108* 135*   IMM GRAN % % 0.3  --   --   --  0.5  --   --  0.4 0.4  --  0.2 0.1   NEUTROS ABS 10*3/mm3 5.23 4.64 4.80 7.08* 4.66 3.75   < > 4.08 5.38   < > 4.44 6.12   LYMPHS ABS 10*3/mm3 0.92 0.88 0.95 0.94 0.83 0.83   < > 0.89 1.44   < > 0.83 1.04   MONOS ABS 10*3/mm3 0.58 0.50 0.51 0.90 0.36 0.45   < > 0.47 0.87   < > 0.53 0.66   EOS ABS 10*3/mm3 0.03 0.08 0.07 0.04 0.06 0.15   < > 0.10 0.10   < > 0.10 0.16   BASOS ABS 10*3/mm3 0.03 0.04 0.03 0.05 0.04 0.03   < > 0.04 0.03   < > 0.03 0.04   IMMATURE GRANS (ABS) 10*3/mm3 0.02  --   --   --  0.03  --   --  0.02 0.03  --  0.01 0.01   NRBC /100 WBC 0.0  --   --   --  0.0  --   --  0.0 0.0  --  0.0 0.0    < > = values in this interval not displayed.       Lab Results - Last 18 Months   Lab Units 02/21/24  1041 01/24/24  1052 12/20/23  1102 11/29/23  1215 11/01/23  1237 10/04/23  1014   GLUCOSE mg/dL 205* 165* 180* 170* 204* 172*   SODIUM mmol/L 131* 133* 133* 133* 135* 136  "  POTASSIUM mmol/L 4.5 4.2 4.0 3.8 4.0 4.0   CO2 mmol/L 27.0 26.0 27.0 28.0 27.0 27.0   CHLORIDE mmol/L 93* 96* 96* 93* 96* 96*   ANION GAP mmol/L 11.0 11.0 10.0 12.0 12.0 13.0   CREATININE mg/dL 0.62* 0.61* 0.58* 0.67* 0.65* 0.64*   BUN mg/dL 15 14 14 15 15 12   BUN / CREAT RATIO  24.2 23.0 24.1 22.4 23.1 18.8   CALCIUM mg/dL 9.3 9.5 9.0 9.5 9.5 9.9   ALK PHOS U/L 75 69 65 64 57 58   TOTAL PROTEIN g/dL 7.7 7.4 7.4 7.6 7.2 7.3   ALT (SGPT) U/L 11 16 18 13 19 13   AST (SGOT) U/L 19 23 26 19 23 20   BILIRUBIN mg/dL 0.6 0.5 0.6 0.8 0.4 0.5   ALBUMIN g/dL 4.1 4.3 4.2 4.1 4.1 4.2   GLOBULIN gm/dL 3.6 3.1 3.2 3.5 3.1 3.1       No results for input(s): \"MSPIKE\", \"KAPPALAMB\", \"IGLFLC\", \"URICACID\", \"FREEKAPPAL\", \"CEA\", \"LDH\", \"REFLABREPO\" in the last 09705 hours.    No results for input(s): \"IRON\", \"TIBC\", \"LABIRON\", \"FERRITIN\", \"M3XZURE\", \"TSH\", \"FOLATE\" in the last 11793 hours.    Invalid input(s): \"VITB12\"    Simeon Tam reports a pain score of 0.            ASSESSMENT:  1.   Prostate cancer, hormone refractory.   AJCC stage (TX, NX, M0)  Treatment status:Casodex and leuprolide with PSA recurrence.  He is taking apalutamide and leuprolde (by Dr. Clarice).  2.   Performance status of 0.  3.   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  Under observation.  4.   Cirrhosis, etiology of thrombocytopenia and followed by PCP.  5.   Urethral stricture. Self catheterization as indicated. \"It depends.\"             PLAN:  1.    Re: Tolerance to apalutamide.  \"It's good.\"   2.    Re:  Heme status.  Hemoglobin 12.9, WBC 6.8 and platelet 166 from 118 from 116 from 132 from 146.  3.    Re:  Pre-office CMP.  GFR 96.6 ml/minute and glucose 205.  4.    Re:  Pre-office PSA pending from 0.047 from 0.064 from 0.056 from 0.071 from 0.053 from 0.050 from 0.047 from 0.048 from 0.038 from 0.029 from 0.031 from 0.031 from 0.026 from 0.024 from 0.019 from 0.020 from 0.016 from 0.015 from 0.018 (forgot " "my Lupron) from < 0.014 from < 0.014  from <0.014 from <0.014 from <0.01 from < 0.014 from < 0.014 from <0.014 from < 0.014 from < 0.014 from < 0.014 from < 0.014 from < 0.014 from < 0.014 from < 0.014 from < 0.014 from < 0.014 from < 0.014 from < 0.014 from 0.015 from 0.03 from 0.125 from 1.03 from 27.3. Testosterone pending from 31.1 from 30.4 from 44.9 from 36.1 from 26.4 from 26.6 from 25.1 from 35.2 from 11.8 from 35.7 from 23.4 from 28.1 from 25 from 28.2 from 25.1 from 34.1 from 18.1 from 527 from 580 from 443 from 235.  Latest Lupron was given on 2/6/2024.  Restage if PSA at 2.   5.   eRx for prochlorperazine 10 mg p.o. every 4 hours as needed for nausea/vomiting, 60, 2 refills if needed.    6.   eRx for C47D1 started on 1/31/2024 apalutamide 60 mg, take four tablets total dose 240 mg po daily, number, 120.  Take either with or without food.  Swallow tablets whole. TSH before apalutamide.  No grapefruit juice or grapefruit.  Monitor for seizures, falls, arrhythmias, rash, hypothyroidism or fractures.  Continue apalutamide and follow PSA.  7.   Continue ongoing management per primary care physician and other specialists.  8.   Plan of care discussed with patient and his spouse.  Understanding expressed.  Patient is agreeable to proceed.  9.   Leuprolide per Dr. Oneil given every 6 months.  10.  Questions were answered to his satisfaction. Okay.\"   11.  Advance Care Planning  ACP discussion was declined by the patient. Patient does not have an advance directive, information provided.   12.  Return to office 4 weeks with CMP, PSA, testosterone, and CBC with differential, same day.           I have reviewed the assessment and plan and verified the accuracy of it. No changes to assessment and plan since the information was documented. Deshawn Black MD 02/21/24        I spent 33 total minutes, face-to-face, caring for Simeon today. Greater than 50% of this time involved counseling and/or coordination of care as " documented within this note.                           (Matthew Oneil MD)  (Mateo Duval MD)  Lucio Garcia MD

## 2024-02-12 ENCOUNTER — TELEPHONE (OUTPATIENT)
Dept: UROLOGY | Facility: CLINIC | Age: 79
End: 2024-02-12
Payer: MEDICARE

## 2024-02-12 ENCOUNTER — OFFICE VISIT (OUTPATIENT)
Dept: UROLOGY | Facility: CLINIC | Age: 79
End: 2024-02-12
Payer: MEDICARE

## 2024-02-12 VITALS — TEMPERATURE: 96.6 F | HEIGHT: 68 IN | BODY MASS INDEX: 30.58 KG/M2 | WEIGHT: 201.8 LBS

## 2024-02-12 DIAGNOSIS — N35.914 ANTERIOR URETHRAL STRICTURE: Primary | ICD-10-CM

## 2024-02-12 DIAGNOSIS — N31.9 NEUROGENIC BLADDER: ICD-10-CM

## 2024-02-12 DIAGNOSIS — C61 PROSTATE CANCER: ICD-10-CM

## 2024-02-12 LAB
BILIRUB BLD-MCNC: NEGATIVE MG/DL
CLARITY, POC: CLEAR
COLOR UR: YELLOW
GLUCOSE UR STRIP-MCNC: NEGATIVE MG/DL
KETONES UR QL: NEGATIVE
LEUKOCYTE EST, POC: ABNORMAL
NITRITE UR-MCNC: NEGATIVE MG/ML
PH UR: 6 [PH] (ref 5–8)
PROT UR STRIP-MCNC: NEGATIVE MG/DL
RBC # UR STRIP: ABNORMAL /UL
SP GR UR: 1.01 (ref 1–1.03)
UROBILINOGEN UR QL: ABNORMAL

## 2024-02-12 PROCEDURE — 99214 OFFICE O/P EST MOD 30 MIN: CPT | Performed by: UROLOGY

## 2024-02-12 PROCEDURE — 1160F RVW MEDS BY RX/DR IN RCRD: CPT | Performed by: UROLOGY

## 2024-02-12 PROCEDURE — 1159F MED LIST DOCD IN RCRD: CPT | Performed by: UROLOGY

## 2024-02-12 PROCEDURE — 81003 URINALYSIS AUTO W/O SCOPE: CPT | Performed by: UROLOGY

## 2024-02-12 RX ORDER — TOLTERODINE 4 MG/1
CAPSULE, EXTENDED RELEASE ORAL
Qty: 30 CAPSULE | Refills: 0 | Status: SHIPPED | OUTPATIENT
Start: 2024-02-12

## 2024-02-12 RX ORDER — DUTASTERIDE 0.5 MG/1
0.5 CAPSULE, LIQUID FILLED ORAL DAILY
Qty: 90 CAPSULE | Refills: 0 | Status: SHIPPED | OUTPATIENT
Start: 2024-02-12

## 2024-02-21 ENCOUNTER — OFFICE VISIT (OUTPATIENT)
Dept: ONCOLOGY | Facility: CLINIC | Age: 79
End: 2024-02-21
Payer: MEDICARE

## 2024-02-21 ENCOUNTER — SPECIALTY PHARMACY (OUTPATIENT)
Dept: ONCOLOGY | Facility: HOSPITAL | Age: 79
End: 2024-02-21
Payer: MEDICARE

## 2024-02-21 ENCOUNTER — LAB (OUTPATIENT)
Dept: LAB | Facility: HOSPITAL | Age: 79
End: 2024-02-21
Payer: MEDICARE

## 2024-02-21 VITALS
HEIGHT: 68 IN | RESPIRATION RATE: 18 BRPM | SYSTOLIC BLOOD PRESSURE: 118 MMHG | OXYGEN SATURATION: 92 % | HEART RATE: 88 BPM | BODY MASS INDEX: 28.49 KG/M2 | WEIGHT: 188 LBS | TEMPERATURE: 97.4 F | DIASTOLIC BLOOD PRESSURE: 70 MMHG

## 2024-02-21 DIAGNOSIS — C61 PROSTATE CANCER: Primary | ICD-10-CM

## 2024-02-21 LAB
ALBUMIN SERPL-MCNC: 4.1 G/DL (ref 3.5–5.2)
ALBUMIN/GLOB SERPL: 1.1 G/DL
ALP SERPL-CCNC: 75 U/L (ref 39–117)
ALT SERPL W P-5'-P-CCNC: 11 U/L (ref 1–41)
ANION GAP SERPL CALCULATED.3IONS-SCNC: 11 MMOL/L (ref 5–15)
AST SERPL-CCNC: 19 U/L (ref 1–40)
BASOPHILS # BLD AUTO: 0.03 10*3/MM3 (ref 0–0.2)
BASOPHILS NFR BLD AUTO: 0.4 % (ref 0–1.5)
BILIRUB SERPL-MCNC: 0.6 MG/DL (ref 0–1.2)
BUN SERPL-MCNC: 15 MG/DL (ref 8–23)
BUN/CREAT SERPL: 24.2 (ref 7–25)
CALCIUM SPEC-SCNC: 9.3 MG/DL (ref 8.6–10.5)
CHLORIDE SERPL-SCNC: 93 MMOL/L (ref 98–107)
CO2 SERPL-SCNC: 27 MMOL/L (ref 22–29)
CREAT SERPL-MCNC: 0.62 MG/DL (ref 0.76–1.27)
DEPRECATED RDW RBC AUTO: 43.2 FL (ref 37–54)
EGFRCR SERPLBLD CKD-EPI 2021: 96.6 ML/MIN/1.73
EOSINOPHIL # BLD AUTO: 0.03 10*3/MM3 (ref 0–0.4)
EOSINOPHIL NFR BLD AUTO: 0.4 % (ref 0.3–6.2)
ERYTHROCYTE [DISTWIDTH] IN BLOOD BY AUTOMATED COUNT: 13 % (ref 12.3–15.4)
GLOBULIN UR ELPH-MCNC: 3.6 GM/DL
GLUCOSE SERPL-MCNC: 205 MG/DL (ref 65–99)
HCT VFR BLD AUTO: 38.8 % (ref 37.5–51)
HGB BLD-MCNC: 12.9 G/DL (ref 13–17.7)
HOLD SPECIMEN: NORMAL
IMM GRANULOCYTES # BLD AUTO: 0.02 10*3/MM3 (ref 0–0.05)
IMM GRANULOCYTES NFR BLD AUTO: 0.3 % (ref 0–0.5)
LYMPHOCYTES # BLD AUTO: 0.92 10*3/MM3 (ref 0.7–3.1)
LYMPHOCYTES NFR BLD AUTO: 13.5 % (ref 19.6–45.3)
MCH RBC QN AUTO: 29.8 PG (ref 26.6–33)
MCHC RBC AUTO-ENTMCNC: 33.2 G/DL (ref 31.5–35.7)
MCV RBC AUTO: 89.6 FL (ref 79–97)
MONOCYTES # BLD AUTO: 0.58 10*3/MM3 (ref 0.1–0.9)
MONOCYTES NFR BLD AUTO: 8.5 % (ref 5–12)
NEUTROPHILS NFR BLD AUTO: 5.23 10*3/MM3 (ref 1.7–7)
NEUTROPHILS NFR BLD AUTO: 76.9 % (ref 42.7–76)
NRBC BLD AUTO-RTO: 0 /100 WBC (ref 0–0.2)
PLATELET # BLD AUTO: 166 10*3/MM3 (ref 140–450)
PMV BLD AUTO: 10.4 FL (ref 6–12)
POTASSIUM SERPL-SCNC: 4.5 MMOL/L (ref 3.5–5.2)
PROT SERPL-MCNC: 7.7 G/DL (ref 6–8.5)
PSA SERPL-MCNC: 0.08 NG/ML (ref 0–4)
RBC # BLD AUTO: 4.33 10*6/MM3 (ref 4.14–5.8)
SODIUM SERPL-SCNC: 131 MMOL/L (ref 136–145)
TESTOST SERPL-MCNC: 8.87 NG/DL (ref 193–740)
WBC NRBC COR # BLD AUTO: 6.81 10*3/MM3 (ref 3.4–10.8)

## 2024-02-21 PROCEDURE — 84403 ASSAY OF TOTAL TESTOSTERONE: CPT

## 2024-02-21 PROCEDURE — 80053 COMPREHEN METABOLIC PANEL: CPT

## 2024-02-21 PROCEDURE — 85025 COMPLETE CBC W/AUTO DIFF WBC: CPT

## 2024-02-21 PROCEDURE — 84153 ASSAY OF PSA TOTAL: CPT

## 2024-02-21 PROCEDURE — 36415 COLL VENOUS BLD VENIPUNCTURE: CPT

## 2024-02-21 NOTE — PROGRESS NOTES
Specialty Pharmacy Patient Management Program  Oncology Reassessment     Simeon Tam is a 78 y.o. male with metastatic hormone refractory prostate cancer seen by an Oncology provider and enrolled in the Oncology Patient Management program offered by [facility].  A follow-up outreach was conducted, including assessment of continued therapy appropriateness, medication adherence, and side effect incidence and management for apalutamide 240 mg PO daily.       Relevant Past Medical History and Comorbidities  Relevant medical history and concomitant health conditions were discussed with the patient. The patient's chart has been reviewed for relevant past medical history and comorbid health conditions and updated as necessary.   Past Medical History:   Diagnosis Date    Arrhythmia     Cancer     prostate    Diabetes mellitus     History of sepsis     Hyperlipidemia     Hypertension     UTI (urinary tract infection)      Social History     Socioeconomic History    Marital status:    Tobacco Use    Smoking status: Never    Smokeless tobacco: Never   Vaping Use    Vaping Use: Never used   Substance and Sexual Activity    Alcohol use: No    Drug use: No    Sexual activity: Defer       Allergies  Known allergies and reactions were discussed with the patient. The patient's chart has been reviewed for allergy information and updated as necessary.   Sulfa antibiotics    Relevant Laboratory Values  Lab Results   Component Value Date    GLUCOSE 205 (H) 02/21/2024    CALCIUM 9.3 02/21/2024     (L) 02/21/2024    K 4.5 02/21/2024    CO2 27.0 02/21/2024    CL 93 (L) 02/21/2024    BUN 15 02/21/2024    CREATININE 0.62 (L) 02/21/2024    EGFRIFNONA 117 02/09/2022    BCR 24.2 02/21/2024    ANIONGAP 11.0 02/21/2024     Lab Results   Component Value Date    WBC 6.81 02/21/2024    RBC 4.33 02/21/2024    HGB 12.9 (L) 02/21/2024    HCT 38.8 02/21/2024    MCV 89.6 02/21/2024    MCH 29.8 02/21/2024    MCHC  33.2 02/21/2024    RDW 13.0 02/21/2024    RDWSD 43.2 02/21/2024    MPV 10.4 02/21/2024     02/21/2024    NEUTRORELPCT 76.9 (H) 02/21/2024    LYMPHORELPCT 13.5 (L) 02/21/2024    MONORELPCT 8.5 02/21/2024    EOSRELPCT 0.4 02/21/2024    BASORELPCT 0.4 02/21/2024    AUTOIGPER 0.3 02/21/2024    NEUTROABS 5.23 02/21/2024    LYMPHSABS 0.92 02/21/2024    MONOSABS 0.58 02/21/2024    EOSABS 0.03 02/21/2024    BASOSABS 0.03 02/21/2024    AUTOIGNUM 0.02 02/21/2024    NRBC 0.0 02/21/2024        Current Medication List  This medication list has been reviewed with the patient and evaluated for any interactions or necessary modifications/recommendations, and updated to include all prescription medications, OTC medications, and supplements the patient is currently taking.  This list reflects what is contained in the patient's profile, which has also been marked as reviewed to communicate to other providers it is the most up to date version of the patient's current medication therapy.     Current Outpatient Medications:     Apalutamide 60 MG tablet, Take 4 tablets by mouth Daily. Take with or without food. Do not crush or chew tablets., Disp: 120 tablet, Rfl: 11    Apalutamide 60 MG tablet, Take 4 tablets by mouth Daily. Take with or without food. Do not crush or chew tablets., Disp: 120 tablet, Rfl: 11    calcium carbonate (OS-JONNY) 600 MG tablet, Take 1 tablet by mouth Daily., Disp: , Rfl:     dutasteride (AVODART) 0.5 MG capsule, Take 1 capsule by mouth Daily., Disp: 90 capsule, Rfl: 0    JANUVIA 100 MG tablet, Take 1 tablet by mouth Daily., Disp: , Rfl:     lisinopril-hydrochlorothiazide (PRINZIDE,ZESTORETIC) 20-12.5 MG per tablet, Take 1 tablet by mouth Daily., Disp: , Rfl:     Loperamide HCl (IMODIUM PO), Take 1 dose by mouth As Needed (diarrhea)., Disp: , Rfl:     Multiple Vitamins-Minerals (ICAPS AREDS 2 PO), Take  by mouth., Disp: , Rfl:     rosuvastatin (CRESTOR) 20 MG tablet, Take 1 tablet by mouth Every Night.,  Disp: , Rfl:     tolterodine LA (DETROL LA) 4 MG 24 hr capsule, T1T PO QD, Disp: 30 capsule, Rfl: 0  No current facility-administered medications for this visit.    Drug Interactions  None at present     Adverse Drug Reactions  Adverse Reactions Experienced: diarrhea , incidence of diarrhea in apalutamide patients reported as 9% to 20%; grades 3/4: 1%    Plan for ADR Management: Loperamide treatment discussed with patient   Hospitalizations and Urgent Care Since Last Assessment  Hospitalizations or Admissions: None    ED Visits: None   Urgent Office Visits: None     Adherence and Self-Administration  Approximate Number of Doses Missed Since Last Assessment: None   Ongoing or New Barriers to Patient Adherence and/or Self-Administration: None   Methods for Supporting Patient Adherence and/or Self-Administration: Continued reassessment with oral oncology clinic    Goals of Therapy  Progress Toward Meeting Patient-Identified Goals of Therapy:  Goal Met  Patient-Identified Goals  Consistently take medications as prescribed  Patient will adhere to medication regimen  Patient will report any medication side effects to healthcare provider    Progress Toward Meeting Clinical Goals or Therapeutic Targets: Goal Met  Clinical Goals or Therapeutic Targets  Support patient understanding of medication regimen  Ensure patient knows the pharmacy contact information  Schedule regular follow-up to monitor the treatment serious adverse events  Schedule regular follow-up to confirm medication adherence  Schedule regular follow-up to monitor disease progression or stabilty    Quality of Life Assessment   Quality of Life related to the patient's specialty therapy was discussed with the patient. The QOL segment of this outreach has been reviewed and updated.   Quality of Life Score: 8    Reassessment Plan & Follow-Up  Pharmacist to continue to perform regular reassessments no more than (6) months from the previous assessment.  Care  Coordinator to facilitate future refill outreaches, coordinate prescription delivery, and escalate clinical questions to pharmacist.     Additional Plans, Therapy Recommendations or Therapy Problems to Be Addressed: None currently   Recent Provider Changes:  None    Attestation  I attest that the specialty medication(s) addressed above are appropriate for the patient based on my reassessment.  If the prescribed therapy is at any point deemed not appropriate based on the current or future assessments, a consultation will be initiated with the patient's specialty care provider to determine the best course of action. The revised plan of therapy will be documented along with any additional patient education provided.     Kirk Han, PharmD  2/21/2024

## 2024-03-01 ENCOUNTER — TELEPHONE (OUTPATIENT)
Dept: ONCOLOGY | Facility: CLINIC | Age: 79
End: 2024-03-01
Payer: MEDICARE

## 2024-03-01 NOTE — TELEPHONE ENCOUNTER
Returned call to patient, there really isnt anything we can do to help the process of getting it mailed.  She states that it did finally come in the mail today.  She is starting him back on it tonight.  He missed two days.

## 2024-03-05 NOTE — PROGRESS NOTES
[facility] GROUP HEMATOLOGY & ONCOLOGY  [address]  [facility] [id number] [phone]    Patient Name: Simeon Tam  Encounter Date: 03/20/2024  YOB: 1945  Patient Number: [id number]      REASON FOR FOLLOW-UP: Simeon Tam is a pleasant 78 y.o.  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  He is seen C48D22 of single agent apalutamide.  Patient is seen with spouse, Essence. History is obtained from patient.  The patient is a reliable historian.             Oncology/Hematology History Overview Note   DIAGNOSTIC ABNORMALITIES:  He presented with rising PSA of 13 and was diagnosed with prostate cancer about 25 years ago.   Previous PSA was 22.26 on 10/05/2016, <0.13 on 07/17/2017, 7.91 on 10/17/2018, 19.29 on 03/19/2020, and 22.25 on 04/07/2020.   He was seen by Dr. Oneil 05/12/2020. Latest PSA was 22.25 ng/ml on 04/07/2020.  Previous PSA 22.26 on 10/05/2016.  Bone scan and CT scan of the abdomen and pelvis were negative for prostate cancer on 10/2016.  Started on Casodex and possibly leuprolide.  PSA  was down to <0.13 on 07/17/207.  Plan for apalutamide after staging scans.   CT abdomen and pelvis 06/11/2020. No evidence of metastatic disease in the abdomen or pelvis. Right renal pelvis and ureter urothelial thickening and hyperemia. Urinary bladder wall is thickened and there is adjacent inflammation. Recommend correlation with urinalysis and exam to exclude cystitis with right-sided pyelitis.  Cirrhosis.  Tiny 8 mm hypodense RIGHT inferior liver lesion on image 33 is too small to characterize.   Bone scan 06/11/2020. No evidence of bone metastases.        PREVIOUS INTERVENTIONS:  He was treated with adjuvant radiation at [place] and androgen ablation with Casodex.  Lupron and Casodex 10/2016. He had 7 months of ADT therapy with Lupron.  Apalutamide 07/22/2020 through  present.     Prostate cancer   6/29/2020 -  Chemotherapy    OP PROSTATE Apalutamide     6/30/2020 Initial Diagnosis    Prostate cancer (CMS/[facility])     7/29/2020 -  Chemotherapy    OP LEUPROLIDE 45MG Q6M - ORDER EXPIRES 7/18/24     10/5/2022 - 10/5/2022 Chemotherapy    OP PROSTATE Apalutamide         PAST MEDICAL HISTORY:  ALLERGIES:  Allergies   Allergen Reactions    Sulfa Antibiotics Rash     CURRENT MEDICATIONS:  Outpatient Encounter Medications as of 3/20/2024   Medication Sig Dispense Refill    Apalutamide 60 MG tablet Take 4 tablets by mouth Daily. Take with or without food. Do not crush or chew tablets. 120 tablet 11    Apalutamide 60 MG tablet Take 4 tablets by mouth Daily. Take with or without food. Do not crush or chew tablets. 120 tablet 11    calcium carbonate (OS-JONNY) 600 MG tablet Take 1 tablet by mouth Daily.      colestipol (COLESTID) 1 g tablet       dutasteride (AVODART) 0.5 MG capsule Take 1 capsule by mouth Daily. 90 capsule 0    JANUVIA 100 MG tablet Take 1 tablet by mouth Daily.      lisinopril-hydrochlorothiazide (PRINZIDE,ZESTORETIC) 20-12.5 MG per tablet Take 1 tablet by mouth Daily.      Loperamide HCl (IMODIUM PO) Take 1 dose by mouth As Needed (diarrhea).      Multiple Vitamins-Minerals (ICAPS AREDS 2 PO) Take  by mouth.      rosuvastatin (CRESTOR) 20 MG tablet Take 1 tablet by mouth Every Night.      tolterodine LA (DETROL LA) 4 MG 24 hr capsule TAKE (1) CAPSULE ONCE DAILY. 30 capsule 0    [DISCONTINUED] tolterodine LA (DETROL LA) 4 MG 24 hr capsule T1T PO QD 30 capsule 0     No facility-administered encounter medications on file as of 3/20/2024.     ADULT ILLNESSES:  Patient Active Problem List   Diagnosis Code    Hypertension I10    Hyperlipidemia E78.5    Diabetes mellitus E11.9    Cancer C80.1    Arrhythmia I49.9    Non morbid obesity due to excess calories E66.09    Prostate cancer C61    Anterior urethral stricture N35.914     SURGERIES:  Past Surgical History:   Procedure Laterality  Date    CATARACT EXTRACTION      CYSTOSCOPY RETROGRADE PYELOGRAM Bilateral 4/21/2021    Procedure: CYSTOSCOPY RETROGRADE PYELOGRAM, possible DIRECT VISION INTERNAL URETHROTOMY;  Surgeon: Matthew Oneil MD;  Location: [facility] OR;  Service: Urology;  Laterality: Bilateral;    CYSTOSCOPY URETHROTOMY VISUAL INTERNAL Bilateral 4/21/2021    Procedure: possible DIRECT VISION INTERNAL URETHROTOMY;  Surgeon: Matthew Oneil MD;  Location:  [facility] OR;  Service: Urology;  Laterality: Bilateral;    EXCISION LESION      neck    KIDNEY STONE SURGERY      REPLACEMENT TOTAL KNEE Left     TONSILLECTOMY       HEALTH MAINTENANCE ITEMS:  Health Maintenance Due   Topic Date Due    LIPID PANEL  Never done    URINE MICROALBUMIN  Never done    Pneumococcal Vaccine 65+ (1 of 2 - PCV) Never done    DIABETIC EYE EXAM  Never done    TDAP/TD VACCINES (1 - Tdap) Never done    ZOSTER VACCINE (1 of 2) Never done    Hepatitis B (1 of 3 - Risk 3-dose series) Never done    RSV Vaccine - Adults (1 - 1-dose 60+ series) Never done    ANNUAL WELLNESS VISIT  Never done    HEMOGLOBIN A1C  Never done    BMI FOLLOWUP  04/27/2022    INFLUENZA VACCINE  Never done    COVID-19 Vaccine (4 - 2023-24 season) 09/01/2023       <no information>  Last Completed Colonoscopy       This patient has no relevant Health Maintenance data.            There is no immunization history on file for this patient.  Last Completed Mammogram       This patient has no relevant Health Maintenance data.              FAMILY HISTORY:  Family History   Problem Relation Age of Onset    Dementia Mother      SOCIAL HISTORY:  Social History     Socioeconomic History    Marital status:    Tobacco Use    Smoking status: Never    Smokeless tobacco: Never   Vaping Use    Vaping status: Never Used   Substance and Sexual Activity    Alcohol use: No    Drug use: No    Sexual activity: Defer       REVIEW OF SYSTEMS:    Review of Systems   Constitutional:  Negative for fatigue and fever.        " \"I feel good.\"   HENT:  Negative for congestion and mouth sores.    Eyes:  Negative for redness and visual disturbance.   Respiratory:  Negative for shortness of breath and wheezing.    Cardiovascular:  Negative for chest pain and palpitations.   Gastrointestinal:  Negative for constipation, diarrhea, nausea and vomiting.   Endocrine: Negative for polydipsia and polyphagia.   Genitourinary:  Negative for difficulty urinating and dysuria.   Musculoskeletal:  Negative for gait problem and myalgias.   Skin:  Negative for pallor.   Allergic/Immunologic: Negative for food allergies.   Neurological:  Negative for dizziness and speech difficulty.   Hematological:  Negative for adenopathy. Does not bruise/bleed easily.   Psychiatric/Behavioral:  Negative for agitation, confusion and hallucinations.        VITAL SIGNS: /64   Pulse 82   Temp 97.6 °F (36.4 °C)   Resp 18   Ht 172.7 cm (68\")   Wt 85.9 kg (189 lb 4.8 oz)   SpO2 94%   BMI 28.78 kg/m²   Pain Score    03/20/24 1118   PainSc: 0-No pain       PHYSICAL EXAMINATION:     Physical Exam  Vitals reviewed.   Constitutional:       General: He is not in acute distress.  HENT:      Head: Normocephalic and atraumatic.   Eyes:      General: No scleral icterus.  Cardiovascular:      Rate and Rhythm: Normal rate.   Pulmonary:      Effort: No respiratory distress.      Breath sounds: No wheezing.   Abdominal:      General: Bowel sounds are normal.      Palpations: Abdomen is soft.      Tenderness: There is no abdominal tenderness.   Musculoskeletal:         General: No swelling.      Cervical back: Neck supple.   Skin:     General: Skin is warm.      Coloration: Skin is not pale.   Neurological:      Mental Status: He is alert and oriented to person, place, and time.   Psychiatric:         Mood and Affect: Mood normal.         Behavior: Behavior normal.         Thought Content: Thought content normal.         Judgment: Judgment normal.         LABS    Lab Results - " Last 18 Months   Lab Units 03/20/24  1043 02/21/24  1041 01/24/24  1052 12/20/23  1102 11/29/23  1215 11/01/23  1237 08/09/23  1036 07/07/23  1036 05/26/23  1338 05/03/23  1341 04/05/23  1326 03/08/23  1407   HEMOGLOBIN g/dL 13.0 12.9* 13.3 13.0 13.3 13.0   < > 13.4 13.5   < > 12.8* 13.2   HEMATOCRIT % 40.1 38.8 40.2 40.3 39.3 38.9   < > 40.4 41.1   < > 39.0 39.8   MCV fL 90.1 89.6 90.7 91.2 88.9 88.2   < > 89.2 92.2   < > 91.5 90.7   WBC 10*3/mm3 7.71 6.81 6.16 6.37 9.04 5.98   < > 5.60 7.85   < > 5.94 8.03   RDW % 13.4 13.0 13.4 13.3 13.0 13.1   < > 13.1 13.0   < > 13.4 13.3   MPV fL 9.8 10.4 10.7 10.6 10.8 10.0   < > 10.2 10.3   < > 10.6 10.3   PLATELETS 10*3/mm3 139* 166 118* 116* 132* 146   < > 146 144   < > 108* 135*   IMM GRAN % %  --  0.3  --   --   --  0.5  --  0.4 0.4  --  0.2 0.1   NEUTROS ABS 10*3/mm3 5.96 5.23 4.64 4.80 7.08* 4.66   < > 4.08 5.38   < > 4.44 6.12   LYMPHS ABS 10*3/mm3 1.00 0.92 0.88 0.95 0.94 0.83   < > 0.89 1.44   < > 0.83 1.04   MONOS ABS 10*3/mm3 0.56 0.58 0.50 0.51 0.90 0.36   < > 0.47 0.87   < > 0.53 0.66   EOS ABS 10*3/mm3 0.14 0.03 0.08 0.07 0.04 0.06   < > 0.10 0.10   < > 0.10 0.16   BASOS ABS 10*3/mm3 0.03 0.03 0.04 0.03 0.05 0.04   < > 0.04 0.03   < > 0.03 0.04   IMMATURE GRANS (ABS) 10*3/mm3  --  0.02  --   --   --  0.03  --  0.02 0.03  --  0.01 0.01   NRBC /100 WBC  --  0.0  --   --   --  0.0  --  0.0 0.0  --  0.0 0.0    < > = values in this interval not displayed.       Lab Results - Last 18 Months   Lab Units 03/20/24  1043 02/21/24  1041 01/24/24  1052 12/20/23  1102 11/29/23  1215 11/01/23  1237   GLUCOSE mg/dL 147* 205* 165* 180* 170* 204*   SODIUM mmol/L 132* 131* 133* 133* 133* 135*   POTASSIUM mmol/L 4.1 4.5 4.2 4.0 3.8 4.0   CO2 mmol/L 28.0 27.0 26.0 27.0 28.0 27.0   CHLORIDE mmol/L 94* 93* 96* 96* 93* 96*   ANION GAP mmol/L 10.0 11.0 11.0 10.0 12.0 12.0   CREATININE mg/dL 0.57* 0.62* 0.61* 0.58* 0.67* 0.65*   BUN mg/dL 13 15 14 14 15 15   BUN / CREAT RATIO  22.8  "24.2 23.0 24.1 22.4 23.1   CALCIUM mg/dL 9.9 9.3 9.5 9.0 9.5 9.5   ALK PHOS U/L 72 75 69 65 64 57   TOTAL PROTEIN g/dL 7.7 7.7 7.4 7.4 7.6 7.2   ALT (SGPT) U/L 14 11 16 18 13 19   AST (SGOT) U/L 20 19 23 26 19 23   BILIRUBIN mg/dL 0.5 0.6 0.5 0.6 0.8 0.4   ALBUMIN g/dL 4.3 4.1 4.3 4.2 4.1 4.1   GLOBULIN gm/dL 3.4 3.6 3.1 3.2 3.5 3.1       No results for input(s): \"MSPIKE\", \"KAPPALAMB\", \"IGLFLC\", \"URICACID\", \"FREEKAPPAL\", \"CEA\", \"LDH\", \"REFLABREPO\" in the last 15882 hours.    No results for input(s): \"IRON\", \"TIBC\", \"LABIRON\", \"FERRITIN\", \"X9QIJND\", \"TSH\", \"FOLATE\" in the last 10867 hours.    Invalid input(s): \"VITB12\"    Simeon Tam reports a pain score of 0.              ASSESSMENT:  1.   Prostate cancer, hormone refractory.   AJCC stage (TX, NX, M0)  Treatment status:Casodex and leuprolide with PSA recurrence.  Patient on treatment with apalutamide and leuprolde (by Dr. Oneil).  2.   Performance status of 0.  3.   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  Under observation.  4.   Cirrhosis, etiology of thrombocytopenia and followed by Dr. Duval.    5.   Urethral stricture. Self catheterization as indicated. \"As needed.\"             PLAN:  1.    Re: Tolerance to apalutamide.  \"I missed 2 doses.\"   2.    Re:  Heme status.  Hemoglobin 13, WBC 7.7 and platelet 139 from 166 from 118 from 116 from 132 from 146.  3.    Re:  Pre-office CMP.  GFR 99.1 ml/minute and glucose 147.  4.    Re:  Pre-office PSA pending from 0.08 from 0.047 from 0.064 from 0.056 from 0.071 from 0.053 from 0.050 from 0.047 from 0.048 from 0.038 from 0.029 from 0.031 from 0.031 from 0.026 from 0.024 from 0.019 from 0.020 from 0.016 from 0.015 from 0.018 (forgot my Lupron) from < 0.014 from < 0.014  from <0.014 from <0.014 from <0.01 from < 0.014 from < 0.014 from <0.014 from < 0.014 from < 0.014 from < 0.014 from < 0.014 from < 0.014 from < 0.014 from < 0.014 from < 0.014 from < 0.014 from < 0.014 " "from < 0.014 from 0.015 from 0.03 from 0.125 from 1.03 from 27.3. Testosterone pending from 8.87 from 31.1 from 30.4 from 44.9 from 36.1 from 26.4 from 26.6 from 25.1 from 35.2 from 11.8 from 35.7 from 23.4 from 28.1 from 25 from 28.2 from 25.1 from 34.1 from 18.1 from 527 from 580 from 443 from 235.  Most recent leuprolide was given on 2/6/2024.  Restage if PSA at 2 patient on prostate cancer working group for rise of 0.2  Association of urology.   5.   eRx for prochlorperazine 10 mg p.o. every 4 hours as needed for nausea/vomiting, 60, 2 refills if needed.    6.   eRx for C48D1 started on 2/28/2024 apalutamide 60 mg, take four tablets total dose 240 mg po daily, number, 120.  Take either with or without food.  Swallow tablets whole. TSH before apalutamide.  No grapefruit juice or grapefruit.  Monitor for potential seizures, rash, arrhythmias, falling, hypothyroidism or fractures.  Continue apalutamide and follow PSA.  7.   Continue ongoing management per primary care physician and other specialists.  8.   Plan of care discussed with patient and Essence, spouse.  Understanding expressed.  Patient is agreeable to proceed.  9.   Leuprolide per Dr. Oneil given every 6 months.  10.  Questions were answered to his satisfaction. \"I follow you.\"   11.  Advance Care Planning  ACP discussion was declined by the patient. Patient does not have an advance directive, information provided.   12.  Return to office 4 weeks with CMP, PSA, testosterone, and CBC with differential, same day.            I have reviewed the assessment and plan and verified the accuracy of it. No changes to assessment and plan since the information was documented. Deshawn Black MD 03/20/24           I spent 32 total minutes, face-to-face, caring for Simeon today. Greater than 50% of this time involved counseling and/or coordination of care as documented within this note.                           (Matthew Oneil MD)  Mateo Duval MD  Lucio " MD Jose

## 2024-03-13 DIAGNOSIS — N31.9 NEUROGENIC BLADDER: ICD-10-CM

## 2024-03-13 RX ORDER — TOLTERODINE 4 MG/1
CAPSULE, EXTENDED RELEASE ORAL
Qty: 30 CAPSULE | Refills: 0 | Status: SHIPPED | OUTPATIENT
Start: 2024-03-13

## 2024-03-20 ENCOUNTER — LAB (OUTPATIENT)
Dept: LAB | Facility: HOSPITAL | Age: 79
End: 2024-03-20
Payer: MEDICARE

## 2024-03-20 ENCOUNTER — OFFICE VISIT (OUTPATIENT)
Dept: ONCOLOGY | Facility: CLINIC | Age: 79
End: 2024-03-20
Payer: MEDICARE

## 2024-03-20 VITALS
HEART RATE: 82 BPM | SYSTOLIC BLOOD PRESSURE: 112 MMHG | TEMPERATURE: 97.6 F | WEIGHT: 189.3 LBS | HEIGHT: 68 IN | BODY MASS INDEX: 28.69 KG/M2 | RESPIRATION RATE: 18 BRPM | OXYGEN SATURATION: 94 % | DIASTOLIC BLOOD PRESSURE: 64 MMHG

## 2024-03-20 DIAGNOSIS — C61 PROSTATE CANCER: Primary | ICD-10-CM

## 2024-03-20 LAB
ALBUMIN SERPL-MCNC: 4.3 G/DL (ref 3.5–5.2)
ALBUMIN/GLOB SERPL: 1.3 G/DL
ALP SERPL-CCNC: 72 U/L (ref 39–117)
ALT SERPL W P-5'-P-CCNC: 14 U/L (ref 1–41)
ANION GAP SERPL CALCULATED.3IONS-SCNC: 10 MMOL/L (ref 5–15)
AST SERPL-CCNC: 20 U/L (ref 1–40)
BASOPHILS # BLD AUTO: 0.03 10*3/MM3 (ref 0–0.2)
BASOPHILS NFR BLD AUTO: 0.4 % (ref 0–1.5)
BILIRUB SERPL-MCNC: 0.5 MG/DL (ref 0–1.2)
BUN SERPL-MCNC: 13 MG/DL (ref 8–23)
BUN/CREAT SERPL: 22.8 (ref 7–25)
CALCIUM SPEC-SCNC: 9.9 MG/DL (ref 8.6–10.5)
CHLORIDE SERPL-SCNC: 94 MMOL/L (ref 98–107)
CO2 SERPL-SCNC: 28 MMOL/L (ref 22–29)
CREAT SERPL-MCNC: 0.57 MG/DL (ref 0.76–1.27)
DEPRECATED RDW RBC AUTO: 44 FL (ref 37–54)
EGFRCR SERPLBLD CKD-EPI 2021: 99.1 ML/MIN/1.73
EOSINOPHIL # BLD AUTO: 0.14 10*3/MM3 (ref 0–0.4)
EOSINOPHIL NFR BLD AUTO: 1.8 % (ref 0.3–6.2)
ERYTHROCYTE [DISTWIDTH] IN BLOOD BY AUTOMATED COUNT: 13.4 % (ref 12.3–15.4)
GLOBULIN UR ELPH-MCNC: 3.4 GM/DL
GLUCOSE SERPL-MCNC: 147 MG/DL (ref 65–99)
HCT VFR BLD AUTO: 40.1 % (ref 37.5–51)
HGB BLD-MCNC: 13 G/DL (ref 13–17.7)
HOLD SPECIMEN: NORMAL
LYMPHOCYTES # BLD AUTO: 1 10*3/MM3 (ref 0.7–3.1)
LYMPHOCYTES NFR BLD AUTO: 13 % (ref 19.6–45.3)
MCH RBC QN AUTO: 29.2 PG (ref 26.6–33)
MCHC RBC AUTO-ENTMCNC: 32.4 G/DL (ref 31.5–35.7)
MCV RBC AUTO: 90.1 FL (ref 79–97)
MONOCYTES # BLD AUTO: 0.56 10*3/MM3 (ref 0.1–0.9)
MONOCYTES NFR BLD AUTO: 7.3 % (ref 5–12)
NEUTROPHILS NFR BLD AUTO: 5.96 10*3/MM3 (ref 1.7–7)
NEUTROPHILS NFR BLD AUTO: 77.2 % (ref 42.7–76)
PLATELET # BLD AUTO: 139 10*3/MM3 (ref 140–450)
PMV BLD AUTO: 9.8 FL (ref 6–12)
POTASSIUM SERPL-SCNC: 4.1 MMOL/L (ref 3.5–5.2)
PROT SERPL-MCNC: 7.7 G/DL (ref 6–8.5)
PSA SERPL-MCNC: 0.1 NG/ML (ref 0–4)
RBC # BLD AUTO: 4.45 10*6/MM3 (ref 4.14–5.8)
SODIUM SERPL-SCNC: 132 MMOL/L (ref 136–145)
TESTOST SERPL-MCNC: 28.4 NG/DL (ref 193–740)
WBC NRBC COR # BLD AUTO: 7.71 10*3/MM3 (ref 3.4–10.8)

## 2024-03-20 PROCEDURE — 80053 COMPREHEN METABOLIC PANEL: CPT

## 2024-03-20 PROCEDURE — 84153 ASSAY OF PSA TOTAL: CPT

## 2024-03-20 PROCEDURE — 36415 COLL VENOUS BLD VENIPUNCTURE: CPT

## 2024-03-20 PROCEDURE — 84403 ASSAY OF TOTAL TESTOSTERONE: CPT

## 2024-03-20 PROCEDURE — 85025 COMPLETE CBC W/AUTO DIFF WBC: CPT

## 2024-03-20 RX ORDER — MONTELUKAST SODIUM 4 MG/1
2 TABLET, CHEWABLE ORAL DAILY
COMMUNITY
Start: 2024-03-11

## 2024-03-20 NOTE — LETTER
March 20, 2024       No Recipients    Patient: Simoen Tam   YOB: 1945   Date of Visit: 3/20/2024     Dear Lucio Garcia MD:       Thank you for referring Simeon Tam to me for evaluation. Below are the relevant portions of my assessment and plan of care.    If you have questions, please do not hesitate to call me. I look forward to following Simeon along with you.         Sincerely,        Deshawn Black MD        CC:   No Recipients    Deshawn Black MD  03/20/24 1141  Sign when Signing Visit  [facility] HEMATOLOGY & ONCOLOGY  [address]  [place] [id number] [phone]    Patient Name: Simeon Tam  Encounter Date: 03/20/2024  YOB: 1945  Patient Number: [id number]      REASON FOR FOLLOW-UP: Simeon Tam is a pleasant 78 y.o.  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  He is seen C48D22 of single agent apalutamide.  Patient is seen with spouse, Essence. History is obtained from patient.  The patient is a reliable historian.             Oncology/Hematology History Overview Note   DIAGNOSTIC ABNORMALITIES:  He presented with rising PSA of 13 and was diagnosed with prostate cancer about 25 years ago.   Previous PSA was 22.26 on 10/05/2016, <0.13 on 07/17/2017, 7.91 on 10/17/2018, 19.29 on 03/19/2020, and 22.25 on 04/07/2020.   He was seen by Dr. Oneil 05/12/2020. Latest PSA was 22.25 ng/ml on 04/07/2020.  Previous PSA 22.26 on 10/05/2016.  Bone scan and CT scan of the abdomen and pelvis were negative for prostate cancer on 10/2016.  Started on Casodex and possibly leuprolide.  PSA  was down to <0.13 on 07/17/207.  Plan for apalutamide after staging scans.   CT abdomen and pelvis 06/11/2020. No evidence of metastatic disease in the abdomen or pelvis. Right renal pelvis and ureter urothelial thickening and hyperemia. Urinary bladder wall is  thickened and there is adjacent inflammation. Recommend correlation with urinalysis and exam to exclude cystitis with right-sided pyelitis.  Cirrhosis.  Tiny 8 mm hypodense RIGHT inferior liver lesion on image 33 is too small to characterize.   Bone scan 06/11/2020. No evidence of bone metastases.        PREVIOUS INTERVENTIONS:  He was treated with adjuvant radiation at [place] and androgen ablation with Casodex.  Lupron and Casodex 10/2016. He had 7 months of ADT therapy with Lupron.  Apalutamide 07/22/2020 through present.     Prostate cancer   6/29/2020 -  Chemotherapy    OP PROSTATE Apalutamide     6/30/2020 Initial Diagnosis    Prostate cancer (CMS/HCC)     7/29/2020 -  Chemotherapy    OP LEUPROLIDE 45MG Q6M - ORDER EXPIRES 7/18/24     10/5/2022 - 10/5/2022 Chemotherapy    OP PROSTATE Apalutamide         PAST MEDICAL HISTORY:  ALLERGIES:  Allergies   Allergen Reactions   • Sulfa Antibiotics Rash     CURRENT MEDICATIONS:  Outpatient Encounter Medications as of 3/20/2024   Medication Sig Dispense Refill   • Apalutamide 60 MG tablet Take 4 tablets by mouth Daily. Take with or without food. Do not crush or chew tablets. 120 tablet 11   • Apalutamide 60 MG tablet Take 4 tablets by mouth Daily. Take with or without food. Do not crush or chew tablets. 120 tablet 11   • calcium carbonate (OS-JONNY) 600 MG tablet Take 1 tablet by mouth Daily.     • colestipol (COLESTID) 1 g tablet      • dutasteride (AVODART) 0.5 MG capsule Take 1 capsule by mouth Daily. 90 capsule 0   • JANUVIA 100 MG tablet Take 1 tablet by mouth Daily.     • lisinopril-hydrochlorothiazide (PRINZIDE,ZESTORETIC) 20-12.5 MG per tablet Take 1 tablet by mouth Daily.     • Loperamide HCl (IMODIUM PO) Take 1 dose by mouth As Needed (diarrhea).     • Multiple Vitamins-Minerals (ICAPS AREDS 2 PO) Take  by mouth.     • rosuvastatin (CRESTOR) 20 MG tablet Take 1 tablet by mouth Every Night.     • tolterodine LA (DETROL LA) 4 MG 24 hr capsule TAKE (1) CAPSULE  ONCE DAILY. 30 capsule 0   • [DISCONTINUED] tolterodine LA (DETROL LA) 4 MG 24 hr capsule T1T PO QD 30 capsule 0     No facility-administered encounter medications on file as of 3/20/2024.     ADULT ILLNESSES:  Patient Active Problem List   Diagnosis Code   • Hypertension I10   • Hyperlipidemia E78.5   • Diabetes mellitus E11.9   • Cancer C80.1   • Arrhythmia I49.9   • Non morbid obesity due to excess calories E66.09   • Prostate cancer C61   • Anterior urethral stricture N35.914     SURGERIES:  Past Surgical History:   Procedure Laterality Date   • CATARACT EXTRACTION     • CYSTOSCOPY RETROGRADE PYELOGRAM Bilateral 4/21/2021    Procedure: CYSTOSCOPY RETROGRADE PYELOGRAM, possible DIRECT VISION INTERNAL URETHROTOMY;  Surgeon: Matthew Oneil MD;  Location: [facility] OR;  Service: Urology;  Laterality: Bilateral;   • CYSTOSCOPY URETHROTOMY VISUAL INTERNAL Bilateral 4/21/2021    Procedure: possible DIRECT VISION INTERNAL URETHROTOMY;  Surgeon: Matthew Oneil MD;  Location:  [facility] OR;  Service: Urology;  Laterality: Bilateral;   • EXCISION LESION      neck   • KIDNEY STONE SURGERY     • REPLACEMENT TOTAL KNEE Left    • TONSILLECTOMY       HEALTH MAINTENANCE ITEMS:  Health Maintenance Due   Topic Date Due   • LIPID PANEL  Never done   • URINE MICROALBUMIN  Never done   • Pneumococcal Vaccine 65+ (1 of 2 - PCV) Never done   • DIABETIC EYE EXAM  Never done   • TDAP/TD VACCINES (1 - Tdap) Never done   • ZOSTER VACCINE (1 of 2) Never done   • Hepatitis B (1 of 3 - Risk 3-dose series) Never done   • RSV Vaccine - Adults (1 - 1-dose 60+ series) Never done   • ANNUAL WELLNESS VISIT  Never done   • HEMOGLOBIN A1C  Never done   • BMI FOLLOWUP  04/27/2022   • INFLUENZA VACCINE  Never done   • COVID-19 Vaccine (4 - 2023-24 season) 09/01/2023       <no information>  Last Completed Colonoscopy       This patient has no relevant Health Maintenance data.            There is no immunization history on file for this patient.  Last  "Completed Mammogram       This patient has no relevant Health Maintenance data.              FAMILY HISTORY:  Family History   Problem Relation Age of Onset   • Dementia Mother      SOCIAL HISTORY:  Social History     Socioeconomic History   • Marital status:    Tobacco Use   • Smoking status: Never   • Smokeless tobacco: Never   Vaping Use   • Vaping status: Never Used   Substance and Sexual Activity   • Alcohol use: No   • Drug use: No   • Sexual activity: Defer       REVIEW OF SYSTEMS:    Review of Systems   Constitutional:  Negative for fatigue and fever.        \"I feel good.\"   HENT:  Negative for congestion and mouth sores.    Eyes:  Negative for redness and visual disturbance.   Respiratory:  Negative for shortness of breath and wheezing.    Cardiovascular:  Negative for chest pain and palpitations.   Gastrointestinal:  Negative for constipation, diarrhea, nausea and vomiting.   Endocrine: Negative for polydipsia and polyphagia.   Genitourinary:  Negative for difficulty urinating and dysuria.   Musculoskeletal:  Negative for gait problem and myalgias.   Skin:  Negative for pallor.   Allergic/Immunologic: Negative for food allergies.   Neurological:  Negative for dizziness and speech difficulty.   Hematological:  Negative for adenopathy. Does not bruise/bleed easily.   Psychiatric/Behavioral:  Negative for agitation, confusion and hallucinations.        VITAL SIGNS: /64   Pulse 82   Temp 97.6 °F (36.4 °C)   Resp 18   Ht 172.7 cm (68\")   Wt 85.9 kg (189 lb 4.8 oz)   SpO2 94%   BMI 28.78 kg/m²   Pain Score    03/20/24 1118   PainSc: 0-No pain       PHYSICAL EXAMINATION:     Physical Exam  Vitals reviewed.   Constitutional:       General: He is not in acute distress.  HENT:      Head: Normocephalic and atraumatic.   Eyes:      General: No scleral icterus.  Cardiovascular:      Rate and Rhythm: Normal rate.   Pulmonary:      Effort: No respiratory distress.      Breath sounds: No wheezing. "   Abdominal:      General: Bowel sounds are normal.      Palpations: Abdomen is soft.      Tenderness: There is no abdominal tenderness.   Musculoskeletal:         General: No swelling.      Cervical back: Neck supple.   Skin:     General: Skin is warm.      Coloration: Skin is not pale.   Neurological:      Mental Status: He is alert and oriented to person, place, and time.   Psychiatric:         Mood and Affect: Mood normal.         Behavior: Behavior normal.         Thought Content: Thought content normal.         Judgment: Judgment normal.         LABS    Lab Results - Last 18 Months   Lab Units 03/20/24  1043 02/21/24  1041 01/24/24  1052 12/20/23  1102 11/29/23  1215 11/01/23  1237 08/09/23  1036 07/07/23  1036 05/26/23  1338 05/03/23  1341 04/05/23  1326 03/08/23  1407   HEMOGLOBIN g/dL 13.0 12.9* 13.3 13.0 13.3 13.0   < > 13.4 13.5   < > 12.8* 13.2   HEMATOCRIT % 40.1 38.8 40.2 40.3 39.3 38.9   < > 40.4 41.1   < > 39.0 39.8   MCV fL 90.1 89.6 90.7 91.2 88.9 88.2   < > 89.2 92.2   < > 91.5 90.7   WBC 10*3/mm3 7.71 6.81 6.16 6.37 9.04 5.98   < > 5.60 7.85   < > 5.94 8.03   RDW % 13.4 13.0 13.4 13.3 13.0 13.1   < > 13.1 13.0   < > 13.4 13.3   MPV fL 9.8 10.4 10.7 10.6 10.8 10.0   < > 10.2 10.3   < > 10.6 10.3   PLATELETS 10*3/mm3 139* 166 118* 116* 132* 146   < > 146 144   < > 108* 135*   IMM GRAN % %  --  0.3  --   --   --  0.5  --  0.4 0.4  --  0.2 0.1   NEUTROS ABS 10*3/mm3 5.96 5.23 4.64 4.80 7.08* 4.66   < > 4.08 5.38   < > 4.44 6.12   LYMPHS ABS 10*3/mm3 1.00 0.92 0.88 0.95 0.94 0.83   < > 0.89 1.44   < > 0.83 1.04   MONOS ABS 10*3/mm3 0.56 0.58 0.50 0.51 0.90 0.36   < > 0.47 0.87   < > 0.53 0.66   EOS ABS 10*3/mm3 0.14 0.03 0.08 0.07 0.04 0.06   < > 0.10 0.10   < > 0.10 0.16   BASOS ABS 10*3/mm3 0.03 0.03 0.04 0.03 0.05 0.04   < > 0.04 0.03   < > 0.03 0.04   IMMATURE GRANS (ABS) 10*3/mm3  --  0.02  --   --   --  0.03  --  0.02 0.03  --  0.01 0.01   NRBC /100 WBC  --  0.0  --   --   --  0.0  --  0.0 0.0   "--  0.0 0.0    < > = values in this interval not displayed.       Lab Results - Last 18 Months   Lab Units 03/20/24  1043 02/21/24  1041 01/24/24  1052 12/20/23  1102 11/29/23  1215 11/01/23  1237   GLUCOSE mg/dL 147* 205* 165* 180* 170* 204*   SODIUM mmol/L 132* 131* 133* 133* 133* 135*   POTASSIUM mmol/L 4.1 4.5 4.2 4.0 3.8 4.0   CO2 mmol/L 28.0 27.0 26.0 27.0 28.0 27.0   CHLORIDE mmol/L 94* 93* 96* 96* 93* 96*   ANION GAP mmol/L 10.0 11.0 11.0 10.0 12.0 12.0   CREATININE mg/dL 0.57* 0.62* 0.61* 0.58* 0.67* 0.65*   BUN mg/dL 13 15 14 14 15 15   BUN / CREAT RATIO  22.8 24.2 23.0 24.1 22.4 23.1   CALCIUM mg/dL 9.9 9.3 9.5 9.0 9.5 9.5   ALK PHOS U/L 72 75 69 65 64 57   TOTAL PROTEIN g/dL 7.7 7.7 7.4 7.4 7.6 7.2   ALT (SGPT) U/L 14 11 16 18 13 19   AST (SGOT) U/L 20 19 23 26 19 23   BILIRUBIN mg/dL 0.5 0.6 0.5 0.6 0.8 0.4   ALBUMIN g/dL 4.3 4.1 4.3 4.2 4.1 4.1   GLOBULIN gm/dL 3.4 3.6 3.1 3.2 3.5 3.1       No results for input(s): \"MSPIKE\", \"KAPPALAMB\", \"IGLFLC\", \"URICACID\", \"FREEKAPPAL\", \"CEA\", \"LDH\", \"REFLABREPO\" in the last 06972 hours.    No results for input(s): \"IRON\", \"TIBC\", \"LABIRON\", \"FERRITIN\", \"S8EZKES\", \"TSH\", \"FOLATE\" in the last 22658 hours.    Invalid input(s): \"VITB12\"    Simeon Tam reports a pain score of 0.              ASSESSMENT:  1.   Prostate cancer, hormone refractory.   AJCC stage (TX, NX, M0)  Treatment status:Casodex and leuprolide with PSA recurrence.  Patient on treatment with apalutamide and leuprolde (by Dr. Oneil).  2.   Performance status of 0.  3.   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  Under observation.  4.   Cirrhosis, etiology of thrombocytopenia and followed by Dr. Duval.    5.   Urethral stricture. Self catheterization as indicated. \"As needed.\"             PLAN:  1.    Re: Tolerance to apalutamide.  \"I missed 2 doses.\"   2.    Re:  Heme status.  Hemoglobin 13, WBC 7.7 and platelet 139 from 166 from 118 from 116 from 132 from 146.  3.   " " Re:  Pre-office CMP.  GFR 99.1 ml/minute and glucose 147.  4.    Re:  Pre-office PSA pending from 0.08 from 0.047 from 0.064 from 0.056 from 0.071 from 0.053 from 0.050 from 0.047 from 0.048 from 0.038 from 0.029 from 0.031 from 0.031 from 0.026 from 0.024 from 0.019 from 0.020 from 0.016 from 0.015 from 0.018 (forgot my Lupron) from < 0.014 from < 0.014  from <0.014 from <0.014 from <0.01 from < 0.014 from < 0.014 from <0.014 from < 0.014 from < 0.014 from < 0.014 from < 0.014 from < 0.014 from < 0.014 from < 0.014 from < 0.014 from < 0.014 from < 0.014 from < 0.014 from 0.015 from 0.03 from 0.125 from 1.03 from 27.3. Testosterone pending from 8.87 from 31.1 from 30.4 from 44.9 from 36.1 from 26.4 from 26.6 from 25.1 from 35.2 from 11.8 from 35.7 from 23.4 from 28.1 from 25 from 28.2 from 25.1 from 34.1 from 18.1 from 527 from 580 from 443 from 235.  Most recent leuprolide was given on 2/6/2024.  Restage if PSA at 2 patient on prostate cancer working group for rise of 0.2  Association of urology.   5.   eRx for prochlorperazine 10 mg p.o. every 4 hours as needed for nausea/vomiting, 60, 2 refills if needed.    6.   eRx for C48D1 started on 2/28/2024 apalutamide 60 mg, take four tablets total dose 240 mg po daily, number, 120.  Take either with or without food.  Swallow tablets whole. TSH before apalutamide.  No grapefruit juice or grapefruit.  Monitor for potential seizures, rash, arrhythmias, falling, hypothyroidism or fractures.  Continue apalutamide and follow PSA.  7.   Continue ongoing management per primary care physician and other specialists.  8.   Plan of care discussed with patient and Essence, spouse.  Understanding expressed.  Patient is agreeable to proceed.  9.   Leuprolide per Dr. Oneil given every 6 months.  10.  Questions were answered to his satisfaction. \"I follow you.\"   11.  Advance Care Planning  ACP discussion was declined by the patient. Patient does not have an advance " directive, information provided.   12.  Return to office 4 weeks with CMP, PSA, testosterone, and CBC with differential, same day.            I have reviewed the assessment and plan and verified the accuracy of it. No changes to assessment and plan since the information was documented. Deshawn Black MD 03/20/24           I spent 32 total minutes, face-to-face, caring for Simeon today. Greater than 50% of this time involved counseling and/or coordination of care as documented within this note.                           (Matthew Oneil MD)  MD Lucio Balderrama MD

## 2024-03-21 ENCOUNTER — SPECIALTY PHARMACY (OUTPATIENT)
Dept: ONCOLOGY | Facility: HOSPITAL | Age: 79
End: 2024-03-21
Payer: MEDICARE

## 2024-03-21 NOTE — PROGRESS NOTES
Specialty Pharmacy Refill Coordination Note     Patient Outreach  An attempt was made by the [facility] to contact this patient for a follow-up on their chemotherapy medication. The [facility] can be reached at [phone].         Dori Mathis, Pharmacy Technician  Specialty Pharmacy Technician
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
## 2024-04-09 NOTE — PROGRESS NOTES
[facility]EMATOLOGY & ONCOLOGY  [address]  Located within [facility] [address] [phone]    Patient Name: Simeon Tam  Encounter Date: 04/24/2024  YOB: 1945  Patient Number: [id number]      REASON FOR FOLLOW-UP: Simeon Tam is a pleasant 78 y.o.  male who is seen in follow-up for nonmetastatic hormone refractory prostate carcinoma (HRPC).  He is seen C50D1 of single agent apalutamide.  The patient is seen with spouse, Essence. History is obtained from patient.  History is considered reliable.            Oncology/Hematology History Overview Note   DIAGNOSTIC ABNORMALITIES:  He presented with rising PSA of 13 and was diagnosed with prostate cancer about 25 years ago.   Previous PSA was 22.26 on 10/05/2016, <0.13 on 07/17/2017, 7.91 on 10/17/2018, 19.29 on 03/19/2020, and 22.25 on 04/07/2020.   He was seen by Dr. Oneil 05/12/2020. Latest PSA was 22.25 ng/ml on 04/07/2020.  Previous PSA 22.26 on 10/05/2016.  Bone scan and CT scan of the abdomen and pelvis were negative for prostate cancer on 10/2016.  Started on Casodex and possibly leuprolide.  PSA  was down to <0.13 on 07/17/207.  Plan for apalutamide after staging scans.   CT abdomen and pelvis 06/11/2020. No evidence of metastatic disease in the abdomen or pelvis. Right renal pelvis and ureter urothelial thickening and hyperemia. Urinary bladder wall is thickened and there is adjacent inflammation. Recommend correlation with urinalysis and exam to exclude cystitis with right-sided pyelitis.  Cirrhosis.  Tiny 8 mm hypodense RIGHT inferior liver lesion on image 33 is too small to characterize.   Bone scan 06/11/2020. No evidence of bone metastases.        PREVIOUS INTERVENTIONS:  He was treated with adjuvant radiation at [place] and androgen ablation with Casodex.  Lupron and Casodex 10/2016. He had 7 months of ADT therapy with Lupron.  Apalutamide 07/22/2020 through present.      Prostate cancer   6/29/2020 -  Chemotherapy    OP PROSTATE Apalutamide     6/30/2020 Initial Diagnosis    Prostate cancer (CMS/Formerly [facility])     7/29/2020 -  Chemotherapy    OP LEUPROLIDE 45MG Q6M - ORDER EXPIRES 7/18/24     10/5/2022 - 10/5/2022 Chemotherapy    OP PROSTATE Apalutamide         PAST MEDICAL HISTORY:  ALLERGIES:  Allergies   Allergen Reactions    Sulfa Antibiotics Rash     CURRENT MEDICATIONS:  Outpatient Encounter Medications as of 4/24/2024   Medication Sig Dispense Refill    Apalutamide 60 MG tablet Take 4 tablets by mouth Daily. Take with or without food. Do not crush or chew tablets. 120 tablet 11    calcium carbonate (OS-JONNY) 600 MG tablet Take 1 tablet by mouth Daily.      colestipol (COLESTID) 1 g tablet Take 2 tablets by mouth Daily.      dutasteride (AVODART) 0.5 MG capsule Take 1 capsule by mouth Daily. 90 capsule 0    JANUVIA 100 MG tablet Take 1 tablet by mouth Daily.      lisinopril-hydrochlorothiazide (PRINZIDE,ZESTORETIC) 20-12.5 MG per tablet Take 1 tablet by mouth Daily.      Loperamide HCl (IMODIUM PO) Take 1 dose by mouth As Needed (diarrhea).      Multiple Vitamins-Minerals (ICAPS AREDS 2 PO) Take 1 capsule by mouth Daily.      rosuvastatin (CRESTOR) 20 MG tablet Take 1 tablet by mouth Every Night.      tolterodine LA (DETROL LA) 4 MG 24 hr capsule TAKE (1) CAPSULE ONCE DAILY. 30 capsule 0    [DISCONTINUED] tolterodine LA (DETROL LA) 4 MG 24 hr capsule TAKE (1) CAPSULE ONCE DAILY. 30 capsule 0     No facility-administered encounter medications on file as of 4/24/2024.     ADULT ILLNESSES:  Patient Active Problem List   Diagnosis Code    Hypertension I10    Hyperlipidemia E78.5    Diabetes mellitus E11.9    Cancer C80.1    Arrhythmia I49.9    Non morbid obesity due to excess calories E66.09    Prostate cancer C61    Anterior urethral stricture N35.914     SURGERIES:  Past Surgical History:   Procedure Laterality Date    CATARACT EXTRACTION      CYSTOSCOPY RETROGRADE PYELOGRAM Bilateral  "4/21/2021    Procedure: CYSTOSCOPY RETROGRADE PYELOGRAM, possible DIRECT VISION INTERNAL URETHROTOMY;  Surgeon: Matthew Oneil MD;  Location: [facility] OR;  Service: Urology;  Laterality: Bilateral;    CYSTOSCOPY URETHROTOMY VISUAL INTERNAL Bilateral 4/21/2021    Procedure: possible DIRECT VISION INTERNAL URETHROTOMY;  Surgeon: Matthew Oneil MD;  Location:  [facility] OR;  Service: Urology;  Laterality: Bilateral;    EXCISION LESION      neck    KIDNEY STONE SURGERY      REPLACEMENT TOTAL KNEE Left     TONSILLECTOMY       HEALTH MAINTENANCE ITEMS:  Health Maintenance Due   Topic Date Due    LIPID PANEL  Never done    URINE MICROALBUMIN  Never done    Pneumococcal Vaccine 65+ (1 of 2 - PCV) Never done    DIABETIC EYE EXAM  Never done    TDAP/TD VACCINES (1 - Tdap) Never done    ZOSTER VACCINE (1 of 2) Never done    Hepatitis B (1 of 3 - Risk 3-dose series) Never done    RSV Vaccine - Adults (1 - 1-dose 60+ series) Never done    ANNUAL WELLNESS VISIT  Never done    HEMOGLOBIN A1C  Never done    BMI FOLLOWUP  04/27/2022    COVID-19 Vaccine (4 - 2023-24 season) 09/01/2023       <no information>  Last Completed Colonoscopy       This patient has no relevant Health Maintenance data.            There is no immunization history on file for this patient.  Last Completed Mammogram       This patient has no relevant Health Maintenance data.              FAMILY HISTORY:  Family History   Problem Relation Age of Onset    Dementia Mother      SOCIAL HISTORY:  Social History     Socioeconomic History    Marital status:    Tobacco Use    Smoking status: Never    Smokeless tobacco: Never   Vaping Use    Vaping status: Never Used   Substance and Sexual Activity    Alcohol use: No    Drug use: No    Sexual activity: Defer       REVIEW OF SYSTEMS:    Review of Systems   Constitutional:  Negative for fatigue, fever and unexpected weight change.        \"Feeling fine.\"   HENT:  Negative for congestion and hearing loss.    Eyes:  " "Negative for discharge and redness.   Respiratory:  Negative for shortness of breath and wheezing.    Cardiovascular:  Negative for chest pain and palpitations.   Gastrointestinal:  Negative for abdominal pain, nausea and vomiting.   Endocrine: Negative for cold intolerance and heat intolerance.   Genitourinary:  Positive for difficulty urinating. Negative for hematuria.   Musculoskeletal:  Negative for gait problem and myalgias.   Allergic/Immunologic: Negative for food allergies.   Neurological:  Positive for speech difficulty. Negative for dizziness.   Hematological:  Negative for adenopathy. Does not bruise/bleed easily.   Psychiatric/Behavioral:  Negative for agitation, confusion and hallucinations.        VITAL SIGNS: /66   Pulse 85   Temp 97.8 °F (36.6 °C)   Resp 18   Ht 172.7 cm (68\")   Wt 86.7 kg (191 lb 3.2 oz)   SpO2 94%   BMI 29.07 kg/m²  Gained 2 pounds.   Pain Score    04/24/24 1114   PainSc: 0-No pain       PHYSICAL EXAMINATION:     Physical Exam  Vitals reviewed.   Constitutional:       General: He is not in acute distress.  HENT:      Head: Normocephalic and atraumatic.   Eyes:      General: No scleral icterus.  Cardiovascular:      Rate and Rhythm: Normal rate.   Pulmonary:      Effort: No respiratory distress.      Breath sounds: No wheezing.   Abdominal:      General: Bowel sounds are normal.      Palpations: Abdomen is soft.      Tenderness: There is no abdominal tenderness.   Musculoskeletal:         General: No swelling.      Cervical back: Neck supple.   Skin:     General: Skin is warm.      Coloration: Skin is not pale.   Neurological:      Mental Status: He is alert and oriented to person, place, and time.   Psychiatric:         Mood and Affect: Mood normal.         Behavior: Behavior normal.         Thought Content: Thought content normal.         Judgment: Judgment normal.         LABS    Lab Results - Last 18 Months   Lab Units 04/24/24  1046 03/20/24  1043 02/21/24  1041 " 01/24/24  1052 12/20/23  1102 11/29/23  1215 11/01/23  1237 08/09/23  1036 07/07/23  1036 05/26/23  1338 05/03/23  1341 04/05/23  1326 03/08/23  1407   HEMOGLOBIN g/dL 13.1 13.0 12.9* 13.3 13.0 13.3 13.0   < > 13.4 13.5   < > 12.8* 13.2   HEMATOCRIT % 39.5 40.1 38.8 40.2 40.3 39.3 38.9   < > 40.4 41.1   < > 39.0 39.8   MCV fL 89.4 90.1 89.6 90.7 91.2 88.9 88.2   < > 89.2 92.2   < > 91.5 90.7   WBC 10*3/mm3 7.06 7.71 6.81 6.16 6.37 9.04 5.98   < > 5.60 7.85   < > 5.94 8.03   RDW % 13.9 13.4 13.0 13.4 13.3 13.0 13.1   < > 13.1 13.0   < > 13.4 13.3   MPV fL 10.7 9.8 10.4 10.7 10.6 10.8 10.0   < > 10.2 10.3   < > 10.6 10.3   PLATELETS 10*3/mm3 123* 139* 166 118* 116* 132* 146   < > 146 144   < > 108* 135*   IMM GRAN % %  --   --  0.3  --   --   --  0.5  --  0.4 0.4  --  0.2 0.1   NEUTROS ABS 10*3/mm3 5.34 5.96 5.23 4.64 4.80 7.08* 4.66   < > 4.08 5.38   < > 4.44 6.12   LYMPHS ABS 10*3/mm3 1.00 1.00 0.92 0.88 0.95 0.94 0.83   < > 0.89 1.44   < > 0.83 1.04   MONOS ABS 10*3/mm3 0.53 0.56 0.58 0.50 0.51 0.90 0.36   < > 0.47 0.87   < > 0.53 0.66   EOS ABS 10*3/mm3 0.12 0.14 0.03 0.08 0.07 0.04 0.06   < > 0.10 0.10   < > 0.10 0.16   BASOS ABS 10*3/mm3 0.05 0.03 0.03 0.04 0.03 0.05 0.04   < > 0.04 0.03   < > 0.03 0.04   IMMATURE GRANS (ABS) 10*3/mm3  --   --  0.02  --   --   --  0.03  --  0.02 0.03  --  0.01 0.01   NRBC /100 WBC  --   --  0.0  --   --   --  0.0  --  0.0 0.0  --  0.0 0.0    < > = values in this interval not displayed.       Lab Results - Last 18 Months   Lab Units 03/20/24  1043 02/21/24  1041 01/24/24  1052 12/20/23  1102 11/29/23  1215 11/01/23  1237   GLUCOSE mg/dL 147* 205* 165* 180* 170* 204*   SODIUM mmol/L 132* 131* 133* 133* 133* 135*   POTASSIUM mmol/L 4.1 4.5 4.2 4.0 3.8 4.0   CO2 mmol/L 28.0 27.0 26.0 27.0 28.0 27.0   CHLORIDE mmol/L 94* 93* 96* 96* 93* 96*   ANION GAP mmol/L 10.0 11.0 11.0 10.0 12.0 12.0   CREATININE mg/dL 0.57* 0.62* 0.61* 0.58* 0.67* 0.65*   BUN mg/dL 13 15 14 14 15 15   BUN /  "CREAT RATIO  22.8 24.2 23.0 24.1 22.4 23.1   CALCIUM mg/dL 9.9 9.3 9.5 9.0 9.5 9.5   ALK PHOS U/L 72 75 69 65 64 57   TOTAL PROTEIN g/dL 7.7 7.7 7.4 7.4 7.6 7.2   ALT (SGPT) U/L 14 11 16 18 13 19   AST (SGOT) U/L 20 19 23 26 19 23   BILIRUBIN mg/dL 0.5 0.6 0.5 0.6 0.8 0.4   ALBUMIN g/dL 4.3 4.1 4.3 4.2 4.1 4.1   GLOBULIN gm/dL 3.4 3.6 3.1 3.2 3.5 3.1       No results for input(s): \"MSPIKE\", \"KAPPALAMB\", \"IGLFLC\", \"URICACID\", \"FREEKAPPAL\", \"CEA\", \"LDH\", \"REFLABREPO\" in the last 13211 hours.    No results for input(s): \"IRON\", \"TIBC\", \"LABIRON\", \"FERRITIN\", \"C7FBQKA\", \"TSH\", \"FOLATE\" in the last 90815 hours.    Invalid input(s): \"VITB12\"    Simeon Tam reports a pain score of 0.        ASSESSMENT:  1.   Prostate cancer, hormone refractory.   AJCC stage (TX, NX, M0)  Treatment status:Casodex and leuprolide with PSA recurrence.  The patient is undergoing therapy with apalutamide and leuprolde (by Dr. Oneil).  2.   Performance status of 0.  3.   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  Off treatment.  4.   Cirrhosis, etiology of thrombocytopenia and followed by Dr. Duval.    5.   Urethral stricture. Self catheterization as indicated. \"Just as needed.\"             PLAN:  1.    Re: Tolerance to apalutamide.  \"Doing good.\"   2.    Re:  Heme status.  WBC 7.06, hemoglobin 13.1 and platelet 123 from 139 from 166 from 118 from 116.  3.    Re:  Pre-office CMP.  .5 ml/minute and glucose 153.  4.    Re:  Pre-office PSA pending from 0.101 from 0.08 from 0.047 from 0.064 from 0.056 from 0.071 from 0.053 from 0.050 from 0.047 from 0.048 from 0.038 from 0.029 from 0.031 from 0.031 from 0.026 from 0.024 from 0.019 from 0.020 from 0.016 from 0.015 from 0.018 (forgot my Lupron) from < 0.014 from < 0.014  from <0.014 from <0.014 from <0.01 from < 0.014 from < 0.014 from <0.014 from < 0.014 from < 0.014 from < 0.014 from < 0.014 from < 0.014 from < 0.014 from < 0.014 from < 0.014 " "from < 0.014 from < 0.014 from < 0.014 from 0.015 from 0.03 from 0.125 from 1.03 from 27.3. Testosterone pending from 28.4 from 8.87 from 31.1 from 30.4 from 44.9 from 36.1 from 26.4 from 26.6 from 25.1 from 35.2 from 11.8 from 35.7 from 23.4 from 28.1 from 25 from 28.2 from 25.1 from 34.1 from 18.1 from 527 from 580 from 443 from 235.  Most recent leuprolide was given on 2/6/2024.  Restage if PSA at 2 based on prostate cancer working group for rise of 0.2 on 2 or more occasions per  Association of urology.   5.   eRx for prochlorperazine 10 mg p.o. every 4 hours as needed for nausea/vomiting, 60, 2 refills if needed.    6.   eRx for C50D1 started on 4/24/2024 apalutamide 60 mg, take four tablets total dose 240 mg po daily, number, 120.  Take either with or without food.  Swallow tablets whole. TSH before apalutamide.  No grapefruit juice or grapefruit.  Monitor for rash, seizures, arrhythmias, falling, hypothyroidism or fractures.  Plan to restage.  7.   Continue ongoing management per primary care physician and other specialists.  8.   Plan of care discussed with patient and his spouse, Essence.  Understanding expressed.  Patient is agreeable to proceed.  9.   Leuprolide per Dr. Oneil given every 6 months.  10.  Questions were answered to his satisfaction. \"I do follow.\"   11.  Advance Care Planning  ACP discussion was declined by the patient. Patient does not have an advance directive, information provided.  12.  Order PMSA PET to restage if PSA at 0.2.  13.  Return to office 3 weeks with CMP, PSA, testosterone, and CBC with differential, same day.  Going to [place] May 22nd through May 29th.\"           I have reviewed the assessment and plan and verified the accuracy of it. No changes to assessment and plan since the information was documented. Deshawn Black MD 04/24/24          I spent 31 total minutes, face-to-face, caring for Simeon today. Greater than 50% of this time involved counseling and/or " coordination of care as documented within this note.                           (Matthew Oneil MD)  MD Lucio Balderrama MD

## 2024-04-13 DIAGNOSIS — N31.9 NEUROGENIC BLADDER: ICD-10-CM

## 2024-04-15 RX ORDER — TOLTERODINE 4 MG/1
CAPSULE, EXTENDED RELEASE ORAL
Qty: 30 CAPSULE | Refills: 0 | Status: SHIPPED | OUTPATIENT
Start: 2024-04-15

## 2024-04-24 ENCOUNTER — OFFICE VISIT (OUTPATIENT)
Dept: ONCOLOGY | Facility: CLINIC | Age: 79
End: 2024-04-24
Payer: MEDICARE

## 2024-04-24 ENCOUNTER — LAB (OUTPATIENT)
Dept: LAB | Facility: HOSPITAL | Age: 79
End: 2024-04-24
Payer: MEDICARE

## 2024-04-24 VITALS
HEIGHT: 68 IN | HEART RATE: 85 BPM | RESPIRATION RATE: 18 BRPM | SYSTOLIC BLOOD PRESSURE: 118 MMHG | WEIGHT: 191.2 LBS | DIASTOLIC BLOOD PRESSURE: 66 MMHG | BODY MASS INDEX: 28.98 KG/M2 | OXYGEN SATURATION: 94 % | TEMPERATURE: 97.8 F

## 2024-04-24 DIAGNOSIS — C61 PROSTATE CANCER: Primary | ICD-10-CM

## 2024-04-24 LAB
ALBUMIN SERPL-MCNC: 4.4 G/DL (ref 3.5–5.2)
ALBUMIN/GLOB SERPL: 1.4 G/DL
ALP SERPL-CCNC: 73 U/L (ref 39–117)
ALT SERPL W P-5'-P-CCNC: 18 U/L (ref 1–41)
ANION GAP SERPL CALCULATED.3IONS-SCNC: 12 MMOL/L (ref 5–15)
AST SERPL-CCNC: 24 U/L (ref 1–40)
BASOPHILS # BLD AUTO: 0.05 10*3/MM3 (ref 0–0.2)
BASOPHILS NFR BLD AUTO: 0.7 % (ref 0–1.5)
BILIRUB SERPL-MCNC: 0.6 MG/DL (ref 0–1.2)
BUN SERPL-MCNC: 12 MG/DL (ref 8–23)
BUN/CREAT SERPL: 23.5 (ref 7–25)
CALCIUM SPEC-SCNC: 9.8 MG/DL (ref 8.6–10.5)
CHLORIDE SERPL-SCNC: 95 MMOL/L (ref 98–107)
CO2 SERPL-SCNC: 27 MMOL/L (ref 22–29)
CREAT SERPL-MCNC: 0.51 MG/DL (ref 0.76–1.27)
DEPRECATED RDW RBC AUTO: 45.3 FL (ref 37–54)
EGFRCR SERPLBLD CKD-EPI 2021: 102.5 ML/MIN/1.73
EOSINOPHIL # BLD AUTO: 0.12 10*3/MM3 (ref 0–0.4)
EOSINOPHIL NFR BLD AUTO: 1.7 % (ref 0.3–6.2)
ERYTHROCYTE [DISTWIDTH] IN BLOOD BY AUTOMATED COUNT: 13.9 % (ref 12.3–15.4)
GLOBULIN UR ELPH-MCNC: 3.1 GM/DL
GLUCOSE SERPL-MCNC: 153 MG/DL (ref 65–99)
HCT VFR BLD AUTO: 39.5 % (ref 37.5–51)
HGB BLD-MCNC: 13.1 G/DL (ref 13–17.7)
HOLD SPECIMEN: NORMAL
LYMPHOCYTES # BLD AUTO: 1 10*3/MM3 (ref 0.7–3.1)
LYMPHOCYTES NFR BLD AUTO: 14.2 % (ref 19.6–45.3)
MCH RBC QN AUTO: 29.6 PG (ref 26.6–33)
MCHC RBC AUTO-ENTMCNC: 33.2 G/DL (ref 31.5–35.7)
MCV RBC AUTO: 89.4 FL (ref 79–97)
MONOCYTES # BLD AUTO: 0.53 10*3/MM3 (ref 0.1–0.9)
MONOCYTES NFR BLD AUTO: 7.5 % (ref 5–12)
NEUTROPHILS NFR BLD AUTO: 5.34 10*3/MM3 (ref 1.7–7)
NEUTROPHILS NFR BLD AUTO: 75.6 % (ref 42.7–76)
PLATELET # BLD AUTO: 123 10*3/MM3 (ref 140–450)
PMV BLD AUTO: 10.7 FL (ref 6–12)
POTASSIUM SERPL-SCNC: 4.4 MMOL/L (ref 3.5–5.2)
PROT SERPL-MCNC: 7.5 G/DL (ref 6–8.5)
PSA SERPL-MCNC: 0.08 NG/ML (ref 0–4)
RBC # BLD AUTO: 4.42 10*6/MM3 (ref 4.14–5.8)
SODIUM SERPL-SCNC: 134 MMOL/L (ref 136–145)
TESTOST SERPL-MCNC: 35.8 NG/DL (ref 193–740)
WBC NRBC COR # BLD AUTO: 7.06 10*3/MM3 (ref 3.4–10.8)

## 2024-04-24 PROCEDURE — 80053 COMPREHEN METABOLIC PANEL: CPT

## 2024-04-24 PROCEDURE — 84403 ASSAY OF TOTAL TESTOSTERONE: CPT

## 2024-04-24 PROCEDURE — 85025 COMPLETE CBC W/AUTO DIFF WBC: CPT

## 2024-04-24 PROCEDURE — 84153 ASSAY OF PSA TOTAL: CPT

## 2024-04-24 PROCEDURE — 36415 COLL VENOUS BLD VENIPUNCTURE: CPT

## 2024-04-25 ENCOUNTER — SPECIALTY PHARMACY (OUTPATIENT)
Dept: ONCOLOGY | Facility: HOSPITAL | Age: 79
End: 2024-04-25
Payer: MEDICARE

## 2024-04-25 NOTE — PROGRESS NOTES
"Specialty Pharmacy Refill Coordination Note       Spoke with Essence (spouse) for Mr. Tam's monthly telephone follow-up regarding the oral oncology medication. She stated that he has been tolerating the Erleada well and has not had any obvious side effects from the medication. \"He is doing good\" He has not missed any doses of the medication in the last month. He still receives refills through [facility] , however the wife states that they have had several issues getting his refills in on time. I explained that I can look into sending the refill elsewhere next month when he is due but that I will call to discuss it with her before making any changes. She states that there have been no changes to his maintenance medications and no new drug allergies that she is aware of. She stated he has not had any recent stays in the hospital and has not visited the ER in the last month due to the Erleada .     I encouraged her to reach out anytime with any questions or concerns they might have regarding his oral chemotherapy medication.     No needs expressed by the patient. Will follow-up next month regarding the patient's oral chemotherapy with a routine telephone consultation.       Refill Questions      Flowsheet Row Most Recent Value   Changes to allergies? No   Changes to medications? No   New conditions or infections since last clinic visit No   Unplanned office visit, urgent care, ED, or hospital admission in the last 4 weeks  No   How does patient/caregiver feel medication is working? Good   Financial problems or insurance changes  No   Since the previous refill, were any specialty medication doses or scheduled injections missed or delayed?  No   Does this patient require a clinical escalation to a pharmacist? No            Delivery Questions      Flowsheet Row Most Recent Value   Delivery method Other (Comment)   Delivery address verified with patient/caregiver? No   Delivery address Other (Comment)   Number of medications " in delivery 1   Medication(s) being filled and delivered No medications found.   Doses left of specialty medications Patient fills with [facility]   Copay verified? Yes   Copay amount 0   Copay form of payment No copayment ($0)                   Follow-up: 28-30 day(s)     Dori Mathis, Pharmacy Technician  Specialty Pharmacy Technician

## 2024-04-30 NOTE — PROGRESS NOTES
[facility] HEMATOLOGY & ONCOLOGY  [address]  [place] [address] [phone]    Patient Name: Simeon Tam  Encounter Date: 05/15/2024  YOB: 1945  Patient Number: [id number]      REASON FOR FOLLOW-UP: Simeon Tam is a pleasant 78 y.o.  male who is seen in follow-up for hormone refractory prostate carcinoma (HRPC), nonmetastatic.  He is seen C50D22 of single agent apalutamide.  He is seen alone. History is obtained from patient.  Patient is a reliable historian.           Oncology/Hematology History Overview Note   DIAGNOSTIC ABNORMALITIES:  He presented with rising PSA of 13 and was diagnosed with prostate cancer about 25 years ago.   Previous PSA was 22.26 on 10/05/2016, <0.13 on 07/17/2017, 7.91 on 10/17/2018, 19.29 on 03/19/2020, and 22.25 on 04/07/2020.   He was seen by Dr. Oneil 05/12/2020. Latest PSA was 22.25 ng/ml on 04/07/2020.  Previous PSA 22.26 on 10/05/2016.  Bone scan and CT scan of the abdomen and pelvis were negative for prostate cancer on 10/2016.  Started on Casodex and possibly leuprolide.  PSA  was down to <0.13 on 07/17/207.  Plan for apalutamide after staging scans.   CT abdomen and pelvis 06/11/2020. No evidence of metastatic disease in the abdomen or pelvis. Right renal pelvis and ureter urothelial thickening and hyperemia. Urinary bladder wall is thickened and there is adjacent inflammation. Recommend correlation with urinalysis and exam to exclude cystitis with right-sided pyelitis.  Cirrhosis.  Tiny 8 mm hypodense RIGHT inferior liver lesion on image 33 is too small to characterize.   Bone scan 06/11/2020. No evidence of bone metastases.        PREVIOUS INTERVENTIONS:  He was treated with adjuvant radiation at [facility] and androgen ablation with Casodex.  Lupron and Casodex 10/2016. He had 7 months of ADT therapy with Lupron.  Apalutamide 07/22/2020 through present.     Prostate cancer    6/29/2020 -  Chemotherapy    OP PROSTATE Apalutamide     6/30/2020 Initial Diagnosis    Prostate cancer (CMS/[facility])     7/29/2020 -  Chemotherapy    OP LEUPROLIDE 45MG Q6M - ORDER EXPIRES 7/18/24     10/5/2022 - 10/5/2022 Chemotherapy    OP PROSTATE Apalutamide         PAST MEDICAL HISTORY:  ALLERGIES:  Allergies   Allergen Reactions    Sulfa Antibiotics Rash     CURRENT MEDICATIONS:  Outpatient Encounter Medications as of 5/15/2024   Medication Sig Dispense Refill    Apalutamide 60 MG tablet Take 4 tablets by mouth Daily. Take with or without food. Do not crush or chew tablets. 120 tablet 11    calcium carbonate (OS-JONNY) 600 MG tablet Take 1 tablet by mouth Daily.      colestipol (COLESTID) 1 g tablet Take 2 tablets by mouth Daily.      dutasteride (AVODART) 0.5 MG capsule Take 1 capsule by mouth Daily. 90 capsule 0    JANUVIA 100 MG tablet Take 1 tablet by mouth Daily.      lisinopril-hydrochlorothiazide (PRINZIDE,ZESTORETIC) 20-12.5 MG per tablet Take 1 tablet by mouth Daily.      Loperamide HCl (IMODIUM PO) Take 1 dose by mouth As Needed (diarrhea).      Multiple Vitamins-Minerals (ICAPS AREDS 2 PO) Take 1 capsule by mouth Daily.      rosuvastatin (CRESTOR) 20 MG tablet Take 1 tablet by mouth Every Night.      tolterodine LA (DETROL LA) 4 MG 24 hr capsule TAKE (1) CAPSULE ONCE DAILY. 30 capsule 0    [DISCONTINUED] tolterodine LA (DETROL LA) 4 MG 24 hr capsule TAKE (1) CAPSULE ONCE DAILY. 30 capsule 0     No facility-administered encounter medications on file as of 5/15/2024.     ADULT ILLNESSES:  Patient Active Problem List   Diagnosis Code    Hypertension I10    Hyperlipidemia E78.5    Diabetes mellitus E11.9    Cancer C80.1    Arrhythmia I49.9    Non morbid obesity due to excess calories E66.09    Prostate cancer C61    Anterior urethral stricture N35.914     SURGERIES:  Past Surgical History:   Procedure Laterality Date    CATARACT EXTRACTION      CYSTOSCOPY RETROGRADE PYELOGRAM Bilateral 4/21/2021     "Procedure: CYSTOSCOPY RETROGRADE PYELOGRAM, possible DIRECT VISION INTERNAL URETHROTOMY;  Surgeon: Matthew Oneil MD;  Location:  [facility] OR;  Service: Urology;  Laterality: Bilateral;    CYSTOSCOPY URETHROTOMY VISUAL INTERNAL Bilateral 4/21/2021    Procedure: possible DIRECT VISION INTERNAL URETHROTOMY;  Surgeon: Matthew Oneil MD;  Location:  [facility] OR;  Service: Urology;  Laterality: Bilateral;    EXCISION LESION      neck    KIDNEY STONE SURGERY      REPLACEMENT TOTAL KNEE Left     TONSILLECTOMY       HEALTH MAINTENANCE ITEMS:  Health Maintenance Due   Topic Date Due    LIPID PANEL  Never done    URINE MICROALBUMIN  Never done    Pneumococcal Vaccine 65+ (1 of 2 - PCV) Never done    DIABETIC EYE EXAM  Never done    TDAP/TD VACCINES (1 - Tdap) Never done    ZOSTER VACCINE (1 of 2) Never done    Hepatitis B (1 of 3 - Risk 3-dose series) Never done    RSV Vaccine - Adults (1 - 1-dose 60+ series) Never done    ANNUAL WELLNESS VISIT  Never done    HEMOGLOBIN A1C  Never done    BMI FOLLOWUP  04/27/2022    COVID-19 Vaccine (4 - 2023-24 season) 09/01/2023       <no information>  Last Completed Colonoscopy       This patient has no relevant Health Maintenance data.            There is no immunization history on file for this patient.  Last Completed Mammogram       This patient has no relevant Health Maintenance data.              FAMILY HISTORY:  Family History   Problem Relation Age of Onset    Dementia Mother      SOCIAL HISTORY:  Social History     Socioeconomic History    Marital status:    Tobacco Use    Smoking status: Never    Smokeless tobacco: Never   Vaping Use    Vaping status: Never Used   Substance and Sexual Activity    Alcohol use: No    Drug use: No    Sexual activity: Defer       REVIEW OF SYSTEMS:    Review of Systems   Constitutional:  Negative for fatigue, fever and unexpected weight change.        \"I feel good.\"   HENT:  Negative for congestion and mouth sores.    Eyes:  Negative for " "redness and visual disturbance.   Respiratory:  Negative for shortness of breath and wheezing.    Cardiovascular:  Negative for chest pain and leg swelling.   Gastrointestinal:  Negative for constipation, diarrhea, nausea and vomiting.   Endocrine: Negative for cold intolerance and heat intolerance.   Genitourinary:  Positive for difficulty urinating. Negative for hematuria.   Musculoskeletal:  Negative for gait problem and myalgias.   Skin:  Negative for pallor.   Allergic/Immunologic: Negative for food allergies.   Neurological:  Negative for dizziness, speech difficulty and light-headedness.   Hematological:  Negative for adenopathy. Does not bruise/bleed easily.   Psychiatric/Behavioral:  Negative for agitation, confusion and hallucinations.        VITAL SIGNS: /72   Pulse 98   Temp 97.2 °F (36.2 °C)   Resp 18   Ht 172.7 cm (68\")   Wt 86.2 kg (190 lb 1.6 oz)   SpO2 96%   BMI 28.90 kg/m²   Pain Score    05/15/24 1315   PainSc: 0-No pain       PHYSICAL EXAMINATION:     Physical Exam  Vitals reviewed.   Constitutional:       General: He is not in acute distress.  HENT:      Head: Normocephalic and atraumatic.   Eyes:      General: No scleral icterus.  Cardiovascular:      Rate and Rhythm: Normal rate.   Pulmonary:      Effort: No respiratory distress.      Breath sounds: No wheezing.   Abdominal:      General: Bowel sounds are normal.      Palpations: Abdomen is soft.      Tenderness: There is no abdominal tenderness.   Musculoskeletal:         General: No swelling.      Cervical back: Neck supple.   Skin:     Coloration: Skin is not pale.   Neurological:      Mental Status: He is alert and oriented to person, place, and time.   Psychiatric:         Mood and Affect: Mood normal.         Behavior: Behavior normal.         Thought Content: Thought content normal.         Judgment: Judgment normal.         LABS    Lab Results - Last 18 Months   Lab Units 05/15/24  1249 04/24/24  1046 03/20/24  1043 " 02/21/24  1041 01/24/24  1052 12/20/23  1102 11/29/23  1215 11/01/23  1237 08/09/23  1036 07/07/23  1036 05/26/23  1338 05/03/23  1341 04/05/23  1326 03/08/23  1407   HEMOGLOBIN g/dL 13.0 13.1 13.0 12.9* 13.3 13.0   < > 13.0   < > 13.4 13.5   < > 12.8* 13.2   HEMATOCRIT % 38.6 39.5 40.1 38.8 40.2 40.3   < > 38.9   < > 40.4 41.1   < > 39.0 39.8   MCV fL 89.6 89.4 90.1 89.6 90.7 91.2   < > 88.2   < > 89.2 92.2   < > 91.5 90.7   WBC 10*3/mm3 6.98 7.06 7.71 6.81 6.16 6.37   < > 5.98   < > 5.60 7.85   < > 5.94 8.03   RDW % 14.1 13.9 13.4 13.0 13.4 13.3   < > 13.1   < > 13.1 13.0   < > 13.4 13.3   MPV fL 10.2 10.7 9.8 10.4 10.7 10.6   < > 10.0   < > 10.2 10.3   < > 10.6 10.3   PLATELETS 10*3/mm3 144 123* 139* 166 118* 116*   < > 146   < > 146 144   < > 108* 135*   IMM GRAN % % 0.3  --   --  0.3  --   --   --  0.5  --  0.4 0.4  --  0.2 0.1   NEUTROS ABS 10*3/mm3 5.09 5.34 5.96 5.23 4.64 4.80   < > 4.66   < > 4.08 5.38   < > 4.44 6.12   LYMPHS ABS 10*3/mm3 1.17 1.00 1.00 0.92 0.88 0.95   < > 0.83   < > 0.89 1.44   < > 0.83 1.04   MONOS ABS 10*3/mm3 0.57 0.53 0.56 0.58 0.50 0.51   < > 0.36   < > 0.47 0.87   < > 0.53 0.66   EOS ABS 10*3/mm3 0.10 0.12 0.14 0.03 0.08 0.07   < > 0.06   < > 0.10 0.10   < > 0.10 0.16   BASOS ABS 10*3/mm3 0.03 0.05 0.03 0.03 0.04 0.03   < > 0.04   < > 0.04 0.03   < > 0.03 0.04   IMMATURE GRANS (ABS) 10*3/mm3 0.02  --   --  0.02  --   --   --  0.03  --  0.02 0.03  --  0.01 0.01   NRBC /100 WBC  --   --   --  0.0  --   --   --  0.0  --  0.0 0.0  --  0.0 0.0    < > = values in this interval not displayed.       Lab Results - Last 18 Months   Lab Units 05/15/24  1249 04/24/24  1046 03/20/24  1043 02/21/24  1041 01/24/24  1052 12/20/23  1102   GLUCOSE mg/dL 135* 153* 147* 205* 165* 180*   SODIUM mmol/L 134* 134* 132* 131* 133* 133*   POTASSIUM mmol/L 4.1 4.4 4.1 4.5 4.2 4.0   CO2 mmol/L 29.0 27.0 28.0 27.0 26.0 27.0   CHLORIDE mmol/L 94* 95* 94* 93* 96* 96*   ANION GAP mmol/L 11.0 12.0 10.0 11.0 11.0  "10.0   CREATININE mg/dL 0.62* 0.51* 0.57* 0.62* 0.61* 0.58*   BUN mg/dL 13 12 13 15 14 14   BUN / CREAT RATIO  21.0 23.5 22.8 24.2 23.0 24.1   CALCIUM mg/dL 9.6 9.8 9.9 9.3 9.5 9.0   ALK PHOS U/L 69 73 72 75 69 65   TOTAL PROTEIN g/dL 7.5 7.5 7.7 7.7 7.4 7.4   ALT (SGPT) U/L 14 18 14 11 16 18   AST (SGOT) U/L 20 24 20 19 23 26   BILIRUBIN mg/dL 0.5 0.6 0.5 0.6 0.5 0.6   ALBUMIN g/dL 4.3 4.4 4.3 4.1 4.3 4.2   GLOBULIN gm/dL 3.2 3.1 3.4 3.6 3.1 3.2       No results for input(s): \"MSPIKE\", \"KAPPALAMB\", \"IGLFLC\", \"URICACID\", \"FREEKAPPAL\", \"CEA\", \"LDH\", \"REFLABREPO\" in the last 45561 hours.    No results for input(s): \"IRON\", \"TIBC\", \"LABIRON\", \"FERRITIN\", \"P0YGKAK\", \"TSH\", \"FOLATE\" in the last 35607 hours.    Invalid input(s): \"VITB12\"    Simeon Tam reports a pain score of 0.          ASSESSMENT:  1.   Prostate cancer, hormone refractory.   AJCC stage (TX, NX, M0)  Treatment status:Casodex and leuprolide with PSA recurrence.  The patient is undergoing therapy with apalutamide and leuprolde (by Dr. Oneil).  2.   Performance status of 0.  3.   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  Off treatment.  4.   Cirrhosis, etiology of thrombocytopenia and followed by Dr. Duval.    5.   Urethral stricture. Self catheterization as indicated. \"As needed\"           PLAN:  1.    Re: Tolerance to apalutamide.  \"Doing fine.\"   2.    Re:  Heme status.  WBC 6.9, hemoglobin 13 and platelet 144 from 123 from 139 from 166 from 118 from 116.  3.    Re:  Pre-office CMP.  GFR 96.6 ml/minute and glucose 135.  4.    Re:  Pre-office PSA pending from 0.083 from 0.101 from 0.08 from 0.047 from 0.064 from 0.056 from 0.071 from 0.053 from 0.050 from 0.047 from 0.048 from 0.038 from 0.029 from 0.031 from 0.031 from 0.026 from 0.024 from 0.019 from 0.020 from 0.016 from 0.015 from 0.018 (forgot my Lupron) from < 0.014 from < 0.014  from <0.014 from <0.014 from <0.01 from < 0.014 from < 0.014 from " "<0.014 from < 0.014 from < 0.014 from < 0.014 from < 0.014 from < 0.014 from < 0.014 from < 0.014 from < 0.014 from < 0.014 from < 0.014 from < 0.014 from 0.015 from 0.03 from 0.125 from 1.03 from 27.3. Testosterone pending from 35.8 from 28.4 from 8.87 from 31.1 from 30.4 from 44.9 from 36.1 from 26.4 from 26.6 from 25.1 from 35.2 from 11.8 from 35.7 from 23.4 from 28.1 from 25 from 28.2 from 25.1 from 34.1 from 18.1 from 527 from 580 from 443 from 235.  Most recent leuprolide was given on 2/6/2024.  Restage if PSA at 2 based on prostate cancer working group for rise of 0.2 on 2 or more occasions per  Association of urology.   5.   eRx for prochlorperazine 10 mg p.o. every 4 hours as needed for nausea/vomiting, 60, 2 refills if needed.    6.   eRx for C51D1 start on 5/22/2024 apalutamide 60 mg, take four tablets total dose 240 mg po daily, number, 120.  Take either with or without food.  Swallow tablets whole. TSH before apalutamide.  No grapefruit juice or grapefruit.  Observe for falling, seizures, rash, arrhythmias, hypothyroidism or fractures.    7.   Continue ongoing management per primary care physician and other specialists.  8.   Plan of care discussed with patient.  Understanding expressed.  Patient is agreeable to proceed.  9.   Leuprolide per Dr. Oneil given every 6 months.  10.  Questions were answered to his satisfaction. \"I do follow.\"   11.  Advance Care Planning  ACP discussion was declined by the patient. Patient does not have an advance directive, information provided.  12.  Order PMSA PET to restage if PSA at 0.2.  13.  Return to office 4 weeks with CMP, PSA, testosterone, and CBC with differential, same day.            I have reviewed the assessment and plan and verified the accuracy of it. No changes to assessment and plan since the information was documented. Deshawn Black MD 05/15/24         I spent 31 total minutes, face-to-face, caring for Simeon today. Greater than 50% of this time " involved counseling and/or coordination of care as documented within this note. .                           (Matthew Oneil MD)  (Mateo Duval MD)  Lucio Garcia MD

## 2024-05-13 DIAGNOSIS — N31.9 NEUROGENIC BLADDER: ICD-10-CM

## 2024-05-13 RX ORDER — TOLTERODINE 4 MG/1
CAPSULE, EXTENDED RELEASE ORAL
Qty: 30 CAPSULE | Refills: 0 | Status: SHIPPED | OUTPATIENT
Start: 2024-05-13

## 2024-05-15 ENCOUNTER — OFFICE VISIT (OUTPATIENT)
Dept: ONCOLOGY | Facility: CLINIC | Age: 79
End: 2024-05-15
Payer: MEDICARE

## 2024-05-15 ENCOUNTER — LAB (OUTPATIENT)
Dept: LAB | Facility: HOSPITAL | Age: 79
End: 2024-05-15
Payer: MEDICARE

## 2024-05-15 VITALS
DIASTOLIC BLOOD PRESSURE: 72 MMHG | HEART RATE: 98 BPM | SYSTOLIC BLOOD PRESSURE: 120 MMHG | WEIGHT: 190.1 LBS | RESPIRATION RATE: 18 BRPM | OXYGEN SATURATION: 96 % | BODY MASS INDEX: 28.81 KG/M2 | TEMPERATURE: 97.2 F | HEIGHT: 68 IN

## 2024-05-15 DIAGNOSIS — C61 PROSTATE CANCER: Primary | ICD-10-CM

## 2024-05-15 LAB
ALBUMIN SERPL-MCNC: 4.3 G/DL (ref 3.5–5.2)
ALBUMIN/GLOB SERPL: 1.3 G/DL
ALP SERPL-CCNC: 69 U/L (ref 39–117)
ALT SERPL W P-5'-P-CCNC: 14 U/L (ref 1–41)
ANION GAP SERPL CALCULATED.3IONS-SCNC: 11 MMOL/L (ref 5–15)
AST SERPL-CCNC: 20 U/L (ref 1–40)
BASOPHILS # BLD AUTO: 0.03 10*3/MM3 (ref 0–0.2)
BASOPHILS NFR BLD AUTO: 0.4 % (ref 0–1.5)
BILIRUB SERPL-MCNC: 0.5 MG/DL (ref 0–1.2)
BUN SERPL-MCNC: 13 MG/DL (ref 8–23)
BUN/CREAT SERPL: 21 (ref 7–25)
CALCIUM SPEC-SCNC: 9.6 MG/DL (ref 8.6–10.5)
CHLORIDE SERPL-SCNC: 94 MMOL/L (ref 98–107)
CO2 SERPL-SCNC: 29 MMOL/L (ref 22–29)
CREAT SERPL-MCNC: 0.62 MG/DL (ref 0.76–1.27)
DEPRECATED RDW RBC AUTO: 45.8 FL (ref 37–54)
EGFRCR SERPLBLD CKD-EPI 2021: 96.6 ML/MIN/1.73
EOSINOPHIL # BLD AUTO: 0.1 10*3/MM3 (ref 0–0.4)
EOSINOPHIL NFR BLD AUTO: 1.4 % (ref 0.3–6.2)
ERYTHROCYTE [DISTWIDTH] IN BLOOD BY AUTOMATED COUNT: 14.1 % (ref 12.3–15.4)
GLOBULIN UR ELPH-MCNC: 3.2 GM/DL
GLUCOSE SERPL-MCNC: 135 MG/DL (ref 65–99)
HCT VFR BLD AUTO: 38.6 % (ref 37.5–51)
HGB BLD-MCNC: 13 G/DL (ref 13–17.7)
HOLD SPECIMEN: NORMAL
IMM GRANULOCYTES # BLD AUTO: 0.02 10*3/MM3 (ref 0–0.05)
IMM GRANULOCYTES NFR BLD AUTO: 0.3 % (ref 0–0.5)
LYMPHOCYTES # BLD AUTO: 1.17 10*3/MM3 (ref 0.7–3.1)
LYMPHOCYTES NFR BLD AUTO: 16.8 % (ref 19.6–45.3)
MCH RBC QN AUTO: 30.2 PG (ref 26.6–33)
MCHC RBC AUTO-ENTMCNC: 33.7 G/DL (ref 31.5–35.7)
MCV RBC AUTO: 89.6 FL (ref 79–97)
MONOCYTES # BLD AUTO: 0.57 10*3/MM3 (ref 0.1–0.9)
MONOCYTES NFR BLD AUTO: 8.2 % (ref 5–12)
NEUTROPHILS NFR BLD AUTO: 5.09 10*3/MM3 (ref 1.7–7)
NEUTROPHILS NFR BLD AUTO: 72.9 % (ref 42.7–76)
PLATELET # BLD AUTO: 144 10*3/MM3 (ref 140–450)
PMV BLD AUTO: 10.2 FL (ref 6–12)
POTASSIUM SERPL-SCNC: 4.1 MMOL/L (ref 3.5–5.2)
PROT SERPL-MCNC: 7.5 G/DL (ref 6–8.5)
PSA SERPL-MCNC: 0.09 NG/ML (ref 0–4)
RBC # BLD AUTO: 4.31 10*6/MM3 (ref 4.14–5.8)
SODIUM SERPL-SCNC: 134 MMOL/L (ref 136–145)
TESTOST SERPL-MCNC: 18.2 NG/DL (ref 193–740)
WBC NRBC COR # BLD AUTO: 6.98 10*3/MM3 (ref 3.4–10.8)

## 2024-05-15 PROCEDURE — 36415 COLL VENOUS BLD VENIPUNCTURE: CPT

## 2024-05-15 PROCEDURE — 84403 ASSAY OF TOTAL TESTOSTERONE: CPT

## 2024-05-15 PROCEDURE — 80053 COMPREHEN METABOLIC PANEL: CPT

## 2024-05-15 PROCEDURE — 84153 ASSAY OF PSA TOTAL: CPT

## 2024-05-15 PROCEDURE — 85025 COMPLETE CBC W/AUTO DIFF WBC: CPT

## 2024-05-27 NOTE — PROGRESS NOTES
[facility] GROUP HEMATOLOGY & ONCOLOGY  [address]  [facility] [address] [phone]    Patient Name: Simeon Tam  Encounter Date: 06/12/2024  YOB: 1945  Patient Number: [id number]      REASON FOR FOLLOW-UP: Simeon Tam is a pleasant 79 y.o.  male who is seen in follow-up for hormone refractory prostate carcinoma (HRPC), nonmetastatic.  The patient is seen C51D22 of monotherapy with apalutamide.  The patient is seen with spouse, Essence. History is obtained from patient.  History is considered reliable.            Oncology/Hematology History Overview Note   DIAGNOSTIC ABNORMALITIES:  He presented with rising PSA of 13 and was diagnosed with prostate cancer about 25 years ago.   Previous PSA was 22.26 on 10/05/2016, <0.13 on 07/17/2017, 7.91 on 10/17/2018, 19.29 on 03/19/2020, and 22.25 on 04/07/2020.   He was seen by Dr. Oneil 05/12/2020. Latest PSA was 22.25 ng/ml on 04/07/2020.  Previous PSA 22.26 on 10/05/2016.  Bone scan and CT scan of the abdomen and pelvis were negative for prostate cancer on 10/2016.  Started on Casodex and possibly leuprolide.  PSA  was down to <0.13 on 07/17/207.  Plan for apalutamide after staging scans.   CT abdomen and pelvis 06/11/2020. No evidence of metastatic disease in the abdomen or pelvis. Right renal pelvis and ureter urothelial thickening and hyperemia. Urinary bladder wall is thickened and there is adjacent inflammation. Recommend correlation with urinalysis and exam to exclude cystitis with right-sided pyelitis.  Cirrhosis.  Tiny 8 mm hypodense RIGHT inferior liver lesion on image 33 is too small to characterize.   Bone scan 06/11/2020. No evidence of bone metastases.        PREVIOUS INTERVENTIONS:  He was treated with adjuvant radiation at [place] and androgen ablation with Casodex.  Lupron and Casodex 10/2016. He had 7 months of ADT therapy with Lupron.  Apalutamide 07/22/2020  through present.     Prostate cancer   6/29/2020 -  Chemotherapy    OP PROSTATE Apalutamide     6/30/2020 Initial Diagnosis    Prostate cancer (CMS/[facility])     7/29/2020 -  Chemotherapy    OP LEUPROLIDE 45MG Q6M - ORDER EXPIRES 7/18/24     10/5/2022 - 10/5/2022 Chemotherapy    OP PROSTATE Apalutamide         PAST MEDICAL HISTORY:  ALLERGIES:  Allergies   Allergen Reactions    Sulfa Antibiotics Rash     CURRENT MEDICATIONS:  Outpatient Encounter Medications as of 6/12/2024   Medication Sig Dispense Refill    Apalutamide (ERLEADA) 60 MG tablet Take 4 tablets by mouth Daily. Take with or without food. Do not crush or chew tablets. 120 tablet 2    colestipol (COLESTID) 1 g tablet Take 2 tablets by mouth Daily.      dutasteride (AVODART) 0.5 MG capsule Take 1 capsule by mouth Daily. 90 capsule 0    JANUVIA 100 MG tablet Take 1 tablet by mouth Daily.      lisinopril-hydrochlorothiazide (PRINZIDE,ZESTORETIC) 20-12.5 MG per tablet Take 1 tablet by mouth Daily.      Loperamide HCl (IMODIUM PO) Take 1 dose by mouth As Needed (diarrhea).      Multiple Vitamins-Minerals (ICAPS AREDS 2 PO) Take 1 capsule by mouth Daily.      rosuvastatin (CRESTOR) 20 MG tablet Take 1 tablet by mouth Every Night.      tolterodine LA (DETROL LA) 4 MG 24 hr capsule TAKE (1) CAPSULE ONCE DAILY. 30 capsule 0    [DISCONTINUED] calcium carbonate (OS-JONNY) 600 MG tablet Take 1 tablet by mouth Daily.      [DISCONTINUED] Apalutamide 60 MG tablet Take 4 tablets by mouth Daily. Take with or without food. Do not crush or chew tablets. 120 tablet 11    [DISCONTINUED] tolterodine LA (DETROL LA) 4 MG 24 hr capsule TAKE (1) CAPSULE ONCE DAILY. 30 capsule 0     No facility-administered encounter medications on file as of 6/12/2024.     ADULT ILLNESSES:  Patient Active Problem List   Diagnosis Code    Hypertension I10    Hyperlipidemia E78.5    Diabetes mellitus E11.9    Cancer C80.1    Arrhythmia I49.9    Non morbid obesity due to excess calories E66.09    Prostate  cancer C61    Anterior urethral stricture N35.914     SURGERIES:  Past Surgical History:   Procedure Laterality Date    CATARACT EXTRACTION      CYSTOSCOPY RETROGRADE PYELOGRAM Bilateral 4/21/2021    Procedure: CYSTOSCOPY RETROGRADE PYELOGRAM, possible DIRECT VISION INTERNAL URETHROTOMY;  Surgeon: Matthew Oneil MD;  Location: [facility];  Service: Urology;  Laterality: Bilateral;    CYSTOSCOPY URETHROTOMY VISUAL INTERNAL Bilateral 4/21/2021    Procedure: possible DIRECT VISION INTERNAL URETHROTOMY;  Surgeon: Matthew Oneil MD;  Location: [facility] OR;  Service: Urology;  Laterality: Bilateral;    EXCISION LESION      neck    KIDNEY STONE SURGERY      REPLACEMENT TOTAL KNEE Left     TONSILLECTOMY       HEALTH MAINTENANCE ITEMS:  Health Maintenance Due   Topic Date Due    LIPID PANEL  Never done    URINE MICROALBUMIN  Never done    Pneumococcal Vaccine 65+ (1 of 2 - PCV) Never done    DIABETIC EYE EXAM  Never done    TDAP/TD VACCINES (1 - Tdap) Never done    ZOSTER VACCINE (1 of 2) Never done    Hepatitis B (1 of 3 - Risk 3-dose series) Never done    RSV Vaccine - Adults (1 - 1-dose 60+ series) Never done    ANNUAL WELLNESS VISIT  Never done    HEMOGLOBIN A1C  Never done    BMI FOLLOWUP  04/27/2022    COVID-19 Vaccine (4 - 2023-24 season) 09/01/2023       <no information>  Last Completed Colonoscopy       This patient has no relevant Health Maintenance data.            There is no immunization history on file for this patient.  Last Completed Mammogram       This patient has no relevant Health Maintenance data.              FAMILY HISTORY:  Family History   Problem Relation Age of Onset    Dementia Mother      SOCIAL HISTORY:  Social History     Socioeconomic History    Marital status:    Tobacco Use    Smoking status: Never    Smokeless tobacco: Never   Vaping Use    Vaping status: Never Used   Substance and Sexual Activity    Alcohol use: No    Drug use: No    Sexual activity: Defer       REVIEW OF  "SYSTEMS:    Review of Systems   Constitutional:  Negative for fatigue, fever and unexpected weight change.        \"I feel good.\"   HENT:  Negative for congestion and mouth sores.    Eyes:  Negative for redness and visual disturbance.   Respiratory:  Negative for shortness of breath and wheezing.    Cardiovascular:  Negative for chest pain and leg swelling.   Gastrointestinal:  Negative for abdominal pain, nausea and vomiting.   Endocrine: Negative for cold intolerance and heat intolerance.   Genitourinary:  Positive for difficulty urinating. Negative for hematuria.   Musculoskeletal:  Negative for gait problem and joint swelling.   Skin:  Negative for pallor.   Allergic/Immunologic: Negative for food allergies.   Neurological:  Negative for dizziness, speech difficulty and weakness.   Hematological:  Negative for adenopathy. Does not bruise/bleed easily.   Psychiatric/Behavioral:  Negative for agitation and confusion. The patient is not nervous/anxious.        VITAL SIGNS: /70   Pulse 88   Temp 97.7 °F (36.5 °C)   Resp 18   Ht 172.7 cm (68\")   Wt 86.4 kg (190 lb 8 oz)   SpO2 95%   BMI 28.97 kg/m²   Pain Score    06/12/24 1143   PainSc: 0-No pain       PHYSICAL EXAMINATION:     Physical Exam  Vitals reviewed.   Constitutional:       General: He is not in acute distress.  HENT:      Head: Normocephalic and atraumatic.   Eyes:      General: No scleral icterus.  Cardiovascular:      Rate and Rhythm: Normal rate.   Pulmonary:      Effort: No respiratory distress.      Breath sounds: No wheezing.   Abdominal:      General: Bowel sounds are normal.      Palpations: Abdomen is soft.      Tenderness: There is no abdominal tenderness.   Musculoskeletal:         General: No swelling.      Cervical back: Neck supple.   Skin:     Coloration: Skin is not pale.   Neurological:      Mental Status: He is alert and oriented to person, place, and time.   Psychiatric:         Mood and Affect: Mood normal.         Behavior: " Behavior normal.         Thought Content: Thought content normal.         Judgment: Judgment normal.         LABS    Lab Results - Last 18 Months   Lab Units 06/12/24  1107 05/15/24  1249 04/24/24  1046 03/20/24  1043 02/21/24  1041 01/24/24  1052 11/29/23  1215 11/01/23  1237 08/09/23  1036 07/07/23  1036 05/26/23  1338 05/03/23  1341 04/05/23  1326 03/08/23  1407   HEMOGLOBIN g/dL 12.9* 13.0 13.1 13.0 12.9* 13.3   < > 13.0   < > 13.4 13.5   < > 12.8* 13.2   HEMATOCRIT % 38.2 38.6 39.5 40.1 38.8 40.2   < > 38.9   < > 40.4 41.1   < > 39.0 39.8   MCV fL 89.3 89.6 89.4 90.1 89.6 90.7   < > 88.2   < > 89.2 92.2   < > 91.5 90.7   WBC 10*3/mm3 6.42 6.98 7.06 7.71 6.81 6.16   < > 5.98   < > 5.60 7.85   < > 5.94 8.03   RDW % 13.9 14.1 13.9 13.4 13.0 13.4   < > 13.1   < > 13.1 13.0   < > 13.4 13.3   MPV fL 10.7 10.2 10.7 9.8 10.4 10.7   < > 10.0   < > 10.2 10.3   < > 10.6 10.3   PLATELETS 10*3/mm3 119* 144 123* 139* 166 118*   < > 146   < > 146 144   < > 108* 135*   IMM GRAN % % 0.2 0.3  --   --  0.3  --   --  0.5  --  0.4 0.4  --  0.2 0.1   NEUTROS ABS 10*3/mm3 4.77 5.09 5.34 5.96 5.23 4.64   < > 4.66   < > 4.08 5.38   < > 4.44 6.12   LYMPHS ABS 10*3/mm3 0.97 1.17 1.00 1.00 0.92 0.88   < > 0.83   < > 0.89 1.44   < > 0.83 1.04   MONOS ABS 10*3/mm3 0.55 0.57 0.53 0.56 0.58 0.50   < > 0.36   < > 0.47 0.87   < > 0.53 0.66   EOS ABS 10*3/mm3 0.09 0.10 0.12 0.14 0.03 0.08   < > 0.06   < > 0.10 0.10   < > 0.10 0.16   BASOS ABS 10*3/mm3 0.03 0.03 0.05 0.03 0.03 0.04   < > 0.04   < > 0.04 0.03   < > 0.03 0.04   IMMATURE GRANS (ABS) 10*3/mm3 0.01 0.02  --   --  0.02  --   --  0.03  --  0.02 0.03  --  0.01 0.01   NRBC /100 WBC  --   --   --   --  0.0  --   --  0.0  --  0.0 0.0  --  0.0 0.0    < > = values in this interval not displayed.       Lab Results - Last 18 Months   Lab Units 06/12/24  1107 05/15/24  1249 04/24/24  1046 03/20/24  1043 02/21/24  1041 01/24/24  1052   GLUCOSE mg/dL 165* 135* 153* 147* 205* 165*   SODIUM mmol/L  "135* 134* 134* 132* 131* 133*   POTASSIUM mmol/L 4.3 4.1 4.4 4.1 4.5 4.2   CO2 mmol/L 29.0 29.0 27.0 28.0 27.0 26.0   CHLORIDE mmol/L 95* 94* 95* 94* 93* 96*   ANION GAP mmol/L 11.0 11.0 12.0 10.0 11.0 11.0   CREATININE mg/dL 0.54* 0.62* 0.51* 0.57* 0.62* 0.61*   BUN mg/dL 16 13 12 13 15 14   BUN / CREAT RATIO  29.6* 21.0 23.5 22.8 24.2 23.0   CALCIUM mg/dL 9.8 9.6 9.8 9.9 9.3 9.5   ALK PHOS U/L 78 69 73 72 75 69   TOTAL PROTEIN g/dL 7.6 7.5 7.5 7.7 7.7 7.4   ALT (SGPT) U/L 23 14 18 14 11 16   AST (SGOT) U/L 29 20 24 20 19 23   BILIRUBIN mg/dL 0.5 0.5 0.6 0.5 0.6 0.5   ALBUMIN g/dL 4.4 4.3 4.4 4.3 4.1 4.3   GLOBULIN gm/dL 3.2 3.2 3.1 3.4 3.6 3.1       No results for input(s): \"MSPIKE\", \"KAPPALAMB\", \"IGLFLC\", \"URICACID\", \"FREEKAPPAL\", \"CEA\", \"LDH\", \"REFLABREPO\" in the last 67709 hours.    No results for input(s): \"IRON\", \"TIBC\", \"LABIRON\", \"FERRITIN\", \"W3LBYZN\", \"TSH\", \"FOLATE\" in the last 50664 hours.    Invalid input(s): \"VITB12\"    Simeon Tam reports a pain score of 0.            ASSESSMENT:  1.   Prostate cancer, hormone refractory.   AJCC stage (TX, NX, M0)  Treatment status:Casodex and leuprolide with PSA recurrence.  The patient is undergoing therapy with apalutamide and leuprolde (by Dr. Oneil).  2.   Performance status of 0.  3.   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  Off treatment.  4.   Cirrhosis, etiology of thrombocytopenia and followed by Dr. Duval.    5.   Urethral stricture. Self catheterization as indicated. \"Just as needed\"           PLAN:  1.    Re: Tolerance to apalutamide.  \"Missed 2 days, delivery was late.\"   2.    Re:  Heme status.  WBC 6.4, hemoglobin 12.9 and platelet 119, stable.  3.    Re:  Pre-office CMP.  .1 ml/minute and glucose 165.  4.    Re:  Pre-office PSA pending from 0.086 from 0.083 from 0.101 from 0.08 from 0.047 from 0.064 from 0.056 from 0.071 from 0.053 from 0.050 from 0.047 from 0.048 from 0.038 from 0.029 from " "0.031 from 0.031 from 0.026 from 0.024 from 0.019 from 0.020 from 0.016 from 0.015 from 0.018 (forgot my Lupron) from < 0.014 from < 0.014  from <0.014 from <0.014 from <0.01 from < 0.014 from < 0.014 from <0.014 from < 0.014 from < 0.014 from < 0.014 from < 0.014 from < 0.014 from < 0.014 from < 0.014 from < 0.014 from < 0.014 from < 0.014 from < 0.014 from 0.015 from 0.03 from 0.125 from 1.03 from 27.3. Testosterone pending from 18.2 from 35.8 from 28.4 from 8.87 from 31.1 from 30.4 from 44.9 from 36.1 from 26.4 from 26.6 from 25.1 from 35.2 from 11.8 from 35.7 from 23.4 from 28.1 from 25 from 28.2 from 25.1 from 34.1 from 18.1 from 527 from 580 from 443 from 235.  Most recent leuprolide was given on 2/6/2024.  Restage if PSA at 2 based on prostate cancer working group for rise of 0.2 on 2 or more occasions per  Association of urology.   5.   eRx for prochlorperazine 10 mg p.o. every 4 hours as needed for nausea or vomiting, 60, 2 refills if needed.    6.   eRx for C52D1 start on 6/19/2024 apalutamide 60 mg, take four tablets total dose 240 mg po daily, number, 120.  Take either with or without food.  Swallow tablets whole. TSH before apalutamide.  No grapefruit juice or grapefruit.  Monitor for falling, rash, seizures, arrhythmias, hypothyroidism or fractures.    7.   Continue ongoing management per primary care physician and the other specialists.  8.   Plan of care discussed with patient and spouse.  Understanding expressed.  He agreed to proceed.  9.   Leuprolide per Dr. Oneil given every 6 months.  10.  Questions were answered to his satisfaction. \"Yea.\"   11.  Advance Care Planning  ACP discussion was declined by the patient. Patient does not have an advance directive, information provided.  12.  Order PMSA PET to restage if PSA at least 0.2.  13.  Return to office 4 weeks with CMP, PSA, testosterone, and CBC with differential, same day.          I have reviewed the assessment and plan and verified " the accuracy of it. No changes to assessment and plan since the information was documented. Deshawn Black MD 06/12/24          I spent 32 total minutes, face-to-face, caring for Simeon today. Greater than 50% of this time involved counseling and/or coordination of care as documented within this note.                            (Matthew Oneil MD)  (Mateo Duval MD)  Lucio Garcia MD

## 2024-05-29 ENCOUNTER — SPECIALTY PHARMACY (OUTPATIENT)
Dept: ONCOLOGY | Facility: HOSPITAL | Age: 79
End: 2024-05-29
Payer: MEDICARE

## 2024-05-29 DIAGNOSIS — C61 PROSTATE CANCER: Primary | ICD-10-CM

## 2024-05-29 NOTE — PROGRESS NOTES
"Specialty Pharmacy Refill Coordination Note       Spoke with Essence (spouse) for Mr. Tam's monthly telephone follow-up regarding the oral oncology medication. She stated that he has been tolerating the Erleada well and has not had any obvious side effects from the medication. \"He's doing really good\" He has not missed any doses of the medication in the last month. He still receives refills through [facility] , and has not had any delays in getting those refills. She states that there have been no changes to his maintenance medications and no new drug allergies that she is aware of. She stated he has not had any recent stays in the hospital and has not visited the ER in the last month due to the Erleada .     I encouraged her to reach out anytime with any questions or concerns they might have regarding his oral chemotherapy medication.     No needs expressed by the patient. Will follow-up next month regarding the patient's oral chemotherapy with a routine telephone consultation.       Refill Questions      Flowsheet Row Most Recent Value   Changes to allergies? No   Changes to medications? No   New conditions or infections since last clinic visit No   Unplanned office visit, urgent care, ED, or hospital admission in the last 4 weeks  No   How does patient/caregiver feel medication is working? Good   Financial problems or insurance changes  No   Since the previous refill, were any specialty medication doses or scheduled injections missed or delayed?  No   Does this patient require a clinical escalation to a pharmacist? No            Delivery Questions      Flowsheet Row Most Recent Value   Delivery method Other (Comment)   Delivery address verified with patient/caregiver? No   Delivery address Other (Comment)   Number of medications in delivery 0   Medication(s) being filled and delivered No medications found.   Doses left of specialty medications Patient fills with [facility]   Copay verified? Yes   Copay amount 0   Copay " form of payment No copayment ($0)                   Follow-up: 28-30 day(s)     Dori Mathis, Pharmacy Technician  Specialty Pharmacy Technician

## 2024-06-11 DIAGNOSIS — N31.9 NEUROGENIC BLADDER: ICD-10-CM

## 2024-06-11 RX ORDER — TOLTERODINE 4 MG/1
CAPSULE, EXTENDED RELEASE ORAL
Qty: 30 CAPSULE | Refills: 0 | Status: SHIPPED | OUTPATIENT
Start: 2024-06-11

## 2024-06-12 ENCOUNTER — OFFICE VISIT (OUTPATIENT)
Dept: ONCOLOGY | Facility: CLINIC | Age: 79
End: 2024-06-12
Payer: MEDICARE

## 2024-06-12 ENCOUNTER — LAB (OUTPATIENT)
Dept: LAB | Facility: HOSPITAL | Age: 79
End: 2024-06-12
Payer: MEDICARE

## 2024-06-12 VITALS
BODY MASS INDEX: 28.87 KG/M2 | HEIGHT: 68 IN | TEMPERATURE: 97.7 F | HEART RATE: 88 BPM | SYSTOLIC BLOOD PRESSURE: 116 MMHG | DIASTOLIC BLOOD PRESSURE: 70 MMHG | OXYGEN SATURATION: 95 % | WEIGHT: 190.5 LBS | RESPIRATION RATE: 18 BRPM

## 2024-06-12 DIAGNOSIS — C61 PROSTATE CANCER: Primary | ICD-10-CM

## 2024-06-12 LAB
ALBUMIN SERPL-MCNC: 4.4 G/DL (ref 3.5–5.2)
ALBUMIN/GLOB SERPL: 1.4 G/DL
ALP SERPL-CCNC: 78 U/L (ref 39–117)
ALT SERPL W P-5'-P-CCNC: 23 U/L (ref 1–41)
ANION GAP SERPL CALCULATED.3IONS-SCNC: 11 MMOL/L (ref 5–15)
AST SERPL-CCNC: 29 U/L (ref 1–40)
BASOPHILS # BLD AUTO: 0.03 10*3/MM3 (ref 0–0.2)
BASOPHILS NFR BLD AUTO: 0.5 % (ref 0–1.5)
BILIRUB SERPL-MCNC: 0.5 MG/DL (ref 0–1.2)
BUN SERPL-MCNC: 16 MG/DL (ref 8–23)
BUN/CREAT SERPL: 29.6 (ref 7–25)
CALCIUM SPEC-SCNC: 9.8 MG/DL (ref 8.6–10.5)
CHLORIDE SERPL-SCNC: 95 MMOL/L (ref 98–107)
CO2 SERPL-SCNC: 29 MMOL/L (ref 22–29)
CREAT SERPL-MCNC: 0.54 MG/DL (ref 0.76–1.27)
DEPRECATED RDW RBC AUTO: 45.4 FL (ref 37–54)
EGFRCR SERPLBLD CKD-EPI 2021: 100.1 ML/MIN/1.73
EOSINOPHIL # BLD AUTO: 0.09 10*3/MM3 (ref 0–0.4)
EOSINOPHIL NFR BLD AUTO: 1.4 % (ref 0.3–6.2)
ERYTHROCYTE [DISTWIDTH] IN BLOOD BY AUTOMATED COUNT: 13.9 % (ref 12.3–15.4)
GLOBULIN UR ELPH-MCNC: 3.2 GM/DL
GLUCOSE SERPL-MCNC: 165 MG/DL (ref 65–99)
HCT VFR BLD AUTO: 38.2 % (ref 37.5–51)
HGB BLD-MCNC: 12.9 G/DL (ref 13–17.7)
HOLD SPECIMEN: NORMAL
IMM GRANULOCYTES # BLD AUTO: 0.01 10*3/MM3 (ref 0–0.05)
IMM GRANULOCYTES NFR BLD AUTO: 0.2 % (ref 0–0.5)
LYMPHOCYTES # BLD AUTO: 0.97 10*3/MM3 (ref 0.7–3.1)
LYMPHOCYTES NFR BLD AUTO: 15.1 % (ref 19.6–45.3)
MCH RBC QN AUTO: 30.1 PG (ref 26.6–33)
MCHC RBC AUTO-ENTMCNC: 33.8 G/DL (ref 31.5–35.7)
MCV RBC AUTO: 89.3 FL (ref 79–97)
MONOCYTES # BLD AUTO: 0.55 10*3/MM3 (ref 0.1–0.9)
MONOCYTES NFR BLD AUTO: 8.6 % (ref 5–12)
NEUTROPHILS NFR BLD AUTO: 4.77 10*3/MM3 (ref 1.7–7)
NEUTROPHILS NFR BLD AUTO: 74.2 % (ref 42.7–76)
PLATELET # BLD AUTO: 119 10*3/MM3 (ref 140–450)
PMV BLD AUTO: 10.7 FL (ref 6–12)
POTASSIUM SERPL-SCNC: 4.3 MMOL/L (ref 3.5–5.2)
PROT SERPL-MCNC: 7.6 G/DL (ref 6–8.5)
PSA SERPL-MCNC: 0.09 NG/ML (ref 0–4)
RBC # BLD AUTO: 4.28 10*6/MM3 (ref 4.14–5.8)
SODIUM SERPL-SCNC: 135 MMOL/L (ref 136–145)
TESTOST SERPL-MCNC: 24.1 NG/DL (ref 193–740)
WBC NRBC COR # BLD AUTO: 6.42 10*3/MM3 (ref 3.4–10.8)

## 2024-06-12 PROCEDURE — 84153 ASSAY OF PSA TOTAL: CPT

## 2024-06-12 PROCEDURE — 85025 COMPLETE CBC W/AUTO DIFF WBC: CPT

## 2024-06-12 PROCEDURE — 36415 COLL VENOUS BLD VENIPUNCTURE: CPT

## 2024-06-12 PROCEDURE — 80053 COMPREHEN METABOLIC PANEL: CPT

## 2024-06-12 PROCEDURE — 84403 ASSAY OF TOTAL TESTOSTERONE: CPT

## 2024-06-18 DIAGNOSIS — N31.9 NEUROGENIC BLADDER: ICD-10-CM

## 2024-06-19 RX ORDER — TOLTERODINE 4 MG/1
CAPSULE, EXTENDED RELEASE ORAL
Qty: 30 CAPSULE | Refills: 3 | Status: SHIPPED | OUTPATIENT
Start: 2024-06-19

## 2024-06-24 NOTE — PROGRESS NOTES
[facility] GROUP HEMATOLOGY & ONCOLOGY  [address]  [place] [address] [phone]    Patient Name: Simeon Tam  Encounter Date: 07/10/2024  YOB: 1945  Patient Number: [id number]      REASON FOR FOLLOW-UP: Simeon Tam is a pleasant 79 y.o.  male who is seen in follow-up for nonmetastatic hormone refractory prostate carcinoma (HRPC).  The patient is seen C52D22 of monotherapy with apalutamide.  He is seen alone. History is obtained from patient.  The patient is a reliable historian.        Oncology/Hematology History Overview Note   DIAGNOSTIC ABNORMALITIES:  He presented with rising PSA of 13 and was diagnosed with prostate cancer about 25 years ago.   Previous PSA was 22.26 on 10/05/2016, <0.13 on 07/17/2017, 7.91 on 10/17/2018, 19.29 on 03/19/2020, and 22.25 on 04/07/2020.   He was seen by Dr. Oneil 05/12/2020. Latest PSA was 22.25 ng/ml on 04/07/2020.  Previous PSA 22.26 on 10/05/2016.  Bone scan and CT scan of the abdomen and pelvis were negative for prostate cancer on 10/2016.  Started on Casodex and possibly leuprolide.  PSA  was down to <0.13 on 07/17/207.  Plan for apalutamide after staging scans.   CT abdomen and pelvis 06/11/2020. No evidence of metastatic disease in the abdomen or pelvis. Right renal pelvis and ureter urothelial thickening and hyperemia. Urinary bladder wall is thickened and there is adjacent inflammation. Recommend correlation with urinalysis and exam to exclude cystitis with right-sided pyelitis.  Cirrhosis.  Tiny 8 mm hypodense RIGHT inferior liver lesion on image 33 is too small to characterize.   Bone scan 06/11/2020. No evidence of bone metastases.        PREVIOUS INTERVENTIONS:  He was treated with adjuvant radiation at [place] and androgen ablation with Casodex.  Lupron and Casodex 10/2016. He had 7 months of ADT therapy with Lupron.  Apalutamide 07/22/2020 through present.      Prostate cancer   6/29/2020 -  Chemotherapy    OP PROSTATE Apalutamide     6/30/2020 Initial Diagnosis    Prostate cancer (CMS/[facility])     7/29/2020 -  Chemotherapy    OP LEUPROLIDE 45MG Q6M - ORDER EXPIRES 7/18/24     10/5/2022 - 10/5/2022 Chemotherapy    OP PROSTATE Apalutamide         PAST MEDICAL HISTORY:  ALLERGIES:  Allergies   Allergen Reactions    Sulfa Antibiotics Rash     CURRENT MEDICATIONS:  Outpatient Encounter Medications as of 7/10/2024   Medication Sig Dispense Refill    Apalutamide (ERLEADA) 60 MG tablet Take 4 tablets by mouth Daily. Take with or without food. Do not crush or chew tablets. 120 tablet 2    colestipol (COLESTID) 1 g tablet Take 2 tablets by mouth Daily.      dutasteride (AVODART) 0.5 MG capsule Take 1 capsule by mouth Daily. 90 capsule 0    JANUVIA 100 MG tablet Take 1 tablet by mouth Daily.      lisinopril-hydrochlorothiazide (PRINZIDE,ZESTORETIC) 20-12.5 MG per tablet Take 1 tablet by mouth Daily.      Loperamide HCl (IMODIUM PO) Take 1 dose by mouth As Needed (diarrhea).      Multiple Vitamins-Minerals (ICAPS AREDS 2 PO) Take 1 capsule by mouth Daily.      rosuvastatin (CRESTOR) 20 MG tablet Take 1 tablet by mouth Every Night.      tolterodine LA (DETROL LA) 4 MG 24 hr capsule TAKE (1) CAPSULE ONCE DAILY. 30 capsule 3     No facility-administered encounter medications on file as of 7/10/2024.     ADULT ILLNESSES:  Patient Active Problem List   Diagnosis Code    Hypertension I10    Hyperlipidemia E78.5    Diabetes mellitus E11.9    Cancer C80.1    Arrhythmia I49.9    Non morbid obesity due to excess calories E66.09    Prostate cancer C61    Anterior urethral stricture N35.914     SURGERIES:  Past Surgical History:   Procedure Laterality Date    CATARACT EXTRACTION      CYSTOSCOPY RETROGRADE PYELOGRAM Bilateral 4/21/2021    Procedure: CYSTOSCOPY RETROGRADE PYELOGRAM, possible DIRECT VISION INTERNAL URETHROTOMY;  Surgeon: Matthew Oneil MD;  Location: [facility];  Service: Urology;   "Laterality: Bilateral;    CYSTOSCOPY URETHROTOMY VISUAL INTERNAL Bilateral 4/21/2021    Procedure: possible DIRECT VISION INTERNAL URETHROTOMY;  Surgeon: Matthew Oneil MD;  Location: [facility];  Service: Urology;  Laterality: Bilateral;    EXCISION LESION      neck    KIDNEY STONE SURGERY      REPLACEMENT TOTAL KNEE Left     TONSILLECTOMY       HEALTH MAINTENANCE ITEMS:  Health Maintenance Due   Topic Date Due    LIPID PANEL  Never done    URINE MICROALBUMIN  Never done    Pneumococcal Vaccine 65+ (1 of 2 - PCV) Never done    DIABETIC EYE EXAM  Never done    TDAP/TD VACCINES (1 - Tdap) Never done    ZOSTER VACCINE (1 of 2) Never done    Hepatitis B (1 of 3 - Risk 3-dose series) Never done    RSV Vaccine - Adults (1 - 1-dose 60+ series) Never done    ANNUAL WELLNESS VISIT  Never done    HEMOGLOBIN A1C  Never done    BMI FOLLOWUP  04/27/2022    COVID-19 Vaccine (4 - 2023-24 season) 09/01/2023       <no information>  Last Completed Colonoscopy       This patient has no relevant Health Maintenance data.            There is no immunization history on file for this patient.  Last Completed Mammogram       This patient has no relevant Health Maintenance data.              FAMILY HISTORY:  Family History   Problem Relation Age of Onset    Dementia Mother      SOCIAL HISTORY:  Social History     Socioeconomic History    Marital status:    Tobacco Use    Smoking status: Never    Smokeless tobacco: Never   Vaping Use    Vaping status: Never Used   Substance and Sexual Activity    Alcohol use: No    Drug use: No    Sexual activity: Defer       REVIEW OF SYSTEMS:    Review of Systems   Constitutional:  Negative for fatigue, fever and unexpected weight change.        \"I feel good.\"   HENT:  Negative for congestion and trouble swallowing.    Eyes:  Negative for redness and visual disturbance.   Respiratory:  Negative for shortness of breath and wheezing.    Cardiovascular:  Negative for chest pain and leg swelling. " "  Gastrointestinal:  Negative for constipation, diarrhea, nausea and vomiting.   Endocrine: Negative for cold intolerance and heat intolerance.   Genitourinary:  Positive for difficulty urinating. Negative for hematuria.   Musculoskeletal:  Negative for gait problem and myalgias.   Skin:  Negative for pallor.   Allergic/Immunologic: Negative for food allergies.   Neurological:  Negative for dizziness and speech difficulty.   Hematological:  Negative for adenopathy. Does not bruise/bleed easily.   Psychiatric/Behavioral:  Negative for agitation and confusion. The patient is not nervous/anxious.        VITAL SIGNS: /66   Pulse 64   Temp 97 °F (36.1 °C)   Resp 18   Ht 172.7 cm (68\")   Wt 86.4 kg (190 lb 8 oz)   SpO2 96%   BMI 28.97 kg/m²   Pain Score    07/10/24 1131   PainSc: 0-No pain       PHYSICAL EXAMINATION:     Physical Exam  Vitals reviewed.   Constitutional:       General: He is not in acute distress.  HENT:      Head: Normocephalic and atraumatic.   Eyes:      General: No scleral icterus.  Cardiovascular:      Rate and Rhythm: Normal rate.   Pulmonary:      Effort: No respiratory distress.      Breath sounds: No wheezing.   Abdominal:      General: Bowel sounds are normal.      Palpations: Abdomen is soft.      Tenderness: There is no abdominal tenderness.   Musculoskeletal:         General: No swelling.      Cervical back: No rigidity.   Skin:     Coloration: Skin is not pale.   Neurological:      Mental Status: He is alert and oriented to person, place, and time.   Psychiatric:         Mood and Affect: Mood normal.         Behavior: Behavior normal.         Thought Content: Thought content normal.         Judgment: Judgment normal.         LABS    Lab Results - Last 18 Months   Lab Units 07/10/24  1050 06/12/24  1107 05/15/24  1249 04/24/24  1046 03/20/24  1043 02/21/24  1041 11/29/23  1215 11/01/23  1237 08/09/23  1036 07/07/23  1036 05/26/23  1338 05/03/23  1341 04/05/23  1326 03/08/23  1407 "   HEMOGLOBIN g/dL 13.3 12.9* 13.0 13.1 13.0 12.9*   < > 13.0   < > 13.4 13.5   < > 12.8* 13.2   HEMATOCRIT % 39.5 38.2 38.6 39.5 40.1 38.8   < > 38.9   < > 40.4 41.1   < > 39.0 39.8   MCV fL 89.4 89.3 89.6 89.4 90.1 89.6   < > 88.2   < > 89.2 92.2   < > 91.5 90.7   WBC 10*3/mm3 5.06 6.42 6.98 7.06 7.71 6.81   < > 5.98   < > 5.60 7.85   < > 5.94 8.03   RDW % 13.4 13.9 14.1 13.9 13.4 13.0   < > 13.1   < > 13.1 13.0   < > 13.4 13.3   MPV fL 10.9 10.7 10.2 10.7 9.8 10.4   < > 10.0   < > 10.2 10.3   < > 10.6 10.3   PLATELETS 10*3/mm3 121* 119* 144 123* 139* 166   < > 146   < > 146 144   < > 108* 135*   IMM GRAN % % 0.2 0.2 0.3  --   --  0.3  --  0.5  --  0.4 0.4  --  0.2 0.1   NEUTROS ABS 10*3/mm3 3.59 4.77 5.09 5.34 5.96 5.23   < > 4.66   < > 4.08 5.38   < > 4.44 6.12   LYMPHS ABS 10*3/mm3 0.86 0.97 1.17 1.00 1.00 0.92   < > 0.83   < > 0.89 1.44   < > 0.83 1.04   MONOS ABS 10*3/mm3 0.45 0.55 0.57 0.53 0.56 0.58   < > 0.36   < > 0.47 0.87   < > 0.53 0.66   EOS ABS 10*3/mm3 0.11 0.09 0.10 0.12 0.14 0.03   < > 0.06   < > 0.10 0.10   < > 0.10 0.16   BASOS ABS 10*3/mm3 0.04 0.03 0.03 0.05 0.03 0.03   < > 0.04   < > 0.04 0.03   < > 0.03 0.04   IMMATURE GRANS (ABS) 10*3/mm3 0.01 0.01 0.02  --   --  0.02  --  0.03  --  0.02 0.03  --  0.01 0.01   NRBC /100 WBC  --   --   --   --   --  0.0  --  0.0  --  0.0 0.0  --  0.0 0.0    < > = values in this interval not displayed.       Lab Results - Last 18 Months   Lab Units 07/10/24  1050 06/12/24  1107 05/15/24  1249 04/24/24  1046 03/20/24  1043 02/21/24  1041   GLUCOSE mg/dL 179* 165* 135* 153* 147* 205*   SODIUM mmol/L 133* 135* 134* 134* 132* 131*   POTASSIUM mmol/L 4.6 4.3 4.1 4.4 4.1 4.5   CO2 mmol/L 26.0 29.0 29.0 27.0 28.0 27.0   CHLORIDE mmol/L 96* 95* 94* 95* 94* 93*   ANION GAP mmol/L 11.0 11.0 11.0 12.0 10.0 11.0   CREATININE mg/dL 0.63* 0.54* 0.62* 0.51* 0.57* 0.62*   BUN mg/dL 17 16 13 12 13 15   BUN / CREAT RATIO  27.0* 29.6* 21.0 23.5 22.8 24.2   CALCIUM mg/dL 10.2 9.8  "9.6 9.8 9.9 9.3   ALK PHOS U/L 74 78 69 73 72 75   TOTAL PROTEIN g/dL 7.6 7.6 7.5 7.5 7.7 7.7   ALT (SGPT) U/L 15 23 14 18 14 11   AST (SGOT) U/L 21 29 20 24 20 19   BILIRUBIN mg/dL 0.4 0.5 0.5 0.6 0.5 0.6   ALBUMIN g/dL 4.3 4.4 4.3 4.4 4.3 4.1   GLOBULIN gm/dL 3.3 3.2 3.2 3.1 3.4 3.6       No results for input(s): \"MSPIKE\", \"KAPPALAMB\", \"IGLFLC\", \"URICACID\", \"FREEKAPPAL\", \"CEA\", \"LDH\", \"REFLABREPO\" in the last 04360 hours.    No results for input(s): \"IRON\", \"TIBC\", \"LABIRON\", \"FERRITIN\", \"G5GPFNF\", \"TSH\", \"FOLATE\" in the last 78219 hours.    Invalid input(s): \"VITB12\"    Simeon Tam reports a pain score of 0.         ASSESSMENT:  1.   Prostate cancer, hormone refractory.   AJCC stage (TX, NX, M0)  Treatment status:Casodex and leuprolide with PSA recurrence.  The patient is taking apalutamide and leuprolde (by Dr. Oneil).  2.   Good performance status of 0.  3.   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  Off treatment.  4.   Cirrhosis, etiology of thrombocytopenia and is followed by Dr. Duval.    5.   Urethral stricture. Self catheterization as indicated. \"As needed\"             PLAN:  1.    Re: Tolerance to apalutamide.  \"It's doing fine.\"   2.    Re:  Heme status.  WBC 5.06, hemoglobin 13.3 and platelet 121, stable.  3.    Re:  Pre-office CMP.  GFR 95.6 ml/minute and glucose 179.  4.    Re:  Pre-office PSA pending from 0.086 from 0.086 from 0.083 from 0.101 from 0.08 from 0.047 from 0.064 from 0.056 from 0.071 from 0.053 from 0.050 from 0.047 from 0.048 from 0.038 from 0.029 from 0.031 from 0.031 from 0.026 from 0.024 from 0.019 from 0.020 from 0.016 from 0.015 from 0.018 (forgot my Lupron) from < 0.014 from < 0.014  from <0.014 from <0.014 from <0.01 from < 0.014 from < 0.014 from <0.014 from < 0.014 from < 0.014 from < 0.014 from < 0.014 from < 0.014 from < 0.014 from < 0.014 from < 0.014 from < 0.014 from < 0.014 from < 0.014 from 0.015 from 0.03 from 0.125 " "from 1.03 from 27.3. Testosterone pending from 24.1 from 18.2 from 35.8 from 28.4 from 8.87 from 31.1 from 30.4 from 44.9 from 36.1 from 26.4 from 26.6 from 25.1 from 35.2 from 11.8 from 35.7 from 23.4 from 28.1 from 25 from 28.2 from 25.1 from 34.1 from 18.1 from 527 from 580 from 443 from 235.  Latest leuprolide was given on 2/6/2024.  Restage if PSA at 2 based on prostate cancer working group for rise of 0.2 on 2 or more occasions per  Association of urology.   5.   eRx for prochlorperazine 10 mg p.o. every 4 hours as needed for nausea or vomiting, 60, 2 refills if needed.    6.   eRx for C53D1 start on 7/17/2024 apalutamide 60 mg, take four tablets total dose 240 mg po daily, number, 120.  Take either with or without food.  Swallow tablets whole. TSH before apalutamide.  No grapefruit juice or grapefruit.  Observe for hypothyroidism, fractures, falls, rash, seizures or arrhythmias.    7.   Continue ongoing management per primary care physician and the other specialists.  8.   Plan of care discussed with patient.  Understanding expressed.  Patient agreed to proceed.  9.   Leuprolide per Dr. Oneil given every 6 months.  10.  Questions were answered to his satisfaction. \"Yea.\"   11.  Advance Care Planning  ACP discussion was declined by the patient. Patient does not have an advance directive, information provided.  12.  Plan for PMSA PET to restage if PSA at least 0.2.  13.  Return to office 5 weeks with CMP, PSA, testosterone, and CBC with differential, same day.            I have reviewed the assessment and plan and verified the accuracy of it. No changes to assessment and plan since the information was documented. Deshawn Black MD 07/10/24         I spent 30 total minutes, face-to-face, caring for Simeon today. Greater than 50% of this time involved counseling and/or coordination of care as documented within this note.                           (Matthew Oneil MD)  (Mateo Duval MD)  Lucio Garcia, " MD

## 2024-07-01 ENCOUNTER — SPECIALTY PHARMACY (OUTPATIENT)
Dept: ONCOLOGY | Facility: HOSPITAL | Age: 79
End: 2024-07-01
Payer: MEDICARE

## 2024-07-01 NOTE — PROGRESS NOTES
Specialty Pharmacy Refill Coordination Note     Patient Outreach  An attempt was made by the [facility] to contact this patient for a follow-up on their chemotherapy medication. The [facility] can be reached at [phone].        Dori Mathis, Pharmacy Technician  Specialty Pharmacy Technician

## 2024-07-09 ENCOUNTER — SPECIALTY PHARMACY (OUTPATIENT)
Dept: ONCOLOGY | Facility: HOSPITAL | Age: 79
End: 2024-07-09
Payer: MEDICARE

## 2024-07-09 NOTE — PROGRESS NOTES
Specialty Pharmacy Refill Coordination Note     Patient Outreach  An attempt was made by the [facility] to contact this patient for a follow-up on their chemotherapy medication. The [facility] can be reached at [phone].       Ana Vail, Pharmacy Technician  Specialty Pharmacy Technician

## 2024-07-10 ENCOUNTER — LAB (OUTPATIENT)
Dept: LAB | Facility: HOSPITAL | Age: 79
End: 2024-07-10
Payer: MEDICARE

## 2024-07-10 ENCOUNTER — OFFICE VISIT (OUTPATIENT)
Dept: ONCOLOGY | Facility: CLINIC | Age: 79
End: 2024-07-10
Payer: MEDICARE

## 2024-07-10 ENCOUNTER — SPECIALTY PHARMACY (OUTPATIENT)
Dept: ONCOLOGY | Facility: HOSPITAL | Age: 79
End: 2024-07-10
Payer: MEDICARE

## 2024-07-10 VITALS
WEIGHT: 190.5 LBS | HEART RATE: 64 BPM | SYSTOLIC BLOOD PRESSURE: 114 MMHG | OXYGEN SATURATION: 96 % | DIASTOLIC BLOOD PRESSURE: 66 MMHG | HEIGHT: 68 IN | BODY MASS INDEX: 28.87 KG/M2 | RESPIRATION RATE: 18 BRPM | TEMPERATURE: 97 F

## 2024-07-10 DIAGNOSIS — C61 PROSTATE CANCER: Primary | ICD-10-CM

## 2024-07-10 LAB
ALBUMIN SERPL-MCNC: 4.3 G/DL (ref 3.5–5.2)
ALBUMIN/GLOB SERPL: 1.3 G/DL
ALP SERPL-CCNC: 74 U/L (ref 39–117)
ALT SERPL W P-5'-P-CCNC: 15 U/L (ref 1–41)
ANION GAP SERPL CALCULATED.3IONS-SCNC: 11 MMOL/L (ref 5–15)
AST SERPL-CCNC: 21 U/L (ref 1–40)
BASOPHILS # BLD AUTO: 0.04 10*3/MM3 (ref 0–0.2)
BASOPHILS NFR BLD AUTO: 0.8 % (ref 0–1.5)
BILIRUB SERPL-MCNC: 0.4 MG/DL (ref 0–1.2)
BUN SERPL-MCNC: 17 MG/DL (ref 8–23)
BUN/CREAT SERPL: 27 (ref 7–25)
CALCIUM SPEC-SCNC: 10.2 MG/DL (ref 8.6–10.5)
CHLORIDE SERPL-SCNC: 96 MMOL/L (ref 98–107)
CO2 SERPL-SCNC: 26 MMOL/L (ref 22–29)
CREAT SERPL-MCNC: 0.63 MG/DL (ref 0.76–1.27)
DEPRECATED RDW RBC AUTO: 44 FL (ref 37–54)
EGFRCR SERPLBLD CKD-EPI 2021: 95.6 ML/MIN/1.73
EOSINOPHIL # BLD AUTO: 0.11 10*3/MM3 (ref 0–0.4)
EOSINOPHIL NFR BLD AUTO: 2.2 % (ref 0.3–6.2)
ERYTHROCYTE [DISTWIDTH] IN BLOOD BY AUTOMATED COUNT: 13.4 % (ref 12.3–15.4)
GLOBULIN UR ELPH-MCNC: 3.3 GM/DL
GLUCOSE SERPL-MCNC: 179 MG/DL (ref 65–99)
HCT VFR BLD AUTO: 39.5 % (ref 37.5–51)
HGB BLD-MCNC: 13.3 G/DL (ref 13–17.7)
HOLD SPECIMEN: NORMAL
IMM GRANULOCYTES # BLD AUTO: 0.01 10*3/MM3 (ref 0–0.05)
IMM GRANULOCYTES NFR BLD AUTO: 0.2 % (ref 0–0.5)
LYMPHOCYTES # BLD AUTO: 0.86 10*3/MM3 (ref 0.7–3.1)
LYMPHOCYTES NFR BLD AUTO: 17 % (ref 19.6–45.3)
MCH RBC QN AUTO: 30.1 PG (ref 26.6–33)
MCHC RBC AUTO-ENTMCNC: 33.7 G/DL (ref 31.5–35.7)
MCV RBC AUTO: 89.4 FL (ref 79–97)
MONOCYTES # BLD AUTO: 0.45 10*3/MM3 (ref 0.1–0.9)
MONOCYTES NFR BLD AUTO: 8.9 % (ref 5–12)
NEUTROPHILS NFR BLD AUTO: 3.59 10*3/MM3 (ref 1.7–7)
NEUTROPHILS NFR BLD AUTO: 70.9 % (ref 42.7–76)
PLATELET # BLD AUTO: 121 10*3/MM3 (ref 140–450)
PMV BLD AUTO: 10.9 FL (ref 6–12)
POTASSIUM SERPL-SCNC: 4.6 MMOL/L (ref 3.5–5.2)
PROT SERPL-MCNC: 7.6 G/DL (ref 6–8.5)
PSA SERPL-MCNC: 0.08 NG/ML (ref 0–4)
RBC # BLD AUTO: 4.42 10*6/MM3 (ref 4.14–5.8)
SODIUM SERPL-SCNC: 133 MMOL/L (ref 136–145)
TESTOST SERPL-MCNC: 17.9 NG/DL (ref 193–740)
WBC NRBC COR # BLD AUTO: 5.06 10*3/MM3 (ref 3.4–10.8)

## 2024-07-10 PROCEDURE — 80053 COMPREHEN METABOLIC PANEL: CPT

## 2024-07-10 PROCEDURE — 36415 COLL VENOUS BLD VENIPUNCTURE: CPT

## 2024-07-10 PROCEDURE — 85025 COMPLETE CBC W/AUTO DIFF WBC: CPT

## 2024-07-10 PROCEDURE — 84153 ASSAY OF PSA TOTAL: CPT

## 2024-07-10 PROCEDURE — 1159F MED LIST DOCD IN RCRD: CPT | Performed by: INTERNAL MEDICINE

## 2024-07-10 PROCEDURE — 1160F RVW MEDS BY RX/DR IN RCRD: CPT | Performed by: INTERNAL MEDICINE

## 2024-07-10 PROCEDURE — 1126F AMNT PAIN NOTED NONE PRSNT: CPT | Performed by: INTERNAL MEDICINE

## 2024-07-10 PROCEDURE — 99214 OFFICE O/P EST MOD 30 MIN: CPT | Performed by: INTERNAL MEDICINE

## 2024-07-10 PROCEDURE — 3078F DIAST BP <80 MM HG: CPT | Performed by: INTERNAL MEDICINE

## 2024-07-10 PROCEDURE — 84403 ASSAY OF TOTAL TESTOSTERONE: CPT

## 2024-07-10 PROCEDURE — 3074F SYST BP LT 130 MM HG: CPT | Performed by: INTERNAL MEDICINE

## 2024-07-10 NOTE — PROGRESS NOTES
"Specialty Pharmacy Refill Coordination Note       Spoke with Mrs. Tam for Mr. Tam's monthly telephone follow-up regarding the oral oncology medication. She stated that he has been tolerating the Erleada well and has not had any obvious side effects from the medication. \"He is doing good and has been getting his refills on time.\" He has not missed any doses of the medication in the last month. He still receives refills through [facility] , and has not had any delays in getting those refills. He states that there have been no changes to his maintenance medications and no new drug allergies that he is aware of. She stated he has not had any recent stays in the hospital and has not visited the ER in the last month due to the Erleada .     I encouraged her to reach out anytime with any questions or concerns she might have regarding his oral chemotherapy medication.     No needs expressed by the patient. Will follow-up next month regarding the patient's oral chemotherapy with a routine telephone consultation.      Refill Questions      Flowsheet Row Most Recent Value   Changes to allergies? No   Changes to medications? No   New conditions or infections since last clinic visit No   Unplanned office visit, urgent care, ED, or hospital admission in the last 4 weeks  No   How does patient/caregiver feel medication is working? Very good   Financial problems or insurance changes  No   Since the previous refill, were any specialty medication doses or scheduled injections missed or delayed?  No   Does this patient require a clinical escalation to a pharmacist? No            Delivery Questions      Flowsheet Row Most Recent Value   Delivery method Other (Comment)   Delivery address verified with patient/caregiver? Yes   Delivery address Other (Comment)   Number of medications in delivery 1   Doses left of specialty medications Patient fills with [facility]   Copay verified? Yes   Copay amount 0   Copay form of payment No copayment " ($0)   Signature Required No                   Follow-up: 28-30 day(s)     Ana Vail, Pharmacy Technician  Specialty Pharmacy Technician

## 2024-07-15 ENCOUNTER — SPECIALTY PHARMACY (OUTPATIENT)
Dept: ONCOLOGY | Facility: HOSPITAL | Age: 79
End: 2024-07-15
Payer: MEDICARE

## 2024-07-15 NOTE — PROGRESS NOTES
Specialty Pharmacy Patient Management Program  Oncology 6-Month Clinical Assessment       Simeon Tam is a 79 y.o. male with prostate cancer seen today to assess adherence and side effects.    Consulting Provider: Deshawn Black MD ([facility])  Oral Chemotherapy Regimen: apalutamide 240 mg by mouth daily  [facility] [place]      Reason for Outreach: Routine medication check-in .    Oncology/Hematology History Overview Note    Oncology/Hematology History Overview Note   DIAGNOSTIC ABNORMALITIES:  He presented with rising PSA of 13 and was diagnosed with prostate cancer about 25 years ago.   Previous PSA was 22.26 on 10/05/2016, <0.13 on 07/17/2017, 7.91 on 10/17/2018, 19.29 on 03/19/2020, and 22.25 on 04/07/2020.   He was seen by Dr. Oneil 05/12/2020. Latest PSA was 22.25 ng/ml on 04/07/2020.  Previous PSA 22.26 on 10/05/2016.  Bone scan and CT scan of the abdomen and pelvis were negative for prostate cancer on 10/2016.  Started on Casodex and possibly leuprolide.  PSA  was down to <0.13 on 07/17/207.  Plan for apalutamide after staging scans.   CT abdomen and pelvis 06/11/2020. No evidence of metastatic disease in the abdomen or pelvis. Right renal pelvis and ureter urothelial thickening and hyperemia. Urinary bladder wall is thickened and there is adjacent inflammation. Recommend correlation with urinalysis and exam to exclude cystitis with right-sided pyelitis.  Cirrhosis.  Tiny 8 mm hypodense RIGHT inferior liver lesion on image 33 is too small to characterize.   Bone scan 06/11/2020. No evidence of bone metastases.        PREVIOUS INTERVENTIONS:  He was treated with adjuvant radiation at [place] and androgen ablation with Casodex.  Lupron and Casodex 10/2016. He had 7 months of ADT therapy with Lupron.  Apalutamide 07/22/2020 through present.     Prostate cancer   6/29/2020 -  Chemotherapy    OP PROSTATE Apalutamide     6/30/2020 Initial Diagnosis     Prostate cancer (CMS/[facility])     7/29/2020 -  Chemotherapy    OP LEUPROLIDE 45MG Q6M - ORDER EXPIRES 7/18/24     10/5/2022 - 10/5/2022 Chemotherapy    OP PROSTATE Apalutamide         Problem List   Medicines reviewed by Kirk Han, PharmD on 7/15/2024 at 10:05 AM  Patient Active Problem List   Diagnosis Code    Hypertension I10    Hyperlipidemia E78.5    Diabetes mellitus E11.9    Cancer C80.1    Arrhythmia I49.9    Non morbid obesity due to excess calories E66.09    Prostate cancer C61    Anterior urethral stricture N35.914       Specialty Pharmacy Goal:   Goals Addressed Today        Specialty Pharmacy General Goal      Control Cancer              Medication Regimen Assessment:  Administration: Patient is taking every day at the same time , with food , and as prescribed .   Patient can self administer medications: Yes  Medication Follow-Up Plan: Next clinical assessment    Lab(s) Review:  The labs listed below have been reviewed.  Provider aware    Lab Results   Component Value Date    GLUCOSE 179 (H) 07/10/2024    CALCIUM 10.2 07/10/2024     (L) 07/10/2024    K 4.6 07/10/2024    CO2 26.0 07/10/2024    CL 96 (L) 07/10/2024    BUN 17 07/10/2024    CREATININE 0.63 (L) 07/10/2024    EGFRIFNONA 117 02/09/2022    BCR 27.0 (H) 07/10/2024    ANIONGAP 11.0 07/10/2024     Lab Results   Component Value Date    WBC 5.06 07/10/2024    RBC 4.42 07/10/2024    HGB 13.3 07/10/2024    HCT 39.5 07/10/2024    MCV 89.4 07/10/2024    MCH 30.1 07/10/2024    MCHC 33.7 07/10/2024    RDW 13.4 07/10/2024    RDWSD 44.0 07/10/2024    MPV 10.9 07/10/2024     (L) 07/10/2024    NEUTRORELPCT 70.9 07/10/2024    LYMPHORELPCT 17.0 (L) 07/10/2024    MONORELPCT 8.9 07/10/2024    EOSRELPCT 2.2 07/10/2024    BASORELPCT 0.8 07/10/2024    AUTOIGPER 0.2 07/10/2024    NEUTROABS 3.59 07/10/2024    LYMPHSABS 0.86 07/10/2024    MONOSABS 0.45 07/10/2024    EOSABS 0.11 07/10/2024    BASOSABS 0.04 07/10/2024    AUTOIGNUM 0.01 07/10/2024    NRBC  0.0 02/21/2024       Drug-Drug Interaction(s) Assessment:  Completed medication reconciliation today to assess for drug interactions. Patient denies starting or stopping any medications.    Assessed medication list for interactions,   Bile Acid Sequestrants (colestipol) may decrease the absorption of hydrochlorothiazide. The diuretic response is likewise decreased.  Patient is unsure if he is still taking colestipol. Consider  administraton of bile acid sequestrants and thiazide diuretics by at least 4 hours. Monitor for decreased therapeutic effects of thiazide diuretics if coadministered with a bile acid sequestrant.  Advised patient to call the clinic if any new medications are started so we can assess for drug-drug interactions.  Vaccines are coordinated by the patient's oncologist and primary care provider.    Medication(s) Review:   Medicines reviewed by Kirk Han, PharmD on 7/15/2024 at 10:05 AM  Prior to Admission medications    Medication Sig Start Date End Date Taking? Authorizing Provider   Apalutamide (ERLEADA) 60 MG tablet Take 4 tablets by mouth Daily. Take with or without food. Do not crush or chew tablets. 6/3/24  Yes Deshawn Black MD   colestipol (COLESTID) 1 g tablet Take 2 tablets by mouth Daily. 3/11/24  Yes Tati Hancock MD   dutasteride (AVODART) 0.5 MG capsule Take 1 capsule by mouth Daily. 2/12/24  Yes Matthew Oneil MD   JANUVIA 100 MG tablet Take 1 tablet by mouth Daily. 11/28/16  Yes Tati Hancock MD   lisinopril-hydrochlorothiazide (PRINZIDE,ZESTORETIC) 20-12.5 MG per tablet Take 1 tablet by mouth Daily. 11/28/16  Yes Tati Hancock MD   Loperamide HCl (IMODIUM PO) Take 1 dose by mouth As Needed (diarrhea).   Yes Tati Hancock MD   Multiple Vitamins-Minerals (ICAPS AREDS 2 PO) Take 1 capsule by mouth Daily.   Yes Tati Hancock MD   rosuvastatin (CRESTOR) 20 MG tablet Take 1 tablet by mouth Every Night. 11/28/16  Yes Santi  MD Tati   tolterodine LA (DETROL LA) 4 MG 24 hr capsule TAKE (1) CAPSULE ONCE DAILY. 6/19/24  Yes Matthew Oneil MD   tolterodine LA (DETROL LA) 4 MG 24 hr capsule TAKE (1) CAPSULE ONCE DAILY. 6/11/24 6/19/24  Matthew Oneil MD       Allergy Review:  Known allergies and reactions were discussed with the patient. The patient's chart has been reviewed for allergy information and updated as necessary.   Allergies   Allergen Reactions    Sulfa Antibiotics Rash              Hospitalizations and Urgent Care Visits Since Last Visit Assessment:  Unplanned hospitalizations or inpatient admissions: None  ED Visits: None  Urgent Office Visits: None    Adherence Assessment:  No adherence issues noted during 6-month clinical discussion. However, due to missing 2 doses due to delayed refill, patient was counseled to call pharmacy for refills 2 weeks prior to needing to receive refill to allow 10 business days for processing and delivery.     Quality of Life Assessment:  Quality of Life: 9/10    Financial Assessment:  Medication availability/affordability: Patient has had no issues obtaining medication from pharmacy.    Intervention(s):  Patient counseled to call pharmacy for refills 2 weeks prior to needing to receive refill to allow 10 business days for processing and delivery.     Follow-Up(s):  No needs expressed by the patient or otherwise identified. Will follow-up next month regarding the patient's oral chemotherapy with a routine telephone consultation. The next routine follow-up will be scheduled approximately 28-30 days from today. Will conduct the next in-person clinical assessment visit within the next 6 months.    Attestation:  I attest the patient was actively involved in and has agreed to the above plan of care. If the prescribed therapy is at any point deemed not appropriate based on the current or future assessments, a consultation will be initiated with the patient's specialty care provider to  determine the best course of action. The revised plan of therapy will be documented along with any required assessments and/or additional patient education provided.     Finally, all questions and concerns today were addressed to the best of my knowledge within the 6-month clinical assessment office visit. Patient verbalized they had the Specialty Pharmacy Services contact information for any future concerns or questions.         Electronically signed 07/15/2024 10:28 CDT by:  Kirk Han PharmD  Hematology & Oncology Specialty Pharmacist  [facility]  Phone: [phone]

## 2024-07-22 DIAGNOSIS — N31.9 NEUROGENIC BLADDER: ICD-10-CM

## 2024-07-22 RX ORDER — DUTASTERIDE 0.5 MG/1
CAPSULE, LIQUID FILLED ORAL
Qty: 90 CAPSULE | Refills: 0 | Status: SHIPPED | OUTPATIENT
Start: 2024-07-22

## 2024-08-01 NOTE — PROGRESS NOTES
Chief Complaint  Prostate cancer, urethral stricture disease, and hypocontractile bladder    Subjective          Simeon Tam presents to [facility] to follow-up prostate cancer and lower urinary tract issues    This patient has nonmetastatic castrate resistant prostate cancer.  This was treated with external beam radiation therapy.  Developed a biochemical recurrence.  He has responded well to leuprolide and apalutamide.  Patient continues to do well with no concerning systemic symptoms such as lower extremity edema, musculoskeletal pain that is unexplained, weight loss or other issues.  He does get hot flashes from the leuprolide.    He does have anterior urethral stricture disease.  He also has neurogenic hypocontractile bladder with incomplete emptying.  He treats both of these with self-catheterization up to 6 times daily.  He is having no difficulty passing the catheter.  No blood per cathing in a while.          Current Outpatient Medications:     Apalutamide (ERLEADA) 60 MG tablet, Take 4 tablets by mouth Daily. Take with or without food. Do not crush or chew tablets., Disp: 120 tablet, Rfl: 2    colestipol (COLESTID) 1 g tablet, Take 2 tablets by mouth Daily., Disp: , Rfl:     dutasteride (AVODART) 0.5 MG capsule, TAKE (1) CAPSULE ONCE DAILY., Disp: 90 capsule, Rfl: 0    JANUVIA 100 MG tablet, Take 1 tablet by mouth Daily., Disp: , Rfl:     lisinopril-hydrochlorothiazide (PRINZIDE,ZESTORETIC) 20-12.5 MG per tablet, Take 1 tablet by mouth Daily., Disp: , Rfl:     Loperamide HCl (IMODIUM PO), Take 1 dose by mouth As Needed (diarrhea)., Disp: , Rfl:     rosuvastatin (CRESTOR) 20 MG tablet, Take 1 tablet by mouth Every Night., Disp: , Rfl:     tolterodine LA (DETROL LA) 4 MG 24 hr capsule, TAKE (1) CAPSULE ONCE DAILY., Disp: 30 capsule, Rfl: 3    Multiple Vitamins-Minerals (ICAPS AREDS 2 PO), Take 1 capsule by mouth Daily. (Patient not taking: Reported on 8/13/2024), Disp: , Rfl:  "  Past Medical History:   Diagnosis Date    Arrhythmia     Cancer     prostate    Diabetes mellitus     History of sepsis     Hyperlipidemia     Hypertension     UTI (urinary tract infection)      Past Surgical History:   Procedure Laterality Date    CATARACT EXTRACTION      CYSTOSCOPY RETROGRADE PYELOGRAM Bilateral 4/21/2021    Procedure: CYSTOSCOPY RETROGRADE PYELOGRAM, possible DIRECT VISION INTERNAL URETHROTOMY;  Surgeon: Matthew Oneil MD;  Location: [facility];  Service: Urology;  Laterality: Bilateral;    CYSTOSCOPY URETHROTOMY VISUAL INTERNAL Bilateral 4/21/2021    Procedure: possible DIRECT VISION INTERNAL URETHROTOMY;  Surgeon: Matthew Oneil MD;  Location: [facility];  Service: Urology;  Laterality: Bilateral;    EXCISION LESION      neck    KIDNEY STONE SURGERY      REPLACEMENT TOTAL KNEE Left     TONSILLECTOMY             Review of Systems      Objective   PHYSICAL EXAM  Vital Signs:   Temp 98 °F (36.7 °C)   Ht 172.7 cm (68\")   Wt 86.5 kg (190 lb 12.8 oz)   BMI 29.01 kg/m²     Physical Exam      DATA  Result Review :              Results for orders placed or performed in visit on 08/13/24   POC Urinalysis Dipstick, Multipro    Specimen: Urine   Result Value Ref Range    Color Yellow Yellow, Straw, Dark Yellow, Viviana    Clarity, UA Clear Clear    Glucose, UA Negative Negative mg/dL    Bilirubin Negative Negative    Ketones, UA Negative Negative    Specific Gravity  1.010 1.005 - 1.030    Blood, UA Negative Negative    pH, Urine 5.5 5.0 - 8.0    Protein, POC Negative Negative mg/dL    Urobilinogen, UA Normal Normal, 0.2 E.U./dL    Nitrite, UA Negative Negative    Leukocytes Small (1+) (A) Negative             Lab Results   Component Value Date    PSA 0.083 07/10/2024    PSA 0.086 06/12/2024    PSA 0.086 05/15/2024                ASSESSMENT AND PLAN          Problem List Items Addressed This Visit          Genitourinary and Reproductive     Anterior urethral stricture    Overview     Added " automatically from request for surgery [id number]            Hematology and Neoplasia    Prostate cancer - Primary    Relevant Orders    POC Urinalysis Dipstick, Multipro (Completed)     Other Visit Diagnoses       Neurogenic bladder              Each of these chronic Urologic conditions, which I have followed >1 year,  were evaluated and managed today as follows:   PSA remains very low on current regimen.  He is tolerating the leuprolide well with just occasional hot flashes.  I will continue his leuprolide.  He will be under the care of Dr. Black for management of the apalutamide.    Continue intermittent catheterization up to 6 times daily as needed.  This works well for both hypocontractile bladder and his neurogenic bladder.        FOLLOW UP     Return in about 1 year (around 8/13/2025).        (Please note that portions of this note were completed with a voice recognition program.)  Matthew Oneil MD  08/13/24  12:46 CDT

## 2024-08-13 ENCOUNTER — OFFICE VISIT (OUTPATIENT)
Dept: UROLOGY | Facility: CLINIC | Age: 79
End: 2024-08-13
Payer: MEDICARE

## 2024-08-13 VITALS — HEIGHT: 68 IN | WEIGHT: 190.8 LBS | BODY MASS INDEX: 28.92 KG/M2 | TEMPERATURE: 98 F

## 2024-08-13 DIAGNOSIS — N31.9 NEUROGENIC BLADDER: ICD-10-CM

## 2024-08-13 DIAGNOSIS — C61 PROSTATE CANCER: Primary | ICD-10-CM

## 2024-08-13 DIAGNOSIS — N35.914 ANTERIOR URETHRAL STRICTURE: ICD-10-CM

## 2024-08-13 LAB
BILIRUB BLD-MCNC: NEGATIVE MG/DL
CLARITY, POC: CLEAR
COLOR UR: YELLOW
GLUCOSE UR STRIP-MCNC: NEGATIVE MG/DL
KETONES UR QL: NEGATIVE
LEUKOCYTE EST, POC: ABNORMAL
NITRITE UR-MCNC: NEGATIVE MG/ML
PH UR: 5.5 [PH] (ref 5–8)
PROT UR STRIP-MCNC: NEGATIVE MG/DL
RBC # UR STRIP: NEGATIVE /UL
SP GR UR: 1.01 (ref 1–1.03)
UROBILINOGEN UR QL: NORMAL

## 2024-08-13 NOTE — PROGRESS NOTES
[facility] HEMATOLOGY & ONCOLOGY  [address]  [place] [address] [phone]    Patient Name: Simeon Tam  Encounter Date: 08/21/2024  YOB: 1945  Patient Number: [id number]      REASON FOR FOLLOW-UP: Simeon Tam is a pleasant 79 y.o.  male who is seen in follow-up for nonmetastatic hormone refractory prostate cancer (HRPC).  The patient is seen C54D8 of monotherapy with apalutamide.  He is seen with spouse, Essence. History is obtained from patient.  He is a reliable historian.         Oncology/Hematology History Overview Note   DIAGNOSTIC ABNORMALITIES:  He presented with rising PSA of 13 and was diagnosed with prostate cancer about 25 years ago.   Previous PSA was 22.26 on 10/05/2016, <0.13 on 07/17/2017, 7.91 on 10/17/2018, 19.29 on 03/19/2020, and 22.25 on 04/07/2020.   He was seen by Dr. Oneil 05/12/2020. Latest PSA was 22.25 ng/ml on 04/07/2020.  Previous PSA 22.26 on 10/05/2016.  Bone scan and CT scan of the abdomen and pelvis were negative for prostate cancer on 10/2016.  Started on Casodex and possibly leuprolide.  PSA  was down to <0.13 on 07/17/207.  Plan for apalutamide after staging scans.   CT abdomen and pelvis 06/11/2020. No evidence of metastatic disease in the abdomen or pelvis. Right renal pelvis and ureter urothelial thickening and hyperemia. Urinary bladder wall is thickened and there is adjacent inflammation. Recommend correlation with urinalysis and exam to exclude cystitis with right-sided pyelitis.  Cirrhosis.  Tiny 8 mm hypodense RIGHT inferior liver lesion on image 33 is too small to characterize.   Bone scan 06/11/2020. No evidence of bone metastases.        PREVIOUS INTERVENTIONS:  He was treated with adjuvant radiation at [place] and androgen ablation with Casodex.  Lupron and Casodex 10/2016. He had 7 months of ADT therapy with Lupron.  Apalutamide 07/22/2020 through present.      Prostate cancer   6/29/2020 -  Chemotherapy    OP PROSTATE Apalutamide     6/30/2020 Initial Diagnosis    Prostate cancer (CMS/formerly [facility])     7/29/2020 -  Chemotherapy    OP LEUPROLIDE 45MG Q6M - ORDER EXPIRES 7/18/24     10/5/2022 - 10/5/2022 Chemotherapy    OP PROSTATE Apalutamide         PAST MEDICAL HISTORY:  ALLERGIES:  Allergies   Allergen Reactions    Sulfa Antibiotics Rash     CURRENT MEDICATIONS:  Outpatient Encounter Medications as of 8/21/2024   Medication Sig Dispense Refill    Apalutamide (ERLEADA) 60 MG tablet Take 4 tablets by mouth Daily. Take with or without food. Do not crush or chew tablets. 120 tablet 5    colestipol (COLESTID) 1 g tablet Take 2 tablets by mouth Daily.      dutasteride (AVODART) 0.5 MG capsule TAKE (1) CAPSULE ONCE DAILY. 90 capsule 0    JANUVIA 100 MG tablet Take 1 tablet by mouth Daily.      lisinopril-hydrochlorothiazide (PRINZIDE,ZESTORETIC) 20-12.5 MG per tablet Take 1 tablet by mouth Daily.      Loperamide HCl (IMODIUM PO) Take 1 dose by mouth As Needed (diarrhea).      Multiple Vitamins-Minerals (ICAPS AREDS 2 PO) Take 1 capsule by mouth Daily.      rosuvastatin (CRESTOR) 20 MG tablet Take 1 tablet by mouth Every Night.      tolterodine LA (DETROL LA) 4 MG 24 hr capsule TAKE (1) CAPSULE ONCE DAILY. 30 capsule 3    [DISCONTINUED] Apalutamide (ERLEADA) 60 MG tablet Take 4 tablets by mouth Daily. Take with or without food. Do not crush or chew tablets. 120 tablet 2     No facility-administered encounter medications on file as of 8/21/2024.     ADULT ILLNESSES:  Patient Active Problem List   Diagnosis Code    Hypertension I10    Hyperlipidemia E78.5    Diabetes mellitus E11.9    Cancer C80.1    Arrhythmia I49.9    Non morbid obesity due to excess calories E66.09    Prostate cancer C61    Anterior urethral stricture N35.914     SURGERIES:  Past Surgical History:   Procedure Laterality Date    CATARACT EXTRACTION      CYSTOSCOPY RETROGRADE PYELOGRAM Bilateral 4/21/2021    Procedure:  "CYSTOSCOPY RETROGRADE PYELOGRAM, possible DIRECT VISION INTERNAL URETHROTOMY;  Surgeon: Matthew Oneil MD;  Location: [facility] OR;  Service: Urology;  Laterality: Bilateral;    CYSTOSCOPY URETHROTOMY VISUAL INTERNAL Bilateral 4/21/2021    Procedure: possible DIRECT VISION INTERNAL URETHROTOMY;  Surgeon: Matthew Oneil MD;  Location:  [facility] OR;  Service: Urology;  Laterality: Bilateral;    EXCISION LESION      neck    KIDNEY STONE SURGERY      REPLACEMENT TOTAL KNEE Left     TONSILLECTOMY       HEALTH MAINTENANCE ITEMS:  Health Maintenance Due   Topic Date Due    LIPID PANEL  Never done    URINE MICROALBUMIN  Never done    Pneumococcal Vaccine 65+ (1 of 2 - PCV) Never done    DIABETIC EYE EXAM  Never done    TDAP/TD VACCINES (1 - Tdap) Never done    ZOSTER VACCINE (1 of 2) Never done    Hepatitis B (1 of 3 - Risk 3-dose series) Never done    RSV Vaccine - Adults (1 - 1-dose 60+ series) Never done    ANNUAL WELLNESS VISIT  Never done    HEMOGLOBIN A1C  Never done    BMI FOLLOWUP  04/27/2022    COVID-19 Vaccine (4 - 2023-24 season) 09/01/2023    INFLUENZA VACCINE  08/01/2024       <no information>  Last Completed Colonoscopy       This patient has no relevant Health Maintenance data.            There is no immunization history on file for this patient.  Last Completed Mammogram       This patient has no relevant Health Maintenance data.              FAMILY HISTORY:  Family History   Problem Relation Age of Onset    Dementia Mother      SOCIAL HISTORY:  Social History     Socioeconomic History    Marital status:    Tobacco Use    Smoking status: Never    Smokeless tobacco: Never   Vaping Use    Vaping status: Never Used   Substance and Sexual Activity    Alcohol use: No    Drug use: No    Sexual activity: Defer       REVIEW OF SYSTEMS:    Review of Systems   Constitutional:  Negative for fatigue, fever and unexpected weight change.        \"I feel good.\"   HENT:  Negative for congestion and mouth sores.  " "  Eyes:  Negative for discharge and redness.   Respiratory:  Negative for shortness of breath and wheezing.    Cardiovascular:  Negative for chest pain and palpitations.   Gastrointestinal:  Negative for constipation, diarrhea, nausea and vomiting.   Endocrine: Negative for cold intolerance and heat intolerance.   Genitourinary:  Negative for flank pain and hematuria.   Musculoskeletal:  Negative for gait problem and myalgias.   Skin:  Negative for pallor.   Allergic/Immunologic: Negative for food allergies.   Neurological:  Negative for dizziness, speech difficulty and weakness.   Hematological:  Negative for adenopathy. Does not bruise/bleed easily.   Psychiatric/Behavioral:  Negative for agitation, confusion and hallucinations.        VITAL SIGNS: /60   Pulse 82   Temp 97.5 °F (36.4 °C)   Resp 18   Ht 172.7 cm (68\")   Wt 86.4 kg (190 lb 8 oz)   SpO2 95%   BMI 28.97 kg/m²   Pain Score    08/21/24 1132   PainSc: 0-No pain       PHYSICAL EXAMINATION:     Physical Exam  Vitals reviewed.   Constitutional:       General: He is not in acute distress.  HENT:      Head: Normocephalic and atraumatic.   Eyes:      General: No scleral icterus.  Cardiovascular:      Rate and Rhythm: Normal rate.   Pulmonary:      Effort: No respiratory distress.      Breath sounds: No wheezing.   Abdominal:      General: Bowel sounds are normal.      Palpations: Abdomen is soft.      Tenderness: There is no abdominal tenderness.   Musculoskeletal:         General: No swelling.      Cervical back: Neck supple.   Skin:     Coloration: Skin is not pale.   Neurological:      Mental Status: He is alert and oriented to person, place, and time.   Psychiatric:         Mood and Affect: Mood normal.         Behavior: Behavior normal.         Thought Content: Thought content normal.         Judgment: Judgment normal.         LABS    Lab Results - Last 18 Months   Lab Units 08/21/24  1050 07/10/24  1050 06/12/24  1107 05/15/24  1249 " 04/24/24  1046 03/20/24  1043 02/21/24  1041 11/29/23  1215 11/01/23  1237 08/09/23  1036 07/07/23  1036 05/26/23  1338 05/03/23  1341 04/05/23  1326 03/08/23  1407   HEMOGLOBIN g/dL 13.1 13.3 12.9* 13.0 13.1 13.0 12.9*   < > 13.0   < > 13.4 13.5   < > 12.8* 13.2   HEMATOCRIT % 39.3 39.5 38.2 38.6 39.5 40.1 38.8   < > 38.9   < > 40.4 41.1   < > 39.0 39.8   MCV fL 89.9 89.4 89.3 89.6 89.4 90.1 89.6   < > 88.2   < > 89.2 92.2   < > 91.5 90.7   WBC 10*3/mm3 5.38 5.06 6.42 6.98 7.06 7.71 6.81   < > 5.98   < > 5.60 7.85   < > 5.94 8.03   RDW % 13.1 13.4 13.9 14.1 13.9 13.4 13.0   < > 13.1   < > 13.1 13.0   < > 13.4 13.3   MPV fL 10.8 10.9 10.7 10.2 10.7 9.8 10.4   < > 10.0   < > 10.2 10.3   < > 10.6 10.3   PLATELETS 10*3/mm3 115* 121* 119* 144 123* 139* 166   < > 146   < > 146 144   < > 108* 135*   IMM GRAN % % 0.4 0.2 0.2 0.3  --   --  0.3  --  0.5  --  0.4 0.4  --  0.2 0.1   NEUTROS ABS 10*3/mm3 3.95 3.59 4.77 5.09 5.34 5.96 5.23   < > 4.66   < > 4.08 5.38   < > 4.44 6.12   LYMPHS ABS 10*3/mm3 0.83 0.86 0.97 1.17 1.00 1.00 0.92   < > 0.83   < > 0.89 1.44   < > 0.83 1.04   MONOS ABS 10*3/mm3 0.44 0.45 0.55 0.57 0.53 0.56 0.58   < > 0.36   < > 0.47 0.87   < > 0.53 0.66   EOS ABS 10*3/mm3 0.11 0.11 0.09 0.10 0.12 0.14 0.03   < > 0.06   < > 0.10 0.10   < > 0.10 0.16   BASOS ABS 10*3/mm3 0.03 0.04 0.03 0.03 0.05 0.03 0.03   < > 0.04   < > 0.04 0.03   < > 0.03 0.04   IMMATURE GRANS (ABS) 10*3/mm3 0.02 0.01 0.01 0.02  --   --  0.02  --  0.03  --  0.02 0.03  --  0.01 0.01   NRBC /100 WBC  --   --   --   --   --   --  0.0  --  0.0  --  0.0 0.0  --  0.0 0.0    < > = values in this interval not displayed.       Lab Results - Last 18 Months   Lab Units 08/21/24  1050 07/10/24  1050 06/12/24  1107 05/15/24  1249 04/24/24  1046 03/20/24  1043   GLUCOSE mg/dL 148* 179* 165* 135* 153* 147*   SODIUM mmol/L 135* 133* 135* 134* 134* 132*   POTASSIUM mmol/L 4.3 4.6 4.3 4.1 4.4 4.1   CO2 mmol/L 26.0 26.0 29.0 29.0 27.0 28.0   CHLORIDE  "mmol/L 96* 96* 95* 94* 95* 94*   ANION GAP mmol/L 13.0 11.0 11.0 11.0 12.0 10.0   CREATININE mg/dL 0.61* 0.63* 0.54* 0.62* 0.51* 0.57*   BUN mg/dL 13 17 16 13 12 13   BUN / CREAT RATIO  21.3 27.0* 29.6* 21.0 23.5 22.8   CALCIUM mg/dL 9.7 10.2 9.8 9.6 9.8 9.9   ALK PHOS U/L 73 74 78 69 73 72   TOTAL PROTEIN g/dL 7.6 7.6 7.6 7.5 7.5 7.7   ALT (SGPT) U/L 12 15 23 14 18 14   AST (SGOT) U/L 18 21 29 20 24 20   BILIRUBIN mg/dL 0.4 0.4 0.5 0.5 0.6 0.5   ALBUMIN g/dL 4.3 4.3 4.4 4.3 4.4 4.3   GLOBULIN gm/dL 3.3 3.3 3.2 3.2 3.1 3.4       No results for input(s): \"MSPIKE\", \"KAPPALAMB\", \"IGLFLC\", \"URICACID\", \"FREEKAPPAL\", \"CEA\", \"LDH\", \"REFLABREPO\" in the last 23780 hours.    No results for input(s): \"IRON\", \"TIBC\", \"LABIRON\", \"FERRITIN\", \"I8TXNTJ\", \"TSH\", \"FOLATE\" in the last 81099 hours.    Invalid input(s): \"VITB12\"    Simeon Tam reports a pain score of 0.          ASSESSMENT:  1.   Prostate cancer, hormone refractory.   AJCC stage (TX, NX, M0)  Treatment status:Casodex and leuprolide with PSA recurrence.  The patient is taking apalutamide and leuprolde (by Dr. Oneil).  2.   Performance status of 0.  3.   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  On observation.  4.   Cirrhosis, etiology of thrombocytopenia and is followed by Dr. Duval.    5.   Urethral stricture. Self catheterization as indicated. \"Still as needed.\"              PLAN:  1.    Re: Tolerance to apalutamide.  \"Still taking it\"   2.    Re:  Heme status.  WBC 5.3, hemoglobin 13.1 and platelet 115 from 121 from 119 stable.  3.    Re:  Pre-office CMP.  GFR 96.5 ml/minute and glucose 148.  4.    Re:  Pre-office PSA pending from 0.083 from 0.086 from 0.086 from 0.083 from 0.101 from 0.08 from 0.047 from 0.064 from 0.056 from 0.071 from 0.053 from 0.050 from 0.047 from 0.048 from 0.038 from 0.029 from 0.031 from 0.031 from 0.026 from 0.024 from 0.019 from 0.020 from 0.016 from 0.015 from 0.018 (forgot my Lupron) " "from < 0.014 from < 0.014  from <0.014 from <0.014 from <0.01 from < 0.014 from < 0.014 from <0.014 from < 0.014 from < 0.014 from < 0.014 from < 0.014 from < 0.014 from < 0.014 from < 0.014 from < 0.014 from < 0.014 from < 0.014 from < 0.014 from 0.015 from 0.03 from 0.125 from 1.03 from 27.3. Testosterone pending from 17.9 from 24.1 from 18.2 from 35.8 from 28.4 from 8.87 from 31.1 from 30.4 from 44.9 from 36.1 from 26.4 from 26.6 from 25.1 from 35.2 from 11.8 from 35.7 from 23.4 from 28.1 from 25 from 28.2 from 25.1 from 34.1 from 18.1 from 527 from 580 from 443 from 235.  Latest leuprolide given on 2/6/2024.  Restage if PSA at 2 based on prostate cancer working group for rise of 0.2 on 2 or more occasions per  Association of urology.   5.   eRx for prochlorperazine 10 mg p.o. every 4 hours as needed for nausea or vomiting, 60, 2 refills if needed.    6.   eRx for C54D1 started on 7/14/2024 apalutamide 60 mg, take four tablets total dose 240 mg po daily, number, 120.  Take either with or without food.  Swallow tablets whole. TSH before apalutamide.  No grapefruit juice or grapefruit.  Observe for seizures, rash, falls, hypothyroidism, fractures or arrhythmias.    7.   Continue ongoing management per primary care physician and the other specialists.  8.   Plan of care discussed with patient and spouse.  Understanding expressed.  He is agreeable to proceed.  9.   Leuprolide per Dr. Oneil given every 6 months.  10.  Questions were answered to his satisfaction. \"Yes.\"   11.  Advance Care Planning  ACP discussion was declined by the patient. Patient does not have an advance directive, information provided.  12.  Plan for PMSA PET to restage if PSA at least 0.2.  13.  Return to office 4 weeks with CMP, PSA, testosterone, and CBC with differential, same day.             I have reviewed the assessment and plan and verified the accuracy of it. No changes to assessment and plan since the information was " documented. Deshawn Black MD 08/21/24 ;        I spent 31 total minutes, face-to-face, caring for Simeon today. Greater than 50% of this time involved counseling and/or coordination of care as documented within this note.                           (Matthew Oneil MD)  (Mateo Duval MD)  Lucio Garcia MD

## 2024-08-20 ENCOUNTER — SPECIALTY PHARMACY (OUTPATIENT)
Dept: ONCOLOGY | Facility: HOSPITAL | Age: 79
End: 2024-08-20
Payer: MEDICARE

## 2024-08-20 DIAGNOSIS — C61 PROSTATE CANCER: Primary | ICD-10-CM

## 2024-08-20 NOTE — PROGRESS NOTES
"[facility]    Oral Oncology Patient REFILL REQUEST      Consult Details  Consulting Provider:   Deshawn Black MD ([facility])   Medication and Regimen:  apalutamide [Erleada] 240 mg PO daily     Prescription Review  Dosing & Interactions:   Reviewed, appropriate and no significant interaction noted at this time..   Current Specialty Pharmacy [facility]     Intervention(s)/Discussion:         Received message from Dori Mathis (Patient Care Coordinator) to send a new prescription to our retail pharmacy for the patient's Erleada.   Dori \"tracks\" our specialty patients that fill with our retail pharmacy. An excel-based tracker is utilized for therapy initiation based on new patients, and is updated regularly based on new prescriptions needed, when refill has been submitted, how much prescription co-pay amount there was, and how much hector/foundation fund has been used.      Summary:    Sent in a 6 MONTH prescription for Erleada to our [facility]. [facility] will continue to follow patient, regarding the patient’s oral chemotherapy.      Electronically signed 08/20/2024 08:29 CDT by:  Carolee Diaz, Jill  Hematology & Oncology Specialty Pharmacist  [facility]  Phone: [phone]      "

## 2024-08-21 ENCOUNTER — OFFICE VISIT (OUTPATIENT)
Dept: ONCOLOGY | Facility: CLINIC | Age: 79
End: 2024-08-21
Payer: MEDICARE

## 2024-08-21 ENCOUNTER — INFUSION (OUTPATIENT)
Dept: ONCOLOGY | Facility: HOSPITAL | Age: 79
End: 2024-08-21
Payer: MEDICARE

## 2024-08-21 ENCOUNTER — LAB (OUTPATIENT)
Dept: LAB | Facility: HOSPITAL | Age: 79
End: 2024-08-21
Payer: MEDICARE

## 2024-08-21 VITALS
HEIGHT: 68 IN | HEART RATE: 82 BPM | WEIGHT: 190.5 LBS | TEMPERATURE: 97.5 F | BODY MASS INDEX: 28.87 KG/M2 | DIASTOLIC BLOOD PRESSURE: 60 MMHG | SYSTOLIC BLOOD PRESSURE: 118 MMHG | OXYGEN SATURATION: 95 % | RESPIRATION RATE: 18 BRPM

## 2024-08-21 VITALS
DIASTOLIC BLOOD PRESSURE: 60 MMHG | RESPIRATION RATE: 18 BRPM | HEART RATE: 97 BPM | HEIGHT: 68 IN | OXYGEN SATURATION: 97 % | WEIGHT: 193 LBS | TEMPERATURE: 97.3 F | SYSTOLIC BLOOD PRESSURE: 121 MMHG | BODY MASS INDEX: 29.25 KG/M2

## 2024-08-21 DIAGNOSIS — C61 PROSTATE CANCER: Primary | ICD-10-CM

## 2024-08-21 LAB
ALBUMIN SERPL-MCNC: 4.3 G/DL (ref 3.5–5.2)
ALBUMIN/GLOB SERPL: 1.3 G/DL
ALP SERPL-CCNC: 73 U/L (ref 39–117)
ALT SERPL W P-5'-P-CCNC: 12 U/L (ref 1–41)
ANION GAP SERPL CALCULATED.3IONS-SCNC: 13 MMOL/L (ref 5–15)
AST SERPL-CCNC: 18 U/L (ref 1–40)
BASOPHILS # BLD AUTO: 0.03 10*3/MM3 (ref 0–0.2)
BASOPHILS NFR BLD AUTO: 0.6 % (ref 0–1.5)
BILIRUB SERPL-MCNC: 0.4 MG/DL (ref 0–1.2)
BUN SERPL-MCNC: 13 MG/DL (ref 8–23)
BUN/CREAT SERPL: 21.3 (ref 7–25)
CALCIUM SPEC-SCNC: 9.7 MG/DL (ref 8.6–10.5)
CHLORIDE SERPL-SCNC: 96 MMOL/L (ref 98–107)
CO2 SERPL-SCNC: 26 MMOL/L (ref 22–29)
CREAT SERPL-MCNC: 0.61 MG/DL (ref 0.76–1.27)
DEPRECATED RDW RBC AUTO: 43.2 FL (ref 37–54)
EGFRCR SERPLBLD CKD-EPI 2021: 96.5 ML/MIN/1.73
EOSINOPHIL # BLD AUTO: 0.11 10*3/MM3 (ref 0–0.4)
EOSINOPHIL NFR BLD AUTO: 2 % (ref 0.3–6.2)
ERYTHROCYTE [DISTWIDTH] IN BLOOD BY AUTOMATED COUNT: 13.1 % (ref 12.3–15.4)
GLOBULIN UR ELPH-MCNC: 3.3 GM/DL
GLUCOSE SERPL-MCNC: 148 MG/DL (ref 65–99)
HCT VFR BLD AUTO: 39.3 % (ref 37.5–51)
HGB BLD-MCNC: 13.1 G/DL (ref 13–17.7)
HOLD SPECIMEN: NORMAL
IMM GRANULOCYTES # BLD AUTO: 0.02 10*3/MM3 (ref 0–0.05)
IMM GRANULOCYTES NFR BLD AUTO: 0.4 % (ref 0–0.5)
LYMPHOCYTES # BLD AUTO: 0.83 10*3/MM3 (ref 0.7–3.1)
LYMPHOCYTES NFR BLD AUTO: 15.4 % (ref 19.6–45.3)
MCH RBC QN AUTO: 30 PG (ref 26.6–33)
MCHC RBC AUTO-ENTMCNC: 33.3 G/DL (ref 31.5–35.7)
MCV RBC AUTO: 89.9 FL (ref 79–97)
MONOCYTES # BLD AUTO: 0.44 10*3/MM3 (ref 0.1–0.9)
MONOCYTES NFR BLD AUTO: 8.2 % (ref 5–12)
NEUTROPHILS NFR BLD AUTO: 3.95 10*3/MM3 (ref 1.7–7)
NEUTROPHILS NFR BLD AUTO: 73.4 % (ref 42.7–76)
PLATELET # BLD AUTO: 115 10*3/MM3 (ref 140–450)
PMV BLD AUTO: 10.8 FL (ref 6–12)
POTASSIUM SERPL-SCNC: 4.3 MMOL/L (ref 3.5–5.2)
PROT SERPL-MCNC: 7.6 G/DL (ref 6–8.5)
PSA SERPL-MCNC: 0.1 NG/ML (ref 0–4)
RBC # BLD AUTO: 4.37 10*6/MM3 (ref 4.14–5.8)
SODIUM SERPL-SCNC: 135 MMOL/L (ref 136–145)
TESTOST SERPL-MCNC: 21 NG/DL (ref 193–740)
WBC NRBC COR # BLD AUTO: 5.38 10*3/MM3 (ref 3.4–10.8)

## 2024-08-21 PROCEDURE — 1160F RVW MEDS BY RX/DR IN RCRD: CPT | Performed by: INTERNAL MEDICINE

## 2024-08-21 PROCEDURE — 1126F AMNT PAIN NOTED NONE PRSNT: CPT | Performed by: INTERNAL MEDICINE

## 2024-08-21 PROCEDURE — 3074F SYST BP LT 130 MM HG: CPT | Performed by: INTERNAL MEDICINE

## 2024-08-21 PROCEDURE — 1159F MED LIST DOCD IN RCRD: CPT | Performed by: INTERNAL MEDICINE

## 2024-08-21 PROCEDURE — 84403 ASSAY OF TOTAL TESTOSTERONE: CPT

## 2024-08-21 PROCEDURE — 36415 COLL VENOUS BLD VENIPUNCTURE: CPT

## 2024-08-21 PROCEDURE — 85025 COMPLETE CBC W/AUTO DIFF WBC: CPT

## 2024-08-21 PROCEDURE — 84153 ASSAY OF PSA TOTAL: CPT

## 2024-08-21 PROCEDURE — 25010000002 LEUPROLIDE 45 MG KIT: Performed by: UROLOGY

## 2024-08-21 PROCEDURE — 99214 OFFICE O/P EST MOD 30 MIN: CPT | Performed by: INTERNAL MEDICINE

## 2024-08-21 PROCEDURE — 80053 COMPREHEN METABOLIC PANEL: CPT

## 2024-08-21 PROCEDURE — 3078F DIAST BP <80 MM HG: CPT | Performed by: INTERNAL MEDICINE

## 2024-08-21 PROCEDURE — 96402 CHEMO HORMON ANTINEOPL SQ/IM: CPT

## 2024-08-21 RX ADMIN — LEUPROLIDE ACETATE 45 MG: KIT at 14:04

## 2024-09-18 ENCOUNTER — SPECIALTY PHARMACY (OUTPATIENT)
Dept: ONCOLOGY | Facility: HOSPITAL | Age: 79
End: 2024-09-18
Payer: MEDICARE

## 2024-09-25 ENCOUNTER — OFFICE VISIT (OUTPATIENT)
Dept: ONCOLOGY | Facility: CLINIC | Age: 79
End: 2024-09-25
Payer: MEDICARE

## 2024-09-25 ENCOUNTER — LAB (OUTPATIENT)
Dept: LAB | Facility: HOSPITAL | Age: 79
End: 2024-09-25
Payer: MEDICARE

## 2024-09-25 VITALS
WEIGHT: 190 LBS | RESPIRATION RATE: 18 BRPM | TEMPERATURE: 97.2 F | SYSTOLIC BLOOD PRESSURE: 114 MMHG | BODY MASS INDEX: 28.89 KG/M2 | HEART RATE: 88 BPM | OXYGEN SATURATION: 95 % | DIASTOLIC BLOOD PRESSURE: 66 MMHG

## 2024-09-25 DIAGNOSIS — C61 PROSTATE CANCER: Primary | ICD-10-CM

## 2024-09-25 DIAGNOSIS — C61 PROSTATE CANCER: ICD-10-CM

## 2024-09-25 LAB
ALBUMIN SERPL-MCNC: 4.3 G/DL (ref 3.5–5.2)
ALBUMIN/GLOB SERPL: 1.4 G/DL
ALP SERPL-CCNC: 74 U/L (ref 39–117)
ALT SERPL W P-5'-P-CCNC: 21 U/L (ref 1–41)
ANION GAP SERPL CALCULATED.3IONS-SCNC: 13 MMOL/L (ref 5–15)
AST SERPL-CCNC: 26 U/L (ref 1–40)
BASOPHILS # BLD AUTO: 0.03 10*3/MM3 (ref 0–0.2)
BASOPHILS NFR BLD AUTO: 0.6 % (ref 0–1.5)
BILIRUB SERPL-MCNC: 0.3 MG/DL (ref 0–1.2)
BUN SERPL-MCNC: 11 MG/DL (ref 8–23)
BUN/CREAT SERPL: 18.3 (ref 7–25)
CALCIUM SPEC-SCNC: 9.4 MG/DL (ref 8.6–10.5)
CHLORIDE SERPL-SCNC: 93 MMOL/L (ref 98–107)
CO2 SERPL-SCNC: 27 MMOL/L (ref 22–29)
CREAT SERPL-MCNC: 0.6 MG/DL (ref 0.76–1.27)
DEPRECATED RDW RBC AUTO: 43.6 FL (ref 37–54)
EGFRCR SERPLBLD CKD-EPI 2021: 97 ML/MIN/1.73
EOSINOPHIL # BLD AUTO: 0.11 10*3/MM3 (ref 0–0.4)
EOSINOPHIL NFR BLD AUTO: 2.4 % (ref 0.3–6.2)
ERYTHROCYTE [DISTWIDTH] IN BLOOD BY AUTOMATED COUNT: 13.2 % (ref 12.3–15.4)
GLOBULIN UR ELPH-MCNC: 3 GM/DL
GLUCOSE SERPL-MCNC: 157 MG/DL (ref 65–99)
HCT VFR BLD AUTO: 39.2 % (ref 37.5–51)
HGB BLD-MCNC: 12.9 G/DL (ref 13–17.7)
IMM GRANULOCYTES # BLD AUTO: 0.02 10*3/MM3 (ref 0–0.05)
IMM GRANULOCYTES NFR BLD AUTO: 0.4 % (ref 0–0.5)
LYMPHOCYTES # BLD AUTO: 0.8 10*3/MM3 (ref 0.7–3.1)
LYMPHOCYTES NFR BLD AUTO: 17.3 % (ref 19.6–45.3)
MCH RBC QN AUTO: 29.5 PG (ref 26.6–33)
MCHC RBC AUTO-ENTMCNC: 32.9 G/DL (ref 31.5–35.7)
MCV RBC AUTO: 89.7 FL (ref 79–97)
MONOCYTES # BLD AUTO: 0.44 10*3/MM3 (ref 0.1–0.9)
MONOCYTES NFR BLD AUTO: 9.5 % (ref 5–12)
NEUTROPHILS NFR BLD AUTO: 3.22 10*3/MM3 (ref 1.7–7)
NEUTROPHILS NFR BLD AUTO: 69.8 % (ref 42.7–76)
PLATELET # BLD AUTO: 106 10*3/MM3 (ref 140–450)
PMV BLD AUTO: 10.3 FL (ref 6–12)
POTASSIUM SERPL-SCNC: 4 MMOL/L (ref 3.5–5.2)
PROT SERPL-MCNC: 7.3 G/DL (ref 6–8.5)
PSA SERPL-MCNC: 0.11 NG/ML (ref 0–4)
RBC # BLD AUTO: 4.37 10*6/MM3 (ref 4.14–5.8)
SODIUM SERPL-SCNC: 133 MMOL/L (ref 136–145)
TESTOST SERPL-MCNC: 15.2 NG/DL (ref 193–740)
WBC NRBC COR # BLD AUTO: 4.62 10*3/MM3 (ref 3.4–10.8)

## 2024-09-25 PROCEDURE — 36415 COLL VENOUS BLD VENIPUNCTURE: CPT

## 2024-09-25 PROCEDURE — 85025 COMPLETE CBC W/AUTO DIFF WBC: CPT

## 2024-09-25 PROCEDURE — 1160F RVW MEDS BY RX/DR IN RCRD: CPT | Performed by: INTERNAL MEDICINE

## 2024-09-25 PROCEDURE — 84403 ASSAY OF TOTAL TESTOSTERONE: CPT

## 2024-09-25 PROCEDURE — 1126F AMNT PAIN NOTED NONE PRSNT: CPT | Performed by: INTERNAL MEDICINE

## 2024-09-25 PROCEDURE — 3074F SYST BP LT 130 MM HG: CPT | Performed by: INTERNAL MEDICINE

## 2024-09-25 PROCEDURE — 3078F DIAST BP <80 MM HG: CPT | Performed by: INTERNAL MEDICINE

## 2024-09-25 PROCEDURE — 80053 COMPREHEN METABOLIC PANEL: CPT

## 2024-09-25 PROCEDURE — 99214 OFFICE O/P EST MOD 30 MIN: CPT | Performed by: INTERNAL MEDICINE

## 2024-09-25 PROCEDURE — 84153 ASSAY OF PSA TOTAL: CPT

## 2024-09-25 PROCEDURE — 1159F MED LIST DOCD IN RCRD: CPT | Performed by: INTERNAL MEDICINE

## 2024-10-08 NOTE — PROGRESS NOTES
[facility] GROUP HEMATOLOGY & ONCOLOGY  [address]  [place] [address] [phone]    Patient Name: Simeon Tam  Encounter Date: 10/23/2024  YOB: 1945  Patient Number: [id number]      REASON FOR FOLLOW-UP:Simeon Tam is a pleasant 79 y.o.  male who is seen in follow-up for nonmetastatic hormone refractory prostate carcinoma (HRPC).  The patient is seen C56D15 of monotherapy with apalutamide.  He is seen with his spouse, Essence. History is obtained from patient.  Patient is a reliable historian.        Oncology/Hematology History Overview Note   DIAGNOSTIC ABNORMALITIES:  He presented with rising PSA of 13 and was diagnosed with prostate cancer about 25 years ago.   Previous PSA was 22.26 on 10/05/2016, <0.13 on 07/17/2017, 7.91 on 10/17/2018, 19.29 on 03/19/2020, and 22.25 on 04/07/2020.   He was seen by Dr. Oneil 05/12/2020. Latest PSA was 22.25 ng/ml on 04/07/2020.  Previous PSA 22.26 on 10/05/2016.  Bone scan and CT scan of the abdomen and pelvis were negative for prostate cancer on 10/2016.  Started on Casodex and possibly leuprolide.  PSA  was down to <0.13 on 07/17/207.  Plan for apalutamide after staging scans.   CT abdomen and pelvis 06/11/2020. No evidence of metastatic disease in the abdomen or pelvis. Right renal pelvis and ureter urothelial thickening and hyperemia. Urinary bladder wall is thickened and there is adjacent inflammation. Recommend correlation with urinalysis and exam to exclude cystitis with right-sided pyelitis.  Cirrhosis.  Tiny 8 mm hypodense RIGHT inferior liver lesion on image 33 is too small to characterize.   Bone scan 06/11/2020. No evidence of bone metastases.        PREVIOUS INTERVENTIONS:  He was treated with adjuvant radiation at [facility] and androgen ablation with Casodex.  Lupron and Casodex 10/2016. He had 7 months of ADT therapy with Lupron.  Apalutamide 07/22/2020 through  present.     Prostate cancer   6/29/2020 -  Chemotherapy    OP PROSTATE Apalutamide     6/30/2020 Initial Diagnosis    Prostate cancer (CMS/[facility])     7/29/2020 -  Chemotherapy    OP LEUPROLIDE 45MG Q6M - ORDER EXPIRES 7/18/24     10/5/2022 - 10/5/2022 Chemotherapy    OP PROSTATE Apalutamide         PAST MEDICAL HISTORY:  ALLERGIES:  Allergies   Allergen Reactions    Sulfa Antibiotics Rash     CURRENT MEDICATIONS:  Outpatient Encounter Medications as of 10/23/2024   Medication Sig Dispense Refill    Apalutamide (ERLEADA) 60 MG tablet Take 4 tablets by mouth Daily. Take with or without food. Do not crush or chew tablets. 120 tablet 5    colestipol (COLESTID) 1 g tablet Take 2 tablets by mouth Daily.      dutasteride (AVODART) 0.5 MG capsule TAKE (1) CAPSULE ONCE DAILY. 90 capsule 0    JANUVIA 100 MG tablet Take 1 tablet by mouth Daily.      lisinopril-hydrochlorothiazide (PRINZIDE,ZESTORETIC) 20-12.5 MG per tablet Take 1 tablet by mouth Daily.      Loperamide HCl (IMODIUM PO) Take 1 dose by mouth As Needed (diarrhea).      Multiple Vitamins-Minerals (ICAPS AREDS 2 PO) Take 1 capsule by mouth Daily.      rosuvastatin (CRESTOR) 20 MG tablet Take 1 tablet by mouth Every Night.      tolterodine LA (DETROL LA) 4 MG 24 hr capsule TAKE (1) CAPSULE ONCE DAILY. 30 capsule 3    [DISCONTINUED] dutasteride (AVODART) 0.5 MG capsule TAKE (1) CAPSULE ONCE DAILY. 90 capsule 0     No facility-administered encounter medications on file as of 10/23/2024.     ADULT ILLNESSES:  Patient Active Problem List   Diagnosis Code    Hypertension I10    Hyperlipidemia E78.5    Diabetes mellitus E11.9    Cancer C80.1    Arrhythmia I49.9    Non morbid obesity due to excess calories E66.09    Prostate cancer C61    Anterior urethral stricture N35.914     SURGERIES:  Past Surgical History:   Procedure Laterality Date    CATARACT EXTRACTION      CYSTOSCOPY RETROGRADE PYELOGRAM Bilateral 4/21/2021    Procedure: CYSTOSCOPY RETROGRADE PYELOGRAM, possible  "DIRECT VISION INTERNAL URETHROTOMY;  Surgeon: Matthew Oneil MD;  Location: [facility] OR;  Service: Urology;  Laterality: Bilateral;    CYSTOSCOPY URETHROTOMY VISUAL INTERNAL Bilateral 4/21/2021    Procedure: possible DIRECT VISION INTERNAL URETHROTOMY;  Surgeon: Matthew Oneil MD;  Location: [facility] OR;  Service: Urology;  Laterality: Bilateral;    EXCISION LESION      neck    KIDNEY STONE SURGERY      REPLACEMENT TOTAL KNEE Left     TONSILLECTOMY       HEALTH MAINTENANCE ITEMS:  Health Maintenance Due   Topic Date Due    LIPID PANEL  Never done    URINE MICROALBUMIN  Never done    Pneumococcal Vaccine 65+ (1 of 2 - PCV) Never done    DIABETIC EYE EXAM  Never done    TDAP/TD VACCINES (1 - Tdap) Never done    ZOSTER VACCINE (1 of 2) Never done    Hepatitis B (1 of 3 - Risk 3-dose series) Never done    RSV Vaccine - Adults (1 - 1-dose 75+ series) Never done    ANNUAL WELLNESS VISIT  Never done    HEMOGLOBIN A1C  Never done    BMI FOLLOWUP  04/27/2022    INFLUENZA VACCINE  Never done    COVID-19 Vaccine (4 - 2023-24 season) 09/01/2024       <no information>  Last Completed Colonoscopy       This patient has no relevant Health Maintenance data.            There is no immunization history on file for this patient.  Last Completed Mammogram       This patient has no relevant Health Maintenance data.              FAMILY HISTORY:  Family History   Problem Relation Age of Onset    Dementia Mother      SOCIAL HISTORY:  Social History     Socioeconomic History    Marital status:    Tobacco Use    Smoking status: Never    Smokeless tobacco: Never   Vaping Use    Vaping status: Never Used   Substance and Sexual Activity    Alcohol use: No    Drug use: No    Sexual activity: Defer       REVIEW OF SYSTEMS:    Review of Systems   Constitutional:  Negative for fatigue, fever and unexpected weight change.        \"I feel good.\"   HENT:  Negative for congestion and mouth sores.    Eyes:  Negative for discharge and redness. " "  Respiratory:  Negative for shortness of breath and wheezing.    Cardiovascular:  Negative for chest pain and leg swelling.   Gastrointestinal:  Negative for abdominal pain, nausea and vomiting.   Endocrine: Negative for cold intolerance and heat intolerance.   Genitourinary:  Negative for difficulty urinating and dysuria.   Musculoskeletal:  Negative for gait problem and myalgias.   Skin:  Negative for pallor.   Allergic/Immunologic: Negative for food allergies.   Neurological:  Negative for dizziness, speech difficulty and weakness.   Hematological:  Negative for adenopathy. Does not bruise/bleed easily.   Psychiatric/Behavioral:  Negative for agitation, confusion and hallucinations.        VITAL SIGNS: /70   Pulse 78   Temp 97.3 °F (36.3 °C)   Resp 18   Ht 172.7 cm (68\")   Wt 86.4 kg (190 lb 8 oz)   SpO2 96%   BMI 28.97 kg/m²   Pain Score    10/23/24 1048   PainSc: 0-No pain       PHYSICAL EXAMINATION:     Physical Exam  Vitals reviewed.   Constitutional:       General: He is not in acute distress.  HENT:      Head: Normocephalic and atraumatic.   Eyes:      General: No scleral icterus.  Cardiovascular:      Rate and Rhythm: Normal rate.   Pulmonary:      Effort: No respiratory distress.      Breath sounds: No wheezing.   Abdominal:      General: Bowel sounds are normal.      Palpations: Abdomen is soft.      Tenderness: There is no abdominal tenderness.   Musculoskeletal:         General: No swelling.      Cervical back: Neck supple.   Skin:     Coloration: Skin is not pale.   Neurological:      Mental Status: He is alert and oriented to person, place, and time.   Psychiatric:         Mood and Affect: Mood normal.         Behavior: Behavior normal.         Thought Content: Thought content normal.         Judgment: Judgment normal.         LABS    Lab Results - Last 18 Months   Lab Units 09/25/24  1040 08/21/24  1050 07/10/24  1050 06/12/24  1107 05/15/24  1249 04/24/24  1046 03/20/24  1043 " 02/21/24  1041 11/29/23  1215 11/01/23  1237 08/09/23  1036 07/07/23  1036 05/26/23  1338   HEMOGLOBIN g/dL 12.9* 13.1 13.3 12.9* 13.0 13.1   < > 12.9*   < > 13.0   < > 13.4 13.5   HEMATOCRIT % 39.2 39.3 39.5 38.2 38.6 39.5   < > 38.8   < > 38.9   < > 40.4 41.1   MCV fL 89.7 89.9 89.4 89.3 89.6 89.4   < > 89.6   < > 88.2   < > 89.2 92.2   WBC 10*3/mm3 4.62 5.38 5.06 6.42 6.98 7.06   < > 6.81   < > 5.98   < > 5.60 7.85   RDW % 13.2 13.1 13.4 13.9 14.1 13.9   < > 13.0   < > 13.1   < > 13.1 13.0   MPV fL 10.3 10.8 10.9 10.7 10.2 10.7   < > 10.4   < > 10.0   < > 10.2 10.3   PLATELETS 10*3/mm3 106* 115* 121* 119* 144 123*   < > 166   < > 146   < > 146 144   IMM GRAN % % 0.4 0.4 0.2 0.2 0.3  --   --  0.3  --  0.5  --  0.4 0.4   NEUTROS ABS 10*3/mm3 3.22 3.95 3.59 4.77 5.09 5.34   < > 5.23   < > 4.66   < > 4.08 5.38   LYMPHS ABS 10*3/mm3 0.80 0.83 0.86 0.97 1.17 1.00   < > 0.92   < > 0.83   < > 0.89 1.44   MONOS ABS 10*3/mm3 0.44 0.44 0.45 0.55 0.57 0.53   < > 0.58   < > 0.36   < > 0.47 0.87   EOS ABS 10*3/mm3 0.11 0.11 0.11 0.09 0.10 0.12   < > 0.03   < > 0.06   < > 0.10 0.10   BASOS ABS 10*3/mm3 0.03 0.03 0.04 0.03 0.03 0.05   < > 0.03   < > 0.04   < > 0.04 0.03   IMMATURE GRANS (ABS) 10*3/mm3 0.02 0.02 0.01 0.01 0.02  --   --  0.02  --  0.03  --  0.02 0.03   NRBC /100 WBC  --   --   --   --   --   --   --  0.0  --  0.0  --  0.0 0.0    < > = values in this interval not displayed.       Lab Results - Last 18 Months   Lab Units 09/25/24  1040 08/21/24  1050 07/10/24  1050 06/12/24  1107 05/15/24  1249 04/24/24  1046   GLUCOSE mg/dL 157* 148* 179* 165* 135* 153*   SODIUM mmol/L 133* 135* 133* 135* 134* 134*   POTASSIUM mmol/L 4.0 4.3 4.6 4.3 4.1 4.4   CO2 mmol/L 27.0 26.0 26.0 29.0 29.0 27.0   CHLORIDE mmol/L 93* 96* 96* 95* 94* 95*   ANION GAP mmol/L 13.0 13.0 11.0 11.0 11.0 12.0   CREATININE mg/dL 0.60* 0.61* 0.63* 0.54* 0.62* 0.51*   BUN mg/dL 11 13 17 16 13 12   BUN / CREAT RATIO  18.3 21.3 27.0* 29.6* 21.0 23.5  "  CALCIUM mg/dL 9.4 9.7 10.2 9.8 9.6 9.8   ALK PHOS U/L 74 73 74 78 69 73   TOTAL PROTEIN g/dL 7.3 7.6 7.6 7.6 7.5 7.5   ALT (SGPT) U/L 21 12 15 23 14 18   AST (SGOT) U/L 26 18 21 29 20 24   BILIRUBIN mg/dL 0.3 0.4 0.4 0.5 0.5 0.6   ALBUMIN g/dL 4.3 4.3 4.3 4.4 4.3 4.4   GLOBULIN gm/dL 3.0 3.3 3.3 3.2 3.2 3.1       No results for input(s): \"MSPIKE\", \"KAPPALAMB\", \"IGLFLC\", \"URICACID\", \"FREEKAPPAL\", \"CEA\", \"LDH\", \"REFLABREPO\" in the last 28606 hours.    No results for input(s): \"IRON\", \"TIBC\", \"LABIRON\", \"FERRITIN\", \"Q1DKZIE\", \"TSH\", \"FOLATE\" in the last 19875 hours.    Invalid input(s): \"VITB12\"    Simeon Tam reports a pain score of 0.      .      ASSESSMENT:  1.   Prostate cancer, hormone refractory.   AJCC stage (TX, NX, M0)  Treatment status:Casodex and leuprolide with PSA recurrence.  The patient is taking apalutamide and leuprolde (by Dr. Oneil).  2.   Performance status of 0.  3.   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  Under observation.  4.   Cirrhosis, etiology of thrombocytopenia and is followed by Dr. Duval.    5.   Urethral stricture. Self catheterization as indicated. \"As needed.\"              PLAN:  1.    Re: Tolerance to apalutamide.  \"Doing fine.\"   2.    Re:  Heme status.  Latest platelet 106 from 115 from 121. \"CBC next time.\"  3.    Re:  Pre-office CMP.  .7 from 97 ml/minute and glucose latest 169.  4.    Re:  Pre-office PSA latest 0.108 from 0.104 from0.083 from 0.086 from 0.086 from 0.083 from 0.101 from 0.08 from 0.047 from 0.064 from 0.056 from 0.071 from 0.053 from 0.050 from 0.047 from 0.048 from 0.038 from 0.029 from 0.031 from 0.031 from 0.026 from 0.024 from 0.019 from 0.020 from 0.016 from 0.015 from 0.018 (forgot my Lupron) from < 0.014 from < 0.014  from <0.014 from <0.014 from <0.01 from < 0.014 from < 0.014 from <0.014 from < 0.014 from < 0.014 from < 0.014 from < 0.014 from < 0.014 from < 0.014 from < 0.014 from < 0.014 " "from < 0.014 from < 0.014 from < 0.014 from 0.015 from 0.03 from 0.125 from 1.03 from 27.3. Testosterone latest 15.2 from from 21 from 17.9 from 24.1 from 18.2 from 35.8 from 28.4 from 8.87 from 31.1 from 30.4 from 44.9 from 36.1 from 26.4 from 26.6 from 25.1 from 35.2 from 11.8 from 35.7 from 23.4 from 28.1 from 25 from 28.2 from 25.1 from 34.1 from 18.1 from 527 from 580 from 443 from 235.  Latest leuprolide given on 2/6/2024.  Restage if PSA at 2 based on prostate cancer working group for rise of 0.2 on 2 or more occasions per  Association of urology.   5.   eRx for prochlorperazine 10 mg p.o. every 4 hours as needed for nausea or vomiting, 60, 2 refills if needed.    6.   eRx for C56D1 started on 10/9/2024 apalutamide 60 mg, take four tablets total dose 240 mg po daily, number, 120.  Take either with or without food.  Swallow tablets whole. TSH before apalutamide.  No grapefruit juice or grapefruit.  Monitor for rash, seizures, falling, fractures, hypothyroidism or arrhythmias.    7.   Continue ongoing management per primary care physician and the other specialists.  8.   Plan of care discussed with patient and his spouse.  Understanding expressed.  The patient is agreeable to proceed.  9.   Leuprolide per Dr. Oneil given every 6 months.  10.  Questions were answered to his satisfaction. \"Yes.\"   11.  Advance Care Planning  ACP discussion was declined by the patient. Patient does not have an advance directive, information provided.  12.  Plan for PMSA PET to restage if PSA at least 0.2.  13.  Return to office 4 weeks with CMP, PSA, testosterone, and CBC with differential, same day.              I have reviewed the assessment and plan and verified the accuracy of it. No changes to assessment and plan since the information was documented. Deshawn Black MD 10/23/24       I spent 32 total minutes, face-to-face, caring for Simeon mcdonald. Greater than 50% of this time involved counseling and/or coordination of " care as documented within this note.                          (Matthew Oneil MD)  (Mateo Duval MD)  Lucio Garcia MD

## 2024-10-16 ENCOUNTER — SPECIALTY PHARMACY (OUTPATIENT)
Dept: ONCOLOGY | Facility: HOSPITAL | Age: 79
End: 2024-10-16
Payer: MEDICARE

## 2024-10-16 NOTE — PROGRESS NOTES
Specialty Pharmacy Refill Coordination Note     Patient Outreach  An attempt was made by the [facility] to contact this patient for a follow-up on their chemotherapy medication. The [facility] can be reached at [phone].        Dori Mathis, Pharmacy Technician  Specialty Pharmacy Technician

## 2024-10-21 DIAGNOSIS — N31.9 NEUROGENIC BLADDER: ICD-10-CM

## 2024-10-21 RX ORDER — DUTASTERIDE 0.5 MG/1
CAPSULE, LIQUID FILLED ORAL
Qty: 90 CAPSULE | Refills: 0 | Status: SHIPPED | OUTPATIENT
Start: 2024-10-21

## 2024-10-23 ENCOUNTER — LAB (OUTPATIENT)
Dept: LAB | Facility: HOSPITAL | Age: 79
End: 2024-10-23
Payer: MEDICARE

## 2024-10-23 ENCOUNTER — OFFICE VISIT (OUTPATIENT)
Dept: ONCOLOGY | Facility: CLINIC | Age: 79
End: 2024-10-23
Payer: MEDICARE

## 2024-10-23 VITALS
BODY MASS INDEX: 28.87 KG/M2 | OXYGEN SATURATION: 96 % | HEART RATE: 78 BPM | DIASTOLIC BLOOD PRESSURE: 70 MMHG | WEIGHT: 190.5 LBS | TEMPERATURE: 97.3 F | HEIGHT: 68 IN | SYSTOLIC BLOOD PRESSURE: 120 MMHG | RESPIRATION RATE: 18 BRPM

## 2024-10-23 DIAGNOSIS — C61 PROSTATE CANCER: Primary | ICD-10-CM

## 2024-10-23 LAB
ALBUMIN SERPL-MCNC: 4.5 G/DL (ref 3.5–5.2)
ALBUMIN/GLOB SERPL: 1.6 G/DL
ALP SERPL-CCNC: 72 U/L (ref 39–117)
ALT SERPL W P-5'-P-CCNC: 17 U/L (ref 1–41)
ANION GAP SERPL CALCULATED.3IONS-SCNC: 13 MMOL/L (ref 5–15)
AST SERPL-CCNC: 27 U/L (ref 1–40)
BILIRUB SERPL-MCNC: 0.5 MG/DL (ref 0–1.2)
BUN SERPL-MCNC: 15 MG/DL (ref 8–23)
BUN/CREAT SERPL: 28.3 (ref 7–25)
CALCIUM SPEC-SCNC: 9.4 MG/DL (ref 8.6–10.5)
CHLORIDE SERPL-SCNC: 97 MMOL/L (ref 98–107)
CO2 SERPL-SCNC: 26 MMOL/L (ref 22–29)
CREAT SERPL-MCNC: 0.53 MG/DL (ref 0.76–1.27)
EGFRCR SERPLBLD CKD-EPI 2021: 100.7 ML/MIN/1.73
GLOBULIN UR ELPH-MCNC: 2.8 GM/DL
GLUCOSE SERPL-MCNC: 169 MG/DL (ref 65–99)
POTASSIUM SERPL-SCNC: 4.8 MMOL/L (ref 3.5–5.2)
PROT SERPL-MCNC: 7.3 G/DL (ref 6–8.5)
PSA SERPL-MCNC: 0.12 NG/ML (ref 0–4)
SODIUM SERPL-SCNC: 136 MMOL/L (ref 136–145)
TESTOST SERPL-MCNC: 15.6 NG/DL (ref 193–740)

## 2024-10-23 PROCEDURE — 84403 ASSAY OF TOTAL TESTOSTERONE: CPT

## 2024-10-23 PROCEDURE — 84153 ASSAY OF PSA TOTAL: CPT

## 2024-10-23 PROCEDURE — 36415 COLL VENOUS BLD VENIPUNCTURE: CPT

## 2024-10-23 PROCEDURE — 80053 COMPREHEN METABOLIC PANEL: CPT

## 2024-10-24 ENCOUNTER — SPECIALTY PHARMACY (OUTPATIENT)
Dept: ONCOLOGY | Facility: HOSPITAL | Age: 79
End: 2024-10-24
Payer: MEDICARE

## 2024-10-24 NOTE — PROGRESS NOTES
"Specialty Pharmacy Refill Coordination Note       Spoke with Essence (spouse) for Mr. Tam's monthly telephone follow-up regarding the oral oncology medication. She stated that he has been tolerating the Erleada well and has not had any obvious side effects from the medication. \"He is doing good, no changes, he saw Dr. Black yesterday\" He has not missed any doses of the medication in the last month. He still receives refills through [facility] , and has not had any delays in getting those refills. She states that there have been no changes to his maintenance medications and no new drug allergies that she is aware of. She stated he has not had any recent stays in the hospital and has not visited the ER in the last month due to the Erleada .     I encouraged her to reach out anytime with any questions or concerns they might have regarding his oral chemotherapy medication.     No needs expressed by the patient. Will follow-up next month regarding the patient's oral chemotherapy with a routine telephone consultation.       Refill Questions      Flowsheet Row Most Recent Value   Changes to allergies? No   Changes to medications? No   New conditions or infections since last clinic visit No   Unplanned office visit, urgent care, ED, or hospital admission in the last 4 weeks  No   How does patient/caregiver feel medication is working? Very good   Financial problems or insurance changes  No   Since the previous refill, were any specialty medication doses or scheduled injections missed or delayed?  No   Does this patient require a clinical escalation to a pharmacist? No            Delivery Questions      Flowsheet Row Most Recent Value   Delivery method [employer]   Delivery address verified with patient/caregiver? Yes   Delivery address Prescription   Number of medications in delivery 1   Medication(s) being filled and delivered Apalutamide (ERLEADA)   Doses left of specialty medications 30   Copay verified? Yes   Copay amount 0   Copay " form of payment No copayment ($0)   Ship Date 10/11/2024   Delivery Date 10/12/2024   Signature Required No                   Follow-up: 28-30 day(s)     Dori Mathis, Pharmacy Technician  Specialty Pharmacy Technician

## 2024-11-06 NOTE — PROGRESS NOTES
[facility] HEMATOLOGY & ONCOLOGY  [address]  [place] [address] [phone]    Patient Name: Simeon Tam  Encounter Date: 11/20/2024  YOB: 1945  Patient Number: [id number]      REASON FOR FOLLOW-UP: Simeon Tam is a pleasant 79 y.o.  male who is seen in follow-up for nonmetastatic hormone refractory prostate carcinoma (HRPC).  The patient is seen C57D15 of monotherapy with apalutamide.  He is seen with his spouse, Essence. History is obtained from patient.  History is considered reliable.         Oncology/Hematology History Overview Note   DIAGNOSTIC ABNORMALITIES:  He presented with rising PSA of 13 and was diagnosed with prostate cancer about 25 years ago.   Previous PSA was 22.26 on 10/05/2016, <0.13 on 07/17/2017, 7.91 on 10/17/2018, 19.29 on 03/19/2020, and 22.25 on 04/07/2020.   He was seen by Dr. Oneil 05/12/2020. Latest PSA was 22.25 ng/ml on 04/07/2020.  Previous PSA 22.26 on 10/05/2016.  Bone scan and CT scan of the abdomen and pelvis were negative for prostate cancer on 10/2016.  Started on Casodex and possibly leuprolide.  PSA  was down to <0.13 on 07/17/207.  Plan for apalutamide after staging scans.   CT abdomen and pelvis 06/11/2020. No evidence of metastatic disease in the abdomen or pelvis. Right renal pelvis and ureter urothelial thickening and hyperemia. Urinary bladder wall is thickened and there is adjacent inflammation. Recommend correlation with urinalysis and exam to exclude cystitis with right-sided pyelitis.  Cirrhosis.  Tiny 8 mm hypodense RIGHT inferior liver lesion on image 33 is too small to characterize.   Bone scan 06/11/2020. No evidence of bone metastases.        PREVIOUS INTERVENTIONS:  He was treated with adjuvant radiation at [place] and androgen ablation with Casodex.  Lupron and Casodex 10/2016. He had 7 months of ADT therapy with Lupron.  Apalutamide 07/22/2020 through  present.     Prostate cancer   6/29/2020 -  Chemotherapy    OP PROSTATE Apalutamide     6/30/2020 Initial Diagnosis    Prostate cancer (CMS/[facility])     7/29/2020 -  Chemotherapy    OP LEUPROLIDE 45MG Q6M - ORDER EXPIRES 7/18/24     10/5/2022 - 10/5/2022 Chemotherapy    OP PROSTATE Apalutamide         PAST MEDICAL HISTORY:  ALLERGIES:  Allergies   Allergen Reactions    Sulfa Antibiotics Rash     CURRENT MEDICATIONS:  Outpatient Encounter Medications as of 11/20/2024   Medication Sig Dispense Refill    Apalutamide (ERLEADA) 60 MG tablet Take 4 tablets by mouth Daily. Take with or without food. Do not crush or chew tablets. 120 tablet 5    colestipol (COLESTID) 1 g tablet Take 2 tablets by mouth Daily.      dutasteride (AVODART) 0.5 MG capsule TAKE (1) CAPSULE ONCE DAILY. 90 capsule 0    JANUVIA 100 MG tablet Take 1 tablet by mouth Daily.      lisinopril-hydrochlorothiazide (PRINZIDE,ZESTORETIC) 20-12.5 MG per tablet Take 1 tablet by mouth Daily.      Loperamide HCl (IMODIUM PO) Take 1 dose by mouth As Needed (diarrhea).      Multiple Vitamins-Minerals (ICAPS AREDS 2 PO) Take 1 capsule by mouth Daily.      rosuvastatin (CRESTOR) 20 MG tablet Take 1 tablet by mouth Every Night.      tolterodine LA (DETROL LA) 4 MG 24 hr capsule TAKE (1) CAPSULE ONCE DAILY. 30 capsule 0    [DISCONTINUED] tolterodine LA (DETROL LA) 4 MG 24 hr capsule TAKE (1) CAPSULE ONCE DAILY. 30 capsule 3     No facility-administered encounter medications on file as of 11/20/2024.     ADULT ILLNESSES:  Patient Active Problem List   Diagnosis Code    Hypertension I10    Hyperlipidemia E78.5    Diabetes mellitus E11.9    Cancer C80.1    Arrhythmia I49.9    Non morbid obesity due to excess calories E66.09    Prostate cancer C61    Anterior urethral stricture N35.914     SURGERIES:  Past Surgical History:   Procedure Laterality Date    CATARACT EXTRACTION      CYSTOSCOPY RETROGRADE PYELOGRAM Bilateral 4/21/2021    Procedure: CYSTOSCOPY RETROGRADE PYELOGRAM,  "possible DIRECT VISION INTERNAL URETHROTOMY;  Surgeon: Matthew Oneil MD;  Location: [facility] OR;  Service: Urology;  Laterality: Bilateral;    CYSTOSCOPY URETHROTOMY VISUAL INTERNAL Bilateral 4/21/2021    Procedure: possible DIRECT VISION INTERNAL URETHROTOMY;  Surgeon: Matthew Oneil MD;  Location: [facility] OR;  Service: Urology;  Laterality: Bilateral;    EXCISION LESION      neck    KIDNEY STONE SURGERY      REPLACEMENT TOTAL KNEE Left     TONSILLECTOMY       HEALTH MAINTENANCE ITEMS:  Health Maintenance Due   Topic Date Due    LIPID PANEL  Never done    Pneumococcal Vaccine 65+ (1 of 2 - PCV) Never done    DIABETIC EYE EXAM  Never done    TDAP/TD VACCINES (1 - Tdap) Never done    ZOSTER VACCINE (1 of 2) Never done    Hepatitis B (1 of 3 - Risk 3-dose series) Never done    RSV Vaccine - Adults (1 - 1-dose 75+ series) Never done    ANNUAL WELLNESS VISIT  Never done    HEMOGLOBIN A1C  Never done    BMI FOLLOWUP  04/27/2022    INFLUENZA VACCINE  Never done    COVID-19 Vaccine (4 - 2024-25 season) 09/01/2024       <no information>  Last Completed Colonoscopy       This patient has no relevant Health Maintenance data.            There is no immunization history on file for this patient.  Last Completed Mammogram       This patient has no relevant Health Maintenance data.              FAMILY HISTORY:  Family History   Problem Relation Age of Onset    Dementia Mother      SOCIAL HISTORY:  Social History     Socioeconomic History    Marital status:    Tobacco Use    Smoking status: Never    Smokeless tobacco: Never   Vaping Use    Vaping status: Never Used   Substance and Sexual Activity    Alcohol use: No    Drug use: No    Sexual activity: Defer       REVIEW OF SYSTEMS:    Review of Systems   Constitutional:  Negative for fatigue, fever and unexpected weight change.        \"I feel wonderful.\"   HENT:  Negative for congestion and mouth sores.    Eyes:  Negative for discharge and redness.   Respiratory:  " "Negative for shortness of breath and wheezing.    Cardiovascular:  Negative for chest pain and palpitations.   Gastrointestinal:  Negative for constipation, diarrhea, nausea and vomiting.   Endocrine: Negative for cold intolerance and heat intolerance.   Genitourinary:  Negative for hematuria.        \"Self catheter as needed.\"   Musculoskeletal:  Negative for gait problem and myalgias.   Skin:  Negative for pallor.   Allergic/Immunologic: Negative for food allergies.   Neurological:  Negative for dizziness, speech difficulty and weakness.   Hematological:  Negative for adenopathy. Bruises/bleeds easily.   Psychiatric/Behavioral:  Negative for agitation, confusion and hallucinations.        VITAL SIGNS: /64   Pulse 83   Temp 97.4 °F (36.3 °C)   Resp 18   Ht 172.7 cm (68\")   Wt 86.2 kg (190 lb)   SpO2 94%   BMI 28.89 kg/m²   Pain Score    11/20/24 1006   PainSc: 0-No pain       PHYSICAL EXAMINATION:     Physical Exam  Vitals reviewed.   Constitutional:       General: He is not in acute distress.  HENT:      Head: Normocephalic and atraumatic.   Eyes:      General: No scleral icterus.  Cardiovascular:      Rate and Rhythm: Normal rate.   Pulmonary:      Effort: No respiratory distress.      Breath sounds: No wheezing.   Abdominal:      General: Bowel sounds are normal.      Palpations: Abdomen is soft.   Musculoskeletal:         General: No swelling.      Cervical back: Neck supple.   Skin:     Coloration: Skin is not pale.   Neurological:      Mental Status: He is alert and oriented to person, place, and time.   Psychiatric:         Mood and Affect: Mood normal.         Behavior: Behavior normal.         Thought Content: Thought content normal.         Judgment: Judgment normal.         LABS    Lab Results - Last 18 Months   Lab Units 09/25/24  1040 08/21/24  1050 07/10/24  1050 06/12/24  1107 05/15/24  1249 04/24/24  1046 03/20/24  1043 02/21/24  1041 11/29/23  1215 11/01/23  1237 08/09/23  1036 " 07/07/23  1036 05/26/23  1338   HEMOGLOBIN g/dL 12.9* 13.1 13.3 12.9* 13.0 13.1   < > 12.9*   < > 13.0   < > 13.4 13.5   HEMATOCRIT % 39.2 39.3 39.5 38.2 38.6 39.5   < > 38.8   < > 38.9   < > 40.4 41.1   MCV fL 89.7 89.9 89.4 89.3 89.6 89.4   < > 89.6   < > 88.2   < > 89.2 92.2   WBC 10*3/mm3 4.62 5.38 5.06 6.42 6.98 7.06   < > 6.81   < > 5.98   < > 5.60 7.85   RDW % 13.2 13.1 13.4 13.9 14.1 13.9   < > 13.0   < > 13.1   < > 13.1 13.0   MPV fL 10.3 10.8 10.9 10.7 10.2 10.7   < > 10.4   < > 10.0   < > 10.2 10.3   PLATELETS 10*3/mm3 106* 115* 121* 119* 144 123*   < > 166   < > 146   < > 146 144   IMM GRAN % % 0.4 0.4 0.2 0.2 0.3  --   --  0.3  --  0.5  --  0.4 0.4   NEUTROS ABS 10*3/mm3 3.22 3.95 3.59 4.77 5.09 5.34   < > 5.23   < > 4.66   < > 4.08 5.38   LYMPHS ABS 10*3/mm3 0.80 0.83 0.86 0.97 1.17 1.00   < > 0.92   < > 0.83   < > 0.89 1.44   MONOS ABS 10*3/mm3 0.44 0.44 0.45 0.55 0.57 0.53   < > 0.58   < > 0.36   < > 0.47 0.87   EOS ABS 10*3/mm3 0.11 0.11 0.11 0.09 0.10 0.12   < > 0.03   < > 0.06   < > 0.10 0.10   BASOS ABS 10*3/mm3 0.03 0.03 0.04 0.03 0.03 0.05   < > 0.03   < > 0.04   < > 0.04 0.03   IMMATURE GRANS (ABS) 10*3/mm3 0.02 0.02 0.01 0.01 0.02  --   --  0.02  --  0.03  --  0.02 0.03   NRBC /100 WBC  --   --   --   --   --   --   --  0.0  --  0.0  --  0.0 0.0    < > = values in this interval not displayed.       Lab Results - Last 18 Months   Lab Units 10/23/24  1032 09/25/24  1040 08/21/24  1050 07/10/24  1050 06/12/24  1107 05/15/24  1249   GLUCOSE mg/dL 169* 157* 148* 179* 165* 135*   SODIUM mmol/L 136 133* 135* 133* 135* 134*   POTASSIUM mmol/L 4.8 4.0 4.3 4.6 4.3 4.1   CO2 mmol/L 26.0 27.0 26.0 26.0 29.0 29.0   CHLORIDE mmol/L 97* 93* 96* 96* 95* 94*   ANION GAP mmol/L 13.0 13.0 13.0 11.0 11.0 11.0   CREATININE mg/dL 0.53* 0.60* 0.61* 0.63* 0.54* 0.62*   BUN mg/dL 15 11 13 17 16 13   BUN / CREAT RATIO  28.3* 18.3 21.3 27.0* 29.6* 21.0   CALCIUM mg/dL 9.4 9.4 9.7 10.2 9.8 9.6   ALK PHOS U/L 72 74 73 74  "78 69   TOTAL PROTEIN g/dL 7.3 7.3 7.6 7.6 7.6 7.5   ALT (SGPT) U/L 17 21 12 15 23 14   AST (SGOT) U/L 27 26 18 21 29 20   BILIRUBIN mg/dL 0.5 0.3 0.4 0.4 0.5 0.5   ALBUMIN g/dL 4.5 4.3 4.3 4.3 4.4 4.3   GLOBULIN gm/dL 2.8 3.0 3.3 3.3 3.2 3.2       No results for input(s): \"MSPIKE\", \"KAPPALAMB\", \"IGLFLC\", \"URICACID\", \"FREEKAPPAL\", \"CEA\", \"LDH\", \"REFLABREPO\" in the last 20996 hours.    No results for input(s): \"IRON\", \"TIBC\", \"LABIRON\", \"FERRITIN\", \"N5FWWNO\", \"TSH\", \"FOLATE\" in the last 78589 hours.    Invalid input(s): \"VITB12\"    Simeon Tam reports a pain score of 0.          ASSESSMENT:  1.   Prostate cancer, hormone refractory.   AJCC stage (TX, NX, M0)  Treatment status:Casodex and leuprolide with PSA recurrence.  He is on therapy with apalutamide and leuprolde (by Dr. Oneil).  2.   Performance status of 0.  3.   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  Under observation.  4.   Cirrhosis, etiology of thrombocytopenia.  Off therapy.  5.   Urethral stricture. Self catheterization as indicated. \"as needed basis.\"              PLAN:  1.    Re: Tolerance to apalutamide.  \"It's doing good.\"   2.    Re:  Heme status.  hemoglobin 12.9, WBC 5.7 and platelet 104 from 106 from 115 from 121.  3.    Re:  Pre-office CMP.  GFR latest 100.7 from 97 ml/minute and glucose latest 169.  4.    Re:  Pre-office PSA latest 0.118 from0.108 from 0.104 from0.083 from 0.086 from 0.086 from 0.083 from 0.101 from 0.08 from 0.047 from 0.064 from 0.056 from 0.071 from 0.053 from 0.050 from 0.047 from 0.048 from 0.038 from 0.029 from 0.031 from 0.031 from 0.026 from 0.024 from 0.019 from 0.020 from 0.016 from 0.015 from 0.018 (forgot my Lupron) from < 0.014 from < 0.014  from <0.014 from <0.014 from <0.01 from < 0.014 from < 0.014 from <0.014 from < 0.014 from < 0.014 from < 0.014 from < 0.014 from < 0.014 from < 0.014 from < 0.014 from < 0.014 from < 0.014 from < 0.014 from < 0.014 from " "0.015 from 0.03 from 0.125 from 1.03 from 27.3. Testosterone latest 15.6 from 15.2 from from 21 from 17.9 from 24.1 from 18.2 from 35.8 from 28.4 from 8.87 from 31.1 from 30.4 from 44.9 from 36.1 from 26.4 from 26.6 from 25.1 from 35.2 from 11.8 from 35.7 from 23.4 from 28.1 from 25 from 28.2 from 25.1 from 34.1 from 18.1 from 527 from 580 from 443 from 235.  Latest leuprolide given on 2/6/2024.  Restage if PSA at 2 based on prostate cancer working group for rise of 0.2 on 2 or more occasions per  Association of urology.   5.   eRx for prochlorperazine 10 mg p.o. every 4 hours as needed for nausea or vomiting, 60, 2 refills if needed.    6.   eRx for C57D1 started on 11/6/2024 apalutamide 60 mg, take four tablets total dose 240 mg po daily, number, 120.  Take either with or without food.  Swallow tablets whole. TSH before apalutamide.  No grapefruit juice or grapefruit.  Observe for arrhythmias, falls, fractures, rash, hot flashes, seizures or hypothyroidism.    7.   Continue ongoing management per primary care physician and the other specialist..  8.   Plan of care discussed with patient and spouse.  Understanding expressed.  He agreed to proceed..  9.   Leuprolide per Dr. Oneil given every 6 months.  10.  Questions were answered to his satisfaction. \"Right.\"   11.  Advance Care Planning  ACP discussion was declined by the patient. Patient does not have an advance directive, information provided.  12.  Plan for PMSA PET to restage if PSA at least 0.2.  13.  Return to office 4 weeks with CMP, PSA, testosterone, and CBC with differential, same day.                I have reviewed the assessment and plan and verified the accuracy of it. No changes to assessment and plan since the information was documented. Deshawn Black MD 11/20/24         I spent 31 total minutes, face-to-face, caring for Simeon mcdonald. Greater than 50% of this time involved counseling and/or coordination of care as documented within this " note.                           (Matthew Oneil MD)  (Mateo Duval MD)  Lucio Garcia MD

## 2024-11-15 DIAGNOSIS — N31.9 NEUROGENIC BLADDER: ICD-10-CM

## 2024-11-15 RX ORDER — TOLTERODINE 4 MG/1
CAPSULE, EXTENDED RELEASE ORAL
Qty: 30 CAPSULE | Refills: 0 | Status: SHIPPED | OUTPATIENT
Start: 2024-11-15

## 2024-11-20 ENCOUNTER — OFFICE VISIT (OUTPATIENT)
Dept: ONCOLOGY | Facility: CLINIC | Age: 79
End: 2024-11-20
Payer: MEDICARE

## 2024-11-20 ENCOUNTER — LAB (OUTPATIENT)
Dept: LAB | Facility: HOSPITAL | Age: 79
End: 2024-11-20
Payer: MEDICARE

## 2024-11-20 VITALS
SYSTOLIC BLOOD PRESSURE: 118 MMHG | RESPIRATION RATE: 18 BRPM | OXYGEN SATURATION: 94 % | HEART RATE: 83 BPM | BODY MASS INDEX: 28.79 KG/M2 | DIASTOLIC BLOOD PRESSURE: 64 MMHG | HEIGHT: 68 IN | TEMPERATURE: 97.4 F | WEIGHT: 190 LBS

## 2024-11-20 DIAGNOSIS — C61 PROSTATE CANCER: Primary | ICD-10-CM

## 2024-11-20 LAB
ALBUMIN SERPL-MCNC: 4 G/DL (ref 3.5–5.2)
ALBUMIN/GLOB SERPL: 1.2 G/DL
ALP SERPL-CCNC: 67 U/L (ref 39–117)
ALT SERPL W P-5'-P-CCNC: 12 U/L (ref 1–41)
ANION GAP SERPL CALCULATED.3IONS-SCNC: 12 MMOL/L (ref 5–15)
AST SERPL-CCNC: 21 U/L (ref 1–40)
BASOPHILS # BLD AUTO: 0.02 10*3/MM3 (ref 0–0.2)
BASOPHILS NFR BLD AUTO: 0.4 % (ref 0–1.5)
BILIRUB SERPL-MCNC: 0.5 MG/DL (ref 0–1.2)
BUN SERPL-MCNC: 18 MG/DL (ref 8–23)
BUN/CREAT SERPL: 28.1 (ref 7–25)
CALCIUM SPEC-SCNC: 9.5 MG/DL (ref 8.6–10.5)
CHLORIDE SERPL-SCNC: 98 MMOL/L (ref 98–107)
CO2 SERPL-SCNC: 25 MMOL/L (ref 22–29)
CREAT SERPL-MCNC: 0.64 MG/DL (ref 0.76–1.27)
DEPRECATED RDW RBC AUTO: 43.8 FL (ref 37–54)
EGFRCR SERPLBLD CKD-EPI 2021: 95.1 ML/MIN/1.73
EOSINOPHIL # BLD AUTO: 0.1 10*3/MM3 (ref 0–0.4)
EOSINOPHIL NFR BLD AUTO: 1.8 % (ref 0.3–6.2)
ERYTHROCYTE [DISTWIDTH] IN BLOOD BY AUTOMATED COUNT: 13.6 % (ref 12.3–15.4)
GLOBULIN UR ELPH-MCNC: 3.3 GM/DL
GLUCOSE SERPL-MCNC: 159 MG/DL (ref 65–99)
HCT VFR BLD AUTO: 38.2 % (ref 37.5–51)
HGB BLD-MCNC: 12.9 G/DL (ref 13–17.7)
HOLD SPECIMEN: NORMAL
IMM GRANULOCYTES # BLD AUTO: 0.01 10*3/MM3 (ref 0–0.05)
IMM GRANULOCYTES NFR BLD AUTO: 0.2 % (ref 0–0.5)
LYMPHOCYTES # BLD AUTO: 0.8 10*3/MM3 (ref 0.7–3.1)
LYMPHOCYTES NFR BLD AUTO: 14 % (ref 19.6–45.3)
MCH RBC QN AUTO: 30 PG (ref 26.6–33)
MCHC RBC AUTO-ENTMCNC: 33.8 G/DL (ref 31.5–35.7)
MCV RBC AUTO: 88.8 FL (ref 79–97)
MONOCYTES # BLD AUTO: 0.41 10*3/MM3 (ref 0.1–0.9)
MONOCYTES NFR BLD AUTO: 7.2 % (ref 5–12)
NEUTROPHILS NFR BLD AUTO: 4.36 10*3/MM3 (ref 1.7–7)
NEUTROPHILS NFR BLD AUTO: 76.4 % (ref 42.7–76)
PLATELET # BLD AUTO: 104 10*3/MM3 (ref 140–450)
PMV BLD AUTO: 10.7 FL (ref 6–12)
POTASSIUM SERPL-SCNC: 3.8 MMOL/L (ref 3.5–5.2)
PROT SERPL-MCNC: 7.3 G/DL (ref 6–8.5)
PSA SERPL-MCNC: 0.12 NG/ML (ref 0–4)
RBC # BLD AUTO: 4.3 10*6/MM3 (ref 4.14–5.8)
SODIUM SERPL-SCNC: 135 MMOL/L (ref 136–145)
TESTOST SERPL-MCNC: 22.1 NG/DL (ref 193–740)
WBC NRBC COR # BLD AUTO: 5.7 10*3/MM3 (ref 3.4–10.8)

## 2024-11-20 PROCEDURE — 84153 ASSAY OF PSA TOTAL: CPT

## 2024-11-20 PROCEDURE — 36415 COLL VENOUS BLD VENIPUNCTURE: CPT

## 2024-11-20 PROCEDURE — 84403 ASSAY OF TOTAL TESTOSTERONE: CPT

## 2024-11-20 PROCEDURE — 80053 COMPREHEN METABOLIC PANEL: CPT

## 2024-11-20 PROCEDURE — 85025 COMPLETE CBC W/AUTO DIFF WBC: CPT

## 2024-12-06 ENCOUNTER — SPECIALTY PHARMACY (OUTPATIENT)
Dept: ONCOLOGY | Facility: HOSPITAL | Age: 79
End: 2024-12-06
Payer: MEDICARE

## 2024-12-06 NOTE — PROGRESS NOTES
Specialty Pharmacy Patient Management Program  Oncology Initial Assessment       Simeon Tam is a 79 y.o. male with Prostate Cancer seen seen by an Oncology provider and enrolled in the Specialty Oncology Patient Management program offered by [facility].  An initial outreach was conducted, including assessment of therapy appropriateness and specialty medication education for Erleada. The patient was introduced to services offered by [facility], including: regular assessments, refill coordination, curbside pick-up or mail order delivery options, prior authorization maintenance, and financial assistance programs as applicable. The patient was also provided with contact information for the pharmacy team.     Diagnosis (Documented by Provider):  Prostate cancer, hormone refractory.   AJCC stage (TX, NX, M0)  Treatment status:Casodex and leuprolide with PSA recurrence.  He is on therapy with apalutamide and leuprolde (by Dr. Oneil).    Oncology Provider: Deshawn Black MD ([facility])  Oral Chemotherapy Regimen: apalutamide [Erleada] 240 mg PO daily    Script Review and Evaluation + Insurance Coverage and Financial Support:  Oral Treatment Plan Signed?: Yes   Oral Treatment Plan Released?: Yes , released on 12/6/24   Specialty Pharmacy Selected:  [facility] ([facility]   Prior Authorization Status: Approved   Financial Assistance Status: Approved   Predicted Shipping Date: Pending fill with [facility]   Predicted Start Date: As soon as oral specialty medication is available.     Oncology/Hematology History Overview Note    Oncology/Hematology History Overview Note   DIAGNOSTIC ABNORMALITIES:  He presented with rising PSA of 13 and was diagnosed with prostate cancer about 25 years ago.   Previous PSA was 22.26 on 10/05/2016, <0.13 on 07/17/2017, 7.91 on 10/17/2018, 19.29 on 03/19/2020, and 22.25 on  04/07/2020.   He was seen by Dr. Oneil 05/12/2020. Latest PSA was 22.25 ng/ml on 04/07/2020.  Previous PSA 22.26 on 10/05/2016.  Bone scan and CT scan of the abdomen and pelvis were negative for prostate cancer on 10/2016.  Started on Casodex and possibly leuprolide.  PSA  was down to <0.13 on 07/17/207.  Plan for apalutamide after staging scans.   CT abdomen and pelvis 06/11/2020. No evidence of metastatic disease in the abdomen or pelvis. Right renal pelvis and ureter urothelial thickening and hyperemia. Urinary bladder wall is thickened and there is adjacent inflammation. Recommend correlation with urinalysis and exam to exclude cystitis with right-sided pyelitis.  Cirrhosis.  Tiny 8 mm hypodense RIGHT inferior liver lesion on image 33 is too small to characterize.   Bone scan 06/11/2020. No evidence of bone metastases.        PREVIOUS INTERVENTIONS:  He was treated with adjuvant radiation at [place] and androgen ablation with Casodex.  Lupron and Casodex 10/2016. He had 7 months of ADT therapy with Lupron.  Apalutamide 07/22/2020 through present.     Prostate cancer   6/29/2020 -  Chemotherapy    OP PROSTATE Apalutamide     6/30/2020 Initial Diagnosis    Prostate cancer (CMS/HCC)     7/29/2020 -  Chemotherapy    OP LEUPROLIDE 45MG Q6M - ORDER EXPIRES 7/18/24     10/5/2022 - 10/5/2022 Chemotherapy    OP PROSTATE Apalutamide         Problem List   Problem list reviewed by Carolee Diaz, PharmD on 12/6/2024 at  4:10 PM  Medicines reviewed by Carolee Diaz, PharmD on 12/6/2024 at  4:09 PM  Allergies reviewed by Carolee Diaz, PharmD on 12/6/2024 at  4:09 PM  Patient Active Problem List   Diagnosis Code    Hypertension I10    Hyperlipidemia E78.5    Diabetes mellitus E11.9    Cancer C80.1    Arrhythmia I49.9    Non morbid obesity due to excess calories E66.09    Prostate cancer C61    Anterior urethral stricture N35.914       Specialty Pharmacy Goal:   Goals Addressed Today        Specialty Pharmacy  General Goal      Achieve progression free disease as evidenced by provider markers (ie. undetectable PSA WNL)              Relevant Past Medical History, Comorbidities, and Vaccines:  Relevant medical history and concomitant health conditions were discussed with the patient. The patient's chart has been reviewed for relevant past medical history and comorbid health conditions and updated as necessary.  Vaccines are coordinated by the patient's oncologist and primary care provider.  Past Medical History:   Diagnosis Date    Arrhythmia     Cancer     prostate    Diabetes mellitus     History of sepsis     Hyperlipidemia     Hypertension     UTI (urinary tract infection)      Social History     Socioeconomic History    Marital status:    Tobacco Use    Smoking status: Never    Smokeless tobacco: Never   Vaping Use    Vaping status: Never Used   Substance and Sexual Activity    Alcohol use: No    Drug use: No    Sexual activity: Defer       Relevant Laboratory Values:  The labs listed below have been reviewed. No dose adjustments are needed for the oral specialty medication(s) based on the labs.    Lab Results   Component Value Date    GLUCOSE 159 (H) 11/20/2024    CALCIUM 9.5 11/20/2024     (L) 11/20/2024    K 3.8 11/20/2024    CO2 25.0 11/20/2024    CL 98 11/20/2024    BUN 18 11/20/2024    CREATININE 0.64 (L) 11/20/2024    EGFRIFNONA 117 02/09/2022    BCR 28.1 (H) 11/20/2024    ANIONGAP 12.0 11/20/2024     Lab Results   Component Value Date    WBC 5.70 11/20/2024    RBC 4.30 11/20/2024    HGB 12.9 (L) 11/20/2024    HCT 38.2 11/20/2024    MCV 88.8 11/20/2024    MCH 30.0 11/20/2024    MCHC 33.8 11/20/2024    RDW 13.6 11/20/2024    RDWSD 43.8 11/20/2024    MPV 10.7 11/20/2024     (L) 11/20/2024    NEUTRORELPCT 76.4 (H) 11/20/2024    LYMPHORELPCT 14.0 (L) 11/20/2024    MONORELPCT 7.2 11/20/2024    EOSRELPCT 1.8 11/20/2024    BASORELPCT 0.4 11/20/2024    AUTOIGPER 0.2 11/20/2024    NEUTROABS 4.36  11/20/2024    LYMPHSABS 0.80 11/20/2024    MONOSABS 0.41 11/20/2024    EOSABS 0.10 11/20/2024    BASOSABS 0.02 11/20/2024    AUTOIGNUM 0.01 11/20/2024    NRBC 0.0 02/21/2024       Allergy Review:  Known allergies and reactions were discussed with the patient. The patient's chart has been reviewed for allergy information and updated as necessary.   Allergies   Allergen Reactions    Sulfa Antibiotics Rash        Current Medication List:  This medication list has been reviewed with the patient and evaluated for any interactions or necessary modifications/recommendations, and updated to include all prescription medications, OTC medications, and supplements the patient is currently taking.  This list reflects what is contained in the patient's profile, which has also been marked as reviewed to communicate to other providers it is the most up to date version of the patient's current medication therapy.   Prior to Admission medications    Medication Sig Start Date End Date Taking? Authorizing Provider   Apalutamide (ERLEADA) 60 MG tablet Take 4 tablets by mouth Daily. Take with or without food. Do not crush or chew tablets. 8/20/24   Deshawn Black MD   colestipol (COLESTID) 1 g tablet Take 2 tablets by mouth Daily. 3/11/24   Tati Hancock MD   dutasteride (AVODART) 0.5 MG capsule TAKE (1) CAPSULE ONCE DAILY. 10/21/24   Matthew Oneil MD   JANUVIA 100 MG tablet Take 1 tablet by mouth Daily. 11/28/16   Tati Hancock MD   lisinopril-hydrochlorothiazide (PRINZIDE,ZESTORETIC) 20-12.5 MG per tablet Take 1 tablet by mouth Daily. 11/28/16   Tati Hancock MD   Loperamide HCl (IMODIUM PO) Take 1 dose by mouth As Needed (diarrhea).    Tati Hancock MD   Multiple Vitamins-Minerals (ICAPS AREDS 2 PO) Take 1 capsule by mouth Daily.    Tati Hancock MD   rosuvastatin (CRESTOR) 20 MG tablet Take 1 tablet by mouth Every Night. 11/28/16   Tati Hancock MD   tolterodine LA (DETROL LA) 4 MG  24 hr capsule TAKE (1) CAPSULE ONCE DAILY. 11/15/24   Matthew Oneil MD   tolterodine LA (DETROL LA) 4 MG 24 hr capsule TAKE (1) CAPSULE ONCE DAILY. 6/19/24 11/15/24  Matthew Oneil MD       Medication(s) Review:   Medicines reviewed by Carolee Diaz, PharmD on 12/6/2024 at  4:09 PM  Prior to Admission medications    Medication Sig Start Date End Date Taking? Authorizing Provider   Apalutamide (ERLEADA) 60 MG tablet Take 4 tablets by mouth Daily. Take with or without food. Do not crush or chew tablets. 8/20/24   Deshawn Black MD   colestipol (COLESTID) 1 g tablet Take 2 tablets by mouth Daily. 3/11/24   Tati Hancock MD   dutasteride (AVODART) 0.5 MG capsule TAKE (1) CAPSULE ONCE DAILY. 10/21/24   Matthew Oneil MD   JANUVIA 100 MG tablet Take 1 tablet by mouth Daily. 11/28/16   Tati Hancock MD   lisinopril-hydrochlorothiazide (PRINZIDE,ZESTORETIC) 20-12.5 MG per tablet Take 1 tablet by mouth Daily. 11/28/16   Tati Hancock MD   Loperamide HCl (IMODIUM PO) Take 1 dose by mouth As Needed (diarrhea).    Tati Hancock MD   Multiple Vitamins-Minerals (ICAPS AREDS 2 PO) Take 1 capsule by mouth Daily.    Tati Hancock MD   rosuvastatin (CRESTOR) 20 MG tablet Take 1 tablet by mouth Every Night. 11/28/16   Tati Hancock MD   tolterodine LA (DETROL LA) 4 MG 24 hr capsule TAKE (1) CAPSULE ONCE DAILY. 11/15/24   Matthew Oneil MD   tolterodine LA (DETROL LA) 4 MG 24 hr capsule TAKE (1) CAPSULE ONCE DAILY. 6/19/24 11/15/24  Matthew Oneil MD       Medication Regimen Assessment:  Administration: Patient is taking every day at the same time  and as prescribed .   Patient can self administer medications: yes  Medication Follow-Up Plan: Refill coordination    Drug-Drug Interaction(s) Assessment:  Reviewed concomitant medications, allergies, labs, comorbidities/medical history, quality of life, and immunization history.   Completed medication reconciliation today  to assess for drug interactions. Patient denies starting or stopping any medications.    Drug-drug interactions noted and discussed during education: no significant drug interactions noted. . Reminded the patient to let us know before making any changes or starting any new prescription or OTC medications so we can first assess drug interactions.  Drug-food interactions noted and discussed during education. Patient was instructed to avoid eating grapefruit and drinking grapefruit juice, eating [place] oranges (commonly found in orange marmalade), eating starfruit, and eating pomegranate and drinking pomegranate juice  Vaccines are coordinated by the patient's oncologist and primary care provider.    Prophylaxis Medications Assessment:  Bone Health (such as calcium/vitamin D, bisphosphonate, RANKL inhibitor) - Not Indicated   VTE prophylaxis - Not Indicated   Prophylactic antimicrobials - Not Indicated   Tumor lysis syndrome prophylaxis - Risk for TLS Low.     INITIAL EDUCATION PROVIDED FOR SPECIALTY MEDICATION:  The patient has been provided with the following education and any applicable administration techniques (i.e. self-injection) have been demonstrated for the therapies indicated. All questions and concerns have been addressed prior to the patient receiving the medication, and the patient has verbalized comprehension of the education and any materials provided. Additional patient education shall be provided and documented upon request by the patient, provider, or payer.    Provided Patient With: Education sheets about the medication, 24-hour clinic phone number and my contact information and instructions to call should additional questions arise.     Medication Education Sheets Provided: (select all that apply)  Oral Specialty Medication: Erleada (apalutamide)    TOPICS COMMENTS   Storage and Handling of Oral Specialty Medication Store in the original container, in a dry location out of direct sunlight, and out  of reach of children or pets. and Store at room temperature.  Discussed safe handling and what to do with any unused medication.   Administration of Oral Specialty Medication Take with food at the same time(s) each day., Take with a full glass of water., and Do not crush or chew tablets.   Adherence to Oral Specialty Regimen and Handling Missed Doses Patient is likely to have good treatment adherence; reinforced the importance of adherence. Reviewed how to address missed doses and encouraged the patient to let us know of any missed doses.   Anemia: role of RBC, cause, s/s, ways to manage, role of transfusion Reviewed the role of RBC and the use of transfusions if hemoglobin decreases too much.  Patient to notify us if he experiences shortness of breath, dizziness, or palpitations.  Also let patient know that he could feel more tired than usual and to try to stay active, but rest if he needs to.    Thrombocytopenia: role of platelet, cause, s/s, ways to prevent bleeding, things to avoid, when to seek help Reviewed the role of platelets in blood clotting and when to call clinic (bloody nose that bleeds for 5 minutes despite pressure, a cut that won't stop bleeding despite pressure, gums that bleed excessively with brushing or flossing). Recommended using an electric razor, soft bristle toothbrush, and blowing your nose gently.    Neutropenia: role of WBC, cause, infection precautions, s/s of infection, when to call MD Reviewed the role of WBC, good infection prevention practices, and when to call the clinic (temperature 100.4F, sore throat, burning urination, etc).   Nutrition and Appetite Changes:  importance of maintaining healthy diet & weight, ways to manage to improve intake, dietary consult, exercise regimen, electrolyte and/or blood glucose abnormalities Decreased Appetite: Discussed the risk of decreased appetite. Recommended eating smaller, more frequent meals. Instructed the patient to contact the clinic if  losing weight or having difficulty eating enough to maintain energy level.   Diarrhea: causes, s/s of dehydration, ways to manage, dietary changes, when to call MD Chemotherapy : Discussed the risk of diarrhea. Instructed patient on use OTC loperamide with diarrhea onset, but emphasized the importance of calling the clinic if 4-6 episodes in 24 hours not relieved by OTC loperamide.   Constipation: causes, ways to manage, dietary changes, when to call MD Provided supplementary handout with instructions for use of docusate and other OTC therapies to manage constipation.  Patient was instructed to call us if medications aren't working.   Nausea/Vomiting: cause, use of antiemetics, dietary changes, when to call MD Emetic risk: High  PRN home meds: Prochlorperazine  Pharmacy home meds sent to: [facility] - [address] - [phone] [facility] [phone] FX     Instructed the patient to take the scheduled anti-nausea medications even if he does not feel nauseous.  I explained this is to prevent delayed nausea.    Instructed the patient to take a dose of the PRN medication at the first onset of nausea and if it's not working to call us for additional medications.  Also provided non-drug measures to mitigate nausea.   Mouth Sores: causes, oral care, ways to manage Mouth sores can be prevented by making a mouth wash mixture of salt, baking soda, and water. The patient was instructed to swish and spit four times daily after meals and before bedtime.  Use of a soft bristle toothbrush was recommended.  The patient was instructed to avoid alcohol-containing OTC mouthwashes.    Alopecia: cause, ways to manage, resources Discussed the possibility of hair loss with the patient. Informed patient that he could request a prescription for a wig if desired and most of the cost is usually covered by insurance. Recommended covering the head with a hat and/or protecting the skin on the head with SPF 30 or  higher.    Nervous System Changes: causes, s/s, neuropathies, cognitive changes, ways to manage Neurologic Problems - Patient encouraged to call the clinic if they have trouble speaking, memory loss, confusion, seizure, problems walking or with balance, weakness/numbness in extremities, tremors, headache, or loss of consciousness.   Pain: causes, ways to manage Chemo: Discussed muscle and joint aches/pains with chemotherapy, and recommended the use of OTC pain relief with ibuprofen or acetaminophen if needed.   Skin/Nail Changes: cause, s/s, ways to manage Immunotherapy - Discussed potential for a rash or itchy skin from immunotherapy, offered nonpharmacologic prevention strategies, and instructed patient to call if a rash develops that worsens or gets larger   Organ Toxicities: cause, s/s, need for diagnostic tests, labs, when to notify MD Discussed potential effects on organ systems, monitoring, diagnostic tests, labs, and when to notify the MD. Discussed the signs/symptoms of the following: cardiotoxicity, central neurotoxicity (confusion, vision changes, stiff neck, seizure), GI perforation, hypertension - recommended close monitoring of blood pressure, provided BP log, and explained how to track values, lung changes, nephrotoxicity, and skin changes.   Survivorship: distress, distress assessment, secondary malignancies, early/late effects, follow-up, social issues, social support Discussed the rare, but possible risk of secondary malignancies months to years after treatment   Miscellaneous Financial Issues: none at this time  Lab Draws:  Return to office 4 weeks with CMP, PSA, testosterone, and CBC with differential, same day   Infertility and Sexuality:  causes, fertility preservation options, sexuality changes, ways to manage, importance of birth control The patient is not of childbearing potential.   Home Care: how to manage bodily fluids Counseled on management of soiled linens and proper flush technique.   Discussed how to manage all the side effects at home and advised when to contact the MD office     Adherence and Self-Administration Assessment:  Adherence related to the patient's specialty therapy was discussed with the patient. The adherence segment of this outreach has been reviewed and updated.  Barriers to Patient Adherence and/or Self-Administration: none at this time  Methods for Supporting Patient Adherence and/or Self-Administration: Dosing calendar  Expected duration of therapy: Until disease progression or intolerable toxicity    Goals of Therapy:  Goals related to the patient's specialty therapy were discussed with the patient. The patient goals segment of this outreach has been reviewed and updated.  Patient Goals of Therapy:   Consistently take medications as prescribed  Patient will adhere to medication regimen  Patient will report any medication side effects to healthcare provider  Clinical Goals:    Goals Addressed Today        Specialty Pharmacy General Goal      Achieve progression free disease as evidenced by provider markers (ie. undetectable PSA WNL)            Support patient understanding of medication regimen  Ensure patient knows the pharmacy contact information  Schedule regular follow-up to monitor the treatment serious adverse events  Schedule regular follow-up to confirm medication adherence  Schedule regular follow-up to monitor disease progression or stability    Quality of Life Assessment:  The patient's current (pre-therapy) quality of life was discussed with the patient. The QOL segment of this outreach has been reviewed and updated.   Quality of Life Score: 7/10    Reassessment Plan and Follow-Up:  Pharmacist to perform regular reassessments no more than (6) months from the previous assessment.  Welcome information and patient satisfaction survey to be sent by retail team with patient's initial fill.  Care Coordinator to set up future refill outreaches, coordinate prescription  delivery, and escalate clinical questions to pharmacist.     Medication Therapy Changes: none at this time  Additional Plans, Therapy Recommendations or Therapy Problems to Be Addressed: none at this time     Attestation:  I attest the patient was actively involved in and has agreed to the above plan of care. If the prescribed therapy is at any point deemed not appropriate based on the current or future assessments, a consultation will be initiated with the patient's specialty care provider to determine the best course of action. The revised plan of therapy will be documented along with any required assessments and/or additional patient education provided.     Finally, all questions and concerns today were addressed to the best of my knowledge within the initial clinical assessment office visit. Patient verbalized they had the Specialty Pharmacy Services contact information for any future concerns or questions.         Electronically signed 12/06/2024 16:10 CST by:  Carolee Diaz PharmD  Hematology & Oncology Clinic Specialty Pharmacist  [facility]  Phone: [phone]

## 2024-12-12 NOTE — PROGRESS NOTES
[facility] HEMATOLOGY & ONCOLOGY  [address]  [place] [address] [phone]    Patient Name: Simeon Tam  Encounter Date: 12/18/2024  YOB: 1945  Patient Number: [id number]      REASON FOR FOLLOW-UP: Simeon Tam is a pleasant 79 y.o.  male who is seen in follow-up for nonmetastatic hormone refractory prostate cancer (HRPC).  The patient is seen C58D15 of monotherapy with apalutamide.  He is seen with Essence, his spouse. History is obtained from patient.  He is a reliable historian.           Oncology/Hematology History Overview Note   DIAGNOSTIC ABNORMALITIES:  He presented with rising PSA of 13 and was diagnosed with prostate cancer about 25 years ago.   Previous PSA was 22.26 on 10/05/2016, <0.13 on 07/17/2017, 7.91 on 10/17/2018, 19.29 on 03/19/2020, and 22.25 on 04/07/2020.   He was seen by Dr. Oneil 05/12/2020. Latest PSA was 22.25 ng/ml on 04/07/2020.  Previous PSA 22.26 on 10/05/2016.  Bone scan and CT scan of the abdomen and pelvis were negative for prostate cancer on 10/2016.  Started on Casodex and possibly leuprolide.  PSA  was down to <0.13 on 07/17/207.  Plan for apalutamide after staging scans.   CT abdomen and pelvis 06/11/2020. No evidence of metastatic disease in the abdomen or pelvis. Right renal pelvis and ureter urothelial thickening and hyperemia. Urinary bladder wall is thickened and there is adjacent inflammation. Recommend correlation with urinalysis and exam to exclude cystitis with right-sided pyelitis.  Cirrhosis.  Tiny 8 mm hypodense RIGHT inferior liver lesion on image 33 is too small to characterize.   Bone scan 06/11/2020. No evidence of bone metastases.        PREVIOUS INTERVENTIONS:  He was treated with adjuvant radiation at [place] and androgen ablation with Casodex.  Lupron and Casodex 10/2016. He had 7 months of ADT therapy with Lupron.  Apalutamide 07/22/2020 through present.      Prostate cancer   6/29/2020 -  Chemotherapy    OP PROSTATE Apalutamide     6/30/2020 Initial Diagnosis    Prostate cancer (CMS/[facility])     7/29/2020 -  Chemotherapy    OP LEUPROLIDE 45MG Q6M - ORDER EXPIRES 7/18/24     10/5/2022 - 10/5/2022 Chemotherapy    OP PROSTATE Apalutamide         PAST MEDICAL HISTORY:  ALLERGIES:  Allergies   Allergen Reactions    Sulfa Antibiotics Rash     CURRENT MEDICATIONS:  Outpatient Encounter Medications as of 12/18/2024   Medication Sig Dispense Refill    Apalutamide (ERLEADA) 60 MG tablet Take 4 tablets by mouth Daily. Take with or without food. Do not crush or chew tablets. 120 tablet 5    colestipol (COLESTID) 1 g tablet Take 2 tablets by mouth Daily.      dutasteride (AVODART) 0.5 MG capsule TAKE (1) CAPSULE ONCE DAILY. 90 capsule 0    JANUVIA 100 MG tablet Take 1 tablet by mouth Daily.      lisinopril-hydrochlorothiazide (PRINZIDE,ZESTORETIC) 20-12.5 MG per tablet Take 1 tablet by mouth Daily.      Loperamide HCl (IMODIUM PO) Take 1 dose by mouth As Needed (diarrhea).      Multiple Vitamins-Minerals (ICAPS AREDS 2 PO) Take 1 capsule by mouth Daily.      rosuvastatin (CRESTOR) 20 MG tablet Take 1 tablet by mouth Every Night.      tolterodine LA (DETROL LA) 4 MG 24 hr capsule TAKE (1) CAPSULE ONCE DAILY. 30 capsule 0    [DISCONTINUED] tolterodine LA (DETROL LA) 4 MG 24 hr capsule TAKE (1) CAPSULE ONCE DAILY. 30 capsule 0     No facility-administered encounter medications on file as of 12/18/2024.     ADULT ILLNESSES:  Patient Active Problem List   Diagnosis Code    Hypertension I10    Hyperlipidemia E78.5    Diabetes mellitus E11.9    Cancer C80.1    Arrhythmia I49.9    Non morbid obesity due to excess calories E66.09    Prostate cancer C61    Anterior urethral stricture N35.914     SURGERIES:  Past Surgical History:   Procedure Laterality Date    CATARACT EXTRACTION      CYSTOSCOPY RETROGRADE PYELOGRAM Bilateral 4/21/2021    Procedure: CYSTOSCOPY RETROGRADE PYELOGRAM, possible  "DIRECT VISION INTERNAL URETHROTOMY;  Surgeon: Matthew Oneil MD;  Location: [facility] OR;  Service: Urology;  Laterality: Bilateral;    CYSTOSCOPY URETHROTOMY VISUAL INTERNAL Bilateral 4/21/2021    Procedure: possible DIRECT VISION INTERNAL URETHROTOMY;  Surgeon: Matthew Oneil MD;  Location: [facility] OR;  Service: Urology;  Laterality: Bilateral;    EXCISION LESION      neck    KIDNEY STONE SURGERY      REPLACEMENT TOTAL KNEE Left     TONSILLECTOMY       HEALTH MAINTENANCE ITEMS:  Health Maintenance Due   Topic Date Due    LIPID PANEL  Never done    Pneumococcal Vaccine 65+ (1 of 2 - PCV) Never done    DIABETIC EYE EXAM  Never done    TDAP/TD VACCINES (1 - Tdap) Never done    ZOSTER VACCINE (1 of 2) Never done    Hepatitis B (1 of 3 - Risk 3-dose series) Never done    RSV Vaccine - Adults (1 - 1-dose 75+ series) Never done    ANNUAL WELLNESS VISIT  Never done    HEMOGLOBIN A1C  Never done    BMI FOLLOWUP  04/27/2022    INFLUENZA VACCINE  Never done    COVID-19 Vaccine (4 - 2024-25 season) 09/01/2024       <no information>  Last Completed Colonoscopy       This patient has no relevant Health Maintenance data.            There is no immunization history on file for this patient.  Last Completed Mammogram       This patient has no relevant Health Maintenance data.              FAMILY HISTORY:  Family History   Problem Relation Age of Onset    Dementia Mother      SOCIAL HISTORY:  Social History     Socioeconomic History    Marital status:    Tobacco Use    Smoking status: Former     Current packs/day: 0.00     Types: Cigarettes     Passive exposure: Past    Smokeless tobacco: Never   Vaping Use    Vaping status: Never Used   Substance and Sexual Activity    Alcohol use: No    Drug use: No    Sexual activity: Defer       REVIEW OF SYSTEMS:    Review of Systems   Constitutional:  Negative for fatigue, fever and unexpected weight change.        \"Feeling good.\"   HENT:  Negative for congestion and mouth sores.  " "  Eyes:  Negative for discharge and redness.   Respiratory:  Negative for shortness of breath and wheezing.    Cardiovascular:  Negative for chest pain and leg swelling.   Gastrointestinal:  Negative for constipation, diarrhea, nausea and vomiting.   Endocrine: Negative for cold intolerance and heat intolerance.   Genitourinary:  Positive for difficulty urinating. Negative for hematuria.   Musculoskeletal:  Negative for gait problem and myalgias.   Skin:  Negative for pallor.   Allergic/Immunologic: Negative for food allergies.   Neurological:  Negative for dizziness and speech difficulty.   Hematological:  Negative for adenopathy. Does not bruise/bleed easily.   Psychiatric/Behavioral:  Negative for agitation and confusion. The patient is not nervous/anxious.        VITAL SIGNS: /72   Pulse 69   Temp 97.8 °F (36.6 °C) (Temporal)   Resp 18   Ht 172.7 cm (68\")   Wt 84.8 kg (187 lb)   SpO2 96%   BMI 28.43 kg/m²  Lost 3 pounds.   Pain Score    12/18/24 1048   PainSc: 0-No pain       PHYSICAL EXAMINATION:     Physical Exam  Vitals reviewed.   Constitutional:       General: He is not in acute distress.  HENT:      Head: Normocephalic and atraumatic.   Eyes:      General: No scleral icterus.  Cardiovascular:      Rate and Rhythm: Normal rate.   Pulmonary:      Effort: No respiratory distress.      Breath sounds: No wheezing.   Abdominal:      General: Bowel sounds are normal. There is no distension.      Palpations: Abdomen is soft.   Musculoskeletal:         General: No swelling.      Cervical back: Neck supple.   Skin:     Coloration: Skin is not pale.   Neurological:      Mental Status: He is alert and oriented to person, place, and time.   Psychiatric:         Mood and Affect: Mood normal.         Behavior: Behavior normal.         Thought Content: Thought content normal.         Judgment: Judgment normal.         LABS    Lab Results - Last 18 Months   Lab Units 12/18/24  1030 11/20/24  0951 " 09/25/24  1040 08/21/24  1050 07/10/24  1050 06/12/24  1107 03/20/24  1043 02/21/24  1041 11/29/23  1215 11/01/23  1237 08/09/23  1036 07/07/23  1036   HEMOGLOBIN g/dL 13.3 12.9* 12.9* 13.1 13.3 12.9*   < > 12.9*   < > 13.0   < > 13.4   HEMATOCRIT % 39.4 38.2 39.2 39.3 39.5 38.2   < > 38.8   < > 38.9   < > 40.4   MCV fL 89.7 88.8 89.7 89.9 89.4 89.3   < > 89.6   < > 88.2   < > 89.2   WBC 10*3/mm3 5.81 5.70 4.62 5.38 5.06 6.42   < > 6.81   < > 5.98   < > 5.60   RDW % 13.3 13.6 13.2 13.1 13.4 13.9   < > 13.0   < > 13.1   < > 13.1   MPV fL 10.6 10.7 10.3 10.8 10.9 10.7   < > 10.4   < > 10.0   < > 10.2   PLATELETS 10*3/mm3 117* 104* 106* 115* 121* 119*   < > 166   < > 146   < > 146   IMM GRAN % % 0.3 0.2 0.4 0.4 0.2 0.2   < > 0.3  --  0.5  --  0.4   NEUTROS ABS 10*3/mm3 4.35 4.36 3.22 3.95 3.59 4.77   < > 5.23   < > 4.66   < > 4.08   LYMPHS ABS 10*3/mm3 0.86 0.80 0.80 0.83 0.86 0.97   < > 0.92   < > 0.83   < > 0.89   MONOS ABS 10*3/mm3 0.46 0.41 0.44 0.44 0.45 0.55   < > 0.58   < > 0.36   < > 0.47   EOS ABS 10*3/mm3 0.09 0.10 0.11 0.11 0.11 0.09   < > 0.03   < > 0.06   < > 0.10   BASOS ABS 10*3/mm3 0.03 0.02 0.03 0.03 0.04 0.03   < > 0.03   < > 0.04   < > 0.04   IMMATURE GRANS (ABS) 10*3/mm3 0.02 0.01 0.02 0.02 0.01 0.01   < > 0.02  --  0.03  --  0.02   NRBC /100 WBC  --   --   --   --   --   --   --  0.0  --  0.0  --  0.0    < > = values in this interval not displayed.       Lab Results - Last 18 Months   Lab Units 12/18/24  1030 11/20/24  0951 10/23/24  1032 09/25/24  1040 08/21/24  1050 07/10/24  1050   GLUCOSE mg/dL 165* 159* 169* 157* 148* 179*   SODIUM mmol/L 135* 135* 136 133* 135* 133*   POTASSIUM mmol/L 4.1 3.8 4.8 4.0 4.3 4.6   CO2 mmol/L 26.0 25.0 26.0 27.0 26.0 26.0   CHLORIDE mmol/L 97* 98 97* 93* 96* 96*   ANION GAP mmol/L 12.0 12.0 13.0 13.0 13.0 11.0   CREATININE mg/dL 0.65* 0.64* 0.53* 0.60* 0.61* 0.63*   BUN mg/dL 15 18 15 11 13 17   BUN / CREAT RATIO  23.1 28.1* 28.3* 18.3 21.3 27.0*   CALCIUM mg/dL  "9.4 9.5 9.4 9.4 9.7 10.2   ALK PHOS U/L 72 67 72 74 73 74   TOTAL PROTEIN g/dL 7.5 7.3 7.3 7.3 7.6 7.6   ALT (SGPT) U/L 12 12 17 21 12 15   AST (SGOT) U/L 19 21 27 26 18 21   BILIRUBIN mg/dL 0.6 0.5 0.5 0.3 0.4 0.4   ALBUMIN g/dL 4.4 4.0 4.5 4.3 4.3 4.3   GLOBULIN gm/dL 3.1 3.3 2.8 3.0 3.3 3.3       No results for input(s): \"MSPIKE\", \"KAPPALAMB\", \"IGLFLC\", \"URICACID\", \"FREEKAPPAL\", \"CEA\", \"LDH\", \"REFLABREPO\" in the last 14713 hours.    No results for input(s): \"IRON\", \"TIBC\", \"LABIRON\", \"FERRITIN\", \"V5NSSDS\", \"TSH\", \"FOLATE\" in the last 80135 hours.    Invalid input(s): \"VITB12\"    Simeon Tam reports a pain score of 0.         ASSESSMENT:  1.   Prostate cancer, hormone refractory.   AJCC stage (TX, NX, M0)  Treatment status:Casodex and leuprolide with PSA recurrence.  He is on therapy with apalutamide and leuprolde (by Dr. Oneil).  2.   Performance status of 0.  3.   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  Off therapy.  4.   Cirrhosis, etiology of thrombocytopenia.  Off treatment.  5.   Urethral stricture. Self catheterization as indicated. \"Just as needed.\"              PLAN:  1.    Re: Tolerance to apalutamide.  \"Doing good.\"   2.    Re:  Heme status.  Hemoglobin 13.3, WBC 5.8 and platelet 117 from 104 from 106 from 115 from 121.  3.    Re:  Pre-office CMP.  GFR 94.7 from 95.1 from 100.7 from 97 ml/minute and glucose latest 159 from 169.  4.    Re:  Pre-office PSA latest 0.117 from0.118 from0.108 from 0.104 from0.083 from 0.086 from 0.086 from 0.083 from 0.101 from 0.08 from 0.047 from 0.064 from 0.056 from 0.071 from 0.053 from 0.050 from 0.047 from 0.048 from 0.038 from 0.029 from 0.031 from 0.031 from 0.026 from 0.024 from 0.019 from 0.020 from 0.016 from 0.015 from 0.018 (forgot my Lupron) from < 0.014 from < 0.014  from <0.014 from <0.014 from <0.01 from < 0.014 from < 0.014 from <0.014 from < 0.014 from < 0.014 from < 0.014 from < 0.014 from < 0.014 from " "< 0.014 from < 0.014 from < 0.014 from < 0.014 from < 0.014 from < 0.014 from 0.015 from 0.03 from 0.125 from 1.03 from 27.3. Testosterone latest 22.1 from 15.6 from 15.2 from from 21 from 17.9 from 24.1 from 18.2 from 35.8 from 28.4 from 8.87 from 31.1 from 30.4 from 44.9 from 36.1 from 26.4 from 26.6 from 25.1 from 35.2 from 11.8 from 35.7 from 23.4 from 28.1 from 25 from 28.2 from 25.1 from 34.1 from 18.1 from 527 from 580 from 443 from 235.  Latest leuprolide given on 8/21/2024.  Restage if PSA at 2 based on prostate cancer working group for rise of 0.2 on 2 or more occasions per  Association of urology.   5.   eRx for prochlorperazine 10 mg p.o. every 4 hours as needed for nausea or vomiting, 60, 2 refills if needed.    6.   eRx for C58D1 started on 12/4/2024 apalutamide 60 mg, take four tablets total dose 240 mg po daily, number, 120.  Take either with or without food.  Swallow tablets whole. TSH before apalutamide.  No grapefruit juice or grapefruit.  Monitor for hot flashes, fractures, falling, arrhythmia, rash, seizures or hypothyroidism.    7.   Continue ongoing management per primary care physician and the other specialists.  8.   Plan of care discussed with patient and his spouse.  Understanding expressed.  He agreed to proceed.  9.   Leuprolide per Dr. Oneil given every 6 months.  10.  Questions were answered to his satisfaction. \"Yes.\"   11.  Advance Care Planning  ACP discussion was declined by the patient. Patient does not have an advance directive, information provided.  12.  Plan for PMSA PET to restage if PSA at least 0.2.  13.  Return to office 4 weeks with CMP, PSA, testosterone, and CBC with differential, same day.                  I have reviewed the assessment and plan and verified the accuracy of it. No changes to assessment and plan since the information was documented. Deshawn Black MD 12/18/24         I spent 30 total minutes, face-to-face, caring for Simeon mcdonald. Greater than " 50% of this time involved counseling and/or coordination of care as documented within this note.                          (Matthew Oneil MD)  (Mateo Duval MD)  Lucio Garcia MD

## 2024-12-17 DIAGNOSIS — N31.9 NEUROGENIC BLADDER: ICD-10-CM

## 2024-12-17 RX ORDER — TOLTERODINE 4 MG/1
CAPSULE, EXTENDED RELEASE ORAL
Qty: 30 CAPSULE | Refills: 0 | Status: SHIPPED | OUTPATIENT
Start: 2024-12-17

## 2024-12-18 ENCOUNTER — OFFICE VISIT (OUTPATIENT)
Dept: ONCOLOGY | Facility: CLINIC | Age: 79
End: 2024-12-18
Payer: MEDICARE

## 2024-12-18 ENCOUNTER — LAB (OUTPATIENT)
Dept: LAB | Facility: HOSPITAL | Age: 79
End: 2024-12-18
Payer: MEDICARE

## 2024-12-18 VITALS
HEIGHT: 68 IN | OXYGEN SATURATION: 96 % | HEART RATE: 69 BPM | DIASTOLIC BLOOD PRESSURE: 72 MMHG | WEIGHT: 187 LBS | TEMPERATURE: 97.8 F | BODY MASS INDEX: 28.34 KG/M2 | RESPIRATION RATE: 18 BRPM | SYSTOLIC BLOOD PRESSURE: 130 MMHG

## 2024-12-18 DIAGNOSIS — C61 PROSTATE CANCER: Primary | ICD-10-CM

## 2024-12-18 LAB
ALBUMIN SERPL-MCNC: 4.4 G/DL (ref 3.5–5.2)
ALBUMIN/GLOB SERPL: 1.4 G/DL
ALP SERPL-CCNC: 72 U/L (ref 39–117)
ALT SERPL W P-5'-P-CCNC: 12 U/L (ref 1–41)
ANION GAP SERPL CALCULATED.3IONS-SCNC: 12 MMOL/L (ref 5–15)
AST SERPL-CCNC: 19 U/L (ref 1–40)
BASOPHILS # BLD AUTO: 0.03 10*3/MM3 (ref 0–0.2)
BASOPHILS NFR BLD AUTO: 0.5 % (ref 0–1.5)
BILIRUB SERPL-MCNC: 0.6 MG/DL (ref 0–1.2)
BUN SERPL-MCNC: 15 MG/DL (ref 8–23)
BUN/CREAT SERPL: 23.1 (ref 7–25)
CALCIUM SPEC-SCNC: 9.4 MG/DL (ref 8.6–10.5)
CHLORIDE SERPL-SCNC: 97 MMOL/L (ref 98–107)
CO2 SERPL-SCNC: 26 MMOL/L (ref 22–29)
CREAT SERPL-MCNC: 0.65 MG/DL (ref 0.76–1.27)
DEPRECATED RDW RBC AUTO: 43.9 FL (ref 37–54)
EGFRCR SERPLBLD CKD-EPI 2021: 94.7 ML/MIN/1.73
EOSINOPHIL # BLD AUTO: 0.09 10*3/MM3 (ref 0–0.4)
EOSINOPHIL NFR BLD AUTO: 1.5 % (ref 0.3–6.2)
ERYTHROCYTE [DISTWIDTH] IN BLOOD BY AUTOMATED COUNT: 13.3 % (ref 12.3–15.4)
GLOBULIN UR ELPH-MCNC: 3.1 GM/DL
GLUCOSE SERPL-MCNC: 165 MG/DL (ref 65–99)
HCT VFR BLD AUTO: 39.4 % (ref 37.5–51)
HGB BLD-MCNC: 13.3 G/DL (ref 13–17.7)
HOLD SPECIMEN: NORMAL
IMM GRANULOCYTES # BLD AUTO: 0.02 10*3/MM3 (ref 0–0.05)
IMM GRANULOCYTES NFR BLD AUTO: 0.3 % (ref 0–0.5)
LYMPHOCYTES # BLD AUTO: 0.86 10*3/MM3 (ref 0.7–3.1)
LYMPHOCYTES NFR BLD AUTO: 14.8 % (ref 19.6–45.3)
MCH RBC QN AUTO: 30.3 PG (ref 26.6–33)
MCHC RBC AUTO-ENTMCNC: 33.8 G/DL (ref 31.5–35.7)
MCV RBC AUTO: 89.7 FL (ref 79–97)
MONOCYTES # BLD AUTO: 0.46 10*3/MM3 (ref 0.1–0.9)
MONOCYTES NFR BLD AUTO: 7.9 % (ref 5–12)
NEUTROPHILS NFR BLD AUTO: 4.35 10*3/MM3 (ref 1.7–7)
NEUTROPHILS NFR BLD AUTO: 75 % (ref 42.7–76)
PLATELET # BLD AUTO: 117 10*3/MM3 (ref 140–450)
PMV BLD AUTO: 10.6 FL (ref 6–12)
POTASSIUM SERPL-SCNC: 4.1 MMOL/L (ref 3.5–5.2)
PROT SERPL-MCNC: 7.5 G/DL (ref 6–8.5)
PSA SERPL-MCNC: 0.13 NG/ML (ref 0–4)
RBC # BLD AUTO: 4.39 10*6/MM3 (ref 4.14–5.8)
SODIUM SERPL-SCNC: 135 MMOL/L (ref 136–145)
TESTOST SERPL-MCNC: 25.7 NG/DL (ref 193–740)
WBC NRBC COR # BLD AUTO: 5.81 10*3/MM3 (ref 3.4–10.8)

## 2024-12-18 PROCEDURE — 85025 COMPLETE CBC W/AUTO DIFF WBC: CPT

## 2024-12-18 PROCEDURE — 36415 COLL VENOUS BLD VENIPUNCTURE: CPT

## 2024-12-18 PROCEDURE — 84403 ASSAY OF TOTAL TESTOSTERONE: CPT

## 2024-12-18 PROCEDURE — 80053 COMPREHEN METABOLIC PANEL: CPT

## 2024-12-18 PROCEDURE — 84153 ASSAY OF PSA TOTAL: CPT

## 2025-01-08 ENCOUNTER — SPECIALTY PHARMACY (OUTPATIENT)
Dept: ONCOLOGY | Facility: HOSPITAL | Age: 80
End: 2025-01-08
Payer: MEDICARE

## 2025-01-08 NOTE — PROGRESS NOTES
Specialty Pharmacy Refill Coordination Note     Simeon is a 79 y.o. male contacted today regarding refills of  his specialty medication(s).    Reviewed and verified with patient:  Allergies  Meds  Problems       Specialty medication(s) and dose(s) confirmed: Erleada 240mg daily.     Refill Questions      Flowsheet Row Most Recent Value   Changes to allergies? No   Changes to medications? No   New conditions or infections since last clinic visit No   Unplanned office visit, urgent care, ED, or hospital admission in the last 4 weeks  No   How does patient/caregiver feel medication is working? Good   Financial problems or insurance changes  No  [Patient approved for [employer] that covered $1366.00 copay]   Since the previous refill, were any specialty medication doses or scheduled injections missed or delayed?  No   Does this patient require a clinical escalation to a pharmacist? No            Delivery Questions      Flowsheet Row Most Recent Value   Delivery method [employer]   Delivery address verified with patient/caregiver? Yes   Delivery address Home   Number of medications in delivery 1   Medication(s) being filled and delivered Apalutamide (ERLEADA)   Doses left of specialty medications 30   Copay verified? Yes   Copay amount $0 (patient approved for copay assistance that covered $1366 copay.)   Copay form of payment No copayment ($0)   Ship Date Pending Fill (weather)   Delivery Date Pending Fill (weather)   Signature Required No                   Follow-up: 30 day(s)     Dori Mathis, Pharmacy Technician  Specialty Pharmacy Technician

## 2025-01-08 NOTE — PROGRESS NOTES
[facility] GROUP HEMATOLOGY & ONCOLOGY  [address]  [facility] [address] [phone]    Patient Name: Simeon Tam  Encounter Date: 01/15/2025  YOB: 1945  Patient Number: [id number]      REASON FOR FOLLOW-UP: Simeon Tam is a pleasant 79 y.o.  male who is seen in follow-up for nonmetastatic hormone refractory prostate carcinoma (HRPC).  The patient is seen C59D15 of monotherapy with apalutamide.  The patient is seen with Essence, his spouse. History is obtained from patient.  History is considered reliable.         Oncology/Hematology History Overview Note   DIAGNOSTIC ABNORMALITIES:  He presented with rising PSA of 13 and was diagnosed with prostate cancer about 25 years ago.   Previous PSA was 22.26 on 10/05/2016, <0.13 on 07/17/2017, 7.91 on 10/17/2018, 19.29 on 03/19/2020, and 22.25 on 04/07/2020.   He was seen by Dr. Oneil 05/12/2020. Latest PSA was 22.25 ng/ml on 04/07/2020.  Previous PSA 22.26 on 10/05/2016.  Bone scan and CT scan of the abdomen and pelvis were negative for prostate cancer on 10/2016.  Started on Casodex and possibly leuprolide.  PSA  was down to <0.13 on 07/17/207.  Plan for apalutamide after staging scans.   CT abdomen and pelvis 06/11/2020. No evidence of metastatic disease in the abdomen or pelvis. Right renal pelvis and ureter urothelial thickening and hyperemia. Urinary bladder wall is thickened and there is adjacent inflammation. Recommend correlation with urinalysis and exam to exclude cystitis with right-sided pyelitis.  Cirrhosis.  Tiny 8 mm hypodense RIGHT inferior liver lesion on image 33 is too small to characterize.   Bone scan 06/11/2020. No evidence of bone metastases.        PREVIOUS INTERVENTIONS:  He was treated with adjuvant radiation at [place] and androgen ablation with Casodex.  Lupron and Casodex 10/2016. He had 7 months of ADT therapy with Lupron.  Apalutamide 07/22/2020  through present.     Prostate cancer   6/29/2020 -  Chemotherapy    OP PROSTATE Apalutamide     6/30/2020 Initial Diagnosis    Prostate cancer (CMS/[facility])     7/29/2020 -  Chemotherapy    OP LEUPROLIDE 45MG Q6M - ORDER EXPIRES 7/18/24     10/5/2022 - 10/5/2022 Chemotherapy    OP PROSTATE Apalutamide         PAST MEDICAL HISTORY:  ALLERGIES:  Allergies   Allergen Reactions    Sulfa Antibiotics Rash     CURRENT MEDICATIONS:  Outpatient Encounter Medications as of 1/15/2025   Medication Sig Dispense Refill    Apalutamide (ERLEADA) 60 MG tablet Take 4 tablets by mouth Daily. Take with or without food. Do not crush or chew tablets. 120 tablet 5    colestipol (COLESTID) 1 g tablet Take 2 tablets by mouth Daily.      dutasteride (AVODART) 0.5 MG capsule TAKE (1) CAPSULE ONCE DAILY. 90 capsule 0    JANUVIA 100 MG tablet Take 1 tablet by mouth Daily.      lisinopril-hydrochlorothiazide (PRINZIDE,ZESTORETIC) 20-12.5 MG per tablet Take 1 tablet by mouth Daily.      Loperamide HCl (IMODIUM PO) Take 1 dose by mouth As Needed (diarrhea).      Multiple Vitamins-Minerals (ICAPS AREDS 2 PO) Take 1 capsule by mouth Daily.      rosuvastatin (CRESTOR) 20 MG tablet Take 1 tablet by mouth Every Night.      tolterodine LA (DETROL LA) 4 MG 24 hr capsule TAKE (1) CAPSULE ONCE DAILY. 30 capsule 0     No facility-administered encounter medications on file as of 1/15/2025.     ADULT ILLNESSES:  Patient Active Problem List   Diagnosis Code    Hypertension I10    Hyperlipidemia E78.5    Diabetes mellitus E11.9    Cancer C80.1    Arrhythmia I49.9    Non morbid obesity due to excess calories E66.09    Prostate cancer C61    Anterior urethral stricture N35.914     SURGERIES:  Past Surgical History:   Procedure Laterality Date    CATARACT EXTRACTION      CYSTOSCOPY RETROGRADE PYELOGRAM Bilateral 4/21/2021    Procedure: CYSTOSCOPY RETROGRADE PYELOGRAM, possible DIRECT VISION INTERNAL URETHROTOMY;  Surgeon: Matthew Oneil MD;  Location: [facility] OR;   "Service: Urology;  Laterality: Bilateral;    CYSTOSCOPY URETHROTOMY VISUAL INTERNAL Bilateral 4/21/2021    Procedure: possible DIRECT VISION INTERNAL URETHROTOMY;  Surgeon: Matthew Oneil MD;  Location: [facility] OR;  Service: Urology;  Laterality: Bilateral;    EXCISION LESION      neck    KIDNEY STONE SURGERY      REPLACEMENT TOTAL KNEE Left     TONSILLECTOMY       HEALTH MAINTENANCE ITEMS:  Health Maintenance Due   Topic Date Due    LIPID PANEL  Never done    Pneumococcal Vaccine 65+ (1 of 2 - PCV) Never done    DIABETIC EYE EXAM  Never done    TDAP/TD VACCINES (1 - Tdap) Never done    ZOSTER VACCINE (1 of 2) Never done    Hepatitis B (1 of 3 - Risk 3-dose series) Never done    RSV Vaccine - Adults (1 - 1-dose 75+ series) Never done    ANNUAL WELLNESS VISIT  Never done    HEMOGLOBIN A1C  Never done    BMI FOLLOWUP  04/27/2022    INFLUENZA VACCINE  Never done    COVID-19 Vaccine (4 - 2024-25 season) 09/01/2024       <no information>  Last Completed Colonoscopy       This patient has no relevant Health Maintenance data.            There is no immunization history on file for this patient.  Last Completed Mammogram       This patient has no relevant Health Maintenance data.              FAMILY HISTORY:  Family History   Problem Relation Age of Onset    Dementia Mother      SOCIAL HISTORY:  Social History     Socioeconomic History    Marital status:    Tobacco Use    Smoking status: Former     Current packs/day: 0.00     Types: Cigarettes     Passive exposure: Past    Smokeless tobacco: Never   Vaping Use    Vaping status: Never Used   Substance and Sexual Activity    Alcohol use: No    Drug use: No    Sexual activity: Defer       REVIEW OF SYSTEMS:    Review of Systems   Constitutional:  Negative for fatigue, fever and unexpected weight change.        \"I feel good.\"   HENT:  Negative for congestion and mouth sores.    Eyes:  Negative for discharge and redness.   Respiratory:  Negative for shortness of breath " "and wheezing.    Cardiovascular:  Negative for chest pain and palpitations.   Gastrointestinal:  Negative for constipation, diarrhea, nausea and vomiting.   Endocrine: Negative for cold intolerance and heat intolerance.   Genitourinary:  Negative for difficulty urinating and hematuria.   Musculoskeletal:  Negative for gait problem and myalgias.   Skin:  Negative for pallor.   Allergic/Immunologic: Negative for food allergies.   Neurological:  Negative for dizziness, speech difficulty and weakness.   Hematological:  Negative for adenopathy. Does not bruise/bleed easily.   Psychiatric/Behavioral:  Negative for agitation and confusion. The patient is not nervous/anxious.        VITAL SIGNS: /74   Pulse 66   Temp 97.7 °F (36.5 °C) (Tympanic)   Resp 18   Ht 172.7 cm (68\")   Wt 85.5 kg (188 lb 6.4 oz)   SpO2 96%   BMI 28.65 kg/m²   Pain Score    01/15/25 1045   PainSc: 0-No pain       PHYSICAL EXAMINATION:     Physical Exam  Vitals reviewed.   Constitutional:       General: He is not in acute distress.  HENT:      Head: Normocephalic and atraumatic.   Eyes:      General: No scleral icterus.  Cardiovascular:      Rate and Rhythm: Normal rate.   Pulmonary:      Effort: No respiratory distress.      Breath sounds: No wheezing.   Abdominal:      General: Bowel sounds are normal.      Palpations: Abdomen is soft.      Tenderness: There is no abdominal tenderness.   Musculoskeletal:         General: No swelling.      Cervical back: Neck supple.   Skin:     Coloration: Skin is not pale.   Neurological:      Mental Status: He is alert and oriented to person, place, and time.   Psychiatric:         Mood and Affect: Mood normal.         Behavior: Behavior normal.         Thought Content: Thought content normal.         Judgment: Judgment normal.         LABS    Lab Results - Last 18 Months   Lab Units 01/15/25  1043 12/18/24  1030 11/20/24  0951 09/25/24  1040 08/21/24  1050 07/10/24  1050 03/20/24  1043 02/21/24  1041 " 11/29/23  1215 11/01/23  1237   HEMOGLOBIN g/dL 14.0 13.3 12.9* 12.9* 13.1 13.3   < > 12.9*   < > 13.0   HEMATOCRIT % 44.6 39.4 38.2 39.2 39.3 39.5   < > 38.8   < > 38.9   MCV fL 93.9 89.7 88.8 89.7 89.9 89.4   < > 89.6   < > 88.2   WBC 10*3/mm3 7.06 5.81 5.70 4.62 5.38 5.06   < > 6.81   < > 5.98   RDW % 13.3 13.3 13.6 13.2 13.1 13.4   < > 13.0   < > 13.1   MPV fL 11.5 10.6 10.7 10.3 10.8 10.9   < > 10.4   < > 10.0   PLATELETS 10*3/mm3 123* 117* 104* 106* 115* 121*   < > 166   < > 146   IMM GRAN % % 0.4 0.3 0.2 0.4 0.4 0.2   < > 0.3  --  0.5   NEUTROS ABS 10*3/mm3 5.42 4.35 4.36 3.22 3.95 3.59   < > 5.23   < > 4.66   LYMPHS ABS 10*3/mm3 0.97 0.86 0.80 0.80 0.83 0.86   < > 0.92   < > 0.83   MONOS ABS 10*3/mm3 0.52 0.46 0.41 0.44 0.44 0.45   < > 0.58   < > 0.36   EOS ABS 10*3/mm3 0.08 0.09 0.10 0.11 0.11 0.11   < > 0.03   < > 0.06   BASOS ABS 10*3/mm3 0.04 0.03 0.02 0.03 0.03 0.04   < > 0.03   < > 0.04   IMMATURE GRANS (ABS) 10*3/mm3 0.03 0.02 0.01 0.02 0.02 0.01   < > 0.02  --  0.03   NRBC /100 WBC  --   --   --   --   --   --   --  0.0  --  0.0    < > = values in this interval not displayed.       Lab Results - Last 18 Months   Lab Units 12/18/24  1030 11/20/24  0951 10/23/24  1032 09/25/24  1040 08/21/24  1050 07/10/24  1050   GLUCOSE mg/dL 165* 159* 169* 157* 148* 179*   SODIUM mmol/L 135* 135* 136 133* 135* 133*   POTASSIUM mmol/L 4.1 3.8 4.8 4.0 4.3 4.6   CO2 mmol/L 26.0 25.0 26.0 27.0 26.0 26.0   CHLORIDE mmol/L 97* 98 97* 93* 96* 96*   ANION GAP mmol/L 12.0 12.0 13.0 13.0 13.0 11.0   CREATININE mg/dL 0.65* 0.64* 0.53* 0.60* 0.61* 0.63*   BUN mg/dL 15 18 15 11 13 17   BUN / CREAT RATIO  23.1 28.1* 28.3* 18.3 21.3 27.0*   CALCIUM mg/dL 9.4 9.5 9.4 9.4 9.7 10.2   ALK PHOS U/L 72 67 72 74 73 74   TOTAL PROTEIN g/dL 7.5 7.3 7.3 7.3 7.6 7.6   ALT (SGPT) U/L 12 12 17 21 12 15   AST (SGOT) U/L 19 21 27 26 18 21   BILIRUBIN mg/dL 0.6 0.5 0.5 0.3 0.4 0.4   ALBUMIN g/dL 4.4 4.0 4.5 4.3 4.3 4.3   GLOBULIN gm/dL 3.1 3.3  "2.8 3.0 3.3 3.3       No results for input(s): \"MSPIKE\", \"KAPPALAMB\", \"IGLFLC\", \"URICACID\", \"FREEKAPPAL\", \"CEA\", \"LDH\", \"REFLABREPO\" in the last 94184 hours.    No results for input(s): \"IRON\", \"TIBC\", \"LABIRON\", \"FERRITIN\", \"Q2JWKYF\", \"TSH\", \"FOLATE\" in the last 02387 hours.    Invalid input(s): \"VITB12\"    Simeon Tam reports a pain score of 0.      ASSESSMENT:  1.   Prostate cancer, hormone refractory.   AJCC stage (TX, NX, M0)  Treatment status:Casodex and leuprolide with PSA recurrence.  He is on therapy with apalutamide and leuprolde (by Dr. Oneil).  2.   Good performance status of 0.  3.   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  On observation.  4.   Cirrhosis, etiology of thrombocytopenia.  Off therapy.  5.   Urethral stricture. Self catheterization as indicated. \"As needed.\"              PLAN:  1.    Re: Tolerance to apalutamide.  \"Doing well.\"   2.    Re:  Heme status.  Hemoglobin 14, WBC 7.06 and platelet 123 from 117 from 104 from 106 from 115 from 121.  3.    Re:  Pre-office CMP.  Latest GFR 94.7 ml/minute and glucose latest 165.  4.    Re:  Pre-office PSA latest 0.128 from0.117 from 0.118 from0.108 from 0.104 from0.083 from 0.086 from 0.086 from 0.083 from 0.101 from 0.08 from 0.047 from 0.064 from 0.056 from 0.071 from 0.053 from 0.050 from 0.047 from 0.048 from 0.038 from 0.029 from 0.031 from 0.031 from 0.026 from 0.024 from 0.019 from 0.020 from 0.016 from 0.015 from 0.018 (forgot my Lupron) from < 0.014 from < 0.014  from <0.014 from <0.014 from <0.01 from < 0.014 from < 0.014 from <0.014 from < 0.014 from < 0.014 from < 0.014 from < 0.014 from < 0.014 from < 0.014 from < 0.014 from < 0.014 from < 0.014 from < 0.014 from < 0.014 from 0.015 from 0.03 from 0.125 from 1.03 from 27.3. Testosterone latest 5.7 from 22.1 from 15.6 from 15.2 from from 21 from 17.9 from 24.1 from 18.2 from 35.8 from 28.4 from 8.87 from 31.1 from 30.4 from 44.9 from 36.1 " "from 26.4 from 26.6 from 25.1 from 35.2 from 11.8 from 35.7 from 23.4 from 28.1 from 25 from 28.2 from 25.1 from 34.1 from 18.1 from 527 from 580 from 443 from 235.  Most recent leuprolide given on 8/21/2024.  Restage if PSA at 2 based on prostate cancer working group for rise of 0.2 on 2 or more occasions per  Association of urology.   5.   eRx for prochlorperazine 10 mg p.o. every 4 hours as needed for nausea or vomiting, 60, 2 refills if needed.    6.   eRx for C59D1 started on 1/1/2025 apalutamide 60 mg, take four tablets total dose 240 mg po daily, number, 120.  Take either with or without food.  Swallow tablets whole. TSH before apalutamide.  No grapefruit juice or grapefruit.  Monitor for falling, fractures, hot flashes, arrhythmia, rash, seizures or hypothyroidism.    7.   Continue ongoing management per primary care physician and the other specialist.  8.   Plan of care discussed with patient and Essence.  Understanding expressed.  Patient agreed to proceed.  9.   Leuprolide per Dr. Oneil given every 6 months.  10.  Questions were answered to his satisfaction. \"Yes.\"   11.  Advance Care Planning   ACP discussion was declined by the patient. Patient does not have an advance directive, information provided.   12.  Plan for PMSA PET to restage if PSA at least 0.2.  13.  Return to office 4 weeks with CMP, PSA, testosterone, and CBC with differential, same day.                 I have reviewed the assessment and plan and verified the accuracy of it. No changes to assessment and plan since the information was documented. Deshawn Black MD 01/15/25           I spent 31 total minutes, face-to-face, caring for Simeon today. Greater than 50% of this time involved counseling and/or coordination of care as documented within this note.                          (Matthew Oneil MD)  (Mateo Duval MD)  Lucio Garcia MD    "

## 2025-01-15 ENCOUNTER — OFFICE VISIT (OUTPATIENT)
Dept: ONCOLOGY | Facility: CLINIC | Age: 80
End: 2025-01-15
Payer: MEDICARE

## 2025-01-15 ENCOUNTER — LAB (OUTPATIENT)
Dept: LAB | Facility: HOSPITAL | Age: 80
End: 2025-01-15
Payer: MEDICARE

## 2025-01-15 VITALS
SYSTOLIC BLOOD PRESSURE: 124 MMHG | HEART RATE: 66 BPM | OXYGEN SATURATION: 96 % | TEMPERATURE: 97.7 F | HEIGHT: 68 IN | BODY MASS INDEX: 28.55 KG/M2 | WEIGHT: 188.4 LBS | DIASTOLIC BLOOD PRESSURE: 74 MMHG | RESPIRATION RATE: 18 BRPM

## 2025-01-15 DIAGNOSIS — C61 PROSTATE CANCER: Primary | ICD-10-CM

## 2025-01-15 LAB
ALBUMIN SERPL-MCNC: 4.5 G/DL (ref 3.5–5.2)
ALBUMIN/GLOB SERPL: 1.4 G/DL
ALP SERPL-CCNC: 75 U/L (ref 39–117)
ALT SERPL W P-5'-P-CCNC: 18 U/L (ref 1–41)
ANION GAP SERPL CALCULATED.3IONS-SCNC: 14 MMOL/L (ref 5–15)
AST SERPL-CCNC: 24 U/L (ref 1–40)
BASOPHILS # BLD AUTO: 0.04 10*3/MM3 (ref 0–0.2)
BASOPHILS NFR BLD AUTO: 0.6 % (ref 0–1.5)
BILIRUB SERPL-MCNC: 0.5 MG/DL (ref 0–1.2)
BUN SERPL-MCNC: 17 MG/DL (ref 8–23)
BUN/CREAT SERPL: 29.3 (ref 7–25)
CALCIUM SPEC-SCNC: 9.9 MG/DL (ref 8.6–10.5)
CHLORIDE SERPL-SCNC: 94 MMOL/L (ref 98–107)
CO2 SERPL-SCNC: 26 MMOL/L (ref 22–29)
CREAT SERPL-MCNC: 0.58 MG/DL (ref 0.76–1.27)
DEPRECATED RDW RBC AUTO: 46.1 FL (ref 37–54)
EGFRCR SERPLBLD CKD-EPI 2021: 98 ML/MIN/1.73
EOSINOPHIL # BLD AUTO: 0.08 10*3/MM3 (ref 0–0.4)
EOSINOPHIL NFR BLD AUTO: 1.1 % (ref 0.3–6.2)
ERYTHROCYTE [DISTWIDTH] IN BLOOD BY AUTOMATED COUNT: 13.3 % (ref 12.3–15.4)
GLOBULIN UR ELPH-MCNC: 3.2 GM/DL
GLUCOSE SERPL-MCNC: 152 MG/DL (ref 65–99)
HCT VFR BLD AUTO: 44.6 % (ref 37.5–51)
HGB BLD-MCNC: 14 G/DL (ref 13–17.7)
HOLD SPECIMEN: NORMAL
IMM GRANULOCYTES # BLD AUTO: 0.03 10*3/MM3 (ref 0–0.05)
IMM GRANULOCYTES NFR BLD AUTO: 0.4 % (ref 0–0.5)
LYMPHOCYTES # BLD AUTO: 0.97 10*3/MM3 (ref 0.7–3.1)
LYMPHOCYTES NFR BLD AUTO: 13.7 % (ref 19.6–45.3)
MCH RBC QN AUTO: 29.5 PG (ref 26.6–33)
MCHC RBC AUTO-ENTMCNC: 31.4 G/DL (ref 31.5–35.7)
MCV RBC AUTO: 93.9 FL (ref 79–97)
MONOCYTES # BLD AUTO: 0.52 10*3/MM3 (ref 0.1–0.9)
MONOCYTES NFR BLD AUTO: 7.4 % (ref 5–12)
NEUTROPHILS NFR BLD AUTO: 5.42 10*3/MM3 (ref 1.7–7)
NEUTROPHILS NFR BLD AUTO: 76.8 % (ref 42.7–76)
PLATELET # BLD AUTO: 123 10*3/MM3 (ref 140–450)
PMV BLD AUTO: 11.5 FL (ref 6–12)
POTASSIUM SERPL-SCNC: 4.3 MMOL/L (ref 3.5–5.2)
PROT SERPL-MCNC: 7.7 G/DL (ref 6–8.5)
PSA SERPL-MCNC: 0.15 NG/ML (ref 0–4)
RBC # BLD AUTO: 4.75 10*6/MM3 (ref 4.14–5.8)
SODIUM SERPL-SCNC: 134 MMOL/L (ref 136–145)
TESTOST SERPL-MCNC: 26.6 NG/DL (ref 193–740)
WBC NRBC COR # BLD AUTO: 7.06 10*3/MM3 (ref 3.4–10.8)

## 2025-01-15 PROCEDURE — 85025 COMPLETE CBC W/AUTO DIFF WBC: CPT

## 2025-01-15 PROCEDURE — 84153 ASSAY OF PSA TOTAL: CPT

## 2025-01-15 PROCEDURE — 80053 COMPREHEN METABOLIC PANEL: CPT

## 2025-01-15 PROCEDURE — 84403 ASSAY OF TOTAL TESTOSTERONE: CPT

## 2025-01-15 PROCEDURE — 36415 COLL VENOUS BLD VENIPUNCTURE: CPT

## 2025-01-16 DIAGNOSIS — N31.9 NEUROGENIC BLADDER: ICD-10-CM

## 2025-01-16 RX ORDER — DUTASTERIDE 0.5 MG/1
CAPSULE, LIQUID FILLED ORAL
Qty: 90 CAPSULE | Refills: 0 | Status: SHIPPED | OUTPATIENT
Start: 2025-01-16

## 2025-01-16 RX ORDER — TOLTERODINE 4 MG/1
CAPSULE, EXTENDED RELEASE ORAL
Qty: 30 CAPSULE | Refills: 0 | Status: SHIPPED | OUTPATIENT
Start: 2025-01-16

## 2025-01-30 ENCOUNTER — SPECIALTY PHARMACY (OUTPATIENT)
Dept: ONCOLOGY | Facility: HOSPITAL | Age: 80
End: 2025-01-30
Payer: MEDICARE

## 2025-01-30 DIAGNOSIS — C61 PROSTATE CANCER: ICD-10-CM

## 2025-02-04 NOTE — PROGRESS NOTES
[facility] GROUP HEMATOLOGY & ONCOLOGY  [address]  [facility] [address] [phone]    Patient Name: Simeon Tam  Encounter Date: 02/12/2025  YOB: 1945  Patient Number: [id number]      REASON FOR FOLLOW-UP: Simeon Tam is a pleasant 79 y.o.  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  The patient is seen C60D15 of monotherapy with apalutamide.  He is seen alone. History is obtained from patient.  Patient is a reliable historian.        Oncology/Hematology History Overview Note   DIAGNOSTIC ABNORMALITIES:  He presented with rising PSA of 13 and was diagnosed with prostate cancer about 25 years ago.   Previous PSA was 22.26 on 10/05/2016, <0.13 on 07/17/2017, 7.91 on 10/17/2018, 19.29 on 03/19/2020, and 22.25 on 04/07/2020.   He was seen by Dr. Oneil 05/12/2020. Latest PSA was 22.25 ng/ml on 04/07/2020.  Previous PSA 22.26 on 10/05/2016.  Bone scan and CT scan of the abdomen and pelvis were negative for prostate cancer on 10/2016.  Started on Casodex and possibly leuprolide.  PSA  was down to <0.13 on 07/17/207.  Plan for apalutamide after staging scans.   CT abdomen and pelvis 06/11/2020. No evidence of metastatic disease in the abdomen or pelvis. Right renal pelvis and ureter urothelial thickening and hyperemia. Urinary bladder wall is thickened and there is adjacent inflammation. Recommend correlation with urinalysis and exam to exclude cystitis with right-sided pyelitis.  Cirrhosis.  Tiny 8 mm hypodense RIGHT inferior liver lesion on image 33 is too small to characterize.   Bone scan 06/11/2020. No evidence of bone metastases.        PREVIOUS INTERVENTIONS:  He was treated with adjuvant radiation at [facility] and androgen ablation with Casodex.  Lupron and Casodex 10/2016. He had 7 months of ADT therapy with Lupron.  Apalutamide 07/22/2020 through present.      Prostate cancer   6/29/2020 -  Chemotherapy    OP PROSTATE Apalutamide     6/30/2020 Initial Diagnosis    Prostate cancer (CMS/HCC)     7/29/2020 -  Chemotherapy    OP LEUPROLIDE 45MG Q6M - ORDER EXPIRES 7/18/24     10/5/2022 - 10/5/2022 Chemotherapy    OP PROSTATE Apalutamide         PAST MEDICAL HISTORY:  ALLERGIES:  Allergies   Allergen Reactions    Sulfa Antibiotics Rash     CURRENT MEDICATIONS:  Outpatient Encounter Medications as of 2/12/2025   Medication Sig Dispense Refill    Apalutamide (ERLEADA) 60 MG tablet Take 4 tablets by mouth Daily. Take with or without food. Do not crush or chew tablets. 120 tablet 5    colestipol (COLESTID) 1 g tablet Take 2 tablets by mouth Daily.      dutasteride (AVODART) 0.5 MG capsule TAKE (1) CAPSULE ONCE DAILY. 90 capsule 0    JANUVIA 100 MG tablet Take 1 tablet by mouth Daily.      lisinopril-hydrochlorothiazide (PRINZIDE,ZESTORETIC) 20-12.5 MG per tablet Take 1 tablet by mouth Daily.      Loperamide HCl (IMODIUM PO) Take 1 dose by mouth As Needed (diarrhea).      Multiple Vitamins-Minerals (ICAPS AREDS 2 PO) Take 1 capsule by mouth Daily.      rosuvastatin (CRESTOR) 20 MG tablet Take 1 tablet by mouth Every Night.      tolterodine LA (DETROL LA) 4 MG 24 hr capsule TAKE (1) CAPSULE ONCE DAILY. 30 capsule 0     No facility-administered encounter medications on file as of 2/12/2025.     ADULT ILLNESSES:  Patient Active Problem List   Diagnosis Code    Hypertension I10    Hyperlipidemia E78.5    Diabetes mellitus E11.9    Cancer C80.1    Arrhythmia I49.9    Non morbid obesity due to excess calories E66.09    Prostate cancer C61    Anterior urethral stricture N35.914     SURGERIES:  Past Surgical History:   Procedure Laterality Date    CATARACT EXTRACTION      CYSTOSCOPY RETROGRADE PYELOGRAM Bilateral 4/21/2021    Procedure: CYSTOSCOPY RETROGRADE PYELOGRAM, possible DIRECT VISION INTERNAL URETHROTOMY;  Surgeon: Matthew Oneil MD;  Location: [facility];  Service: Urology;   "Laterality: Bilateral;    CYSTOSCOPY URETHROTOMY VISUAL INTERNAL Bilateral 4/21/2021    Procedure: possible DIRECT VISION INTERNAL URETHROTOMY;  Surgeon: Matthew Oneil MD;  Location: [facility];  Service: Urology;  Laterality: Bilateral;    EXCISION LESION      neck    KIDNEY STONE SURGERY      REPLACEMENT TOTAL KNEE Left     TONSILLECTOMY       HEALTH MAINTENANCE ITEMS:  Health Maintenance Due   Topic Date Due    LIPID PANEL  Never done    DIABETIC EYE EXAM  Never done    Pneumococcal Vaccine 50+ (1 of 2 - PCV) Never done    TDAP/TD VACCINES (1 - Tdap) Never done    ZOSTER VACCINE (1 of 2) Never done    Hepatitis B (1 of 3 - Risk 3-dose series) Never done    RSV Vaccine - Adults (1 - 1-dose 75+ series) Never done    ANNUAL WELLNESS VISIT  Never done    HEMOGLOBIN A1C  Never done    BMI FOLLOWUP  04/27/2022    INFLUENZA VACCINE  Never done    COVID-19 Vaccine (4 - 2024-25 season) 09/01/2024       <no information>  Last Completed Colonoscopy       This patient has no relevant Health Maintenance data.            There is no immunization history on file for this patient.  Last Completed Mammogram       This patient has no relevant Health Maintenance data.              FAMILY HISTORY:  Family History   Problem Relation Age of Onset    Dementia Mother      SOCIAL HISTORY:  Social History     Socioeconomic History    Marital status:    Tobacco Use    Smoking status: Former     Current packs/day: 0.00     Types: Cigarettes     Passive exposure: Past    Smokeless tobacco: Never   Vaping Use    Vaping status: Never Used   Substance and Sexual Activity    Alcohol use: No    Drug use: No    Sexual activity: Defer       REVIEW OF SYSTEMS:    Review of Systems   Constitutional:  Negative for fatigue, fever and unexpected weight change.        \"Feeling good.\"   HENT:  Negative for congestion and hearing loss.    Eyes:         \"I am getting shots in my eyes every 6 weeks for macular degeneration.\"   Respiratory:  " "Negative for shortness of breath and wheezing.    Cardiovascular:  Negative for chest pain and palpitations.   Gastrointestinal:  Negative for abdominal pain, nausea and vomiting.   Endocrine: Negative for cold intolerance and heat intolerance.   Genitourinary:  Positive for difficulty urinating. Negative for hematuria.   Musculoskeletal:  Negative for gait problem and myalgias.   Skin:  Negative for pallor.   Allergic/Immunologic: Negative for food allergies.   Neurological:  Negative for dizziness, speech difficulty and numbness.   Hematological:  Negative for adenopathy. Does not bruise/bleed easily.   Psychiatric/Behavioral:  Negative for agitation, confusion and hallucinations.        VITAL SIGNS: /70   Pulse 88   Temp 97.6 °F (36.4 °C)   Resp 18   Ht 172.7 cm (68\")   Wt 86.7 kg (191 lb 1.6 oz)   SpO2 94%   BMI 29.06 kg/m²  Gained 3 pounds.   Pain Score    02/12/25 1113   PainSc: 0-No pain       PHYSICAL EXAMINATION:     Physical Exam  Vitals reviewed.   Constitutional:       General: He is not in acute distress.  HENT:      Head: Normocephalic and atraumatic.   Eyes:      General: No scleral icterus.  Cardiovascular:      Rate and Rhythm: Normal rate.   Pulmonary:      Effort: No respiratory distress.   Abdominal:      General: Bowel sounds are normal.      Palpations: Abdomen is soft.   Genitourinary:     Comments: \"Self catheter as needed.\"  Musculoskeletal:         General: No swelling.      Cervical back: Neck supple.   Skin:     Coloration: Skin is not pale.   Neurological:      Mental Status: He is alert and oriented to person, place, and time.   Psychiatric:         Mood and Affect: Mood normal.         Behavior: Behavior normal.         Thought Content: Thought content normal.         Judgment: Judgment normal.         LABS    Lab Results - Last 18 Months   Lab Units 02/12/25  1029 01/15/25  1043 12/18/24  1030 11/20/24  0951 09/25/24  1040 08/21/24  1050 03/20/24  1043 02/21/24  1041 " 11/29/23  1215 11/01/23  1237   HEMOGLOBIN g/dL 13.3 14.0 13.3 12.9* 12.9* 13.1   < > 12.9*   < > 13.0   HEMATOCRIT % 38.6 44.6 39.4 38.2 39.2 39.3   < > 38.8   < > 38.9   MCV fL 88.1 93.9 89.7 88.8 89.7 89.9   < > 89.6   < > 88.2   WBC 10*3/mm3 5.07 7.06 5.81 5.70 4.62 5.38   < > 6.81   < > 5.98   RDW % 13.3 13.3 13.3 13.6 13.2 13.1   < > 13.0   < > 13.1   MPV fL 10.5 11.5 10.6 10.7 10.3 10.8   < > 10.4   < > 10.0   PLATELETS 10*3/mm3 108* 123* 117* 104* 106* 115*   < > 166   < > 146   IMM GRAN % % 0.2 0.4 0.3 0.2 0.4 0.4   < > 0.3  --  0.5   NEUTROS ABS 10*3/mm3 3.65 5.42 4.35 4.36 3.22 3.95   < > 5.23   < > 4.66   LYMPHS ABS 10*3/mm3 0.83 0.97 0.86 0.80 0.80 0.83   < > 0.92   < > 0.83   MONOS ABS 10*3/mm3 0.45 0.52 0.46 0.41 0.44 0.44   < > 0.58   < > 0.36   EOS ABS 10*3/mm3 0.10 0.08 0.09 0.10 0.11 0.11   < > 0.03   < > 0.06   BASOS ABS 10*3/mm3 0.03 0.04 0.03 0.02 0.03 0.03   < > 0.03   < > 0.04   IMMATURE GRANS (ABS) 10*3/mm3 0.01 0.03 0.02 0.01 0.02 0.02   < > 0.02  --  0.03   NRBC /100 WBC  --   --   --   --   --   --   --  0.0  --  0.0    < > = values in this interval not displayed.       Lab Results - Last 18 Months   Lab Units 02/12/25  1029 01/15/25  1043 12/18/24  1030 11/20/24  0951 10/23/24  1032 09/25/24  1040   GLUCOSE mg/dL 167* 152* 165* 159* 169* 157*   SODIUM mmol/L 135* 134* 135* 135* 136 133*   POTASSIUM mmol/L 4.2 4.3 4.1 3.8 4.8 4.0   CO2 mmol/L 25.0 26.0 26.0 25.0 26.0 27.0   CHLORIDE mmol/L 97* 94* 97* 98 97* 93*   ANION GAP mmol/L 13.0 14.0 12.0 12.0 13.0 13.0   CREATININE mg/dL 0.52* 0.58* 0.65* 0.64* 0.53* 0.60*   BUN mg/dL 10 17 15 18 15 11   BUN / CREAT RATIO  19.2 29.3* 23.1 28.1* 28.3* 18.3   CALCIUM mg/dL 9.4 9.9 9.4 9.5 9.4 9.4   ALK PHOS U/L 69 75 72 67 72 74   TOTAL PROTEIN g/dL 7.4 7.7 7.5 7.3 7.3 7.3   ALT (SGPT) U/L 17 18 12 12 17 21   AST (SGOT) U/L 28 24 19 21 27 26   BILIRUBIN mg/dL 0.3 0.5 0.6 0.5 0.5 0.3   ALBUMIN g/dL 4.2 4.5 4.4 4.0 4.5 4.3   GLOBULIN gm/dL 3.2 3.2 3.1  "3.3 2.8 3.0       No results for input(s): \"MSPIKE\", \"KAPPALAMB\", \"IGLFLC\", \"URICACID\", \"FREEKAPPAL\", \"CEA\", \"LDH\", \"REFLABREPO\" in the last 33713 hours.    No results for input(s): \"IRON\", \"TIBC\", \"LABIRON\", \"FERRITIN\", \"F5ZDSNR\", \"TSH\", \"FOLATE\" in the last 63580 hours.    Invalid input(s): \"VITB12\"    Simeon Tam reports a pain score of 0.          ASSESSMENT:  1.   Prostate cancer, hormone refractory.   AJCC stage (TX, NX, M0)  Treatment status:Casodex and leuprolide with PSA recurrence.  He is on therapy with apalutamide and leuprolde (by Dr. Oneil).  2.   Performance status of 0.  3.   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  Under observation.  4.   Cirrhosis, etiology of thrombocytopenia.  Off treatment.  5.   Urethral stricture. Self catheterization as indicated. \"Only as needed.\"              PLAN:  1.    Re: Tolerance to apalutamide.  \"Doing fine.\"   2.    Re:  Heme status.  Hemoglobin 13.3, WBC 5.07 and platelet 108 from 123 from 117 from 104 from 106 from 115 from 121.  3.    Re:  Pre-office CMP.  .3 from 98 from 94.7 ml/minute and glucose 167 from 152 from 165.  4.    Re:  Pre-office PSA latest 0.147 from0.128 from0.117 from 0.118 from0.108 from 0.104 from0.083 from 0.086 from 0.086 from 0.083 from 0.101 from 0.08 from 0.047 from 0.064 from 0.056 from 0.071 from 0.053 from 0.050 from 0.047 from 0.048 from 0.038 from 0.029 from 0.031 from 0.031 from 0.026 from 0.024 from 0.019 from 0.020 from 0.016 from 0.015 from 0.018 (forgot my Lupron) from < 0.014 from < 0.014  from <0.014 from <0.014 from <0.01 from < 0.014 from < 0.014 from <0.014 from < 0.014 from < 0.014 from < 0.014 from < 0.014 from < 0.014 from < 0.014 from < 0.014 from < 0.014 from < 0.014 from < 0.014 from < 0.014 from 0.015 from 0.03 from 0.125 from 1.03 from 27.3. Testosterone latest 26.6 from 25.7 from 22.1 from 15.6 from 15.2 from from 21 from 17.9 from 24.1 from 18.2 from 35.8 " "from 28.4 from 8.87 from 31.1 from 30.4 from 44.9 from 36.1 from 26.4 from 26.6 from 25.1 from 35.2 from 11.8 from 35.7 from 23.4 from 28.1 from 25 from 28.2 from 25.1 from 34.1 from 18.1 from 527 from 580 from 443 from 235.  Most recent leuprolide given on 8/21/2024.  Restage if PSA at 2 based on prostate cancer working group for rise of 0.2 on 2 or more occasions per  Association of urology.   5.   eRx for prochlorperazine 10 mg p.o. every 4 hours as needed for nausea or vomiting, 60, 2 refills if needed.    6.   eRx for C60D1 started on 1/29/2025 apalutamide 60 mg, take four tablets total dose 240 mg po daily, number, 120.  Take either with or without food.  Swallow tablets whole. TSH before apalutamide.  No grapefruit juice or grapefruit.  Observe for hot flashes, falling, fractures, arrhythmias, rash, seizures or hypothyroidism.    7.   Continue ongoing management per primary care physician and the other specialists.  8.   Plan of care discussed with patient.  Understanding expressed.  He agreed to proceed.  9.   Leuprolide per Dr. Oneil given every 6 months.  10.  Questions were answered to his satisfaction. \"Yes.\"   11.  Advance Care Planning  ACP discussion was declined by the patient. Patient does not have an advance directive, information provided.  12.  Consider PMSA PET to restage if PSA at least 0.2.  13.  Return to office 4 weeks with CMP, PSA, testosterone, and CBC with differential, same day.            I have reviewed the assessment and plan and verified the accuracy of it. No changes to assessment and plan since the information was documented. Deshawn Black MD 02/12/25           I spent 32 total minutes, face-to-face, caring for Simeon today. Greater than 50% of this time involved counseling and/or coordination of care as documented within this note.                           (Matthew Oneil MD)  (Mateo Duval MD)  Lucio Garcia MD          "

## 2025-02-12 ENCOUNTER — OFFICE VISIT (OUTPATIENT)
Dept: ONCOLOGY | Facility: CLINIC | Age: 80
End: 2025-02-12
Payer: MEDICARE

## 2025-02-12 ENCOUNTER — LAB (OUTPATIENT)
Dept: LAB | Facility: HOSPITAL | Age: 80
End: 2025-02-12
Payer: MEDICARE

## 2025-02-12 VITALS
BODY MASS INDEX: 28.96 KG/M2 | OXYGEN SATURATION: 94 % | DIASTOLIC BLOOD PRESSURE: 70 MMHG | SYSTOLIC BLOOD PRESSURE: 118 MMHG | HEART RATE: 88 BPM | HEIGHT: 68 IN | TEMPERATURE: 97.6 F | RESPIRATION RATE: 18 BRPM | WEIGHT: 191.1 LBS

## 2025-02-12 DIAGNOSIS — C61 PROSTATE CANCER: Primary | ICD-10-CM

## 2025-02-12 LAB
ALBUMIN SERPL-MCNC: 4.2 G/DL (ref 3.5–5.2)
ALBUMIN/GLOB SERPL: 1.3 G/DL
ALP SERPL-CCNC: 69 U/L (ref 39–117)
ALT SERPL W P-5'-P-CCNC: 17 U/L (ref 1–41)
ANION GAP SERPL CALCULATED.3IONS-SCNC: 13 MMOL/L (ref 5–15)
AST SERPL-CCNC: 28 U/L (ref 1–40)
BASOPHILS # BLD AUTO: 0.03 10*3/MM3 (ref 0–0.2)
BASOPHILS NFR BLD AUTO: 0.6 % (ref 0–1.5)
BILIRUB SERPL-MCNC: 0.3 MG/DL (ref 0–1.2)
BUN SERPL-MCNC: 10 MG/DL (ref 8–23)
BUN/CREAT SERPL: 19.2 (ref 7–25)
CALCIUM SPEC-SCNC: 9.4 MG/DL (ref 8.6–10.5)
CHLORIDE SERPL-SCNC: 97 MMOL/L (ref 98–107)
CO2 SERPL-SCNC: 25 MMOL/L (ref 22–29)
CREAT SERPL-MCNC: 0.52 MG/DL (ref 0.76–1.27)
DEPRECATED RDW RBC AUTO: 43.3 FL (ref 37–54)
EGFRCR SERPLBLD CKD-EPI 2021: 101.3 ML/MIN/1.73
EOSINOPHIL # BLD AUTO: 0.1 10*3/MM3 (ref 0–0.4)
EOSINOPHIL NFR BLD AUTO: 2 % (ref 0.3–6.2)
ERYTHROCYTE [DISTWIDTH] IN BLOOD BY AUTOMATED COUNT: 13.3 % (ref 12.3–15.4)
GLOBULIN UR ELPH-MCNC: 3.2 GM/DL
GLUCOSE SERPL-MCNC: 167 MG/DL (ref 65–99)
HCT VFR BLD AUTO: 38.6 % (ref 37.5–51)
HGB BLD-MCNC: 13.3 G/DL (ref 13–17.7)
HOLD SPECIMEN: NORMAL
IMM GRANULOCYTES # BLD AUTO: 0.01 10*3/MM3 (ref 0–0.05)
IMM GRANULOCYTES NFR BLD AUTO: 0.2 % (ref 0–0.5)
LYMPHOCYTES # BLD AUTO: 0.83 10*3/MM3 (ref 0.7–3.1)
LYMPHOCYTES NFR BLD AUTO: 16.4 % (ref 19.6–45.3)
MCH RBC QN AUTO: 30.4 PG (ref 26.6–33)
MCHC RBC AUTO-ENTMCNC: 34.5 G/DL (ref 31.5–35.7)
MCV RBC AUTO: 88.1 FL (ref 79–97)
MONOCYTES # BLD AUTO: 0.45 10*3/MM3 (ref 0.1–0.9)
MONOCYTES NFR BLD AUTO: 8.9 % (ref 5–12)
NEUTROPHILS NFR BLD AUTO: 3.65 10*3/MM3 (ref 1.7–7)
NEUTROPHILS NFR BLD AUTO: 71.9 % (ref 42.7–76)
PLATELET # BLD AUTO: 108 10*3/MM3 (ref 140–450)
PMV BLD AUTO: 10.5 FL (ref 6–12)
POTASSIUM SERPL-SCNC: 4.2 MMOL/L (ref 3.5–5.2)
PROT SERPL-MCNC: 7.4 G/DL (ref 6–8.5)
PSA SERPL-MCNC: 0.15 NG/ML (ref 0–4)
RBC # BLD AUTO: 4.38 10*6/MM3 (ref 4.14–5.8)
SODIUM SERPL-SCNC: 135 MMOL/L (ref 136–145)
TESTOST SERPL-MCNC: 33.8 NG/DL (ref 193–740)
WBC NRBC COR # BLD AUTO: 5.07 10*3/MM3 (ref 3.4–10.8)

## 2025-02-12 PROCEDURE — 85025 COMPLETE CBC W/AUTO DIFF WBC: CPT

## 2025-02-12 PROCEDURE — 80053 COMPREHEN METABOLIC PANEL: CPT

## 2025-02-12 PROCEDURE — 84403 ASSAY OF TOTAL TESTOSTERONE: CPT

## 2025-02-12 PROCEDURE — 36415 COLL VENOUS BLD VENIPUNCTURE: CPT

## 2025-02-12 PROCEDURE — 84153 ASSAY OF PSA TOTAL: CPT

## 2025-02-18 DIAGNOSIS — N31.9 NEUROGENIC BLADDER: ICD-10-CM

## 2025-02-18 RX ORDER — TOLTERODINE 4 MG/1
CAPSULE, EXTENDED RELEASE ORAL
Qty: 30 CAPSULE | Refills: 3 | Status: SHIPPED | OUTPATIENT
Start: 2025-02-18

## 2025-02-27 ENCOUNTER — SPECIALTY PHARMACY (OUTPATIENT)
Dept: ONCOLOGY | Facility: HOSPITAL | Age: 80
End: 2025-02-27
Payer: MEDICARE

## 2025-02-27 NOTE — PROGRESS NOTES
Specialty Pharmacy Patient Management Program  Refill Outreach     Simeon was contacted today regarding refills of their medication(s).    Refill Questions      Flowsheet Row Most Recent Value   Changes to allergies? No   Changes to medications? No   New conditions or infections since last clinic visit No   Unplanned office visit, urgent care, ED, or hospital admission in the last 4 weeks  No   How does patient/caregiver feel medication is working? Good   Financial problems or insurance changes  No   Since the previous refill, were any specialty medication doses or scheduled injections missed or delayed?  No   Does this patient require a clinical escalation to a pharmacist? No            Delivery Questions      Flowsheet Row Most Recent Value   Delivery method [employer]   Delivery address verified with patient/caregiver? Yes   Delivery address Home   Number of medications in delivery 1   Medication(s) being filled and delivered Apalutamide (ERLEADA)   Doses left of specialty medications 30   Copay verified? Yes   Copay amount $0   Copay form of payment No copayment ($0)   Delivery Date Selection 02/28/25   Signature Required No                 Follow-up: 30 day(s)     Dori Mathis, Pharmacy Technician  2/27/2025  14:41 CST

## 2025-03-03 NOTE — PROGRESS NOTES
[facility] ON[facility] GROUP HEMATOLOGY & ONCOLOGY  [address]  [facility] [address] [phone]    Patient Name: Simeon Tam  Encounter Date: 03/12/2025  YOB: 1945  Patient Number: [id number]      REASON FOR FOLLOW-UP: Simeon Tam is a pleasant 79 y.o.  male who is seen in follow-up for nonmetastatic hormone refractory prostate carcinoma (HRPC).  The patient is seen C61D15 of monotherapy with apalutamide.  He is seen with spouse, Essence. History is obtained from patient.  He is a reliable historian.         Oncology/Hematology History Overview Note   DIAGNOSTIC ABNORMALITIES:  He presented with rising PSA of 13 and was diagnosed with prostate cancer about 25 years ago.   Previous PSA was 22.26 on 10/05/2016, <0.13 on 07/17/2017, 7.91 on 10/17/2018, 19.29 on 03/19/2020, and 22.25 on 04/07/2020.   He was seen by Dr. Oneil 05/12/2020. Latest PSA was 22.25 ng/ml on 04/07/2020.  Previous PSA 22.26 on 10/05/2016.  Bone scan and CT scan of the abdomen and pelvis were negative for prostate cancer on 10/2016.  Started on Casodex and possibly leuprolide.  PSA  was down to <0.13 on 07/17/207.  Plan for apalutamide after staging scans.   CT abdomen and pelvis 06/11/2020. No evidence of metastatic disease in the abdomen or pelvis. Right renal pelvis and ureter urothelial thickening and hyperemia. Urinary bladder wall is thickened and there is adjacent inflammation. Recommend correlation with urinalysis and exam to exclude cystitis with right-sided pyelitis.  Cirrhosis.  Tiny 8 mm hypodense RIGHT inferior liver lesion on image 33 is too small to characterize.   Bone scan 06/11/2020. No evidence of bone metastases.        PREVIOUS INTERVENTIONS:  He was treated with adjuvant radiation at [place] and androgen ablation with Casodex.  Lupron and Casodex 10/2016. He had 7 months of ADT therapy with Lupron.  Apalutamide 07/22/2020 through present.      Prostate cancer   6/29/2020 -  Chemotherapy    OP PROSTATE Apalutamide     6/30/2020 Initial Diagnosis    Prostate cancer (CMS/HCC)     7/29/2020 -  Chemotherapy    OP LEUPROLIDE 45MG Q6M - ORDER EXPIRES 8/1/25     10/5/2022 - 10/5/2022 Chemotherapy    OP PROSTATE Apalutamide         PAST MEDICAL HISTORY:  ALLERGIES:  Allergies   Allergen Reactions    Sulfa Antibiotics Rash     CURRENT MEDICATIONS:  Outpatient Encounter Medications as of 3/12/2025   Medication Sig Dispense Refill    Apalutamide (ERLEADA) 60 MG tablet Take 4 tablets by mouth Daily. Take with or without food. Do not crush or chew tablets. 120 tablet 5    colestipol (COLESTID) 1 g tablet Take 2 tablets by mouth Daily.      dutasteride (AVODART) 0.5 MG capsule TAKE (1) CAPSULE ONCE DAILY. 90 capsule 0    JANUVIA 100 MG tablet Take 1 tablet by mouth Daily.      lisinopril-hydrochlorothiazide (PRINZIDE,ZESTORETIC) 20-12.5 MG per tablet Take 1 tablet by mouth Daily.      Loperamide HCl (IMODIUM PO) Take 1 dose by mouth As Needed (diarrhea).      Multiple Vitamins-Minerals (ICAPS AREDS 2 PO) Take 1 capsule by mouth Daily.      rosuvastatin (CRESTOR) 20 MG tablet Take 1 tablet by mouth Every Night.      tolterodine LA (DETROL LA) 4 MG 24 hr capsule TAKE (1) CAPSULE ONCE DAILY. 30 capsule 3     No facility-administered encounter medications on file as of 3/12/2025.     ADULT ILLNESSES:  Patient Active Problem List   Diagnosis Code    Hypertension I10    Hyperlipidemia E78.5    Diabetes mellitus E11.9    Cancer C80.1    Arrhythmia I49.9    Non morbid obesity due to excess calories E66.09    Prostate cancer C61    Anterior urethral stricture N35.914     SURGERIES:  Past Surgical History:   Procedure Laterality Date    CATARACT EXTRACTION      CYSTOSCOPY RETROGRADE PYELOGRAM Bilateral 4/21/2021    Procedure: CYSTOSCOPY RETROGRADE PYELOGRAM, possible DIRECT VISION INTERNAL URETHROTOMY;  Surgeon: Matthew Oneil MD;  Location: [facility];  Service: Urology;   "Laterality: Bilateral;    CYSTOSCOPY URETHROTOMY VISUAL INTERNAL Bilateral 4/21/2021    Procedure: possible DIRECT VISION INTERNAL URETHROTOMY;  Surgeon: Matthew Oneil MD;  Location: [facility];  Service: Urology;  Laterality: Bilateral;    EXCISION LESION      neck    KIDNEY STONE SURGERY      REPLACEMENT TOTAL KNEE Left     TONSILLECTOMY       HEALTH MAINTENANCE ITEMS:  Health Maintenance Due   Topic Date Due    LIPID PANEL  Never done    DIABETIC EYE EXAM  Never done    URINE MICROALBUMIN-CREATININE RATIO (uACR)  Never done    Pneumococcal Vaccine 50+ (1 of 2 - PCV) Never done    TDAP/TD VACCINES (1 - Tdap) Never done    ZOSTER VACCINE (1 of 2) Never done    Hepatitis B (1 of 3 - Risk 3-dose series) Never done    RSV Vaccine - Adults (1 - 1-dose 75+ series) Never done    HEMOGLOBIN A1C  Never done    BMI FOLLOWUP  04/27/2022    INFLUENZA VACCINE  Never done    COVID-19 Vaccine (4 - 2024-25 season) 09/01/2024       <no information>  Last Completed Colonoscopy    This patient has no relevant Health Maintenance data.         There is no immunization history on file for this patient.  Last Completed Mammogram    This patient has no relevant Health Maintenance data.           FAMILY HISTORY:  Family History   Problem Relation Age of Onset    Dementia Mother      SOCIAL HISTORY:  Social History     Socioeconomic History    Marital status:    Tobacco Use    Smoking status: Former     Current packs/day: 0.00     Types: Cigarettes     Passive exposure: Past    Smokeless tobacco: Never   Vaping Use    Vaping status: Never Used   Substance and Sexual Activity    Alcohol use: No    Drug use: No    Sexual activity: Defer       REVIEW OF SYSTEMS:    Review of Systems   Constitutional:  Negative for fatigue, fever and unexpected weight change.        \"I feel good.\"   HENT:  Negative for congestion and mouth sores.    Eyes:  Negative for discharge and redness.   Respiratory:  Negative for shortness of breath and " "wheezing.    Cardiovascular:  Negative for chest pain and palpitations.   Gastrointestinal:  Negative for constipation, diarrhea, nausea and vomiting.   Endocrine: Negative for cold intolerance and heat intolerance.   Genitourinary:  Negative for hematuria.   Musculoskeletal:  Negative for gait problem and myalgias.   Skin:  Negative for pallor.   Allergic/Immunologic: Negative for food allergies.   Neurological:  Negative for dizziness, speech difficulty and weakness.   Hematological:  Negative for adenopathy. Does not bruise/bleed easily.   Psychiatric/Behavioral:  Negative for agitation, confusion and hallucinations.        VITAL SIGNS: /60   Pulse 90   Temp 97.3 °F (36.3 °C)   Resp 16   Ht 172.7 cm (68\")   Wt 86.5 kg (190 lb 11.2 oz)   SpO2 92%   BMI 29.00 kg/m²   Pain Score    03/12/25 1115   PainSc: 0-No pain       PHYSICAL EXAMINATION:     Physical Exam  Vitals reviewed.   Constitutional:       General: He is not in acute distress.  HENT:      Head: Normocephalic and atraumatic.   Eyes:      General: No scleral icterus.  Cardiovascular:      Rate and Rhythm: Normal rate.   Pulmonary:      Effort: No respiratory distress.      Breath sounds: No wheezing.   Abdominal:      General: Bowel sounds are normal.      Palpations: Abdomen is soft.      Tenderness: There is no abdominal tenderness.   Genitourinary:     Comments: \"Do self catheter.\"  Musculoskeletal:         General: No swelling.      Cervical back: Neck supple.   Skin:     Coloration: Skin is not pale.   Neurological:      Mental Status: He is alert and oriented to person, place, and time.   Psychiatric:         Mood and Affect: Mood normal.         Behavior: Behavior normal.         Thought Content: Thought content normal.         Judgment: Judgment normal.         LABS    Lab Results - Last 18 Months   Lab Units 03/12/25  1102 02/12/25  1029 01/15/25  1043 12/18/24  1030 11/20/24  0951 09/25/24  1040 03/20/24  1043 02/21/24  1041 " 11/29/23  1215 11/01/23  1237   HEMOGLOBIN g/dL 13.5 13.3 14.0 13.3 12.9* 12.9*   < > 12.9*   < > 13.0   HEMATOCRIT % 40.1 38.6 44.6 39.4 38.2 39.2   < > 38.8   < > 38.9   MCV fL 88.5 88.1 93.9 89.7 88.8 89.7   < > 89.6   < > 88.2   WBC 10*3/mm3 5.40 5.07 7.06 5.81 5.70 4.62   < > 6.81   < > 5.98   RDW % 13.4 13.3 13.3 13.3 13.6 13.2   < > 13.0   < > 13.1   MPV fL 10.8 10.5 11.5 10.6 10.7 10.3   < > 10.4   < > 10.0   PLATELETS 10*3/mm3 113* 108* 123* 117* 104* 106*   < > 166   < > 146   IMM GRAN % % 0.2 0.2 0.4 0.3 0.2 0.4   < > 0.3  --  0.5   NEUTROS ABS 10*3/mm3 3.86 3.65 5.42 4.35 4.36 3.22   < > 5.23   < > 4.66   LYMPHS ABS 10*3/mm3 0.95 0.83 0.97 0.86 0.80 0.80   < > 0.92   < > 0.83   MONOS ABS 10*3/mm3 0.43 0.45 0.52 0.46 0.41 0.44   < > 0.58   < > 0.36   EOS ABS 10*3/mm3 0.11 0.10 0.08 0.09 0.10 0.11   < > 0.03   < > 0.06   BASOS ABS 10*3/mm3 0.04 0.03 0.04 0.03 0.02 0.03   < > 0.03   < > 0.04   IMMATURE GRANS (ABS) 10*3/mm3 0.01 0.01 0.03 0.02 0.01 0.02   < > 0.02  --  0.03   NRBC /100 WBC  --   --   --   --   --   --   --  0.0  --  0.0    < > = values in this interval not displayed.       Lab Results - Last 18 Months   Lab Units 02/12/25  1029 01/15/25  1043 12/18/24  1030 11/20/24  0951 10/23/24  1032 09/25/24  1040   GLUCOSE mg/dL 167* 152* 165* 159* 169* 157*   SODIUM mmol/L 135* 134* 135* 135* 136 133*   POTASSIUM mmol/L 4.2 4.3 4.1 3.8 4.8 4.0   CO2 mmol/L 25.0 26.0 26.0 25.0 26.0 27.0   CHLORIDE mmol/L 97* 94* 97* 98 97* 93*   ANION GAP mmol/L 13.0 14.0 12.0 12.0 13.0 13.0   CREATININE mg/dL 0.52* 0.58* 0.65* 0.64* 0.53* 0.60*   BUN mg/dL 10 17 15 18 15 11   BUN / CREAT RATIO  19.2 29.3* 23.1 28.1* 28.3* 18.3   CALCIUM mg/dL 9.4 9.9 9.4 9.5 9.4 9.4   ALK PHOS U/L 69 75 72 67 72 74   TOTAL PROTEIN g/dL 7.4 7.7 7.5 7.3 7.3 7.3   ALT (SGPT) U/L 17 18 12 12 17 21   AST (SGOT) U/L 28 24 19 21 27 26   BILIRUBIN mg/dL 0.3 0.5 0.6 0.5 0.5 0.3   ALBUMIN g/dL 4.2 4.5 4.4 4.0 4.5 4.3   GLOBULIN gm/dL 3.2 3.2 3.1  "3.3 2.8 3.0       No results for input(s): \"MSPIKE\", \"KAPPALAMB\", \"IGLFLC\", \"URICACID\", \"FREEKAPPAL\", \"CEA\", \"LDH\", \"REFLABREPO\" in the last 99394 hours.    No results for input(s): \"IRON\", \"TIBC\", \"LABIRON\", \"FERRITIN\", \"L0GTXYK\", \"TSH\", \"FOLATE\" in the last 31456 hours.    Invalid input(s): \"VITB12\"    Simeon Tam reports a pain score of 0.        ASSESSMENT:  1.   Prostate cancer, hormone refractory.   AJCC stage (TX, NX, M0)  Treatment status:Casodex and leuprolide with PSA recurrence.  Patient on therapy with apalutamide and leuprolde (by Dr. Oneil).  2.   Performance status of 0.  3.   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  On observation.  4.   Cirrhosis, etiology of thrombocytopenia.  On observation..  5.   Urethral stricture. Self catheterization as indicated. \"When needed.\"              PLAN:  1.    Re: Tolerance to apalutamide.  \"it's doing fine.\"   2.    Re:  Heme status.  Hemoglobin 13.5, WBC 5.4 and platelet 113 from 108 from 123 from 117 from 104 from 106 from 115 from 121.  3.    Re:  Pre-office CMP.  GFR latest 101.3 ml/minute and glucose latest 167.  4.    Re:  Pre-office PSA latest 0.148 from 0.147 from0.128 from0.117 from 0.118 from0.108 from 0.104 from0.083 from 0.086 from 0.086 from 0.083 from 0.101 from 0.08 from 0.047 from 0.064 from 0.056 from 0.071 from 0.053 from 0.050 from 0.047 from 0.048 from 0.038 from 0.029 from 0.031 from 0.031 from 0.026 from 0.024 from 0.019 from 0.020 from 0.016 from 0.015 from 0.018 (forgot my Lupron) from < 0.014 from < 0.014  from <0.014 from <0.014 from <0.01 from < 0.014 from < 0.014 from <0.014 from < 0.014 from < 0.014 from < 0.014 from < 0.014 from < 0.014 from < 0.014 from < 0.014 from < 0.014 from < 0.014 from < 0.014 from < 0.014 from 0.015 from 0.03 from 0.125 from 1.03 from 27.3. Testosterone latest 83.8 from 26.6 from 25.7 from 22.1 from 15.6 from 15.2 from from 21 from 17.9 from 24.1 from 18.2 " "from 35.8 from 28.4 from 8.87 from 31.1 from 30.4 from 44.9 from 36.1 from 26.4 from 26.6 from 25.1 from 35.2 from 11.8 from 35.7 from 23.4 from 28.1 from 25 from 28.2 from 25.1 from 34.1 from 18.1 from 527 from 580 from 443 from 235.  Most recent leuprolide given on 8/21/2024.  Restage if PSA at 2 based on prostate cancer working group for rise of 0.2 on 2 or more occasions per  Association of urology.   5.   eRx for prochlorperazine 10 mg p.o. every 4 hours as needed for nausea or vomiting, 60, 2 refills if needed.    6.   eRx for C61D1 started on 2/26/2025 apalutamide 60 mg, take four tablets total dose 240 mg po daily, number, 120.  Take either with or without food.  Swallow tablets whole. TSH before apalutamide.  No grapefruit juice or grapefruit.  Monitor for fractures, arrhythmias, falling, hot flashes, seizures, rash or hypothyroidism.    7.   Continue ongoing management per primary care physician and the other specialists.  8.   Plan of care discussed with patient and spouse.  Understanding expressed.  He is agreeable to proceed  9.   Leuprolide per Dr. Oneil given every 6 months.  10.  Questions were answered to his satisfaction. \"Yea.\"   11.  Advance Care Planning  ACP discussion was declined by the patient. Patient does not have an advance directive, information provided.  12.  Plan for PMSA PET to restage if PSA at least 0.2.  13.  Return to office 4 weeks with CMP, PSA, testosterone, and CBC with differential, same day.               I have reviewed the assessment and plan and verified the accuracy of it. No changes to assessment and plan since the information was documented. Deshawn Black MD 03/12/25         I spent 30 total minutes, face-to-face, caring for Simeon today. Greater than 50% of this time involved counseling and/or coordination of care as documented within this note.                           (Matthew Oneil MD)  (Mateo Duval MD)  Lucio Garcia MD        "

## 2025-03-05 ENCOUNTER — SPECIALTY PHARMACY (OUTPATIENT)
Dept: ONCOLOGY | Facility: HOSPITAL | Age: 80
End: 2025-03-05
Payer: MEDICARE

## 2025-03-05 ENCOUNTER — TELEPHONE (OUTPATIENT)
Dept: ONCOLOGY | Facility: CLINIC | Age: 80
End: 2025-03-05
Payer: MEDICARE

## 2025-03-05 ENCOUNTER — INFUSION (OUTPATIENT)
Dept: ONCOLOGY | Facility: HOSPITAL | Age: 80
End: 2025-03-05
Payer: MEDICARE

## 2025-03-05 VITALS
HEIGHT: 68 IN | RESPIRATION RATE: 16 BRPM | BODY MASS INDEX: 29.25 KG/M2 | HEART RATE: 101 BPM | DIASTOLIC BLOOD PRESSURE: 56 MMHG | OXYGEN SATURATION: 92 % | TEMPERATURE: 97.1 F | SYSTOLIC BLOOD PRESSURE: 134 MMHG | WEIGHT: 193 LBS

## 2025-03-05 DIAGNOSIS — C61 PROSTATE CANCER: Primary | ICD-10-CM

## 2025-03-05 PROCEDURE — 25010000002 LEUPROLIDE (6 MONTH) PER 7.5 MG

## 2025-03-05 PROCEDURE — 96402 CHEMO HORMON ANTINEOPL SQ/IM: CPT

## 2025-03-05 RX ADMIN — LEUPROLIDE ACETATE 45 MG: 45 INJECTION, SUSPENSION, EXTENDED RELEASE SUBCUTANEOUS at 13:36

## 2025-03-05 NOTE — TELEPHONE ENCOUNTER
Call from the patient's wife, Essence. She reports that they just received a call from another provider and Simeon has Ecoli infection. He will need to start an antibiotic and she wanted to make sure it is ok with Dr. Black. I discussed with Dr. Black and it is ok to start an antibiotic.

## 2025-03-05 NOTE — PROGRESS NOTES
Specialty Pharmacy Patient Management Program  Oncology 6-Month Clinical Assessment       Simeon Tam is a 79 y.o. male with Prostate Cancer seen today to assess adherence and side effects.    Consulting Provider: Deshawn Black MD ([facility])  Oral Chemotherapy Regimen: apalutamide [Erleada] 240 mg PO daily  Specialty Pharmacy: [facility]     Reason for Outreach: Routine medication check-in .    Oncology/Hematology History Overview Note    Oncology/Hematology History Overview Note   DIAGNOSTIC ABNORMALITIES:  He presented with rising PSA of 13 and was diagnosed with prostate cancer about 25 years ago.   Previous PSA was 22.26 on 10/05/2016, <0.13 on 07/17/2017, 7.91 on 10/17/2018, 19.29 on 03/19/2020, and 22.25 on 04/07/2020.   He was seen by Dr. Oneil 05/12/2020. Latest PSA was 22.25 ng/ml on 04/07/2020.  Previous PSA 22.26 on 10/05/2016.  Bone scan and CT scan of the abdomen and pelvis were negative for prostate cancer on 10/2016.  Started on Casodex and possibly leuprolide.  PSA  was down to <0.13 on 07/17/207.  Plan for apalutamide after staging scans.   CT abdomen and pelvis 06/11/2020. No evidence of metastatic disease in the abdomen or pelvis. Right renal pelvis and ureter urothelial thickening and hyperemia. Urinary bladder wall is thickened and there is adjacent inflammation. Recommend correlation with urinalysis and exam to exclude cystitis with right-sided pyelitis.  Cirrhosis.  Tiny 8 mm hypodense RIGHT inferior liver lesion on image 33 is too small to characterize.   Bone scan 06/11/2020. No evidence of bone metastases.        PREVIOUS INTERVENTIONS:  He was treated with adjuvant radiation at [place] and androgen ablation with Casodex.  Lupron and Casodex 10/2016. He had 7 months of ADT therapy with Lupron.  Apalutamide 07/22/2020 through present.     Prostate cancer   6/29/2020 -  Chemotherapy    OP PROSTATE  Apalutamide     6/30/2020 Initial Diagnosis    Prostate cancer (CMS/[facility])     7/29/2020 -  Chemotherapy    OP LEUPROLIDE 45MG Q6M - ORDER EXPIRES 8/1/25     10/5/2022 - 10/5/2022 Chemotherapy    OP PROSTATE Apalutamide         Problem List   Problem list reviewed by Carolee Diaz, Jill on 3/5/2025 at  4:53 PM  Medicines reviewed by Carolee Diaz PharmD on 3/5/2025 at  4:53 PM  Allergies reviewed by Carolee Diaz PharmD on 3/5/2025 at  4:53 PM  Patient Active Problem List   Diagnosis Code    Hypertension I10    Hyperlipidemia E78.5    Diabetes mellitus E11.9    Cancer C80.1    Arrhythmia I49.9    Non morbid obesity due to excess calories E66.09    Prostate cancer C61    Anterior urethral stricture N35.914       Specialty Pharmacy Goal:   Goals Addressed Today        Specialty Pharmacy General Goal      Achieve progression free disease as evidenced by provider markers (ie. undetectable PSA WNL).      03/05/25: Pre-office PSA latest 0.147 from0.128 from0.117 from 0.118 from0.108 from 0.104 from0.083 from 0.086 from 0.086 from 0.083 from 0.101 from 0.08 from 0.047 from 0.064 from 0.056 from 0.071 from 0.053 from 0.050 from 0.047 from 0.048 from 0.038 from 0.029 from 0.031 from 0.031 from 0.026 from 0.024 from 0.019 from 0.020 from 0.016 from 0.015 from 0.018 (forgot my Lupron) from < 0.014 from < 0.014  from <0.014 from <0.014 from <0.01 from < 0.014 from < 0.014 from <0.014 from < 0.014 from < 0.014 from < 0.014 from < 0.014 from < 0.014 from < 0.014 from < 0.014 from < 0.014 from < 0.014 from < 0.014 from < 0.014 from 0.015 from 0.03 from 0.125 from 1.03 from 27.3. Testosterone latest 26.6 from 25.7 from 22.1 from 15.6 from 15.2 from from 21 from 17.9 from 24.1 from 18.2 from 35.8 from 28.4 from 8.87 from 31.1 from 30.4 from 44.9 from 36.1 from 26.4 from 26.6 from 25.1 from 35.2 from 11.8 from 35.7 from 23.4 from 28.1 from 25 from 28.2 from 25.1 from 34.1 from 18.1 from 527 from 580 from 443 from  235. On track.              Medication Regimen Assessment:  Administration: Patient is taking every day at the same time  and as prescribed .   Patient can self administer medications: yes  Medication Follow-Up Plan: Refill coordination    Lab(s) Review:  The labs listed below have been reviewed. No dose adjustments are needed for the oral specialty medication(s) based on the labs.    Lab Results   Component Value Date    GLUCOSE 167 (H) 02/12/2025    CALCIUM 9.4 02/12/2025     (L) 02/12/2025    K 4.2 02/12/2025    CO2 25.0 02/12/2025    CL 97 (L) 02/12/2025    BUN 10 02/12/2025    CREATININE 0.52 (L) 02/12/2025    EGFRIFNONA 117 02/09/2022    BCR 19.2 02/12/2025    ANIONGAP 13.0 02/12/2025     Lab Results   Component Value Date    WBC 5.07 02/12/2025    RBC 4.38 02/12/2025    HGB 13.3 02/12/2025    HCT 38.6 02/12/2025    MCV 88.1 02/12/2025    MCH 30.4 02/12/2025    MCHC 34.5 02/12/2025    RDW 13.3 02/12/2025    RDWSD 43.3 02/12/2025    MPV 10.5 02/12/2025     (L) 02/12/2025    NEUTRORELPCT 71.9 02/12/2025    LYMPHORELPCT 16.4 (L) 02/12/2025    MONORELPCT 8.9 02/12/2025    EOSRELPCT 2.0 02/12/2025    BASORELPCT 0.6 02/12/2025    AUTOIGPER 0.2 02/12/2025    NEUTROABS 3.65 02/12/2025    LYMPHSABS 0.83 02/12/2025    MONOSABS 0.45 02/12/2025    EOSABS 0.10 02/12/2025    BASOSABS 0.03 02/12/2025    AUTOIGNUM 0.01 02/12/2025    NRBC 0.0 02/21/2024       Drug-Drug Interaction(s) Assessment:  Completed medication reconciliation today to assess for drug interactions. Patient denies starting or stopping any medications.    Assessed medication list for interactions, no significant drug interactions noted.   Advised patient to call the clinic if any new medications are started so we can assess for drug-drug interactions.  Drug-food interactions discussed: eating grapefruit and drinking grapefruit juice  Vaccines are coordinated by the patient's oncologist and primary care provider.    Medication(s) Review:    Medicines reviewed by Carolee Diaz, PharmD on 3/5/2025 at  4:53 PM  Prior to Admission medications    Medication Sig Start Date End Date Taking? Authorizing Provider   Apalutamide (ERLEADA) 60 MG tablet Take 4 tablets by mouth Daily. Take with or without food. Do not crush or chew tablets. 1/30/25   Deshawn Black MD   colestipol (COLESTID) 1 g tablet Take 2 tablets by mouth Daily. 3/11/24   Tati Hancock MD   dutasteride (AVODART) 0.5 MG capsule TAKE (1) CAPSULE ONCE DAILY. 1/16/25   Matthew Oneil MD   JANUVIA 100 MG tablet Take 1 tablet by mouth Daily. 11/28/16   Tati Hancock MD   lisinopril-hydrochlorothiazide (PRINZIDE,ZESTORETIC) 20-12.5 MG per tablet Take 1 tablet by mouth Daily. 11/28/16   Tati Hancock MD   Loperamide HCl (IMODIUM PO) Take 1 dose by mouth As Needed (diarrhea).    Tati Hancock MD   Multiple Vitamins-Minerals (ICAPS AREDS 2 PO) Take 1 capsule by mouth Daily.    Tati Hancock MD   rosuvastatin (CRESTOR) 20 MG tablet Take 1 tablet by mouth Every Night. 11/28/16   Tati Hancock MD   tolterodine LA (DETROL LA) 4 MG 24 hr capsule TAKE (1) CAPSULE ONCE DAILY. 2/18/25   Matthew Oneil MD   tolterodine LA (DETROL LA) 4 MG 24 hr capsule TAKE (1) CAPSULE ONCE DAILY. 1/16/25 2/18/25  Matthew Oneil MD       Allergy Review:  Known allergies and reactions were discussed with the patient. The patient's chart has been reviewed for allergy information and updated as necessary.   Allergies   Allergen Reactions    Sulfa Antibiotics Rash         Allergies reviewed by Carolee Diaz, PharmD on 3/5/2025 at  4:53 PM    Hospitalizations and Urgent Care Visits Since Last Visit Assessment:  Unplanned hospitalizations or inpatient admissions: none reported  ED Visits: none reported  Urgent Office Visits: none reported    Adherence Assessment:  No adherence issues noted during 6-month clinical discussion.    Quality of Life Assessment:  Quality of  Life: 7/10    Financial Assessment:  Medication availability/affordability: Patient has had no issues obtaining medication from pharmacy.    Intervention(s):  Patient currently has an E. Coli infection and is taking an antibiotic (okay'd by Dr. Black to proceed with antibiotic). Did not update on medication list, as the antibiotic is not long term.     Follow-Up(s):  No needs expressed by the patient or otherwise identified. Will follow-up in ~1 month(s) regarding the patient's oral chemotherapy with a routine telephone consultation. Will conduct the next in-person clinical assessment visit within the next 6 month(s).    Attestation:  I attest the patient was actively involved in and has agreed to the above plan of care. If the prescribed therapy is at any point deemed not appropriate based on the current or future assessments, a consultation will be initiated with the patient's specialty care provider to determine the best course of action. The revised plan of therapy will be documented along with any required assessments and/or additional patient education provided.     Finally, all questions and concerns today were addressed to the best of my knowledge within the 6-month clinical assessment office visit. Patient verbalized they had the Specialty Pharmacy Services contact information for any future concerns or questions.         Electronically signed 03/05/2025 16:54 CST by:  Carolee Diaz PharmD  Hematology & Oncology Clinic Specialty Pharmacist  [facility]  Phone: [phone]

## 2025-03-12 ENCOUNTER — LAB (OUTPATIENT)
Dept: LAB | Facility: HOSPITAL | Age: 80
End: 2025-03-12
Payer: MEDICARE

## 2025-03-12 ENCOUNTER — OFFICE VISIT (OUTPATIENT)
Dept: ONCOLOGY | Facility: CLINIC | Age: 80
End: 2025-03-12
Payer: MEDICARE

## 2025-03-12 VITALS
RESPIRATION RATE: 16 BRPM | OXYGEN SATURATION: 92 % | BODY MASS INDEX: 28.9 KG/M2 | HEIGHT: 68 IN | TEMPERATURE: 97.3 F | SYSTOLIC BLOOD PRESSURE: 112 MMHG | WEIGHT: 190.7 LBS | HEART RATE: 90 BPM | DIASTOLIC BLOOD PRESSURE: 60 MMHG

## 2025-03-12 DIAGNOSIS — C61 PROSTATE CANCER: Primary | ICD-10-CM

## 2025-03-12 LAB
ALBUMIN SERPL-MCNC: 4.3 G/DL (ref 3.5–5.2)
ALBUMIN/GLOB SERPL: 1.3 G/DL
ALP SERPL-CCNC: 76 U/L (ref 39–117)
ALT SERPL W P-5'-P-CCNC: 19 U/L (ref 1–41)
ANION GAP SERPL CALCULATED.3IONS-SCNC: 12 MMOL/L (ref 5–15)
AST SERPL-CCNC: 28 U/L (ref 1–40)
BASOPHILS # BLD AUTO: 0.04 10*3/MM3 (ref 0–0.2)
BASOPHILS NFR BLD AUTO: 0.7 % (ref 0–1.5)
BILIRUB SERPL-MCNC: 0.5 MG/DL (ref 0–1.2)
BUN SERPL-MCNC: 13 MG/DL (ref 8–23)
BUN/CREAT SERPL: 21 (ref 7–25)
CALCIUM SPEC-SCNC: 9.9 MG/DL (ref 8.6–10.5)
CHLORIDE SERPL-SCNC: 95 MMOL/L (ref 98–107)
CO2 SERPL-SCNC: 28 MMOL/L (ref 22–29)
CREAT SERPL-MCNC: 0.62 MG/DL (ref 0.76–1.27)
DEPRECATED RDW RBC AUTO: 43.7 FL (ref 37–54)
EGFRCR SERPLBLD CKD-EPI 2021: 96 ML/MIN/1.73
EOSINOPHIL # BLD AUTO: 0.11 10*3/MM3 (ref 0–0.4)
EOSINOPHIL NFR BLD AUTO: 2 % (ref 0.3–6.2)
ERYTHROCYTE [DISTWIDTH] IN BLOOD BY AUTOMATED COUNT: 13.4 % (ref 12.3–15.4)
GLOBULIN UR ELPH-MCNC: 3.3 GM/DL
GLUCOSE SERPL-MCNC: 169 MG/DL (ref 65–99)
HCT VFR BLD AUTO: 40.1 % (ref 37.5–51)
HGB BLD-MCNC: 13.5 G/DL (ref 13–17.7)
HOLD SPECIMEN: NORMAL
IMM GRANULOCYTES # BLD AUTO: 0.01 10*3/MM3 (ref 0–0.05)
IMM GRANULOCYTES NFR BLD AUTO: 0.2 % (ref 0–0.5)
LYMPHOCYTES # BLD AUTO: 0.95 10*3/MM3 (ref 0.7–3.1)
LYMPHOCYTES NFR BLD AUTO: 17.6 % (ref 19.6–45.3)
MCH RBC QN AUTO: 29.8 PG (ref 26.6–33)
MCHC RBC AUTO-ENTMCNC: 33.7 G/DL (ref 31.5–35.7)
MCV RBC AUTO: 88.5 FL (ref 79–97)
MONOCYTES # BLD AUTO: 0.43 10*3/MM3 (ref 0.1–0.9)
MONOCYTES NFR BLD AUTO: 8 % (ref 5–12)
NEUTROPHILS NFR BLD AUTO: 3.86 10*3/MM3 (ref 1.7–7)
NEUTROPHILS NFR BLD AUTO: 71.5 % (ref 42.7–76)
PLATELET # BLD AUTO: 113 10*3/MM3 (ref 140–450)
PMV BLD AUTO: 10.8 FL (ref 6–12)
POTASSIUM SERPL-SCNC: 3.9 MMOL/L (ref 3.5–5.2)
PROT SERPL-MCNC: 7.6 G/DL (ref 6–8.5)
PSA SERPL-MCNC: 0.13 NG/ML (ref 0–4)
RBC # BLD AUTO: 4.53 10*6/MM3 (ref 4.14–5.8)
SODIUM SERPL-SCNC: 135 MMOL/L (ref 136–145)
TESTOST SERPL-MCNC: 20.3 NG/DL (ref 193–740)
WBC NRBC COR # BLD AUTO: 5.4 10*3/MM3 (ref 3.4–10.8)

## 2025-03-12 PROCEDURE — 84153 ASSAY OF PSA TOTAL: CPT

## 2025-03-12 PROCEDURE — 80053 COMPREHEN METABOLIC PANEL: CPT

## 2025-03-12 PROCEDURE — 84403 ASSAY OF TOTAL TESTOSTERONE: CPT

## 2025-03-12 PROCEDURE — 85025 COMPLETE CBC W/AUTO DIFF WBC: CPT

## 2025-03-12 PROCEDURE — 36415 COLL VENOUS BLD VENIPUNCTURE: CPT

## 2025-03-25 ENCOUNTER — SPECIALTY PHARMACY (OUTPATIENT)
Dept: ONCOLOGY | Facility: HOSPITAL | Age: 80
End: 2025-03-25
Payer: MEDICARE

## 2025-03-25 NOTE — PROGRESS NOTES
Specialty Pharmacy Patient Management Program  Refill Outreach     Simeon was contacted today regarding refills of their medication(s).    Refill Questions      Flowsheet Row Most Recent Value   Changes to allergies? No   Changes to medications? No   New conditions or infections since last clinic visit No   Unplanned office visit, urgent care, ED, or hospital admission in the last 4 weeks  No   How does patient/caregiver feel medication is working? Good   Financial problems or insurance changes  No   Since the previous refill, were any specialty medication doses or scheduled injections missed or delayed?  No   Does this patient require a clinical escalation to a pharmacist? No            Delivery Questions      Flowsheet Row Most Recent Value   Delivery method UPS   Delivery address verified with patient/caregiver? Yes   Delivery address Home   Number of medications in delivery 1   Medication(s) being filled and delivered Apalutamide (ERLEADA)   Doses left of specialty medications 30   Copay verified? Yes   Copay amount $0   Copay form of payment No copayment ($0)   Delivery Date Selection 03/26/25   Signature Required No   Do you consent to receive electronic handouts?  Yes                 Follow-up: 30 day(s)     Dori Mathis, Pharmacy Technician  3/25/2025  08:44 CDT

## 2025-04-03 NOTE — PROGRESS NOTES
[facility] GROUP HEMATOLOGY & ONCOLOGY  [address]  Located within [facility] [address] [phone]    Patient Name: Simeon Tam  Encounter Date: 04/09/2025  YOB: 1945  Patient Number: [id number]      REASON FOR FOLLOW-UP: Simeon Tam is a pleasant 79 y.o.  male who is seen in follow-up for nonmetastatic hormone refractory prostate carcinoma (HRPC).  The patient is seen C62D15 of monotherapy with apalutamide.  He is seen with spouse, Essence. History is obtained from patient.  The patient is a reliable historian.           Oncology/Hematology History Overview Note   DIAGNOSTIC ABNORMALITIES:  He presented with rising PSA of 13 and was diagnosed with prostate cancer about 25 years ago.   Previous PSA was 22.26 on 10/05/2016, <0.13 on 07/17/2017, 7.91 on 10/17/2018, 19.29 on 03/19/2020, and 22.25 on 04/07/2020.   He was seen by Dr. Oneil 05/12/2020. Latest PSA was 22.25 ng/ml on 04/07/2020.  Previous PSA 22.26 on 10/05/2016.  Bone scan and CT scan of the abdomen and pelvis were negative for prostate cancer on 10/2016.  Started on Casodex and possibly leuprolide.  PSA  was down to <0.13 on 07/17/207.  Plan for apalutamide after staging scans.   CT abdomen and pelvis 06/11/2020. No evidence of metastatic disease in the abdomen or pelvis. Right renal pelvis and ureter urothelial thickening and hyperemia. Urinary bladder wall is thickened and there is adjacent inflammation. Recommend correlation with urinalysis and exam to exclude cystitis with right-sided pyelitis.  Cirrhosis.  Tiny 8 mm hypodense RIGHT inferior liver lesion on image 33 is too small to characterize.   Bone scan 06/11/2020. No evidence of bone metastases.        PREVIOUS INTERVENTIONS:  He was treated with adjuvant radiation at [facility] and androgen ablation with Casodex.  Lupron and Casodex 10/2016. He had 7 months of ADT therapy with Lupron.  Apalutamide 07/22/2020 through  present.     Prostate cancer   6/29/2020 -  Chemotherapy    OP PROSTATE Apalutamide     6/30/2020 Initial Diagnosis    Prostate cancer (CMS/[facility])     7/29/2020 -  Chemotherapy    OP LEUPROLIDE 45MG Q6M - ORDER EXPIRES 8/1/25     10/5/2022 - 10/5/2022 Chemotherapy    OP PROSTATE Apalutamide         PAST MEDICAL HISTORY:  ALLERGIES:  Allergies   Allergen Reactions    Sulfa Antibiotics Rash     CURRENT MEDICATIONS:  Outpatient Encounter Medications as of 4/9/2025   Medication Sig Dispense Refill    Apalutamide (ERLEADA) 60 MG tablet Take 4 tablets by mouth Daily. Take with or without food. Do not crush or chew tablets. 120 tablet 5    colestipol (COLESTID) 1 g tablet Take 2 tablets by mouth Daily.      dutasteride (AVODART) 0.5 MG capsule TAKE (1) CAPSULE ONCE DAILY. 90 capsule 0    JANUVIA 100 MG tablet Take 1 tablet by mouth Daily.      lisinopril-hydrochlorothiazide (PRINZIDE,ZESTORETIC) 20-12.5 MG per tablet Take 1 tablet by mouth Daily.      Loperamide HCl (IMODIUM PO) Take 1 dose by mouth As Needed (diarrhea).      Multiple Vitamins-Minerals (ICAPS AREDS 2 PO) Take 1 capsule by mouth Daily.      rosuvastatin (CRESTOR) 20 MG tablet Take 1 tablet by mouth Every Night.      tolterodine LA (DETROL LA) 4 MG 24 hr capsule TAKE (1) CAPSULE ONCE DAILY. 30 capsule 3     No facility-administered encounter medications on file as of 4/9/2025.     ADULT ILLNESSES:  Patient Active Problem List   Diagnosis Code    Hypertension I10    Hyperlipidemia E78.5    Diabetes mellitus E11.9    Cancer C80.1    Arrhythmia I49.9    Non morbid obesity due to excess calories E66.09    Prostate cancer C61    Anterior urethral stricture N35.914     SURGERIES:  Past Surgical History:   Procedure Laterality Date    CATARACT EXTRACTION      CYSTOSCOPY RETROGRADE PYELOGRAM Bilateral 4/21/2021    Procedure: CYSTOSCOPY RETROGRADE PYELOGRAM, possible DIRECT VISION INTERNAL URETHROTOMY;  Surgeon: Matthew Oneil MD;  Location: [facility];  Service:  "Urology;  Laterality: Bilateral;    CYSTOSCOPY URETHROTOMY VISUAL INTERNAL Bilateral 4/21/2021    Procedure: possible DIRECT VISION INTERNAL URETHROTOMY;  Surgeon: Matthew Oneil MD;  Location: [facility];  Service: Urology;  Laterality: Bilateral;    EXCISION LESION      neck    KIDNEY STONE SURGERY      REPLACEMENT TOTAL KNEE Left     TONSILLECTOMY       HEALTH MAINTENANCE ITEMS:  Health Maintenance Due   Topic Date Due    LIPID PANEL  Never done    DIABETIC EYE EXAM  Never done    URINE MICROALBUMIN-CREATININE RATIO (uACR)  Never done    Pneumococcal Vaccine 50+ (1 of 2 - PCV) Never done    TDAP/TD VACCINES (1 - Tdap) Never done    ZOSTER VACCINE (1 of 2) Never done    Hepatitis B (1 of 3 - Risk 3-dose series) Never done    RSV Vaccine - Adults (1 - 1-dose 75+ series) Never done    HEMOGLOBIN A1C  Never done    COVID-19 Vaccine (4 - 2024-25 season) 09/01/2024       <no information>  Last Completed Colonoscopy    This patient has no relevant Health Maintenance data.         There is no immunization history on file for this patient.  Last Completed Mammogram    This patient has no relevant Health Maintenance data.           FAMILY HISTORY:  Family History   Problem Relation Age of Onset    Dementia Mother      SOCIAL HISTORY:  Social History     Socioeconomic History    Marital status:    Tobacco Use    Smoking status: Former     Current packs/day: 0.00     Types: Cigarettes     Passive exposure: Past    Smokeless tobacco: Never   Vaping Use    Vaping status: Never Used   Substance and Sexual Activity    Alcohol use: No    Drug use: No    Sexual activity: Defer       REVIEW OF SYSTEMS:    Review of Systems   Constitutional:  Negative for fatigue, fever and unexpected weight change.        \"I am good.\"   HENT:  Negative for congestion and mouth sores.    Eyes:  Negative for discharge and redness.   Respiratory:  Negative for shortness of breath and wheezing.    Cardiovascular:  Negative for chest pain and " "palpitations.   Gastrointestinal:  Negative for constipation, diarrhea, nausea and vomiting.   Endocrine: Negative for cold intolerance and heat intolerance.   Genitourinary:         \"Self catheter.\"   Musculoskeletal:  Negative for gait problem.   Skin:  Negative for pallor.   Allergic/Immunologic: Negative for food allergies.   Neurological:  Negative for dizziness, speech difficulty and weakness.   Hematological:  Negative for adenopathy. Does not bruise/bleed easily.   Psychiatric/Behavioral:  Negative for agitation and confusion. The patient is not nervous/anxious.        VITAL SIGNS: /68   Pulse 92   Temp 97.3 °F (36.3 °C)   Resp 16   Ht 172.7 cm (68\")   Wt 85.3 kg (188 lb)   SpO2 94%   BMI 28.59 kg/m²  Lost 2 pounds.   Pain Score    04/09/25 1149   PainSc: 0-No pain       PHYSICAL EXAMINATION:     Physical Exam  Vitals reviewed.   Constitutional:       General: He is not in acute distress.  HENT:      Head: Normocephalic and atraumatic.   Eyes:      General: No scleral icterus.  Cardiovascular:      Rate and Rhythm: Normal rate.   Pulmonary:      Effort: No respiratory distress.      Breath sounds: No wheezing.   Abdominal:      General: Bowel sounds are normal.      Palpations: Abdomen is soft.      Tenderness: There is no abdominal tenderness.   Musculoskeletal:         General: No swelling.      Cervical back: Neck supple.   Skin:     Coloration: Skin is not pale.   Neurological:      Mental Status: He is alert and oriented to person, place, and time.   Psychiatric:         Mood and Affect: Mood normal.         Behavior: Behavior normal.         Thought Content: Thought content normal.         Judgment: Judgment normal.         LABS    Lab Results - Last 18 Months   Lab Units 04/09/25  1050 03/12/25  1102 02/12/25  1029 01/15/25  1043 12/18/24  1030 11/20/24  0951 09/25/24  1040 03/20/24  1043 02/21/24  1041 11/29/23  1215 11/01/23  1237   HEMOGLOBIN g/dL 13.4 13.5 13.3 14.0 13.3 12.9* 12.9*   " < > 12.9*   < > 13.0   HEMATOCRIT % 38.9 40.1 38.6 44.6 39.4 38.2 39.2   < > 38.8   < > 38.9   MCV fL 87.8 88.5 88.1 93.9 89.7 88.8 89.7   < > 89.6   < > 88.2   WBC 10*3/mm3 6.47 5.40 5.07 7.06 5.81 5.70 4.62   < > 6.81   < > 5.98   RDW % 13.0 13.4 13.3 13.3 13.3 13.6 13.2   < > 13.0   < > 13.1   MPV fL 10.7 10.8 10.5 11.5 10.6 10.7 10.3   < > 10.4   < > 10.0   PLATELETS 10*3/mm3 135* 113* 108* 123* 117* 104* 106*   < > 166   < > 146   IMM GRAN % %  --  0.2 0.2 0.4 0.3 0.2 0.4   < > 0.3  --  0.5   NEUTROS ABS 10*3/mm3 4.40 3.86 3.65 5.42 4.35 4.36 3.22   < > 5.23   < > 4.66   LYMPHS ABS 10*3/mm3  --  0.95 0.83 0.97 0.86 0.80 0.80   < > 0.92   < > 0.83   MONOS ABS 10*3/mm3  --  0.43 0.45 0.52 0.46 0.41 0.44   < > 0.58   < > 0.36   EOS ABS 10*3/mm3 0.00 0.11 0.10 0.08 0.09 0.10 0.11   < > 0.03   < > 0.06   BASOS ABS 10*3/mm3 0.06 0.04 0.03 0.04 0.03 0.02 0.03   < > 0.03   < > 0.04   IMMATURE GRANS (ABS) 10*3/mm3  --  0.01 0.01 0.03 0.02 0.01 0.02   < > 0.02  --  0.03   NRBC /100 WBC  --   --   --   --   --   --   --   --  0.0  --  0.0   NEUTROPHIL % % 67.0  --   --   --   --   --   --   --   --   --   --    MONOCYTES % % 5.0  --   --   --   --   --   --   --   --   --   --    BASOPHIL % % 1.0  --   --   --   --   --   --   --   --   --   --    ATYP LYMPH % % 16.0*  --   --   --   --   --   --   --   --   --   --     < > = values in this interval not displayed.       Lab Results - Last 18 Months   Lab Units 04/09/25  1050 03/12/25  1102 02/12/25  1029 01/15/25  1043 12/18/24  1030 11/20/24  0951   GLUCOSE mg/dL 174* 169* 167* 152* 165* 159*   SODIUM mmol/L 130* 135* 135* 134* 135* 135*   POTASSIUM mmol/L 4.3 3.9 4.2 4.3 4.1 3.8   CO2 mmol/L 23.0 28.0 25.0 26.0 26.0 25.0   CHLORIDE mmol/L 95* 95* 97* 94* 97* 98   ANION GAP mmol/L 12.0 12.0 13.0 14.0 12.0 12.0   CREATININE mg/dL 0.57* 0.62* 0.52* 0.58* 0.65* 0.64*   BUN mg/dL 14 13 10 17 15 18   BUN / CREAT RATIO  24.6 21.0 19.2 29.3* 23.1 28.1*   CALCIUM mg/dL 9.7 9.9  "9.4 9.9 9.4 9.5   ALK PHOS U/L 63 76 69 75 72 67   TOTAL PROTEIN g/dL 7.6 7.6 7.4 7.7 7.5 7.3   ALT (SGPT) U/L 10 19 17 18 12 12   AST (SGOT) U/L 16 28 28 24 19 21   BILIRUBIN mg/dL 0.4 0.5 0.3 0.5 0.6 0.5   ALBUMIN g/dL 4.1 4.3 4.2 4.5 4.4 4.0   GLOBULIN gm/dL 3.5 3.3 3.2 3.2 3.1 3.3       No results for input(s): \"MSPIKE\", \"KAPPALAMB\", \"IGLFLC\", \"URICACID\", \"FREEKAPPAL\", \"CEA\", \"LDH\", \"REFLABREPO\" in the last 73455 hours.    No results for input(s): \"IRON\", \"TIBC\", \"LABIRON\", \"FERRITIN\", \"U8SHYXF\", \"TSH\", \"FOLATE\" in the last 92972 hours.    Invalid input(s): \"VITB12\"    Simeon Tam reports a pain score of 0.            ASSESSMENT:  1.   Prostate cancer, hormone refractory.   AJCC stage (TX, NX, M0)  Treatment status:Casodex and leuprolide with PSA recurrence.  Patient on therapy with apalutamide and leuprolde (by Dr. Oneil).  2.   Performance status of 0.  3.   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  On observation.  4.   Cirrhosis, etiology of thrombocytopenia.  Off treatment.  5.   Urethral stricture. Self catheterization as indicated. \"As needed.\"              PLAN:  1.    Re: Tolerance to apalutamide.  \"No problems.\"   2.    Re:  Heme status.  Hemoglobin 13.4, WBC 6.4 and platelet 135 from 113 from 108 from 123 from 117 from 104 from 106 from 115 from 121.  3.    Re:  Pre-office CMP.  GFR 98.5 from 96 from 101.3 ml/minute and glucose 174 from 169.  4.    Re:  Pre-office PSA latest 0.130 from 0.148 from 0.147 from0.128 from0.117 from 0.118 from0.108 from 0.104 from0.083 from 0.086 from 0.086 from 0.083 from 0.101 from 0.08 from 0.047 from 0.064 from 0.056 from 0.071 from 0.053 from 0.050 from 0.047 from 0.048 from 0.038 from 0.029 from 0.031 from 0.031 from 0.026 from 0.024 from 0.019 from 0.020 from 0.016 from 0.015 from 0.018 (forgot my Lupron) from < 0.014 from < 0.014  from <0.014 from <0.014 from <0.01 from < 0.014 from < 0.014 from <0.014 from < 0.014 " "from < 0.014 from < 0.014 from < 0.014 from < 0.014 from < 0.014 from < 0.014 from < 0.014 from < 0.014 from < 0.014 from < 0.014 from 0.015 from 0.03 from 0.125 from 1.03 from 27.3. Testosterone latest 20.3 from 33.8 from 26.6 from 25.7 from 22.1 from 15.6 from 15.2 from from 21 from 17.9 from 24.1 from 18.2 from 35.8 from 28.4 from 8.87 from 31.1 from 30.4 from 44.9 from 36.1 from 26.4 from 26.6 from 25.1 from 35.2 from 11.8 from 35.7 from 23.4 from 28.1 from 25 from 28.2 from 25.1 from 34.1 from 18.1 from 527 from 580 from 443 from 235.  Most recent leuprolide given on 8/21/2024.  Restage if PSA at 2 based on prostate cancer working group for rise of 0.2 on 2 or more occasions per  Association of urology.   5.   eRx for prochlorperazine 10 mg p.o. every 4 hours as needed for nausea or vomiting, 60, 2 refills if needed.    6.   eRx for C62D1 started on 3/26/2025 apalutamide 60 mg, take four tablets total dose 240 mg po daily, number, 120.  Take either with or without food.  Swallow tablets whole. TSH before apalutamide.  No grapefruit juice or grapefruit.  Monitor for hot flashes, seizures, falling, fractures, arrhythmias, rash or hypothyroidism.    7.   Continue ongoing management per primary care physician and the other specialists.  8.   Plan of care discussed with patient and his spouse.  Understanding expressed.  He agreed to proceed.  9.   Leuprolide per Dr. Oneil given every 6 months.  10.  Questions were answered to his satisfaction. \"Yea.\"   11.  Advance Care Planning  ACP discussion was declined by the patient. Patient does not have an advance directive, information provided.  12.  Plan to obtain PSMA PET to restage if PSA at least 0.2.  13.  Return to office 4 weeks with CMP, PSA, testosterone, and CBC with differential, same day.             I have reviewed the assessment and plan and verified the accuracy of it. No changes to assessment and plan since the information was documented. Deshawn" MD Sofia 04/09/25          I spent 31 total minutes, face-to-face, caring for Simeon today. Greater than 50% of this time involved counseling and/or coordination of care as documented within this note.                           (Matthew Oneil MD)  (Mateo Duval MD)  Lucio Garcia MD

## 2025-04-09 ENCOUNTER — LAB (OUTPATIENT)
Dept: LAB | Facility: HOSPITAL | Age: 80
End: 2025-04-09
Payer: MEDICARE

## 2025-04-09 ENCOUNTER — OFFICE VISIT (OUTPATIENT)
Dept: ONCOLOGY | Facility: CLINIC | Age: 80
End: 2025-04-09
Payer: MEDICARE

## 2025-04-09 VITALS
DIASTOLIC BLOOD PRESSURE: 68 MMHG | WEIGHT: 188 LBS | HEIGHT: 68 IN | HEART RATE: 92 BPM | OXYGEN SATURATION: 94 % | TEMPERATURE: 97.3 F | SYSTOLIC BLOOD PRESSURE: 118 MMHG | RESPIRATION RATE: 16 BRPM | BODY MASS INDEX: 28.49 KG/M2

## 2025-04-09 DIAGNOSIS — C61 PROSTATE CANCER: Primary | ICD-10-CM

## 2025-04-09 LAB
ALBUMIN SERPL-MCNC: 4.1 G/DL (ref 3.5–5.2)
ALBUMIN/GLOB SERPL: 1.2 G/DL
ALP SERPL-CCNC: 63 U/L (ref 39–117)
ALT SERPL W P-5'-P-CCNC: 10 U/L (ref 1–41)
ANION GAP SERPL CALCULATED.3IONS-SCNC: 12 MMOL/L (ref 5–15)
AST SERPL-CCNC: 16 U/L (ref 1–40)
BASOPHILS # BLD MANUAL: 0.06 10*3/MM3 (ref 0–0.2)
BASOPHILS NFR BLD MANUAL: 1 % (ref 0–1.5)
BILIRUB SERPL-MCNC: 0.4 MG/DL (ref 0–1.2)
BUN SERPL-MCNC: 14 MG/DL (ref 8–23)
BUN/CREAT SERPL: 24.6 (ref 7–25)
CALCIUM SPEC-SCNC: 9.7 MG/DL (ref 8.6–10.5)
CHLORIDE SERPL-SCNC: 95 MMOL/L (ref 98–107)
CO2 SERPL-SCNC: 23 MMOL/L (ref 22–29)
CREAT SERPL-MCNC: 0.57 MG/DL (ref 0.76–1.27)
DEPRECATED RDW RBC AUTO: 42 FL (ref 37–54)
EGFRCR SERPLBLD CKD-EPI 2021: 98.5 ML/MIN/1.73
EOSINOPHIL # BLD MANUAL: 0 10*3/MM3 (ref 0–0.4)
EOSINOPHIL NFR BLD MANUAL: 0 % (ref 0.3–6.2)
ERYTHROCYTE [DISTWIDTH] IN BLOOD BY AUTOMATED COUNT: 13 % (ref 12.3–15.4)
GLOBULIN UR ELPH-MCNC: 3.5 GM/DL
GLUCOSE SERPL-MCNC: 174 MG/DL (ref 65–99)
HCT VFR BLD AUTO: 38.9 % (ref 37.5–51)
HGB BLD-MCNC: 13.4 G/DL (ref 13–17.7)
HOLD SPECIMEN: NORMAL
LYMPHOCYTES # BLD MANUAL: 1.68 10*3/MM3 (ref 0.7–3.1)
LYMPHOCYTES NFR BLD MANUAL: 5 % (ref 5–12)
MCH RBC QN AUTO: 30.2 PG (ref 26.6–33)
MCHC RBC AUTO-ENTMCNC: 34.4 G/DL (ref 31.5–35.7)
MCV RBC AUTO: 87.8 FL (ref 79–97)
MONOCYTES # BLD: 0.32 10*3/MM3 (ref 0.1–0.9)
NEUTROPHILS # BLD AUTO: 4.4 10*3/MM3 (ref 1.7–7)
NEUTROPHILS NFR BLD MANUAL: 67 % (ref 42.7–76)
NEUTS BAND NFR BLD MANUAL: 1 % (ref 0–5)
PLATELET # BLD AUTO: 135 10*3/MM3 (ref 140–450)
PMV BLD AUTO: 10.7 FL (ref 6–12)
POIKILOCYTOSIS BLD QL SMEAR: ABNORMAL
POTASSIUM SERPL-SCNC: 4.3 MMOL/L (ref 3.5–5.2)
PROT SERPL-MCNC: 7.6 G/DL (ref 6–8.5)
PSA SERPL-MCNC: 0.18 NG/ML (ref 0–4)
RBC # BLD AUTO: 4.43 10*6/MM3 (ref 4.14–5.8)
SMALL PLATELETS BLD QL SMEAR: ABNORMAL
SODIUM SERPL-SCNC: 130 MMOL/L (ref 136–145)
STOMATOCYTES BLD QL SMEAR: ABNORMAL
TESTOST SERPL-MCNC: 18.4 NG/DL (ref 193–740)
VARIANT LYMPHS NFR BLD MANUAL: 10 % (ref 19.6–45.3)
VARIANT LYMPHS NFR BLD MANUAL: 16 % (ref 0–5)
WBC MORPH BLD: NORMAL
WBC NRBC COR # BLD AUTO: 6.47 10*3/MM3 (ref 3.4–10.8)

## 2025-04-09 PROCEDURE — 85007 BL SMEAR W/DIFF WBC COUNT: CPT

## 2025-04-09 PROCEDURE — 85025 COMPLETE CBC W/AUTO DIFF WBC: CPT

## 2025-04-09 PROCEDURE — 80053 COMPREHEN METABOLIC PANEL: CPT

## 2025-04-09 PROCEDURE — 84403 ASSAY OF TOTAL TESTOSTERONE: CPT

## 2025-04-09 PROCEDURE — 36415 COLL VENOUS BLD VENIPUNCTURE: CPT

## 2025-04-09 PROCEDURE — 84153 ASSAY OF PSA TOTAL: CPT

## 2025-04-19 DIAGNOSIS — N31.9 NEUROGENIC BLADDER: ICD-10-CM

## 2025-04-21 ENCOUNTER — SPECIALTY PHARMACY (OUTPATIENT)
Dept: ONCOLOGY | Facility: HOSPITAL | Age: 80
End: 2025-04-21
Payer: MEDICARE

## 2025-04-21 RX ORDER — DUTASTERIDE 0.5 MG/1
CAPSULE, LIQUID FILLED ORAL
Qty: 90 CAPSULE | Refills: 3 | Status: SHIPPED | OUTPATIENT
Start: 2025-04-21

## 2025-04-21 NOTE — PROGRESS NOTES
Specialty Pharmacy Patient Management Program  Refill Outreach     Simeon was contacted today regarding refills of their medication(s).    Refill Questions      Flowsheet Row Most Recent Value   Changes to allergies? No   Changes to medications? No   New conditions or infections since last clinic visit No   Unplanned office visit, urgent care, ED, or hospital admission in the last 4 weeks  No   How does patient/caregiver feel medication is working? Good   Financial problems or insurance changes  No   Since the previous refill, were any specialty medication doses or scheduled injections missed or delayed?  No   Does this patient require a clinical escalation to a pharmacist? No            Delivery Questions      Flowsheet Row Most Recent Value   Delivery method UPS   Delivery address verified with patient/caregiver? Yes   Delivery address Home   Number of medications in delivery 1   Medication(s) being filled and delivered Apalutamide (ERLEADA)   Doses left of specialty medications 30   Copay verified? Yes   Copay amount $0   Copay form of payment No copayment ($0)   Delivery Date Selection 04/22/25   Signature Required No   Do you consent to receive electronic handouts?  Yes                 Follow-up: 30 day(s)     Dori Mathis, Pharmacy Technician  4/21/2025  16:04 CDT

## 2025-04-28 NOTE — PROGRESS NOTES
[facility] HEMATOLOGY & ONCOLOGY  [address]  [facility] [address] [phone]    Patient Name: Simoen Tam  Encounter Date: 05/07/2025  YOB: 1945  Patient Number: [id number]      REASON FOR FOLLOW-UP: Simeon Tam is a pleasant 79 y.o.  male who is seen in follow-up for nonmetastatic hormone refractory prostate carcinoma (HRPC).  The patient is seen C63D15 of monotherapy with apalutamide.  The patient is seen with spouse, Essence. History is obtained from patient.  He is a reliable historian.         Oncology/Hematology History Overview Note   DIAGNOSTIC ABNORMALITIES:  He presented with rising PSA of 13 and was diagnosed with prostate cancer about 25 years ago.   Previous PSA was 22.26 on 10/05/2016, <0.13 on 07/17/2017, 7.91 on 10/17/2018, 19.29 on 03/19/2020, and 22.25 on 04/07/2020.   He was seen by Dr. Oneil 05/12/2020. Latest PSA was 22.25 ng/ml on 04/07/2020.  Previous PSA 22.26 on 10/05/2016.  Bone scan and CT scan of the abdomen and pelvis were negative for prostate cancer on 10/2016.  Started on Casodex and possibly leuprolide.  PSA  was down to <0.13 on 07/17/207.  Plan for apalutamide after staging scans.   CT abdomen and pelvis 06/11/2020. No evidence of metastatic disease in the abdomen or pelvis. Right renal pelvis and ureter urothelial thickening and hyperemia. Urinary bladder wall is thickened and there is adjacent inflammation. Recommend correlation with urinalysis and exam to exclude cystitis with right-sided pyelitis.  Cirrhosis.  Tiny 8 mm hypodense RIGHT inferior liver lesion on image 33 is too small to characterize.   Bone scan 06/11/2020. No evidence of bone metastases.        PREVIOUS INTERVENTIONS:  He was treated with adjuvant radiation at [place] and androgen ablation with Casodex.  Lupron and Casodex 10/2016. He had 7 months of ADT therapy with Lupron.  Apalutamide 07/22/2020 through  present.     Prostate cancer   6/29/2020 -  Chemotherapy    OP PROSTATE Apalutamide     6/30/2020 Initial Diagnosis    Prostate cancer (CMS/[facility]ly [facility])     7/29/2020 -  Chemotherapy    OP LEUPROLIDE 45MG Q6M - ORDER EXPIRES 8/1/25     10/5/2022 - 10/5/2022 Chemotherapy    OP PROSTATE Apalutamide         PAST MEDICAL HISTORY:  ALLERGIES:  Allergies   Allergen Reactions    Sulfa Antibiotics Rash     CURRENT MEDICATIONS:  Outpatient Encounter Medications as of 5/7/2025   Medication Sig Dispense Refill    Apalutamide (ERLEADA) 60 MG tablet Take 4 tablets by mouth Daily. Take with or without food. Do not crush or chew tablets. 120 tablet 5    colestipol (COLESTID) 1 g tablet Take 2 tablets by mouth Daily.      dutasteride (AVODART) 0.5 MG capsule TAKE (1) CAPSULE ONCE DAILY. 90 capsule 3    JANUVIA 100 MG tablet Take 1 tablet by mouth Daily.      lisinopril-hydrochlorothiazide (PRINZIDE,ZESTORETIC) 20-12.5 MG per tablet Take 1 tablet by mouth Daily.      Loperamide HCl (IMODIUM PO) Take 1 dose by mouth As Needed (diarrhea).      Multiple Vitamins-Minerals (ICAPS AREDS 2 PO) Take 1 capsule by mouth Daily.      rosuvastatin (CRESTOR) 20 MG tablet Take 1 tablet by mouth Every Night.      tolterodine LA (DETROL LA) 4 MG 24 hr capsule TAKE (1) CAPSULE ONCE DAILY. 30 capsule 3     No facility-administered encounter medications on file as of 5/7/2025.     ADULT ILLNESSES:  Patient Active Problem List   Diagnosis Code    Hypertension I10    Hyperlipidemia E78.5    Diabetes mellitus E11.9    Cancer C80.1    Arrhythmia I49.9    Non morbid obesity due to excess calories E66.09    Prostate cancer C61    Anterior urethral stricture N35.914     SURGERIES:  Past Surgical History:   Procedure Laterality Date    CATARACT EXTRACTION      CYSTOSCOPY RETROGRADE PYELOGRAM Bilateral 4/21/2021    Procedure: CYSTOSCOPY RETROGRADE PYELOGRAM, possible DIRECT VISION INTERNAL URETHROTOMY;  Surgeon: Matthew Oneil MD;  Location: [facility];  Service:  "Urology;  Laterality: Bilateral;    CYSTOSCOPY URETHROTOMY VISUAL INTERNAL Bilateral 4/21/2021    Procedure: possible DIRECT VISION INTERNAL URETHROTOMY;  Surgeon: Matthew Oneil MD;  Location: [facility];  Service: Urology;  Laterality: Bilateral;    EXCISION LESION      neck    KIDNEY STONE SURGERY      REPLACEMENT TOTAL KNEE Left     TONSILLECTOMY       HEALTH MAINTENANCE ITEMS:  Health Maintenance Due   Topic Date Due    LIPID PANEL  Never done    DIABETIC FOOT EXAM  Never done    DIABETIC EYE EXAM  Never done    URINE MICROALBUMIN-CREATININE RATIO (uACR)  Never done    Pneumococcal Vaccine 50+ (1 of 2 - PCV) Never done    TDAP/TD VACCINES (1 - Tdap) Never done    ZOSTER VACCINE (1 of 2) Never done    Hepatitis B (1 of 3 - Risk 3-dose series) Never done    RSV Vaccine - Adults (1 - 1-dose 75+ series) Never done    HEMOGLOBIN A1C  Never done    COVID-19 Vaccine (4 - 2024-25 season) 09/01/2024       <no information>  Last Completed Colonoscopy    This patient has no relevant Health Maintenance data.         There is no immunization history on file for this patient.  Last Completed Mammogram    This patient has no relevant Health Maintenance data.           FAMILY HISTORY:  Family History   Problem Relation Age of Onset    Dementia Mother      SOCIAL HISTORY:  Social History     Socioeconomic History    Marital status:    Tobacco Use    Smoking status: Former     Current packs/day: 0.00     Types: Cigarettes     Passive exposure: Past    Smokeless tobacco: Never   Vaping Use    Vaping status: Never Used   Substance and Sexual Activity    Alcohol use: No    Drug use: No    Sexual activity: Defer       REVIEW OF SYSTEMS:    Review of Systems   Constitutional:  Negative for fatigue, fever and unexpected weight change.        \"I am good.\"   HENT:  Negative for congestion and mouth sores.    Eyes:  Negative for discharge and redness.   Respiratory:  Negative for shortness of breath and wheezing.  " "  Cardiovascular:  Negative for chest pain and palpitations.   Gastrointestinal:  Negative for constipation, diarrhea, nausea and vomiting.   Endocrine: Negative for cold intolerance and heat intolerance.   Genitourinary:  Negative for difficulty urinating and dysuria.   Musculoskeletal:  Negative for joint swelling.   Skin:  Negative for pallor.   Allergic/Immunologic: Negative for food allergies.   Neurological:  Negative for dizziness, speech difficulty and weakness.   Hematological:  Negative for adenopathy. Does not bruise/bleed easily.   Psychiatric/Behavioral:  Negative for agitation and confusion. The patient is not nervous/anxious.        VITAL SIGNS: /60   Pulse 80   Temp 97.6 °F (36.4 °C)   Resp 16   Ht 172.7 cm (68\")   Wt 86.2 kg (190 lb)   SpO2 93%   BMI 28.89 kg/m²  Gained 2 pounds.   Pain Score    05/07/25 1305   PainSc: 0-No pain       PHYSICAL EXAMINATION:     Physical Exam  Vitals reviewed.   Constitutional:       General: He is not in acute distress.  HENT:      Head: Normocephalic and atraumatic.   Eyes:      General: No scleral icterus.  Cardiovascular:      Rate and Rhythm: Normal rate.   Pulmonary:      Effort: No respiratory distress.      Breath sounds: No wheezing.   Abdominal:      General: Bowel sounds are normal.      Palpations: Abdomen is soft.   Musculoskeletal:         General: No swelling.   Skin:     Coloration: Skin is not pale.   Neurological:      Mental Status: He is alert and oriented to person, place, and time.   Psychiatric:         Mood and Affect: Mood normal.         Behavior: Behavior normal.         Thought Content: Thought content normal.         Judgment: Judgment normal.         LABS    Lab Results - Last 18 Months   Lab Units 05/07/25  1236 04/09/25  1050 03/12/25  1102 02/12/25  1029 01/15/25  1043 12/18/24  1030 11/20/24  0951 03/20/24  1043 02/21/24  1041   HEMOGLOBIN g/dL 12.8* 13.4 13.5 13.3 14.0 13.3 12.9*   < > 12.9*   HEMATOCRIT % 38.0 38.9 40.1 " 38.6 44.6 39.4 38.2   < > 38.8   MCV fL 89.2 87.8 88.5 88.1 93.9 89.7 88.8   < > 89.6   WBC 10*3/mm3 6.71 6.47 5.40 5.07 7.06 5.81 5.70   < > 6.81   RDW % 13.3 13.0 13.4 13.3 13.3 13.3 13.6   < > 13.0   MPV fL 10.8 10.7 10.8 10.5 11.5 10.6 10.7   < > 10.4   PLATELETS 10*3/mm3 119* 135* 113* 108* 123* 117* 104*   < > 166   IMM GRAN % % 0.4  --  0.2 0.2 0.4 0.3 0.2   < > 0.3   NEUTROS ABS 10*3/mm3 5.26 4.40 3.86 3.65 5.42 4.35 4.36   < > 5.23   LYMPHS ABS 10*3/mm3 0.86  --  0.95 0.83 0.97 0.86 0.80   < > 0.92   MONOS ABS 10*3/mm3 0.41  --  0.43 0.45 0.52 0.46 0.41   < > 0.58   EOS ABS 10*3/mm3 0.12 0.00 0.11 0.10 0.08 0.09 0.10   < > 0.03   BASOS ABS 10*3/mm3 0.03 0.06 0.04 0.03 0.04 0.03 0.02   < > 0.03   IMMATURE GRANS (ABS) 10*3/mm3 0.03  --  0.01 0.01 0.03 0.02 0.01   < > 0.02   NRBC /100 WBC  --   --   --   --   --   --   --   --  0.0   NEUTROPHIL % %  --  67.0  --   --   --   --   --   --   --    MONOCYTES % %  --  5.0  --   --   --   --   --   --   --    BASOPHIL % %  --  1.0  --   --   --   --   --   --   --    ATYP LYMPH % %  --  16.0*  --   --   --   --   --   --   --     < > = values in this interval not displayed.       Lab Results - Last 18 Months   Lab Units 04/09/25  1050 03/12/25  1102 02/12/25  1029 01/15/25  1043 12/18/24  1030 11/20/24  0951   GLUCOSE mg/dL 174* 169* 167* 152* 165* 159*   SODIUM mmol/L 130* 135* 135* 134* 135* 135*   POTASSIUM mmol/L 4.3 3.9 4.2 4.3 4.1 3.8   CO2 mmol/L 23.0 28.0 25.0 26.0 26.0 25.0   CHLORIDE mmol/L 95* 95* 97* 94* 97* 98   ANION GAP mmol/L 12.0 12.0 13.0 14.0 12.0 12.0   CREATININE mg/dL 0.57* 0.62* 0.52* 0.58* 0.65* 0.64*   BUN mg/dL 14 13 10 17 15 18   BUN / CREAT RATIO  24.6 21.0 19.2 29.3* 23.1 28.1*   CALCIUM mg/dL 9.7 9.9 9.4 9.9 9.4 9.5   ALK PHOS U/L 63 76 69 75 72 67   TOTAL PROTEIN g/dL 7.6 7.6 7.4 7.7 7.5 7.3   ALT (SGPT) U/L 10 19 17 18 12 12   AST (SGOT) U/L 16 28 28 24 19 21   BILIRUBIN mg/dL 0.4 0.5 0.3 0.5 0.6 0.5   ALBUMIN g/dL 4.1 4.3 4.2 4.5 4.4  "4.0   GLOBULIN gm/dL 3.5 3.3 3.2 3.2 3.1 3.3       No results for input(s): \"MSPIKE\", \"KAPPALAMB\", \"IGLFLC\", \"URICACID\", \"FREEKAPPAL\", \"CEA\", \"LDH\", \"REFLABREPO\" in the last 92680 hours.    No results for input(s): \"IRON\", \"TIBC\", \"LABIRON\", \"FERRITIN\", \"O2ZSHTL\", \"TSH\", \"FOLATE\" in the last 86972 hours.    Invalid input(s): \"VITB12\"    Simeon Tam reports a pain score of 0.          ASSESSMENT:  1.   Prostate cancer, hormone refractory.   AJCC stage (TX, NX, M0)  Treatment status:Casodex and leuprolide with PSA recurrence.  Patient on therapy with apalutamide and leuprolde (by Dr. Oneil).  2.   Performance status of 0.  3.   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  Patient on observation.  4.   Cirrhosis, etiology of thrombocytopenia.  He is off therapy.  5.   Urethral stricture. Self catheterization as indicated. \"As needed.\"              PLAN:  1.    Re: Tolerance to apalutamide.  \"Doing fine.\"   2.    Re:  Heme status.  Hemoglobin 12.8, WBC 6.7 and platelet 119 from 135 from 113 from 108 from 123 from 117 from 104. Ferritin pending and saturation pending.   3.    Re:  Pre-office CMP.  GFR 97.5 from 98.5 from 96 from 101.3 ml/minute and glucose 182.  4.    Re:  Pre-office PSA latest 0.182 from.130 from 0.148 from 0.147 from0.128 from0.117 from 0.118 from0.108 from 0.104 from0.083 from 0.086 from 0.086 from 0.083 from 0.101 from 0.08 from 0.047 from 0.064 from 0.056 from 0.071 from 0.053 from 0.050 from 0.047 from 0.048 from 0.038 from 0.029 from 0.031 from 0.031 from 0.026 from 0.024 from 0.019 from 0.020 from 0.016 from 0.015 from 0.018 (forgot my Lupron) from < 0.014 from < 0.014  from <0.014 from <0.014 from <0.01 from < 0.014 from < 0.014 from <0.014 from < 0.014 from < 0.014 from < 0.014 from < 0.014 from < 0.014 from < 0.014 from < 0.014 from < 0.014 from < 0.014 from < 0.014 from < 0.014 from 0.015 from 0.03 from 0.125 from 1.03 from 27.3. Testosterone " "latest 18.4 from 20.3 from 33.8 from 26.6 from 25.7 from 22.1 from 15.6 from 15.2 from from 21 from 17.9 from 24.1 from 18.2 from 35.8 from 28.4 from 8.87 from 31.1 from 30.4 from 44.9 from 36.1 from 26.4 from 26.6 from 25.1 from 35.2 from 11.8 from 35.7 from 23.4 from 28.1 from 25 from 28.2 from 25.1 from 34.1 from 18.1 from 527 from 580 from 443 from 235.  Most recent leuprolide given on 8/21/2024.  Restage if PSA at 2 based on prostate cancer working group for rise of 0.2 on 2 or more occasions per  Association of urology.   5.   eRx for prochlorperazine 10 mg p.o. every 4 hours as needed for nausea or vomiting, 60, 2 refills if needed.    6.   eRx for C63D1 started on 4/23/2025 apalutamide 60 mg, take four tablets total dose 240 mg po daily, number, 120.  Take either with or without food.  Swallow tablets whole. TSH before apalutamide.  No grapefruit juice or grapefruit.  Monitor for rash, thyroid dysfunction, hot flashes, seizures or falling.  7.   Continue ongoing management per primary care physician and the other specialists.  8.   Plan of care discussed with patient and spouse.  Understanding expressed.  Patient agreeable to proceed.  9.   Leuprolide administration per Dr. Oneil given every 6 months.  10.  Questions were answered to his satisfaction. \"Yes.\"   11.  Advance Care Planning  ACP discussion was declined by the patient. Patient does not have an advance directive, information provided.  12.  Plan for PSMA PET to restage if PSA at least 0.2.  13.  Return to office 4 weeks with CMP, PSA, testosterone, and CBC with differential, same day.               I have reviewed the assessment and plan and verified the accuracy of it. No changes to assessment and plan since the information was documented. Deshawn Black MD 05/07/25           I spent 32 total minutes, face-to-face, caring for Simeon today. Greater than 50% of this time involved counseling and/or coordination of care as documented within " this note.                            (Matthew Oneil MD)  (Mateo Duval MD)  Lucio Garcia MD

## 2025-05-07 ENCOUNTER — OFFICE VISIT (OUTPATIENT)
Dept: ONCOLOGY | Facility: CLINIC | Age: 80
End: 2025-05-07
Payer: MEDICARE

## 2025-05-07 ENCOUNTER — LAB (OUTPATIENT)
Dept: LAB | Facility: HOSPITAL | Age: 80
End: 2025-05-07
Payer: MEDICARE

## 2025-05-07 VITALS
SYSTOLIC BLOOD PRESSURE: 112 MMHG | HEIGHT: 68 IN | BODY MASS INDEX: 28.79 KG/M2 | OXYGEN SATURATION: 93 % | DIASTOLIC BLOOD PRESSURE: 60 MMHG | RESPIRATION RATE: 16 BRPM | HEART RATE: 80 BPM | TEMPERATURE: 97.6 F | WEIGHT: 190 LBS

## 2025-05-07 DIAGNOSIS — D50.8 IRON DEFICIENCY ANEMIA SECONDARY TO INADEQUATE DIETARY IRON INTAKE: ICD-10-CM

## 2025-05-07 DIAGNOSIS — C61 PROSTATE CANCER: Primary | ICD-10-CM

## 2025-05-07 LAB
ALBUMIN SERPL-MCNC: 4.1 G/DL (ref 3.5–5.2)
ALBUMIN/GLOB SERPL: 1.4 G/DL
ALP SERPL-CCNC: 60 U/L (ref 39–117)
ALT SERPL W P-5'-P-CCNC: 14 U/L (ref 1–41)
ANION GAP SERPL CALCULATED.3IONS-SCNC: 12 MMOL/L (ref 5–15)
AST SERPL-CCNC: 18 U/L (ref 1–40)
BASOPHILS # BLD AUTO: 0.03 10*3/MM3 (ref 0–0.2)
BASOPHILS NFR BLD AUTO: 0.4 % (ref 0–1.5)
BILIRUB SERPL-MCNC: 0.4 MG/DL (ref 0–1.2)
BUN SERPL-MCNC: 13 MG/DL (ref 8–23)
BUN/CREAT SERPL: 22 (ref 7–25)
CALCIUM SPEC-SCNC: 9.1 MG/DL (ref 8.6–10.5)
CHLORIDE SERPL-SCNC: 98 MMOL/L (ref 98–107)
CO2 SERPL-SCNC: 27 MMOL/L (ref 22–29)
CREAT SERPL-MCNC: 0.59 MG/DL (ref 0.76–1.27)
DEPRECATED RDW RBC AUTO: 43.8 FL (ref 37–54)
EGFRCR SERPLBLD CKD-EPI 2021: 97.5 ML/MIN/1.73
EOSINOPHIL # BLD AUTO: 0.12 10*3/MM3 (ref 0–0.4)
EOSINOPHIL NFR BLD AUTO: 1.8 % (ref 0.3–6.2)
ERYTHROCYTE [DISTWIDTH] IN BLOOD BY AUTOMATED COUNT: 13.3 % (ref 12.3–15.4)
FERRITIN SERPL-MCNC: 150.8 NG/ML (ref 30–400)
GLOBULIN UR ELPH-MCNC: 3 GM/DL
GLUCOSE SERPL-MCNC: 182 MG/DL (ref 65–99)
HCT VFR BLD AUTO: 38 % (ref 37.5–51)
HGB BLD-MCNC: 12.8 G/DL (ref 13–17.7)
HOLD SPECIMEN: NORMAL
IMM GRANULOCYTES # BLD AUTO: 0.03 10*3/MM3 (ref 0–0.05)
IMM GRANULOCYTES NFR BLD AUTO: 0.4 % (ref 0–0.5)
IRON 24H UR-MRATE: 76 MCG/DL (ref 59–158)
IRON SATN MFR SERPL: 23 % (ref 20–50)
LYMPHOCYTES # BLD AUTO: 0.86 10*3/MM3 (ref 0.7–3.1)
LYMPHOCYTES NFR BLD AUTO: 12.8 % (ref 19.6–45.3)
MCH RBC QN AUTO: 30 PG (ref 26.6–33)
MCHC RBC AUTO-ENTMCNC: 33.7 G/DL (ref 31.5–35.7)
MCV RBC AUTO: 89.2 FL (ref 79–97)
MONOCYTES # BLD AUTO: 0.41 10*3/MM3 (ref 0.1–0.9)
MONOCYTES NFR BLD AUTO: 6.1 % (ref 5–12)
NEUTROPHILS NFR BLD AUTO: 5.26 10*3/MM3 (ref 1.7–7)
NEUTROPHILS NFR BLD AUTO: 78.5 % (ref 42.7–76)
PLATELET # BLD AUTO: 119 10*3/MM3 (ref 140–450)
PMV BLD AUTO: 10.8 FL (ref 6–12)
POTASSIUM SERPL-SCNC: 4 MMOL/L (ref 3.5–5.2)
PROT SERPL-MCNC: 7.1 G/DL (ref 6–8.5)
PSA SERPL-MCNC: 0.16 NG/ML (ref 0–4)
RBC # BLD AUTO: 4.26 10*6/MM3 (ref 4.14–5.8)
SODIUM SERPL-SCNC: 137 MMOL/L (ref 136–145)
TESTOST SERPL-MCNC: 27 NG/DL (ref 193–740)
TIBC SERPL-MCNC: 328 MCG/DL (ref 298–536)
TRANSFERRIN SERPL-MCNC: 220 MG/DL (ref 200–360)
WBC NRBC COR # BLD AUTO: 6.71 10*3/MM3 (ref 3.4–10.8)

## 2025-05-07 PROCEDURE — 84403 ASSAY OF TOTAL TESTOSTERONE: CPT

## 2025-05-07 PROCEDURE — 85025 COMPLETE CBC W/AUTO DIFF WBC: CPT

## 2025-05-07 PROCEDURE — 36415 COLL VENOUS BLD VENIPUNCTURE: CPT

## 2025-05-07 PROCEDURE — 80053 COMPREHEN METABOLIC PANEL: CPT

## 2025-05-07 PROCEDURE — 84153 ASSAY OF PSA TOTAL: CPT

## 2025-05-07 PROCEDURE — 82728 ASSAY OF FERRITIN: CPT

## 2025-05-07 PROCEDURE — 84466 ASSAY OF TRANSFERRIN: CPT

## 2025-05-07 PROCEDURE — 83540 ASSAY OF IRON: CPT

## 2025-05-22 ENCOUNTER — SPECIALTY PHARMACY (OUTPATIENT)
Dept: ONCOLOGY | Facility: HOSPITAL | Age: 80
End: 2025-05-22
Payer: MEDICARE
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
## 2025-05-27 NOTE — PROGRESS NOTES
[facility] HEMATOLOGY & ONCOLOGY  [address]  [facility] [id number] [phone]    Patient Name: Simeon Tam  Encounter Date: 06/04/2025  YOB: 1945  Patient Number: [id number]      REASON FOR FOLLOW-UP: Simeon Tam is a pleasant 79 y.o.  male who is seen in follow-up for nonmetastatic hormone refractory prostate cancer (HRPC).  The patient is seen C64D15 of monotherapy with apalutamide.  The patient is seen with his spouse, Essence. History is obtained from patient.  The patient is a reliable historian.         Oncology/Hematology History Overview Note   DIAGNOSTIC ABNORMALITIES:  He presented with rising PSA of 13 and was diagnosed with prostate cancer about 25 years ago.   Previous PSA was 22.26 on 10/05/2016, <0.13 on 07/17/2017, 7.91 on 10/17/2018, 19.29 on 03/19/2020, and 22.25 on 04/07/2020.   He was seen by Dr. Oneil 05/12/2020. Latest PSA was 22.25 ng/ml on 04/07/2020.  Previous PSA 22.26 on 10/05/2016.  Bone scan and CT scan of the abdomen and pelvis were negative for prostate cancer on 10/2016.  Started on Casodex and possibly leuprolide.  PSA  was down to <0.13 on 07/17/207.  Plan for apalutamide after staging scans.   CT abdomen and pelvis 06/11/2020. No evidence of metastatic disease in the abdomen or pelvis. Right renal pelvis and ureter urothelial thickening and hyperemia. Urinary bladder wall is thickened and there is adjacent inflammation. Recommend correlation with urinalysis and exam to exclude cystitis with right-sided pyelitis.  Cirrhosis.  Tiny 8 mm hypodense RIGHT inferior liver lesion on image 33 is too small to characterize.   Bone scan 06/11/2020. No evidence of bone metastases.        PREVIOUS INTERVENTIONS:  He was treated with adjuvant radiation at [place] and androgen ablation with Casodex.  Lupron and Casodex 10/2016. He had 7 months of ADT therapy with Lupron.  Apalutamide 07/22/2020  through present.     Prostate cancer   6/29/2020 -  Chemotherapy    OP PROSTATE Apalutamide     6/30/2020 Initial Diagnosis    Prostate cancer (CMS/[facility])     7/29/2020 -  Chemotherapy    OP LEUPROLIDE 45MG Q6M - ORDER EXPIRES 8/1/25     10/5/2022 - 10/5/2022 Chemotherapy    OP PROSTATE Apalutamide         PAST MEDICAL HISTORY:  ALLERGIES:  Allergies   Allergen Reactions    Sulfa Antibiotics Rash     CURRENT MEDICATIONS:  Outpatient Encounter Medications as of 6/4/2025   Medication Sig Dispense Refill    Apalutamide (ERLEADA) 60 MG tablet Take 4 tablets by mouth Daily. Take with or without food. Do not crush or chew tablets. 120 tablet 5    colestipol (COLESTID) 1 g tablet Take 2 tablets by mouth Daily.      dutasteride (AVODART) 0.5 MG capsule TAKE (1) CAPSULE ONCE DAILY. 90 capsule 3    JANUVIA 100 MG tablet Take 1 tablet by mouth Daily.      lisinopril-hydrochlorothiazide (PRINZIDE,ZESTORETIC) 20-12.5 MG per tablet Take 1 tablet by mouth Daily.      Loperamide HCl (IMODIUM PO) Take 1 dose by mouth As Needed (diarrhea).      Multiple Vitamins-Minerals (ICAPS AREDS 2 PO) Take 1 capsule by mouth Daily.      rosuvastatin (CRESTOR) 20 MG tablet Take 1 tablet by mouth Every Night.      tolterodine LA (DETROL LA) 4 MG 24 hr capsule TAKE (1) CAPSULE ONCE DAILY. 30 capsule 3     No facility-administered encounter medications on file as of 6/4/2025.     ADULT ILLNESSES:  Patient Active Problem List   Diagnosis Code    Hypertension I10    Hyperlipidemia E78.5    Diabetes mellitus E11.9    Cancer C80.1    Arrhythmia I49.9    Non morbid obesity due to excess calories E66.09    Prostate cancer C61    Anterior urethral stricture N35.914     SURGERIES:  Past Surgical History:   Procedure Laterality Date    CATARACT EXTRACTION      CYSTOSCOPY RETROGRADE PYELOGRAM Bilateral 4/21/2021    Procedure: CYSTOSCOPY RETROGRADE PYELOGRAM, possible DIRECT VISION INTERNAL URETHROTOMY;  Surgeon: Matthew Oneil MD;  Location: [facility];   "Service: Urology;  Laterality: Bilateral;    CYSTOSCOPY URETHROTOMY VISUAL INTERNAL Bilateral 4/21/2021    Procedure: possible DIRECT VISION INTERNAL URETHROTOMY;  Surgeon: Matthew Oneil MD;  Location: [facility];  Service: Urology;  Laterality: Bilateral;    EXCISION LESION      neck    KIDNEY STONE SURGERY      REPLACEMENT TOTAL KNEE Left     TONSILLECTOMY       HEALTH MAINTENANCE ITEMS:  Health Maintenance Due   Topic Date Due    LIPID PANEL  Never done    DIABETIC FOOT EXAM  Never done    DIABETIC EYE EXAM  Never done    URINE MICROALBUMIN-CREATININE RATIO (uACR)  Never done    Pneumococcal Vaccine 50+ (1 of 2 - PCV) Never done    TDAP/TD VACCINES (1 - Tdap) Never done    ZOSTER VACCINE (1 of 2) Never done    Hepatitis B (1 of 3 - Risk 3-dose series) Never done    RSV Vaccine - Adults (1 - 1-dose 75+ series) Never done    HEMOGLOBIN A1C  Never done    COVID-19 Vaccine (4 - 2024-25 season) 09/01/2024       <no information>  Last Completed Colonoscopy    This patient has no relevant Health Maintenance data.         There is no immunization history on file for this patient.  Last Completed Mammogram    This patient has no relevant Health Maintenance data.           FAMILY HISTORY:  Family History   Problem Relation Age of Onset    Dementia Mother      SOCIAL HISTORY:  Social History     Socioeconomic History    Marital status:    Tobacco Use    Smoking status: Former     Current packs/day: 0.00     Types: Cigarettes     Passive exposure: Past    Smokeless tobacco: Never   Vaping Use    Vaping status: Never Used   Substance and Sexual Activity    Alcohol use: No    Drug use: No    Sexual activity: Defer       REVIEW OF SYSTEMS:    Review of Systems   Constitutional:  Negative for fatigue, fever and unexpected weight change.        \"I feel good.\"   HENT:  Negative for congestion and nosebleeds.    Eyes:  Negative for redness and visual disturbance.   Respiratory:  Negative for shortness of breath and " "wheezing.    Cardiovascular:  Negative for chest pain and palpitations.   Gastrointestinal:  Negative for constipation, diarrhea, nausea and vomiting.   Endocrine: Negative for cold intolerance and heat intolerance.   Genitourinary:  Positive for difficulty urinating. Negative for hematuria.   Musculoskeletal:  Negative for gait problem.   Skin:  Negative for pallor.   Allergic/Immunologic: Negative for food allergies.   Neurological:  Negative for dizziness, speech difficulty and weakness.   Hematological:  Negative for adenopathy. Does not bruise/bleed easily.   Psychiatric/Behavioral:  Negative for agitation and confusion. The patient is not nervous/anxious.          VITAL SIGNS: /80   Pulse 82   Temp 96.7 °F (35.9 °C) (Tympanic)   Resp 18   Ht 172.7 cm (68\")   Wt 86.1 kg (189 lb 14.4 oz)   SpO2 97%   BMI 28.87 kg/m²   Pain Score    06/04/25 1047   PainSc: 0-No pain       PHYSICAL EXAMINATION:     Physical Exam  Vitals reviewed.   Constitutional:       General: He is not in acute distress.  HENT:      Head: Normocephalic and atraumatic.   Eyes:      General: No scleral icterus.  Cardiovascular:      Rate and Rhythm: Normal rate.   Pulmonary:      Effort: No respiratory distress.      Breath sounds: No wheezing.   Abdominal:      General: Bowel sounds are normal.      Palpations: Abdomen is soft.      Tenderness: There is no abdominal tenderness.   Musculoskeletal:         General: No swelling.   Skin:     Coloration: Skin is not pale.   Neurological:      Mental Status: He is alert and oriented to person, place, and time.   Psychiatric:         Mood and Affect: Mood normal.         Behavior: Behavior normal.         Thought Content: Thought content normal.         Judgment: Judgment normal.         LABS    Lab Results - Last 18 Months   Lab Units 06/04/25  1040 05/07/25  1236 04/09/25  1050 03/12/25  1102 02/12/25  1029 01/15/25  1043 12/18/24  1030 03/20/24  1043 02/21/24  1041   HEMOGLOBIN g/dL " 13.2 12.8* 13.4 13.5 13.3 14.0 13.3   < > 12.9*   HEMATOCRIT % 39.2 38.0 38.9 40.1 38.6 44.6 39.4   < > 38.8   MCV fL 89.3 89.2 87.8 88.5 88.1 93.9 89.7   < > 89.6   WBC 10*3/mm3 5.92 6.71 6.47 5.40 5.07 7.06 5.81   < > 6.81   RDW % 13.5 13.3 13.0 13.4 13.3 13.3 13.3   < > 13.0   MPV fL 10.6 10.8 10.7 10.8 10.5 11.5 10.6   < > 10.4   PLATELETS 10*3/mm3 102* 119* 135* 113* 108* 123* 117*   < > 166   IMM GRAN % % 0.5 0.4  --  0.2 0.2 0.4 0.3   < > 0.3   NEUTROS ABS 10*3/mm3 4.41 5.26 4.40 3.86 3.65 5.42 4.35   < > 5.23   LYMPHS ABS 10*3/mm3 0.86 0.86  --  0.95 0.83 0.97 0.86   < > 0.92   MONOS ABS 10*3/mm3 0.46 0.41  --  0.43 0.45 0.52 0.46   < > 0.58   EOS ABS 10*3/mm3 0.13 0.12 0.00 0.11 0.10 0.08 0.09   < > 0.03   BASOS ABS 10*3/mm3 0.03 0.03 0.06 0.04 0.03 0.04 0.03   < > 0.03   IMMATURE GRANS (ABS) 10*3/mm3 0.03 0.03  --  0.01 0.01 0.03 0.02   < > 0.02   NRBC /100 WBC  --   --   --   --   --   --   --   --  0.0   NEUTROPHIL % %  --   --  67.0  --   --   --   --   --   --    MONOCYTES % %  --   --  5.0  --   --   --   --   --   --    BASOPHIL % %  --   --  1.0  --   --   --   --   --   --    ATYP LYMPH % %  --   --  16.0*  --   --   --   --   --   --     < > = values in this interval not displayed.       Lab Results - Last 18 Months   Lab Units 05/07/25  1236 04/09/25  1050 03/12/25  1102 02/12/25  1029 01/15/25  1043 12/18/24  1030   GLUCOSE mg/dL 182* 174* 169* 167* 152* 165*   SODIUM mmol/L 137 130* 135* 135* 134* 135*   POTASSIUM mmol/L 4.0 4.3 3.9 4.2 4.3 4.1   CO2 mmol/L 27.0 23.0 28.0 25.0 26.0 26.0   CHLORIDE mmol/L 98 95* 95* 97* 94* 97*   ANION GAP mmol/L 12.0 12.0 12.0 13.0 14.0 12.0   CREATININE mg/dL 0.59* 0.57* 0.62* 0.52* 0.58* 0.65*   BUN mg/dL 13 14 13 10 17 15   BUN / CREAT RATIO  22.0 24.6 21.0 19.2 29.3* 23.1   CALCIUM mg/dL 9.1 9.7 9.9 9.4 9.9 9.4   ALK PHOS U/L 60 63 76 69 75 72   TOTAL PROTEIN g/dL 7.1 7.6 7.6 7.4 7.7 7.5   ALT (SGPT) U/L 14 10 19 17 18 12   AST (SGOT) U/L 18 16 28 28 24 19  "  BILIRUBIN mg/dL 0.4 0.4 0.5 0.3 0.5 0.6   ALBUMIN g/dL 4.1 4.1 4.3 4.2 4.5 4.4   GLOBULIN gm/dL 3.0 3.5 3.3 3.2 3.2 3.1       No results for input(s): \"MSPIKE\", \"KAPPALAMB\", \"IGLFLC\", \"URICACID\", \"FREEKAPPAL\", \"CEA\", \"LDH\", \"REFLABREPO\" in the last 80851 hours.    Lab Results - Last 18 Months   Lab Units 05/07/25  1236   IRON mcg/dL 76   TIBC mcg/dL 328   IRON SATURATION (TSAT) % 23   FERRITIN ng/mL 150.80       Simeon Tam reports a pain score of 0.         ASSESSMENT:  1.   Prostate cancer, hormone refractory.   AJCC stage (TX, NX, M0)  Treatment status:Casodex and leuprolide with PSA recurrence.  Patient on therapy with apalutamide and leuprolde (by Dr. Oneil).  2.   Performance status of 0.  3.   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  Patient on observation.  4.   Cirrhosis, etiology of thrombocytopenia.  He is on surveillance  5.   Urethral stricture. Self catheterization as indicated. \"It varies.\"              PLAN:  1.    Re: Tolerance to apalutamide.  \"it's doing fine.\"   2.    Re:  Heme status.  Hemoglobin 13.2, WBC 5.9 and platelet 102 from 119 from 135 from 113 from 108 from 123 from 117 from 104. Ferritin 150.8 and saturation 23.   3.    Re:  Pre-office CMP.  GFR 97 from 97.5 from 98.5 from 96 from 101.3 ml/minute and glucose 182.  4.    Re:  Pre-office PSA latest 0.164 from 0.182 from.130 from 0.148 from 0.147 from0.128 from0.117 from 0.118 from0.108 from 0.104 from0.083 from 0.086 from 0.086 from 0.083 from 0.101 from 0.08 from 0.047 from 0.064 from 0.056 from 0.071 from 0.053 from 0.050 from 0.047 from 0.048 from 0.038 from 0.029 from 0.031 from 0.031 from 0.026 from 0.024 from 0.019 from 0.020 from 0.016 from 0.015 from 0.018 (forgot my Lupron) from < 0.014 from < 0.014  from <0.014 from <0.014 from <0.01 from < 0.014 from < 0.014 from <0.014 from < 0.014 from < 0.014 from < 0.014 from < 0.014 from < 0.014 from < 0.014 from < 0.014 from < 0.014 " "from < 0.014 from < 0.014 from < 0.014 from 0.015 from 0.03 from 0.125 from 1.03 from 27.3. Testosterone latest  27 from 18.4 from 20.3 from 33.8 from 26.6 from 25.7 from 22.1 from 15.6 from 15.2 from from 21 from 17.9 from 24.1 from 18.2 from 35.8 from 28.4 from 8.87 from 31.1 from 30.4 from 44.9 from 36.1 from 26.4 from 26.6 from 25.1 from 35.2 from 11.8 from 35.7 from 23.4 from 28.1 from 25 from 28.2 from 25.1 from 34.1 from 18.1 from 527 from 580 from 443 from 235.  Most recent leuprolide given on 8/21/2024.  Restage if PSA at 2 based on prostate cancer working group for rise of 0.2 on 2 or more occasions per  Association of urology.   5.   eRx for prochlorperazine 10 mg p.o. every 4 hours as needed for nausea or vomiting, 60, 2 refills if needed.    6.   eRx for C64D1 started on 5/21/2025 apalutamide 60 mg, take four tablets total dose 240 mg po daily, number, 120.  Take either with or without food.  Swallow tablets whole. TSH before apalutamide.  No grapefruit juice or grapefruit.  Observe for seizures, falling, hot flashes, thyroid dysfunction or seizures.  7.   Continue ongoing management per primary care physician and the other specialists.  8.   Plan of care discussed with patient and  his spouse.  Understanding expressed.  He is agreeable to proceed.  9.   Leuprolide administration by Dr. Oneil given every 6 months.  10.  Questions were answered to his satisfaction. \"Yes.\"   11.  Advance Care Planning  ACP discussion was declined by the patient. Patient does not have an advance directive, information provided.  12.  Plan to order PSMA PET to restage if PSA at least 0.2.  13.  Return to office 4 weeks with CMP, PSA, testosterone, and CBC with differential, same day.              I have reviewed the assessment and plan and verified the accuracy of it. No changes to assessment and plan since the information was documented. Deshawn Black MD 06/04/25         I spent 30 total minutes, face-to-face, " caring for Simeon today. Greater than 50% of this time involved counseling and/or coordination of care as documented within this note.                            (Matthew Oneil MD)  (Mateo Duval MD)  Lucio Garcia MD

## 2025-06-04 ENCOUNTER — LAB (OUTPATIENT)
Dept: LAB | Facility: HOSPITAL | Age: 80
End: 2025-06-04
Payer: MEDICARE

## 2025-06-04 ENCOUNTER — OFFICE VISIT (OUTPATIENT)
Dept: ONCOLOGY | Facility: CLINIC | Age: 80
End: 2025-06-04
Payer: MEDICARE

## 2025-06-04 VITALS
HEART RATE: 82 BPM | OXYGEN SATURATION: 97 % | TEMPERATURE: 96.7 F | BODY MASS INDEX: 28.78 KG/M2 | DIASTOLIC BLOOD PRESSURE: 80 MMHG | WEIGHT: 189.9 LBS | RESPIRATION RATE: 18 BRPM | HEIGHT: 68 IN | SYSTOLIC BLOOD PRESSURE: 128 MMHG

## 2025-06-04 DIAGNOSIS — C61 PROSTATE CANCER: Primary | ICD-10-CM

## 2025-06-04 LAB
ALBUMIN SERPL-MCNC: 4.2 G/DL (ref 3.5–5.2)
ALBUMIN/GLOB SERPL: 1.4 G/DL
ALP SERPL-CCNC: 62 U/L (ref 39–117)
ALT SERPL W P-5'-P-CCNC: 13 U/L (ref 1–41)
ANION GAP SERPL CALCULATED.3IONS-SCNC: 13 MMOL/L (ref 5–15)
AST SERPL-CCNC: 20 U/L (ref 1–40)
BASOPHILS # BLD AUTO: 0.03 10*3/MM3 (ref 0–0.2)
BASOPHILS NFR BLD AUTO: 0.5 % (ref 0–1.5)
BILIRUB SERPL-MCNC: 0.4 MG/DL (ref 0–1.2)
BUN SERPL-MCNC: 13.3 MG/DL (ref 8–23)
BUN/CREAT SERPL: 22.2 (ref 7–25)
CALCIUM SPEC-SCNC: 9.7 MG/DL (ref 8.6–10.5)
CHLORIDE SERPL-SCNC: 97 MMOL/L (ref 98–107)
CO2 SERPL-SCNC: 25 MMOL/L (ref 22–29)
CREAT SERPL-MCNC: 0.6 MG/DL (ref 0.76–1.27)
DEPRECATED RDW RBC AUTO: 44 FL (ref 37–54)
EGFRCR SERPLBLD CKD-EPI 2021: 97 ML/MIN/1.73
EOSINOPHIL # BLD AUTO: 0.13 10*3/MM3 (ref 0–0.4)
EOSINOPHIL NFR BLD AUTO: 2.2 % (ref 0.3–6.2)
ERYTHROCYTE [DISTWIDTH] IN BLOOD BY AUTOMATED COUNT: 13.5 % (ref 12.3–15.4)
GLOBULIN UR ELPH-MCNC: 3.1 GM/DL
GLUCOSE SERPL-MCNC: 170 MG/DL (ref 65–99)
HCT VFR BLD AUTO: 39.2 % (ref 37.5–51)
HGB BLD-MCNC: 13.2 G/DL (ref 13–17.7)
HOLD SPECIMEN: NORMAL
IMM GRANULOCYTES # BLD AUTO: 0.03 10*3/MM3 (ref 0–0.05)
IMM GRANULOCYTES NFR BLD AUTO: 0.5 % (ref 0–0.5)
LYMPHOCYTES # BLD AUTO: 0.86 10*3/MM3 (ref 0.7–3.1)
LYMPHOCYTES NFR BLD AUTO: 14.5 % (ref 19.6–45.3)
MCH RBC QN AUTO: 30.1 PG (ref 26.6–33)
MCHC RBC AUTO-ENTMCNC: 33.7 G/DL (ref 31.5–35.7)
MCV RBC AUTO: 89.3 FL (ref 79–97)
MONOCYTES # BLD AUTO: 0.46 10*3/MM3 (ref 0.1–0.9)
MONOCYTES NFR BLD AUTO: 7.8 % (ref 5–12)
NEUTROPHILS NFR BLD AUTO: 4.41 10*3/MM3 (ref 1.7–7)
NEUTROPHILS NFR BLD AUTO: 74.5 % (ref 42.7–76)
PLATELET # BLD AUTO: 102 10*3/MM3 (ref 140–450)
PMV BLD AUTO: 10.6 FL (ref 6–12)
POTASSIUM SERPL-SCNC: 4.2 MMOL/L (ref 3.5–5.2)
PROT SERPL-MCNC: 7.3 G/DL (ref 6–8.5)
PSA SERPL-MCNC: 0.15 NG/ML (ref 0–4)
RBC # BLD AUTO: 4.39 10*6/MM3 (ref 4.14–5.8)
SODIUM SERPL-SCNC: 135 MMOL/L (ref 136–145)
TESTOST SERPL-MCNC: 21.2 NG/DL (ref 193–740)
WBC NRBC COR # BLD AUTO: 5.92 10*3/MM3 (ref 3.4–10.8)

## 2025-06-04 PROCEDURE — 80053 COMPREHEN METABOLIC PANEL: CPT

## 2025-06-04 PROCEDURE — 36415 COLL VENOUS BLD VENIPUNCTURE: CPT

## 2025-06-04 PROCEDURE — 84403 ASSAY OF TOTAL TESTOSTERONE: CPT

## 2025-06-04 PROCEDURE — 85025 COMPLETE CBC W/AUTO DIFF WBC: CPT

## 2025-06-04 PROCEDURE — 84153 ASSAY OF PSA TOTAL: CPT

## 2025-06-10 DIAGNOSIS — N31.9 NEUROGENIC BLADDER: ICD-10-CM

## 2025-06-10 RX ORDER — TOLTERODINE 4 MG/1
CAPSULE, EXTENDED RELEASE ORAL
Qty: 30 CAPSULE | Refills: 2 | Status: SHIPPED | OUTPATIENT
Start: 2025-06-10

## 2025-06-17 ENCOUNTER — SPECIALTY PHARMACY (OUTPATIENT)
Dept: ONCOLOGY | Facility: HOSPITAL | Age: 80
End: 2025-06-17
Payer: MEDICARE

## 2025-06-17 NOTE — PROGRESS NOTES
Specialty Pharmacy Patient Management Program  Refill Outreach     Simeon was contacted today regarding refills of their medication(s).    Refill Questions      Flowsheet Row Most Recent Value   Changes to allergies? No   Changes to medications? No   New conditions or infections since last clinic visit No   Unplanned office visit, urgent care, ED, or hospital admission in the last 4 weeks  No   How does patient/caregiver feel medication is working? Excellent   Financial problems or insurance changes  No   Since the previous refill, were any specialty medication doses or scheduled injections missed or delayed?  No   Does this patient require a clinical escalation to a pharmacist? No            Delivery Questions      Flowsheet Row Most Recent Value   Delivery method [employer]   Delivery address verified with patient/caregiver? Yes   Delivery address Home   Number of medications in delivery 1   Medication(s) being filled and delivered Apalutamide (ERLEADA)   Doses left of specialty medications <7   Copay verified? Yes   Copay amount $0   Copay form of payment No copayment ($0)   Delivery Date Selection 06/18/25   Signature Required No   Do you consent to receive electronic handouts?  Yes          Spoke with patient's spouse. She states the patient is doing very well on the Erleada and that his PSA is going back down where before it was rising. No changes to his maintenance medications or allergies.        Follow-up: 30 day(s)     Dori Mathis, Pharmacy Technician  6/17/2025  12:26 CDT

## 2025-06-25 NOTE — PROGRESS NOTES
[facility] HEMATOLOGY & ONCOLOGY  [address]  [place] [address] [phone]    Patient Name: Simeon Tam  Encounter Date: 07/02/2025  YOB: 1945  Patient Number: [id number]      REASON FOR FOLLOW-UP: Simeon Tam is a pleasant 80 y.o.  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  The patient is seen C65D15 of monotherapy with apalutamide.  The patient is seen with Essence, spouse. History is obtained from patient.  He is a reliable historian.         Oncology/Hematology History Overview Note   DIAGNOSTIC ABNORMALITIES:  He presented with rising PSA of 13 and was diagnosed with prostate cancer about 25 years ago.   Previous PSA was 22.26 on 10/05/2016, <0.13 on 07/17/2017, 7.91 on 10/17/2018, 19.29 on 03/19/2020, and 22.25 on 04/07/2020.   He was seen by Dr. Oneil 05/12/2020. Latest PSA was 22.25 ng/ml on 04/07/2020.  Previous PSA 22.26 on 10/05/2016.  Bone scan and CT scan of the abdomen and pelvis were negative for prostate cancer on 10/2016.  Started on Casodex and possibly leuprolide.  PSA  was down to <0.13 on 07/17/207.  Plan for apalutamide after staging scans.   CT abdomen and pelvis 06/11/2020. No evidence of metastatic disease in the abdomen or pelvis. Right renal pelvis and ureter urothelial thickening and hyperemia. Urinary bladder wall is thickened and there is adjacent inflammation. Recommend correlation with urinalysis and exam to exclude cystitis with right-sided pyelitis.  Cirrhosis.  Tiny 8 mm hypodense RIGHT inferior liver lesion on image 33 is too small to characterize.   Bone scan 06/11/2020. No evidence of bone metastases.        PREVIOUS INTERVENTIONS:  He was treated with adjuvant radiation at [place] and androgen ablation with Casodex.  Lupron and Casodex 10/2016. He had 7 months of ADT therapy with Lupron.  Apalutamide 07/22/2020  through present.     Prostate cancer   6/29/2020 -  Chemotherapy    OP PROSTATE Apalutamide     6/30/2020 Initial Diagnosis    Prostate cancer (CMS/[facility])     7/29/2020 -  Chemotherapy    OP LEUPROLIDE 45MG Q6M - ORDER EXPIRES 8/1/25     10/5/2022 - 10/5/2022 Chemotherapy    OP PROSTATE Apalutamide         PAST MEDICAL HISTORY:  ALLERGIES:  Allergies   Allergen Reactions    Sulfa Antibiotics Rash     CURRENT MEDICATIONS:  Outpatient Encounter Medications as of 7/2/2025   Medication Sig Dispense Refill    Apalutamide (ERLEADA) 60 MG tablet Take 4 tablets by mouth Daily. Take with or without food. Do not crush or chew tablets. 120 tablet 5    colestipol (COLESTID) 1 g tablet Take 2 tablets by mouth Daily.      dutasteride (AVODART) 0.5 MG capsule TAKE (1) CAPSULE ONCE DAILY. 90 capsule 3    JANUVIA 100 MG tablet Take 1 tablet by mouth Daily.      lisinopril-hydrochlorothiazide (PRINZIDE,ZESTORETIC) 20-12.5 MG per tablet Take 1 tablet by mouth Daily.      Loperamide HCl (IMODIUM PO) Take 1 dose by mouth As Needed (diarrhea).      Multiple Vitamins-Minerals (ICAPS AREDS 2 PO) Take 1 capsule by mouth Daily.      rosuvastatin (CRESTOR) 20 MG tablet Take 1 tablet by mouth Every Night.      tolterodine LA (DETROL LA) 4 MG 24 hr capsule TAKE (1) CAPSULE ONCE DAILY. 30 capsule 2     No facility-administered encounter medications on file as of 7/2/2025.     ADULT ILLNESSES:  Patient Active Problem List   Diagnosis Code    Hypertension I10    Hyperlipidemia E78.5    Diabetes mellitus E11.9    Cancer C80.1    Arrhythmia I49.9    Non morbid obesity due to excess calories E66.09    Prostate cancer C61    Anterior urethral stricture N35.914     SURGERIES:  Past Surgical History:   Procedure Laterality Date    CATARACT EXTRACTION      CYSTOSCOPY RETROGRADE PYELOGRAM Bilateral 4/21/2021    Procedure: CYSTOSCOPY RETROGRADE PYELOGRAM, possible DIRECT VISION INTERNAL URETHROTOMY;  Surgeon: Matthew Oneil MD;  Location: [facility] OR;   "Service: Urology;  Laterality: Bilateral;    CYSTOSCOPY URETHROTOMY VISUAL INTERNAL Bilateral 4/21/2021    Procedure: possible DIRECT VISION INTERNAL URETHROTOMY;  Surgeon: Matthew Oneil MD;  Location: [facility];  Service: Urology;  Laterality: Bilateral;    EXCISION LESION      neck    KIDNEY STONE SURGERY      REPLACEMENT TOTAL KNEE Left     TONSILLECTOMY       HEALTH MAINTENANCE ITEMS:  Health Maintenance Due   Topic Date Due    LIPID PANEL  Never done    DIABETIC FOOT EXAM  Never done    DIABETIC EYE EXAM  Never done    URINE MICROALBUMIN-CREATININE RATIO (uACR)  Never done    Pneumococcal Vaccine 50+ (1 of 2 - PCV) Never done    TDAP/TD VACCINES (1 - Tdap) Never done    ZOSTER VACCINE (1 of 2) Never done    Hepatitis B (1 of 3 - Risk 3-dose series) Never done    RSV Vaccine - Adults (1 - 1-dose 75+ series) Never done    HEMOGLOBIN A1C  Never done    COVID-19 Vaccine (4 - 2024-25 season) 09/01/2024    INFLUENZA VACCINE  07/01/2025    ANNUAL WELLNESS VISIT  09/20/2025       <no information>  Last Completed Colonoscopy    This patient has no relevant Health Maintenance data.         There is no immunization history on file for this patient.  Last Completed Mammogram    This patient has no relevant Health Maintenance data.           FAMILY HISTORY:  Family History   Problem Relation Age of Onset    Dementia Mother      SOCIAL HISTORY:  Social History     Socioeconomic History    Marital status:    Tobacco Use    Smoking status: Former     Current packs/day: 0.00     Types: Cigarettes     Passive exposure: Past    Smokeless tobacco: Never   Vaping Use    Vaping status: Never Used   Substance and Sexual Activity    Alcohol use: No    Drug use: No    Sexual activity: Defer       REVIEW OF SYSTEMS:    Review of Systems   Constitutional:  Negative for fatigue, fever and unexpected weight change.        \"I feel good.\"   HENT:  Negative for congestion and nosebleeds.    Eyes:  Negative for redness and visual " "disturbance.   Respiratory:  Negative for shortness of breath and wheezing.    Cardiovascular:  Negative for chest pain and leg swelling.   Gastrointestinal:  Negative for nausea and vomiting.   Endocrine: Negative for cold intolerance and heat intolerance.   Genitourinary:  Negative for difficulty urinating and dysuria.   Musculoskeletal:  Negative for gait problem.   Skin:  Negative for pallor.   Allergic/Immunologic: Negative for food allergies.   Neurological:  Negative for dizziness, speech difficulty and weakness.   Hematological:  Negative for adenopathy. Does not bruise/bleed easily.   Psychiatric/Behavioral:  Negative for agitation and confusion. The patient is not nervous/anxious.        VITAL SIGNS: /60   Pulse 83   Temp 97.4 °F (36.3 °C)   Resp 16   Ht 172.7 cm (68\")   Wt 86 kg (189 lb 8 oz)   SpO2 96%   BMI 28.81 kg/m²   Pain Score    07/02/25 1101   PainSc: 0-No pain       PHYSICAL EXAMINATION:     Physical Exam  Vitals reviewed.   Constitutional:       General: He is not in acute distress.  HENT:      Head: Normocephalic and atraumatic.   Eyes:      General: No scleral icterus.  Cardiovascular:      Rate and Rhythm: Normal rate.   Pulmonary:      Effort: No respiratory distress.      Breath sounds: No wheezing.   Abdominal:      General: Bowel sounds are normal.      Palpations: Abdomen is soft.   Musculoskeletal:         General: No swelling.   Skin:     Coloration: Skin is not pale.   Neurological:      Mental Status: He is alert and oriented to person, place, and time.   Psychiatric:         Mood and Affect: Mood normal.         Behavior: Behavior normal.         Thought Content: Thought content normal.         Judgment: Judgment normal.         LABS    Lab Results - Last 18 Months   Lab Units 07/02/25  1042 06/04/25  1040 05/07/25  1236 04/09/25  1050 03/12/25  1102 02/12/25  1029 01/15/25  1043 03/20/24  1043 02/21/24  1041   HEMOGLOBIN g/dL 12.7* 13.2 12.8* 13.4 13.5 13.3 14.0   < > " 12.9*   HEMATOCRIT % 38.3 39.2 38.0 38.9 40.1 38.6 44.6   < > 38.8   MCV fL 89.1 89.3 89.2 87.8 88.5 88.1 93.9   < > 89.6   WBC 10*3/mm3 6.01 5.92 6.71 6.47 5.40 5.07 7.06   < > 6.81   RDW % 13.5 13.5 13.3 13.0 13.4 13.3 13.3   < > 13.0   MPV fL 10.7 10.6 10.8 10.7 10.8 10.5 11.5   < > 10.4   PLATELETS 10*3/mm3 91* 102* 119* 135* 113* 108* 123*   < > 166   IMM GRAN % % 0.3 0.5 0.4  --  0.2 0.2 0.4   < > 0.3   NEUTROS ABS 10*3/mm3 4.49 4.41 5.26 4.40 3.86 3.65 5.42   < > 5.23   LYMPHS ABS 10*3/mm3 0.84 0.86 0.86  --  0.95 0.83 0.97   < > 0.92   MONOS ABS 10*3/mm3 0.51 0.46 0.41  --  0.43 0.45 0.52   < > 0.58   EOS ABS 10*3/mm3 0.12 0.13 0.12 0.00 0.11 0.10 0.08   < > 0.03   BASOS ABS 10*3/mm3 0.03 0.03 0.03 0.06 0.04 0.03 0.04   < > 0.03   IMMATURE GRANS (ABS) 10*3/mm3 0.02 0.03 0.03  --  0.01 0.01 0.03   < > 0.02   NRBC /100 WBC  --   --   --   --   --   --   --   --  0.0   NEUTROPHIL % %  --   --   --  67.0  --   --   --   --   --    MONOCYTES % %  --   --   --  5.0  --   --   --   --   --    BASOPHIL % %  --   --   --  1.0  --   --   --   --   --    ATYP LYMPH % %  --   --   --  16.0*  --   --   --   --   --     < > = values in this interval not displayed.       Lab Results - Last 18 Months   Lab Units 06/04/25  1040 05/07/25  1236 04/09/25  1050 03/12/25  1102 02/12/25  1029 01/15/25  1043   GLUCOSE mg/dL 170* 182* 174* 169* 167* 152*   SODIUM mmol/L 135* 137 130* 135* 135* 134*   POTASSIUM mmol/L 4.2 4.0 4.3 3.9 4.2 4.3   CO2 mmol/L 25.0 27.0 23.0 28.0 25.0 26.0   CHLORIDE mmol/L 97* 98 95* 95* 97* 94*   ANION GAP mmol/L 13.0 12.0 12.0 12.0 13.0 14.0   CREATININE mg/dL 0.60* 0.59* 0.57* 0.62* 0.52* 0.58*   BUN mg/dL 13.3 13 14 13 10 17   BUN / CREAT RATIO  22.2 22.0 24.6 21.0 19.2 29.3*   CALCIUM mg/dL 9.7 9.1 9.7 9.9 9.4 9.9   ALK PHOS U/L 62 60 63 76 69 75   TOTAL PROTEIN g/dL 7.3 7.1 7.6 7.6 7.4 7.7   ALT (SGPT) U/L 13 14 10 19 17 18   AST (SGOT) U/L 20 18 16 28 28 24   BILIRUBIN mg/dL 0.4 0.4 0.4 0.5 0.3 0.5  "  ALBUMIN g/dL 4.2 4.1 4.1 4.3 4.2 4.5   GLOBULIN gm/dL 3.1 3.0 3.5 3.3 3.2 3.2       No results for input(s): \"MSPIKE\", \"KAPPALAMB\", \"IGLFLC\", \"URICACID\", \"FREEKAPPAL\", \"CEA\", \"LDH\", \"REFLABREPO\" in the last 93900 hours.    Lab Results - Last 18 Months   Lab Units 05/07/25  1236   IRON mcg/dL 76   TIBC mcg/dL 328   IRON SATURATION (TSAT) % 23   FERRITIN ng/mL 150.80       Simeon Tam reports a pain score of 0.          ASSESSMENT:  1.   Prostate cancer, hormone refractory.   AJCC stage (TX, NX, M0)  Treatment status:Casodex and leuprolide with PSA recurrence.  Patient on therapy with apalutamide and leuprolde (by Dr. Oneil).  2.   Performance status of 0.  3.   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  Patient on observation.  4.   Cirrhosis, etiology of thrombocytopenia.  He is on surveillance  5.   Urethral stricture. Self catheterization as indicated. \"It just varies.\"              PLAN:  1.    Re: Tolerance to apalutamide.  \"Fine.\" Note from pharmacy on 6/17/2025.  Doing well with apalutamide.  2.    Re:  Heme status.  Hemoglobin 12.7, WBC 6.01 and platelet 91 from 102 from 119 from 135 from 113 from 108 from 123 from 117 from 104. Ferritin 150.8 and saturation 23.   3.    Re:  Pre-office CMP.  GFR latest 97 from 97.5 from 98.5 from 96 from 101.3 ml/minute and glucose 182.  4.    Re:  Pre-office PSA latest 0.148 from 0.164 from 0.182 from.130 from 0.148 from 0.147 from0.128 from 0.117 from 0.118 from0.108 from 0.104 from0.083 from 0.086 from 0.086 from 0.083 from 0.101 from 0.08 from 0.047 from 0.064 from 0.056 from 0.071 from 0.053 from 0.050 from 0.047 from 0.048 from 0.038 from 0.029 from 0.031 from 0.031 from 0.026 from 0.024 from 0.019 from 0.020 from 0.016 from 0.015 from 0.018 (forgot my Lupron) from < 0.014 from < 0.014  from <0.014 from <0.014 from <0.01 from < 0.014 from < 0.014 from <0.014 from < 0.014 from < 0.014 from < 0.014 from < 0.014 from < " "0.014 from < 0.014 from < 0.014 from < 0.014 from < 0.014 from < 0.014 from < 0.014 from 0.015 from 0.03 from 0.125 from 1.03 from 27.3. Testosterone latest 21.3 27 from 18.4 from 20.3 from 33.8 from 26.6 from 25.7 from 22.1 from 15.6 from 15.2 from from 21 from 17.9 from 24.1 from 18.2 from 35.8 from 28.4 from 8.87 from 31.1 from 30.4 from 44.9 from 36.1 from 26.4 from 26.6 from 25.1 from 35.2 from 11.8 from 35.7 from 23.4 from 28.1 from 25 from 28.2 from 25.1 from 34.1 from 18.1 from 527 from 580 from 443 from 235.  Most recent leuprolide given on 8/21/2024.  Restage if PSA at 2 based on prostate cancer working group for rise of 0.2 on 2 or more occasions per  Association of urology.   5.   eRx for prochlorperazine 10 mg p.o. every 4 hours as needed for nausea or vomiting, 60, 2 refills if needed.    6.   eRx for C65D1 started on 6/18/2025 apalutamide 60 mg, take four tablets total dose 240 mg po daily, number, 120.  Take either with or without food.  Swallow tablets whole. TSH before apalutamide.  No grapefruit juice or grapefruit.  Monitor for seizures, falling, hot flashes, dysfunction or fatigue.  7.   Continue ongoing management per primary care physician and the other specialists.  8.   Plan of care discussed with patient and spouse.  Understanding expressed.  Patient agreed to proceed.  9.   Leuprolide administration by Dr. Oneil given every 6 months.  Latest leuprolide on 3/5/2025  10.  Questions were answered to his satisfaction. \"Yes\"   11.  Advance Care Planning  ACP discussion was declined by the patient. Patient does not have an advance directive, information provided.  12.  Plan to schedule PSMA PET to restage if PSA at least 0.2.  13.  Return to office 5 weeks with CMP, PSA, testosterone, and CBC with differential, same day.             I have reviewed the assessment and plan and verified the accuracy of it. No changes to assessment and plan since the information was documented. Deshawn" MD Sofia 07/02/25         I spent 31 total minutes, face-to-face, caring for Simeon today. Greater than 50% of this time involved counseling and/or coordination of care as documented within this note.                            (Matthew Oneil MD)  (Mateo Duval MD)  Lucio Garcia MD

## 2025-06-30 ENCOUNTER — SPECIALTY PHARMACY (OUTPATIENT)
Dept: ONCOLOGY | Facility: HOSPITAL | Age: 80
End: 2025-06-30
Payer: MEDICARE

## 2025-06-30 NOTE — PROGRESS NOTES
Specialty Pharmacy Patient Management Program  Hematology & Oncology 6-Month Clinical Assessment       Simeon Tam is a 80 y.o. male with Prostate Cancer seen today to assess adherence and side effects.    Consulting Provider: Deshawn Black MD ([facility]emat[facility])  Oral Chemotherapy Regimen: apalutamide [Erleada] 240 mg PO daily  Specialty Pharmacy: [facility]     Reason for Outreach: Routine medication check-in .    Oncology/Hematology History Overview Note    Oncology/Hematology History Overview Note   DIAGNOSTIC ABNORMALITIES:  He presented with rising PSA of 13 and was diagnosed with prostate cancer about 25 years ago.   Previous PSA was 22.26 on 10/05/2016, <0.13 on 07/17/2017, 7.91 on 10/17/2018, 19.29 on 03/19/2020, and 22.25 on 04/07/2020.   He was seen by Dr. Oneil 05/12/2020. Latest PSA was 22.25 ng/ml on 04/07/2020.  Previous PSA 22.26 on 10/05/2016.  Bone scan and CT scan of the abdomen and pelvis were negative for prostate cancer on 10/2016.  Started on Casodex and possibly leuprolide.  PSA  was down to <0.13 on 07/17/207.  Plan for apalutamide after staging scans.   CT abdomen and pelvis 06/11/2020. No evidence of metastatic disease in the abdomen or pelvis. Right renal pelvis and ureter urothelial thickening and hyperemia. Urinary bladder wall is thickened and there is adjacent inflammation. Recommend correlation with urinalysis and exam to exclude cystitis with right-sided pyelitis.  Cirrhosis.  Tiny 8 mm hypodense RIGHT inferior liver lesion on image 33 is too small to characterize.   Bone scan 06/11/2020. No evidence of bone metastases.        PREVIOUS INTERVENTIONS:  He was treated with adjuvant radiation at [place] and androgen ablation with Casodex.  Lupron and Casodex 10/2016. He had 7 months of ADT therapy with Lupron.  Apalutamide 07/22/2020 through present.     Prostate cancer   6/29/2020 -  Chemotherapy     OP PROSTATE Apalutamide     6/30/2020 Initial Diagnosis    Prostate cancer (CMS/HCC)     7/29/2020 -  Chemotherapy    OP LEUPROLIDE 45MG Q6M - ORDER EXPIRES 8/1/25     10/5/2022 - 10/5/2022 Chemotherapy    OP PROSTATE Apalutamide         Problem List   Problem list reviewed by Carolee Diaz PharmD on 6/30/2025 at  1:26 PM  Medicines reviewed by Carolee Diaz PharmD on 6/30/2025 at  1:26 PM  Allergies reviewed by Carolee Diaz PharmD on 6/30/2025 at  1:26 PM  Patient Active Problem List   Diagnosis Code    Hypertension I10    Hyperlipidemia E78.5    Diabetes mellitus E11.9    Cancer C80.1    Arrhythmia I49.9    Non morbid obesity due to excess calories E66.09    Prostate cancer C61    Anterior urethral stricture N35.914       Specialty Pharmacy Goal:   Goals Addressed Today        Specialty Pharmacy General Goal      Achieve progression free disease as evidenced by provider markers (ie. undetectable PSA WNL).    03/05/25: Pre-office PSA latest 0.147 from0.128 from0.117 from 0.118 from0.108 from 0.104 from0.083 from 0.086 from 0.086 from 0.083 from 0.101 from 0.08 from 0.047 from 0.064 from 0.056 from 0.071 from 0.053 from 0.050 from 0.047 from 0.048 from 0.038 from 0.029 from 0.031 from 0.031 from 0.026 from 0.024 from 0.019 from 0.020 from 0.016 from 0.015 from 0.018 (forgot my Lupron) from < 0.014 from < 0.014  from <0.014 from <0.014 from <0.01 from < 0.014 from < 0.014 from <0.014 from < 0.014 from < 0.014 from < 0.014 from < 0.014 from < 0.014 from < 0.014 from < 0.014 from < 0.014 from < 0.014 from < 0.014 from < 0.014 from 0.015 from 0.03 from 0.125 from 1.03 from 27.3. Testosterone latest 26.6 from 25.7 from 22.1 from 15.6 from 15.2 from from 21 from 17.9 from 24.1 from 18.2 from 35.8 from 28.4 from 8.87 from 31.1 from 30.4 from 44.9 from 36.1 from 26.4 from 26.6 from 25.1 from 35.2 from 11.8 from 35.7 from 23.4 from 28.1 from 25 from 28.2 from 25.1 from 34.1 from 18.1 from 527 from 580 from  443 from 235. On track.  06/30/25: Most recent scans/labs/provider notes detail no evidence of disease progression - will continue Erleada. On track.              Medication Regimen Assessment:  Administration: Patient is taking every day at the same time  and as prescribed .   Patient can self administer medications: yes  Medication Follow-Up Plan: Refill coordination    Lab(s) Review:  The labs listed below have been reviewed. No dose adjustments are needed for the oral specialty medication(s) based on the labs.    Lab Results   Component Value Date    GLUCOSE 170 (H) 06/04/2025    CALCIUM 9.7 06/04/2025     (L) 06/04/2025    K 4.2 06/04/2025    CO2 25.0 06/04/2025    CL 97 (L) 06/04/2025    BUN 13.3 06/04/2025    CREATININE 0.60 (L) 06/04/2025    EGFRIFNONA 117 02/09/2022    BCR 22.2 06/04/2025    ANIONGAP 13.0 06/04/2025     Lab Results   Component Value Date    WBC 5.92 06/04/2025    RBC 4.39 06/04/2025    HGB 13.2 06/04/2025    HCT 39.2 06/04/2025    MCV 89.3 06/04/2025    MCH 30.1 06/04/2025    MCHC 33.7 06/04/2025    RDW 13.5 06/04/2025    RDWSD 44.0 06/04/2025    MPV 10.6 06/04/2025     (L) 06/04/2025    NEUTRORELPCT 74.5 06/04/2025    LYMPHORELPCT 14.5 (L) 06/04/2025    MONORELPCT 7.8 06/04/2025    EOSRELPCT 2.2 06/04/2025    BASORELPCT 0.5 06/04/2025    AUTOIGPER 0.5 06/04/2025    NEUTROABS 4.41 06/04/2025    LYMPHSABS 0.86 06/04/2025    MONOSABS 0.46 06/04/2025    EOSABS 0.13 06/04/2025    BASOSABS 0.03 06/04/2025    AUTOIGNUM 0.03 06/04/2025    NRBC 0.0 02/21/2024       Drug-Drug Interaction(s) Assessment:  Completed medication reconciliation today to assess for drug interactions. Patient denies starting or stopping any medications.    Assessed medication list for interactions, no significant drug interactions noted.   Advised patient to call the clinic if any new medications are started so we can assess for drug-drug interactions.  Drug-food interactions discussed: eating grapefruit and  drinking grapefruit juice  Vaccines are coordinated by the patient's oncologist and primary care provider.    Medication(s) Review:   Medicines reviewed by Carolee Diaz PharmD on 6/30/2025 at  1:26 PM  Prior to Admission medications    Medication Sig Start Date End Date Taking? Authorizing Provider   Apalutamide (ERLEADA) 60 MG tablet Take 4 tablets by mouth Daily. Take with or without food. Do not crush or chew tablets. 1/30/25   Deshawn Black MD   colestipol (COLESTID) 1 g tablet Take 2 tablets by mouth Daily. 3/11/24   Tati Hancock MD   dutasteride (AVODART) 0.5 MG capsule TAKE (1) CAPSULE ONCE DAILY. 4/21/25   Matthew Oneil MD   JANUVIA 100 MG tablet Take 1 tablet by mouth Daily. 11/28/16   Tati Hancock MD   lisinopril-hydrochlorothiazide (PRINZIDE,ZESTORETIC) 20-12.5 MG per tablet Take 1 tablet by mouth Daily. 11/28/16   Tati Hancock MD   Loperamide HCl (IMODIUM PO) Take 1 dose by mouth As Needed (diarrhea).    Tati Hancock MD   Multiple Vitamins-Minerals (ICAPS AREDS 2 PO) Take 1 capsule by mouth Daily.    Tati Hancock MD   rosuvastatin (CRESTOR) 20 MG tablet Take 1 tablet by mouth Every Night. 11/28/16   Tati Hancock MD   tolterodine LA (DETROL LA) 4 MG 24 hr capsule TAKE (1) CAPSULE ONCE DAILY. 6/10/25   Matthew Oneil MD   tolterodine LA (DETROL LA) 4 MG 24 hr capsule TAKE (1) CAPSULE ONCE DAILY. 2/18/25 6/10/25  Matthew Oneil MD       Allergy Review:  Known allergies and reactions were discussed with the patient. The patient's chart has been reviewed for allergy information and updated as necessary.   Allergies   Allergen Reactions    Sulfa Antibiotics Rash         Allergies reviewed by Carolee Diaz, PharmD on 6/30/2025 at  1:26 PM    Hospitalizations and Urgent Care Visits Since Last Visit Assessment:  Unplanned hospitalizations or inpatient admissions: none reported  ED Visits: none reported  Urgent Office Visits: none  reported    Adherence Assessment:  No adherence issues noted during 6-month clinical discussion.    Quality of Life Assessment:  Quality of Life: 7/10    Financial Assessment:  Medication availability/affordability: Patient has had no issues obtaining medication from pharmacy.    Follow-Up(s):  No needs expressed by the patient or otherwise identified. Will follow-up in ~1 month(s) regarding the patient's oral chemotherapy with a routine telephone consultation. Will conduct the next in-person clinical assessment visit within the next 6 month(s).    Attestation:  I attest the patient was actively involved in and has agreed to the above plan of care. If the prescribed therapy is at any point deemed not appropriate based on the current or future assessments, a consultation will be initiated with the patient's specialty care provider to determine the best course of action. The revised plan of therapy will be documented along with any required assessments and/or additional patient education provided.     Finally, all questions and concerns today were addressed to the best of my knowledge within the 6-month clinical assessment office visit. Patient verbalized they had the Specialty Pharmacy Services contact information for any future concerns or questions.         Electronically signed 06/30/2025 13:27 CDT by:  Carolee Diaz PharmD  Hematology & Oncology Clinic Specialty Pharmacist  [facility]  Phone: [phone]

## 2025-07-02 ENCOUNTER — OFFICE VISIT (OUTPATIENT)
Dept: ONCOLOGY | Facility: CLINIC | Age: 80
End: 2025-07-02
Payer: MEDICARE

## 2025-07-02 ENCOUNTER — LAB (OUTPATIENT)
Dept: LAB | Facility: HOSPITAL | Age: 80
End: 2025-07-02
Payer: MEDICARE

## 2025-07-02 VITALS
DIASTOLIC BLOOD PRESSURE: 60 MMHG | OXYGEN SATURATION: 96 % | WEIGHT: 189.5 LBS | HEART RATE: 83 BPM | RESPIRATION RATE: 16 BRPM | SYSTOLIC BLOOD PRESSURE: 132 MMHG | HEIGHT: 68 IN | BODY MASS INDEX: 28.72 KG/M2 | TEMPERATURE: 97.4 F

## 2025-07-02 DIAGNOSIS — C61 PROSTATE CANCER: Primary | ICD-10-CM

## 2025-07-02 LAB
ALBUMIN SERPL-MCNC: 4.2 G/DL (ref 3.5–5.2)
ALBUMIN/GLOB SERPL: 1.4 G/DL
ALP SERPL-CCNC: 80 U/L (ref 39–117)
ALT SERPL W P-5'-P-CCNC: 15 U/L (ref 1–41)
ANION GAP SERPL CALCULATED.3IONS-SCNC: 12 MMOL/L (ref 5–15)
AST SERPL-CCNC: 23 U/L (ref 1–40)
BASOPHILS # BLD AUTO: 0.03 10*3/MM3 (ref 0–0.2)
BASOPHILS NFR BLD AUTO: 0.5 % (ref 0–1.5)
BILIRUB SERPL-MCNC: 0.3 MG/DL (ref 0–1.2)
BUN SERPL-MCNC: 15.4 MG/DL (ref 8–23)
BUN/CREAT SERPL: 26.6 (ref 7–25)
CALCIUM SPEC-SCNC: 9.7 MG/DL (ref 8.6–10.5)
CHLORIDE SERPL-SCNC: 97 MMOL/L (ref 98–107)
CO2 SERPL-SCNC: 23 MMOL/L (ref 22–29)
CREAT SERPL-MCNC: 0.58 MG/DL (ref 0.76–1.27)
DEPRECATED RDW RBC AUTO: 43.8 FL (ref 37–54)
EGFRCR SERPLBLD CKD-EPI 2021: 97.4 ML/MIN/1.73
EOSINOPHIL # BLD AUTO: 0.12 10*3/MM3 (ref 0–0.4)
EOSINOPHIL NFR BLD AUTO: 2 % (ref 0.3–6.2)
ERYTHROCYTE [DISTWIDTH] IN BLOOD BY AUTOMATED COUNT: 13.5 % (ref 12.3–15.4)
GLOBULIN UR ELPH-MCNC: 2.9 GM/DL
GLUCOSE SERPL-MCNC: 147 MG/DL (ref 65–99)
HCT VFR BLD AUTO: 38.3 % (ref 37.5–51)
HGB BLD-MCNC: 12.7 G/DL (ref 13–17.7)
HOLD SPECIMEN: NORMAL
IMM GRANULOCYTES # BLD AUTO: 0.02 10*3/MM3 (ref 0–0.05)
IMM GRANULOCYTES NFR BLD AUTO: 0.3 % (ref 0–0.5)
LYMPHOCYTES # BLD AUTO: 0.84 10*3/MM3 (ref 0.7–3.1)
LYMPHOCYTES NFR BLD AUTO: 14 % (ref 19.6–45.3)
MCH RBC QN AUTO: 29.5 PG (ref 26.6–33)
MCHC RBC AUTO-ENTMCNC: 33.2 G/DL (ref 31.5–35.7)
MCV RBC AUTO: 89.1 FL (ref 79–97)
MONOCYTES # BLD AUTO: 0.51 10*3/MM3 (ref 0.1–0.9)
MONOCYTES NFR BLD AUTO: 8.5 % (ref 5–12)
NEUTROPHILS NFR BLD AUTO: 4.49 10*3/MM3 (ref 1.7–7)
NEUTROPHILS NFR BLD AUTO: 74.7 % (ref 42.7–76)
PLATELET # BLD AUTO: 91 10*3/MM3 (ref 140–450)
PMV BLD AUTO: 10.7 FL (ref 6–12)
POTASSIUM SERPL-SCNC: 3.9 MMOL/L (ref 3.5–5.2)
PROT SERPL-MCNC: 7.1 G/DL (ref 6–8.5)
PSA SERPL-MCNC: 0.18 NG/ML (ref 0–4)
RBC # BLD AUTO: 4.3 10*6/MM3 (ref 4.14–5.8)
SODIUM SERPL-SCNC: 132 MMOL/L (ref 136–145)
TESTOST SERPL-MCNC: 24.2 NG/DL (ref 193–740)
WBC NRBC COR # BLD AUTO: 6.01 10*3/MM3 (ref 3.4–10.8)

## 2025-07-02 PROCEDURE — 80053 COMPREHEN METABOLIC PANEL: CPT

## 2025-07-02 PROCEDURE — 36415 COLL VENOUS BLD VENIPUNCTURE: CPT

## 2025-07-02 PROCEDURE — 84153 ASSAY OF PSA TOTAL: CPT

## 2025-07-02 PROCEDURE — 84403 ASSAY OF TOTAL TESTOSTERONE: CPT

## 2025-07-02 PROCEDURE — 85025 COMPLETE CBC W/AUTO DIFF WBC: CPT

## 2025-07-16 ENCOUNTER — SPECIALTY PHARMACY (OUTPATIENT)
Dept: ONCOLOGY | Facility: HOSPITAL | Age: 80
End: 2025-07-16
Payer: MEDICARE

## 2025-07-16 DIAGNOSIS — C61 PROSTATE CANCER: ICD-10-CM

## 2025-07-16 NOTE — PROGRESS NOTES
Specialty Pharmacy Patient Management Program  Refill Outreach     Simeon was contacted today regarding refills of their medication(s).    Refill Questions      Flowsheet Row Most Recent Value   Changes to allergies? No   Changes to medications? No   New conditions or infections since last clinic visit No   How does patient/caregiver feel medication is working? Very good   Financial problems or insurance changes  No   Since the previous refill, were any specialty medication doses or scheduled injections missed or delayed?  No   Does this patient require a clinical escalation to a pharmacist? No            Delivery Questions      Flowsheet Row Most Recent Value   Delivery method [employer]   Delivery address verified with patient/caregiver? Yes   Delivery address Home   Number of medications in delivery 1   Medication(s) being filled and delivered Apalutamide (ERLEADA)   Doses left of specialty medications <7   Copay verified? Yes   Copay amount $0   Copay form of payment No copayment ($0)   Delivery Date Selection 07/17/25   Signature Required No   Do you consent to receive electronic handouts?  Yes                 Follow-up: 30 day(s)     Dori Mathis, Pharmacy Technician  7/16/2025  10:58 CDT

## 2025-08-06 ENCOUNTER — LAB (OUTPATIENT)
Dept: LAB | Facility: HOSPITAL | Age: 80
End: 2025-08-06
Payer: MEDICARE

## 2025-08-06 ENCOUNTER — OFFICE VISIT (OUTPATIENT)
Dept: ONCOLOGY | Facility: CLINIC | Age: 80
End: 2025-08-06
Payer: MEDICARE

## 2025-08-06 ENCOUNTER — TELEPHONE (OUTPATIENT)
Dept: UROLOGY | Facility: CLINIC | Age: 80
End: 2025-08-06
Payer: MEDICARE

## 2025-08-06 VITALS
OXYGEN SATURATION: 94 % | BODY MASS INDEX: 28.7 KG/M2 | RESPIRATION RATE: 16 BRPM | SYSTOLIC BLOOD PRESSURE: 120 MMHG | TEMPERATURE: 97.5 F | WEIGHT: 189.4 LBS | DIASTOLIC BLOOD PRESSURE: 64 MMHG | HEART RATE: 99 BPM | HEIGHT: 68 IN

## 2025-08-06 DIAGNOSIS — C61 PROSTATE CANCER: Primary | ICD-10-CM

## 2025-08-06 LAB
ALBUMIN SERPL-MCNC: 4.3 G/DL (ref 3.5–5.2)
ALBUMIN/GLOB SERPL: 1.3 G/DL
ALP SERPL-CCNC: 71 U/L (ref 39–117)
ALT SERPL W P-5'-P-CCNC: 12 U/L (ref 1–41)
ANION GAP SERPL CALCULATED.3IONS-SCNC: 15 MMOL/L (ref 5–15)
AST SERPL-CCNC: 19 U/L (ref 1–40)
BILIRUB SERPL-MCNC: 0.3 MG/DL (ref 0–1.2)
BUN SERPL-MCNC: 15.7 MG/DL (ref 8–23)
BUN/CREAT SERPL: 22.1 (ref 7–25)
CALCIUM SPEC-SCNC: 10 MG/DL (ref 8.6–10.5)
CHLORIDE SERPL-SCNC: 95 MMOL/L (ref 98–107)
CO2 SERPL-SCNC: 21 MMOL/L (ref 22–29)
CREAT SERPL-MCNC: 0.71 MG/DL (ref 0.76–1.27)
DEPRECATED RDW RBC AUTO: 44 FL (ref 37–54)
EGFRCR SERPLBLD CKD-EPI 2021: 91.6 ML/MIN/1.73
ERYTHROCYTE [DISTWIDTH] IN BLOOD BY AUTOMATED COUNT: 13.4 % (ref 12.3–15.4)
GLOBULIN UR ELPH-MCNC: 3.2 GM/DL
GLUCOSE SERPL-MCNC: 164 MG/DL (ref 65–99)
HCT VFR BLD AUTO: 40.6 % (ref 37.5–51)
HGB BLD-MCNC: 13.6 G/DL (ref 13–17.7)
HOLD SPECIMEN: NORMAL
MCH RBC QN AUTO: 30 PG (ref 26.6–33)
MCHC RBC AUTO-ENTMCNC: 33.5 G/DL (ref 31.5–35.7)
MCV RBC AUTO: 89.4 FL (ref 79–97)
NRBC BLD AUTO-RTO: 0 /100 WBC (ref 0–0.2)
PLATELET # BLD AUTO: 122 10*3/MM3 (ref 140–450)
PMV BLD AUTO: 10.7 FL (ref 6–12)
POTASSIUM SERPL-SCNC: 4.1 MMOL/L (ref 3.5–5.2)
PROT SERPL-MCNC: 7.5 G/DL (ref 6–8.5)
PSA SERPL-MCNC: 0.19 NG/ML (ref 0–4)
RBC # BLD AUTO: 4.54 10*6/MM3 (ref 4.14–5.8)
SODIUM SERPL-SCNC: 131 MMOL/L (ref 136–145)
TESTOST SERPL-MCNC: 26.1 NG/DL (ref 193–740)
WBC NRBC COR # BLD AUTO: 5.7 10*3/MM3 (ref 3.4–10.8)

## 2025-08-06 PROCEDURE — 80053 COMPREHEN METABOLIC PANEL: CPT

## 2025-08-06 PROCEDURE — 36415 COLL VENOUS BLD VENIPUNCTURE: CPT

## 2025-08-06 PROCEDURE — 84153 ASSAY OF PSA TOTAL: CPT

## 2025-08-06 PROCEDURE — 85025 COMPLETE CBC W/AUTO DIFF WBC: CPT

## 2025-08-06 PROCEDURE — 84403 ASSAY OF TOTAL TESTOSTERONE: CPT

## 2025-08-12 ENCOUNTER — SPECIALTY PHARMACY (OUTPATIENT)
Dept: ONCOLOGY | Facility: HOSPITAL | Age: 80
End: 2025-08-12
Payer: MEDICARE

## (undated) DEVICE — HYDROGEL COATED LATEX FOLEY CATHETER, 5 CC, 3-WAY, 20 FR (6.7 MM): Brand: DOVER

## (undated) DEVICE — PK CYSTO 30

## (undated) DEVICE — GW AMPLTZ SUPERSTIFF STR .035IN 145CM

## (undated) DEVICE — CATH URETH FLEXIMA CONE TP 5F 70CM

## (undated) DEVICE — ST DIL URETH 8TO24F

## (undated) DEVICE — PK TURNOVER CYSTO RM

## (undated) DEVICE — BAG,DRAINAGE,4L,A/R TOWER,LL,SLIDE TAP: Brand: MEDLINE

## (undated) DEVICE — GLV SURG BIOGEL M LTX PF 8